# Patient Record
Sex: FEMALE | Race: WHITE | NOT HISPANIC OR LATINO | Employment: FULL TIME | URBAN - METROPOLITAN AREA
[De-identification: names, ages, dates, MRNs, and addresses within clinical notes are randomized per-mention and may not be internally consistent; named-entity substitution may affect disease eponyms.]

---

## 2017-07-21 ENCOUNTER — OFFICE VISIT (OUTPATIENT)
Dept: ORTHOPEDICS | Facility: CLINIC | Age: 67
End: 2017-07-21
Payer: OTHER GOVERNMENT

## 2017-07-21 ENCOUNTER — HOSPITAL ENCOUNTER (OUTPATIENT)
Dept: RADIOLOGY | Facility: HOSPITAL | Age: 67
Discharge: HOME | End: 2017-07-21
Attending: NURSE PRACTITIONER
Payer: OTHER GOVERNMENT

## 2017-07-21 VITALS — HEIGHT: 64 IN | BODY MASS INDEX: 32.8 KG/M2 | WEIGHT: 192.13 LBS

## 2017-07-21 DIAGNOSIS — G89.29 CHRONIC PAIN OF BOTH KNEES: Primary | ICD-10-CM

## 2017-07-21 DIAGNOSIS — M17.0 PRIMARY OSTEOARTHRITIS OF BOTH KNEES: ICD-10-CM

## 2017-07-21 DIAGNOSIS — M25.561 CHRONIC PAIN OF BOTH KNEES: ICD-10-CM

## 2017-07-21 DIAGNOSIS — M25.561 CHRONIC PAIN OF BOTH KNEES: Primary | ICD-10-CM

## 2017-07-21 DIAGNOSIS — M25.562 CHRONIC PAIN OF BOTH KNEES: Primary | ICD-10-CM

## 2017-07-21 DIAGNOSIS — M25.562 CHRONIC PAIN OF BOTH KNEES: ICD-10-CM

## 2017-07-21 DIAGNOSIS — G89.29 CHRONIC PAIN OF BOTH KNEES: ICD-10-CM

## 2017-07-21 PROCEDURE — 1159F MED LIST DOCD IN RCRD: CPT | Mod: S$GLB,,, | Performed by: NURSE PRACTITIONER

## 2017-07-21 PROCEDURE — 20610 DRAIN/INJ JOINT/BURSA W/O US: CPT | Mod: 50,S$GLB,, | Performed by: NURSE PRACTITIONER

## 2017-07-21 PROCEDURE — 73564 X-RAY EXAM KNEE 4 OR MORE: CPT | Mod: TC,50

## 2017-07-21 PROCEDURE — 99203 OFFICE O/P NEW LOW 30 MIN: CPT | Mod: 25,S$GLB,, | Performed by: NURSE PRACTITIONER

## 2017-07-21 PROCEDURE — 99999 PR PBB SHADOW E&M-NEW PATIENT-LVL III: CPT | Mod: PBBFAC,,, | Performed by: NURSE PRACTITIONER

## 2017-07-21 PROCEDURE — 1125F AMNT PAIN NOTED PAIN PRSNT: CPT | Mod: S$GLB,,, | Performed by: NURSE PRACTITIONER

## 2017-07-21 PROCEDURE — 73564 X-RAY EXAM KNEE 4 OR MORE: CPT | Mod: 26,50,, | Performed by: RADIOLOGY

## 2017-07-21 RX ORDER — IRBESARTAN 300 MG/1
300 TABLET ORAL NIGHTLY
COMMUNITY
Start: 2017-07-10 | End: 2021-03-10

## 2017-07-21 RX ORDER — SIMVASTATIN 10 MG/1
10 TABLET, FILM COATED ORAL NIGHTLY
COMMUNITY
End: 2022-01-11 | Stop reason: SDUPTHER

## 2017-07-21 RX ORDER — TRIAMCINOLONE ACETONIDE 40 MG/ML
80 INJECTION, SUSPENSION INTRA-ARTICULAR; INTRAMUSCULAR
Status: COMPLETED | OUTPATIENT
Start: 2017-07-21 | End: 2017-07-21

## 2017-07-21 RX ORDER — MELOXICAM 15 MG/1
15 TABLET ORAL DAILY
Qty: 30 TABLET | Refills: 1 | Status: SHIPPED | OUTPATIENT
Start: 2017-07-21 | End: 2017-09-20 | Stop reason: SDUPTHER

## 2017-07-21 RX ORDER — DILTIAZEM HYDROCHLORIDE 240 MG/1
240 CAPSULE, COATED, EXTENDED RELEASE ORAL EVERY MORNING
COMMUNITY
Start: 2017-06-30 | End: 2022-01-11

## 2017-07-21 RX ORDER — DOXAZOSIN 2 MG/1
2 TABLET ORAL EVERY MORNING
Refills: 1 | COMMUNITY
Start: 2017-06-30 | End: 2022-05-02 | Stop reason: SDUPTHER

## 2017-07-21 RX ADMIN — TRIAMCINOLONE ACETONIDE 80 MG: 40 INJECTION, SUSPENSION INTRA-ARTICULAR; INTRAMUSCULAR at 01:07

## 2017-07-21 NOTE — LETTER
July 21, 2017      Cabrera Ricketts MD  [address] [place] [id number]           [facility] - Orthopedics  [address], 5th [address]  [facility] [address]  Phone: [phone]          Patient: Maria Elena Jackson   MR Number: [id number]   YOB: 1950   Date of Visit: 7/21/2017       Dear Dr. Cabrera Ricketts:    Thank you for referring Maria Elena Jackson to me for evaluation. Attached you will find relevant portions of my assessment and plan of care.    If you have questions, please do not hesitate to call me. I look forward to following Maria Elena Jackson along with you.    Sincerely,    Lu Segura NP    Enclosure  CC:  No Recipients    If you would like to receive this communication electronically, please contact [email or web address] or [phone] to request more information on [employer] Link access.    For providers and/or their staff who would like to refer a patient to [facility], please contact us through our one-stop-shop provider referral line, [facility], at [phone].    If you feel you have received this communication in error or would no longer like to receive these types of communications, please e-mail [email or web address]

## 2017-07-21 NOTE — PROGRESS NOTES
CC: Pain of the Left Knee and Pain of the Right Knee      HPI: Pt with bilateral knee pain for the past several months. The pain started before she took a trip to [facility] in April. There was posterior knee swelling. She took advil with some relief and used voltaren gel which also helped her pain. She has had increased swelling in the left knee over the past week. She is still taking advil with some relief of the pain. She is ambulating without assistive device. There is not a limp.    ROS  General: denies fever and chills  Resp: no c/o sob  CVS: no c/o cp  MSK: c/o bilateral knee pain left>right with associated swelling    PE  General: AAOx3, pleasant and cooperative  Resp: respirations even and unlabored  MSK: bilateral knee exam  0 degrees extension  120 degrees flexion  No warmth or erythema   - effusion    Xray:  Ordered and reviewed by me: There is no evidence for acute fracture dislocation bilateral knees. Tricompartmental degenerative change greater on the right most prominent involving the medial femoral tibial compartment with mild/moderate joint space loss. There is no significant change in genu deformity. No focal bone erosion or evidence for significant joint effusion.    Assessment:  Bilateral knee djd    Plan:  Cortisone injections bilateral knees today  mobic 15mg po qd  RICE  F/u as needed    Knee Injection Procedure Note    Pre-operative Diagnosis: bilateral knee degenerative arthritis    Post-operative Diagnosis: same    Indications: bilateral knee pain    Anesthesia: none    Procedure Details     Verbal consent was obtained for the procedure. The injection site was identified and the skin was prepared with alcohol. The bilateral knee was injected from an anterolateral approach with 1 ml of Kenalog and 5 ml Lidocaine under sterile technique using a 22 gauge needle. The needle was removed and the area cleansed and dressed.    Complications:  None; patient tolerated the procedure well.    she was  advised to rest the knee today, using ice and elevation as needed for comfort and swelling. she did receive immediate relief of the knee pain. she was told this would be short lived and is secondary to the lidocaine. she may have an increase in discomfort tonight followed by steady improvement over the next several days. It may take 1-3 weeks following the injection to get the full benefit of the medication.

## 2017-09-20 DIAGNOSIS — M25.561 CHRONIC PAIN OF BOTH KNEES: ICD-10-CM

## 2017-09-20 DIAGNOSIS — G89.29 CHRONIC PAIN OF BOTH KNEES: ICD-10-CM

## 2017-09-20 DIAGNOSIS — M25.562 CHRONIC PAIN OF BOTH KNEES: ICD-10-CM

## 2017-09-20 RX ORDER — MELOXICAM 15 MG/1
15 TABLET ORAL DAILY
Qty: 30 TABLET | Refills: 1 | Status: SHIPPED | OUTPATIENT
Start: 2017-09-20 | End: 2017-11-08 | Stop reason: SDUPTHER

## 2017-11-08 ENCOUNTER — TELEPHONE (OUTPATIENT)
Dept: ORTHOPEDICS | Facility: CLINIC | Age: 67
End: 2017-11-08

## 2017-11-08 DIAGNOSIS — M25.561 CHRONIC PAIN OF BOTH KNEES: ICD-10-CM

## 2017-11-08 DIAGNOSIS — G89.29 CHRONIC PAIN OF BOTH KNEES: ICD-10-CM

## 2017-11-08 DIAGNOSIS — M25.562 CHRONIC PAIN OF BOTH KNEES: ICD-10-CM

## 2017-11-08 RX ORDER — MELOXICAM 15 MG/1
15 TABLET ORAL DAILY
Qty: 30 TABLET | Refills: 1 | Status: SHIPPED | OUTPATIENT
Start: 2017-11-08 | End: 2019-09-26 | Stop reason: CLARIF

## 2017-11-08 NOTE — TELEPHONE ENCOUNTER
----- Message from Fidelia Edmondson MA sent at 11/8/2017  1:20 PM CST -----  Contact: [facility] Pharmacist      ----- Message -----  From: Mary Barros  Sent: 11/8/2017  12:23 PM  To: [facility] Staff    Pt needs a refill on her Mobic medication.

## 2017-11-14 RX ORDER — MELOXICAM 15 MG/1
15 TABLET ORAL DAILY
Qty: 90 TABLET | Refills: 1 | Status: SHIPPED | OUTPATIENT
Start: 2017-11-14 | End: 2018-12-18

## 2018-03-02 ENCOUNTER — OFFICE VISIT (OUTPATIENT)
Dept: ORTHOPEDICS | Facility: CLINIC | Age: 68
End: 2018-03-02
Payer: OTHER GOVERNMENT

## 2018-03-02 VITALS — BODY MASS INDEX: 32.28 KG/M2 | WEIGHT: 189.06 LBS | HEIGHT: 64 IN

## 2018-03-02 DIAGNOSIS — M17.0 PRIMARY OSTEOARTHRITIS OF BOTH KNEES: Primary | ICD-10-CM

## 2018-03-02 PROCEDURE — 20610 DRAIN/INJ JOINT/BURSA W/O US: CPT | Mod: 50,S$GLB,, | Performed by: PHYSICIAN ASSISTANT

## 2018-03-02 PROCEDURE — 99213 OFFICE O/P EST LOW 20 MIN: CPT | Mod: 25,S$GLB,, | Performed by: PHYSICIAN ASSISTANT

## 2018-03-02 PROCEDURE — 99999 PR PBB SHADOW E&M-EST. PATIENT-LVL III: CPT | Mod: PBBFAC,,, | Performed by: PHYSICIAN ASSISTANT

## 2018-03-02 RX ORDER — TRIAMCINOLONE ACETONIDE 40 MG/ML
80 INJECTION, SUSPENSION INTRA-ARTICULAR; INTRAMUSCULAR
Status: COMPLETED | OUTPATIENT
Start: 2018-03-02 | End: 2018-03-02

## 2018-03-02 RX ADMIN — TRIAMCINOLONE ACETONIDE 80 MG: 40 INJECTION, SUSPENSION INTRA-ARTICULAR; INTRAMUSCULAR at 04:03

## 2018-03-02 NOTE — PROGRESS NOTES
Subjective:      Patient ID: Maria Elena Jackson is a 67 y.o. female.    Chief Complaint: No chief complaint on file.    HPI  67 year old female presents with chief complaint of bilateral knee pain L>R x couple of months. She denies trauma. She has a known h/o OA. She has slight pain with walking and pain with stairs. She took mobic in the past. She received cortisone injections last July that gave good relief. She does not use assistive devices.   Review of Systems   Constitution: Negative for chills, fever and night sweats.   Cardiovascular: Negative for chest pain.   Respiratory: Negative for cough and shortness of breath.    Hematologic/Lymphatic: Does not bruise/bleed easily.   Skin: Negative for color change.   Gastrointestinal: Negative for heartburn.   Genitourinary: Negative for dysuria.   Neurological: Negative for numbness and paresthesias.   Psychiatric/Behavioral: Negative for altered mental status.   Allergic/Immunologic: Negative for persistent infections.         Objective:            General    Vitals reviewed.  Constitutional: She is oriented to person, place, and time. She appears well-developed and well-nourished.   Cardiovascular: Normal rate.    Neurological: She is alert and oriented to person, place, and time.             Right Knee Exam:  ROM 0-125 degrees  No effusion  +crepitus  TTP medial joint line    Left Knee Exam:  ROM 0-125 degrees  Small effusion  No warmth or erythema  +crepitus  TTP medial and lateral joint line      X-ray: reviewed by myself. DJD both knees.      Assessment:       Encounter Diagnosis   Name Primary?    Primary osteoarthritis of both knees Yes          Plan:       Patient is going to [place] next week. She would like cortisone injections. Ice and elevate knee. Mobic as needed. HEP given. RTC prn.     PROCEDURE:  I have explained the risks, benefits, and alternatives of the procedure in detail.  The patient voices understanding and all questions have been  answered.  The patient agrees to proceed as planned. So after I performed a sterile prep of the skin in the normal fashion the bilateral knee is injected using a 22 gauge needle from the anterolateral approach with a combination of 4cc 1% plain lidocaine and 40 mg of kenalog.  The patient is cautioned and immediate relief of pain is secondary to the local anesthetic and will be temporary.  After the anesthetic wears off there may be a increase in pain that may last for a few hours or a few days and they should use ice to help alleviate this flair up of pain.

## 2018-09-20 ENCOUNTER — OFFICE VISIT (OUTPATIENT)
Dept: ORTHOPEDICS | Facility: CLINIC | Age: 68
End: 2018-09-20
Payer: OTHER GOVERNMENT

## 2018-09-20 VITALS
WEIGHT: 192 LBS | SYSTOLIC BLOOD PRESSURE: 160 MMHG | HEIGHT: 64 IN | BODY MASS INDEX: 32.78 KG/M2 | HEART RATE: 64 BPM | DIASTOLIC BLOOD PRESSURE: 73 MMHG

## 2018-09-20 DIAGNOSIS — M17.0 PRIMARY OSTEOARTHRITIS OF BOTH KNEES: Primary | ICD-10-CM

## 2018-09-20 PROCEDURE — 99213 OFFICE O/P EST LOW 20 MIN: CPT | Mod: 25,S$GLB,, | Performed by: PHYSICIAN ASSISTANT

## 2018-09-20 PROCEDURE — 99999 PR PBB SHADOW E&M-EST. PATIENT-LVL III: CPT | Mod: PBBFAC,,, | Performed by: PHYSICIAN ASSISTANT

## 2018-09-20 PROCEDURE — 20610 DRAIN/INJ JOINT/BURSA W/O US: CPT | Mod: 50,S$GLB,, | Performed by: PHYSICIAN ASSISTANT

## 2018-09-20 RX ORDER — METHYLPREDNISOLONE ACETATE 80 MG/ML
80 INJECTION, SUSPENSION INTRA-ARTICULAR; INTRALESIONAL; INTRAMUSCULAR; SOFT TISSUE
Status: COMPLETED | OUTPATIENT
Start: 2018-09-20 | End: 2018-09-20

## 2018-09-20 RX ADMIN — METHYLPREDNISOLONE ACETATE 80 MG: 80 INJECTION, SUSPENSION INTRA-ARTICULAR; INTRALESIONAL; INTRAMUSCULAR; SOFT TISSUE at 09:09

## 2018-09-20 NOTE — PROGRESS NOTES
Subjective:      Patient ID: Maria Elena Jackson is a 68 y.o. female.    Chief Complaint: No chief complaint on file.    HPI  68 year old female returns regarding her bilateral knee pain R>L. No trauma. She has h/o OA. Pain is worse with stairs. She has pain with walking and twisting. Aleve and advil give some relief. Mobic does not help anymore. She had cortisone injections in the past. Last injection was in March but it only lasted about 2 months.   Review of Systems   Constitution: Negative for chills, fever and night sweats.   Cardiovascular: Negative for chest pain.   Respiratory: Negative for cough and shortness of breath.    Hematologic/Lymphatic: Does not bruise/bleed easily.   Skin: Negative for color change.   Gastrointestinal: Negative for heartburn.   Genitourinary: Negative for dysuria.   Neurological: Negative for numbness and paresthesias.   Psychiatric/Behavioral: Negative for altered mental status.   Allergic/Immunologic: Negative for persistent infections.         Objective:            General    Vitals reviewed.  Constitutional: She is oriented to person, place, and time. She appears well-developed and well-nourished.   Cardiovascular: Normal rate.    Neurological: She is alert and oriented to person, place, and time.             Bilateral Knee Exam:  ROM 0-120 degrees  No effusion  No warmth or erythema  TTP medial joint line      X-ray: reviewed by myself. There is no evidence for acute fracture dislocation bilateral knees. Tricompartmental degenerative change greater on the right most prominent involving the medial femoral tibial compartment with mild/moderate joint space loss. There is no significant change in genu deformity. No focal bone erosion or evidence for significant joint effusion.      Assessment:       Encounter Diagnosis   Name Primary?    Primary osteoarthritis of both knees Yes          Plan:       Discussed treatment options with patient. She is going on a cruise in 2 weeks  and would like to repeat cortisone today. RTC prn.     PROCEDURE:  I have explained the risks, benefits, and alternatives of the procedure in detail.  The patient voices understanding and all questions have been answered.  The patient agrees to proceed as planned. So after I performed a sterile prep of the skin in the normal fashion the bilateral knee is injected using a 22 gauge needle from the anterolateral approach with a combination of 4cc 1% plain lidocaine and 80 mg of depo medrol.  The patient is cautioned and immediate relief of pain is secondary to the local anesthetic and will be temporary.  After the anesthetic wears off there may be a increase in pain that may last for a few hours or a few days and they should use ice to help alleviate this flair up of pain.

## 2018-11-20 DIAGNOSIS — M17.0 PRIMARY OSTEOARTHRITIS OF BOTH KNEES: Primary | ICD-10-CM

## 2018-11-20 NOTE — PROGRESS NOTES
Patient received very short relief with bilateral cortisone injections done in September. She has bilateral knee DJD. She wants to try viscosupplementation. Order placed for euflexxa. RTC in 2 weeks for first injections pending insurance approval.

## 2018-11-29 ENCOUNTER — TELEPHONE (OUTPATIENT)
Dept: ORTHOPEDICS | Facility: CLINIC | Age: 68
End: 2018-11-29

## 2018-11-30 ENCOUNTER — TELEPHONE (OUTPATIENT)
Dept: ORTHOPEDICS | Facility: CLINIC | Age: 68
End: 2018-11-30

## 2018-11-30 NOTE — TELEPHONE ENCOUNTER
Called patient. No answer. Left vm stating that her options are PT (aquatic or land) or consultation with a knee surgeon. Stated to call clinic back and let us know what she wants to do.

## 2018-11-30 NOTE — TELEPHONE ENCOUNTER
Called pt in regards to starting PT. I explain to pt Ms. Mills and her nurse is out of office for today and that I would send Ms Mills a message to let her know she is ready for PT. Pt verbally understood.

## 2018-12-03 ENCOUNTER — TELEPHONE (OUTPATIENT)
Dept: ORTHOPEDICS | Facility: CLINIC | Age: 68
End: 2018-12-03

## 2018-12-18 ENCOUNTER — OFFICE VISIT (OUTPATIENT)
Dept: ORTHOPEDICS | Facility: CLINIC | Age: 68
End: 2018-12-18
Payer: OTHER GOVERNMENT

## 2018-12-18 ENCOUNTER — HOSPITAL ENCOUNTER (OUTPATIENT)
Dept: RADIOLOGY | Facility: HOSPITAL | Age: 68
Discharge: HOME | End: 2018-12-18
Attending: ORTHOPAEDIC SURGERY
Payer: OTHER GOVERNMENT

## 2018-12-18 ENCOUNTER — TELEPHONE (OUTPATIENT)
Dept: ORTHOPEDICS | Facility: CLINIC | Age: 68
End: 2018-12-18

## 2018-12-18 VITALS
SYSTOLIC BLOOD PRESSURE: 153 MMHG | HEART RATE: 64 BPM | WEIGHT: 196.19 LBS | HEIGHT: 64 IN | DIASTOLIC BLOOD PRESSURE: 76 MMHG | BODY MASS INDEX: 33.49 KG/M2

## 2018-12-18 DIAGNOSIS — M25.561 CHRONIC PAIN OF BOTH KNEES: Primary | ICD-10-CM

## 2018-12-18 DIAGNOSIS — G89.29 CHRONIC PAIN OF BOTH KNEES: ICD-10-CM

## 2018-12-18 DIAGNOSIS — M17.0 PRIMARY OSTEOARTHRITIS OF BOTH KNEES: ICD-10-CM

## 2018-12-18 DIAGNOSIS — M25.562 CHRONIC PAIN OF BOTH KNEES: ICD-10-CM

## 2018-12-18 DIAGNOSIS — M25.561 CHRONIC PAIN OF BOTH KNEES: ICD-10-CM

## 2018-12-18 DIAGNOSIS — G89.29 CHRONIC PAIN OF BOTH KNEES: Primary | ICD-10-CM

## 2018-12-18 DIAGNOSIS — M25.562 CHRONIC PAIN OF BOTH KNEES: Primary | ICD-10-CM

## 2018-12-18 PROCEDURE — 73562 X-RAY EXAM OF KNEE 3: CPT | Mod: 26,50,, | Performed by: RADIOLOGY

## 2018-12-18 PROCEDURE — 73562 X-RAY EXAM OF KNEE 3: CPT | Mod: TC,50

## 2018-12-18 PROCEDURE — 99999 PR PBB SHADOW E&M-EST. PATIENT-LVL III: CPT | Mod: PBBFAC,,, | Performed by: ORTHOPAEDIC SURGERY

## 2018-12-18 PROCEDURE — 99214 OFFICE O/P EST MOD 30 MIN: CPT | Mod: S$GLB,,, | Performed by: ORTHOPAEDIC SURGERY

## 2018-12-18 NOTE — TELEPHONE ENCOUNTER
----- Message from Shandra Wakefield MA sent at 12/18/2018  3:33 PM CST -----  Contact: pt      ----- Message -----  From: Neetu Robles  Sent: 12/18/2018   3:08 PM  To: [facility]    Pt would like to be called back regarding scheduling surgery    Pt can be reached at [phone]              We spoke  She sould not get a steroid injection if she wants surgery in January   She will talk dates over with work & family & call me back

## 2018-12-18 NOTE — PROGRESS NOTES
HPI:    Maria Elena Jackson is a 68 y.o. female who is here today for   Chief Complaint   Patient presents with    Left Knee - Pain    Right Knee - Pain   .     Duration: 6 months  Intensity: severe  Character of pain: sharp  Location: She reports that the pain is predominately  medial  Patient's pain increases with activity.  Pain is increased with weightbearing and interferes with activities of daily living.    She has tried conservative management including NSAIDS, injections, and activity modification without relief.    She has discussed options with her family and wishes to schedule TKA.     PMSFSH reviewed per clinic record       Past Medical History:   Diagnosis Date    Hyperlipidemia     Hypertension           Current Outpatient Medications:     diltiaZEM (CARDIZEM CD) 240 MG 24 hr capsule, , Disp: , Rfl:     doxazosin (CARDURA) 2 MG tablet, Take 2 mg by mouth once daily., Disp: , Rfl: 1    HYDROCHLOROTHIAZIDE ORAL, Take 25 mg by mouth as needed. , Disp: , Rfl:     irbesartan (AVAPRO) 300 MG tablet, , Disp: , Rfl:     simvastatin (ZOCOR) 10 MG tablet, Take 10 mg by mouth every evening., Disp: , Rfl:     meloxicam (MOBIC) 15 MG tablet, Take 1 tablet (15 mg total) by mouth once daily., Disp: 30 tablet, Rfl: 1    Current Facility-Administered Medications:     sodium hyaluronate (EUFLEXXA) 10 mg/mL(mw 2.4 -3.6 million) Syrg 40 mg, 4 mL, Intra-articular, Weekly, Annelise Mills PA-C     Review of patient's allergies indicates:  No Known Allergies     ROS  Constitutional: Negative for fever, Negative for weight loss  HENT Negative for congestion  Cardiovascular: Negative chest pain  Respiratory: neg Shortness of breath  Heme: Negative excessive bleeding  Skin:NegativeItching, Negative breakdown  Musculoskeletal:Negative for back pain, Positive for joint pain, Negative muscle pain, Negative muscle weakness  Neurological: Negative for numbness and paresthesias   Psychiatric/Behavioral: Negative  "altered mental status, Negative for depression    Physical Exam:   BP (!) 153/76   Pulse 64   Ht 5' 4" (1.626 m)   Wt 89 kg (196 lb 3.4 oz)   BMI 33.68 kg/m²   General appearance: This is a well-developed, Well nourished female No obvious acute distress.  Psychiatric: normal mood and affect;  pleasant and conversant; judgment and thought content normal  Gait is coordinated. Patient has antalgic gait to the bilateral  Cardiovascular: There are no swelling or varicosities present.   Respiratory: normal respiratory effort   Lymphatic: no adenopathy   Neurologic: alert and oriented to person, place, and time   Deep Tendon Reflexes are normal;  Coordination and Balance: Normal   Musculoskeletal   Neck    ROM shows normal flexion and extension and lateral rotation    Palpation: Non-tender    Stability is normal    Strength is normal    Skin is normal without masses and lesions    Sensation is intact to light touch   Back    ROM showsnormal flexion, extension and     rotation    Palpation shows no masses    Stability is normal    Strength to flexion and extension well maintained    Core strength is diminished    Skin shows no rashes or cafe au lait spots;     Sensation is intact to light touch  Right hip   Range of motion normal    Left Hip  Range of motionnormal    Right Knee  Swelling Mild  TendernessMedial joint line  Range of Motion: 120  Motion is painfulYes  Crepitance presentYes    Right Leg  Neurologic Intact  Pulses Intact    Left Knee: Swelling Mild  TendernessMedial joint line  Range of Motion: 120   Motion is painful Yes    Left Leg   Neurologic Intact  Pulses Intact    Radiograph: Show severe degenerative arthritis with subchondral sclerosis, periarticular osteophytes and narrowing of joint space.  Angle: mild varus    Physical therapy is contraindicated due to potential bone loss on this severe arthritic joint.    Assessment:  Knee arthritis bilateral      She will need to be cleared in our PreOp center. "         .    She  has a past medical history of Hyperlipidemia and Hypertension. . We will have to take this into account. She  will be followed by the hospitalist service while in the hospital.       We have gone over the hospitalization and recovery with her as well.  This is typically around 2 weeks on a walker and transition to a cane after that.  She will have home health likely for 3-4 weeks and then transition to outpatient if necessary.  She is agreement with this plan of care and is ready to proceed.  I will see her back for clinical recheck at the 2-week postop sharon.  I will see her back for clinical recheck for any other questions or problems as needed and certainly for any other issues in the interim.    We have discussed risks of total knee replacement which include but are not limited to blood clots in the legs that can travel to the lungs (pulmonary embolism). Pulmonary embolism can cause shortness of breath, chest pain, and even shock. Other risks include urinary tract infection, nausea and vomiting (usually related to pain medication), chronic knee pain and stiffness, bleeding into the knee joint, nerve damage, blood vessel injury, and infection of the knee which can require re-operation. Furthermore, the risks of anesthesia include potential heart, lung, kidney, and liver damage.

## 2018-12-18 NOTE — LETTER
December 18, 2018      Annelise Mills PA-C  [address]  [facility] [address]           [facility] - Orthopedics  [address]  [facility] [address]  Phone: [phone]          Patient: Maria Elena Jackson   MR Number: [id number]   YOB: 1950   Date of Visit: 12/18/2018       Dear Annelise Mills:    Thank you for referring Maria Elena Jackson to me for evaluation. Attached you will find relevant portions of my assessment and plan of care.    If you have questions, please do not hesitate to call me. I look forward to following Maria Elena Jackson along with you.    Sincerely,    [facility] MD    Enclosure  CC:  No Recipients    If you would like to receive this communication electronically, please contact [email or web address] or [phone] to request more information on [employer] Link access.    For providers and/or their staff who would like to refer a patient to [facility], please contact us through our one-stop-shop provider referral line, [facility], at [phone].    If you feel you have received this communication in error or would no longer like to receive these types of communications, please e-mail [email or web address]

## 2018-12-20 ENCOUNTER — OFFICE VISIT (OUTPATIENT)
Dept: ORTHOPEDICS | Facility: CLINIC | Age: 68
End: 2018-12-20
Payer: OTHER GOVERNMENT

## 2018-12-20 ENCOUNTER — TELEPHONE (OUTPATIENT)
Dept: ORTHOPEDICS | Facility: CLINIC | Age: 68
End: 2018-12-20

## 2018-12-20 VITALS — WEIGHT: 189.81 LBS | HEIGHT: 64 IN | BODY MASS INDEX: 32.41 KG/M2

## 2018-12-20 DIAGNOSIS — M17.12 PRIMARY OSTEOARTHRITIS OF LEFT KNEE: Primary | ICD-10-CM

## 2018-12-20 PROCEDURE — 20610 DRAIN/INJ JOINT/BURSA W/O US: CPT | Mod: LT,S$GLB,, | Performed by: PHYSICIAN ASSISTANT

## 2018-12-20 PROCEDURE — 99213 OFFICE O/P EST LOW 20 MIN: CPT | Mod: 25,S$GLB,, | Performed by: PHYSICIAN ASSISTANT

## 2018-12-20 PROCEDURE — 99999 PR PBB SHADOW E&M-EST. PATIENT-LVL III: CPT | Mod: PBBFAC,,, | Performed by: PHYSICIAN ASSISTANT

## 2018-12-20 RX ORDER — METHYLPREDNISOLONE ACETATE 80 MG/ML
80 INJECTION, SUSPENSION INTRA-ARTICULAR; INTRALESIONAL; INTRAMUSCULAR; SOFT TISSUE
Status: COMPLETED | OUTPATIENT
Start: 2018-12-20 | End: 2018-12-20

## 2018-12-20 RX ADMIN — METHYLPREDNISOLONE ACETATE 80 MG: 80 INJECTION, SUSPENSION INTRA-ARTICULAR; INTRALESIONAL; INTRAMUSCULAR; SOFT TISSUE at 10:12

## 2018-12-20 NOTE — TELEPHONE ENCOUNTER
We spoke  She picked March 18 for her Right total knee replacement   She will come in March 12  for her clearance and the seminar [email or web address]   I will mail her the appointments

## 2018-12-20 NOTE — TELEPHONE ENCOUNTER
----- Message from Jessica Bobo sent at 12/20/2018  2:24 PM CST -----  Contact: [email or web address]  Type:  Patient Returning Call    Who Called:  Patient  Who Left Message for Patient:  Donna  Does the patient know what this is regarding?:    Best Call Back Number:    Additional Information:           No answer  Left a message to call me back

## 2018-12-20 NOTE — PROGRESS NOTES
Subjective:      Patient ID: Maria Elena Jackson is a 68 y.o. female.    Chief Complaint: No chief complaint on file.    HPI  68 year old female returns regarding her left knee pain. She has h/o OA. She is going to have right TKA done soon by Dr. [facility]. She is here today requesting a cortisone injection for her left knee.   Review of Systems   Constitution: Negative for chills, fever and night sweats.   Cardiovascular: Negative for chest pain.   Respiratory: Negative for cough and shortness of breath.    Hematologic/Lymphatic: Does not bruise/bleed easily.   Skin: Negative for color change.   Gastrointestinal: Negative for heartburn.   Genitourinary: Negative for dysuria.   Neurological: Negative for numbness and paresthesias.   Psychiatric/Behavioral: Negative for altered mental status.   Allergic/Immunologic: Negative for persistent infections.         Objective:            General    Vitals reviewed.  Constitutional: She is oriented to person, place, and time. She appears well-developed and well-nourished.   Cardiovascular: Normal rate.    Neurological: She is alert and oriented to person, place, and time.             Left Knee Exam:  ROM 0-120 degrees  No effusion  TTP medial joint line      X-ray: reviewed by myself. Advanced DJD.       Assessment:       Encounter Diagnosis   Name Primary?    Primary osteoarthritis of left knee Yes          Plan:       Patient requests left knee cortisone injection. She will contact Dr. [facility] office when she is ready to schedule right TKA.    PROCEDURE:  I have explained the risks, benefits, and alternatives of the procedure in detail.  The patient voices understanding and all questions have been answered.  The patient agrees to proceed as planned. So after I performed a sterile prep of the skin in the normal fashion the left knee is injected using a 22 gauge needle from the anterolateral approach with a combination of 4cc 1% plain lidocaine and 80 mg of depo medrol.   The patient is cautioned and immediate relief of pain is secondary to the local anesthetic and will be temporary.  After the anesthetic wears off there may be a increase in pain that may last for a few hours or a few days and they should use ice to help alleviate this flair up of pain.

## 2019-01-11 DIAGNOSIS — M17.9 OSTEOARTHRITIS OF KNEE, UNSPECIFIED (CODE): Primary | ICD-10-CM

## 2019-01-30 ENCOUNTER — TELEPHONE (OUTPATIENT)
Dept: ORTHOPEDICS | Facility: CLINIC | Age: 69
End: 2019-01-30

## 2019-01-30 NOTE — TELEPHONE ENCOUNTER
----- Message from Erna Boothe MA sent at 1/29/2019  6:12 PM CST -----  Contact: Self/ [phone]      ----- Message -----  From: Concepcion Chaidez  Sent: 1/29/2019   4:11 PM  To: [facility] Staff    Patient would like to speak with a nurse about her surgery.      We spoke  She must postpone her  R  TKA on 3/18 to sometime in August do to needing 2 root canals and periodontal work   She went to the seminar already   Will need a full clearance with template films also

## 2019-02-04 ENCOUNTER — OFFICE VISIT (OUTPATIENT)
Dept: OBSTETRICS AND GYNECOLOGY | Facility: CLINIC | Age: 69
End: 2019-02-04
Attending: OBSTETRICS & GYNECOLOGY
Payer: OTHER GOVERNMENT

## 2019-02-04 VITALS
BODY MASS INDEX: 31.48 KG/M2 | HEIGHT: 65 IN | WEIGHT: 188.94 LBS | DIASTOLIC BLOOD PRESSURE: 78 MMHG | SYSTOLIC BLOOD PRESSURE: 122 MMHG

## 2019-02-04 DIAGNOSIS — Z01.419 WELL WOMAN EXAM: Primary | ICD-10-CM

## 2019-02-04 PROCEDURE — 99999 PR PBB SHADOW E&M-EST. PATIENT-LVL III: CPT | Mod: PBBFAC,,, | Performed by: OBSTETRICS & GYNECOLOGY

## 2019-02-04 PROCEDURE — 99387 PR PREVENTIVE VISIT,NEW,65 & OVER: ICD-10-PCS | Mod: S$GLB,,, | Performed by: OBSTETRICS & GYNECOLOGY

## 2019-02-04 PROCEDURE — 88175 CYTOPATH C/V AUTO FLUID REDO: CPT

## 2019-02-04 PROCEDURE — 99999 PR PBB SHADOW E&M-EST. PATIENT-LVL III: ICD-10-PCS | Mod: PBBFAC,,, | Performed by: OBSTETRICS & GYNECOLOGY

## 2019-02-04 PROCEDURE — 99387 INIT PM E/M NEW PAT 65+ YRS: CPT | Mod: S$GLB,,, | Performed by: OBSTETRICS & GYNECOLOGY

## 2019-02-04 RX ORDER — SULFAMETHOXAZOLE AND TRIMETHOPRIM 800; 160 MG/1; MG/1
1 TABLET ORAL 2 TIMES DAILY
Refills: 0 | COMMUNITY
Start: 2019-01-28 | End: 2022-01-11

## 2019-02-04 NOTE — PROGRESS NOTES
"CC: Well woman exam    Maria Elena Jackson is a 68 y.o. female  presents for well woman exam.  LMP: No LMP recorded. Patient is postmenopausal..  No gyn issues, problems, or complaints.      Past Medical History:   Diagnosis Date    Hyperlipidemia     Hypertension      Past Surgical History:   Procedure Laterality Date    CHOLECYSTECTOMY      D&C Hysteroscopy      DILATION AND CURETTAGE OF UTERUS      HYSTEROSCOPY, WITH DILATION AND CURETTAGE OF UTERUS N/A 2013    Performed by Miguel Angel Benjamin [facility] at [facility]    laparoscopy      TONSILLECTOMY       Social History     Socioeconomic History    Marital status:      Spouse name: Not on file    Number of children: Not on file    Years of education: Not on file    Highest education level: Not on file   Social Needs    Financial resource strain: Not on file    Food insecurity - worry: Not on file    Food insecurity - inability: Not on file    Transportation needs - medical: Not on file    Transportation needs - non-medical: Not on file   Occupational History    Not on file   Tobacco Use    Smoking status: Former Smoker    Smokeless tobacco: Never Used    Tobacco comment: quit 25 yrs ago   Substance and Sexual Activity    Alcohol use: Yes     Comment: social    Drug use: No    Sexual activity: Not Currently     Birth control/protection: Post-menopausal   Other Topics Concern    Not on file   Social History Narrative    Not on file     Family History   Problem Relation Age of Onset    Colon cancer Mother 55    Ovarian cancer Mother 35    Breast cancer Neg Hx      OB History      Para Term  AB Living    2 2       2    SAB TAB Ectopic Multiple Live Births            2          /78   Ht 5' 5" (1.651 m)   Wt 85.7 kg (188 lb 15 oz)   BMI 31.44 kg/m²       ROS:  GENERAL: Denies weight gain or weight loss. Feeling well overall.   SKIN: Denies rash or lesions.   HEAD: Denies head injury or headache.   NODES: " Denies enlarged lymph nodes.   CHEST: Denies chest pain or shortness of breath.   CARDIOVASCULAR: Denies palpitations or left sided chest pain.   ABDOMEN: No abdominal pain, constipation, diarrhea, nausea, vomiting or rectal bleeding.   URINARY: No frequency, dysuria, hematuria, or burning on urination.  REPRODUCTIVE: See HPI.   BREASTS: The patient performs breast self-examination and denies pain, lumps, or nipple discharge.   HEMATOLOGIC: No easy bruisability or excessive bleeding.   MUSCULOSKELETAL: Denies joint pain or swelling.   NEUROLOGIC: Denies syncope or weakness.   PSYCHIATRIC: Denies depression, anxiety or mood swings.    PHYSICAL EXAM:  APPEARANCE: Well nourished, well developed, in no acute distress.  AFFECT: WNL, alert and oriented x 3  SKIN: No acne or hirsutism  NECK: Neck symmetric without masses or thyromegaly  NODES: No inguinal, cervical, axillary, or femoral lymph node enlargement  CHEST: Good respiratory effect  ABDOMEN: Soft.  No tenderness or masses.  No hepatosplenomegaly.  No hernias.  BREASTS: Symmetrical, no skin changes or visible lesions.  No palpable masses, nipple discharge bilaterally.  PELVIC: Normal external genitalia without lesions.  Normal hair distribution.  Adequate perineal body, normal urethral meatus.  Vagina moist and well rugated without lesions or discharge.  Cervix pink, without lesions, discharge or tenderness.  No significant cystocele or rectocele.  Bimanual exam shows uterus to be normal size, regular, mobile and nontender.  Adnexa without masses or tenderness.    EXTREMITIES: No edema.    ASSESSMENT    ICD-10-CM ICD-9-CM    1. Well woman exam Z01.419 V72.31 Mammo Digital Screening Bilat w/ Shahriar      Liquid-based pap smear, screening         PLAN:  Well woman exam  -     Mammo Digital Screening Bilat w/ Shahriar; Future; Expected date: 02/04/2019  -     Liquid-based pap smear, screening        Patient was counseled today on A.C.S. Pap guidelines and recommendations for  yearly pelvic exams, mammograms and monthly self breast exams; to see her PCP for other health maintenance.

## 2019-02-10 ENCOUNTER — HOSPITAL ENCOUNTER (OUTPATIENT)
Dept: RADIOLOGY | Facility: HOSPITAL | Age: 69
Discharge: HOME | End: 2019-02-10
Attending: OBSTETRICS & GYNECOLOGY
Payer: OTHER GOVERNMENT

## 2019-02-10 VITALS — BODY MASS INDEX: 31.16 KG/M2 | HEIGHT: 65 IN | WEIGHT: 187 LBS

## 2019-02-10 DIAGNOSIS — Z12.31 VISIT FOR SCREENING MAMMOGRAM: ICD-10-CM

## 2019-02-10 PROCEDURE — 77067 SCR MAMMO BI INCL CAD: CPT | Mod: TC

## 2019-02-10 PROCEDURE — 77067 SCR MAMMO BI INCL CAD: CPT | Mod: 26,,, | Performed by: RADIOLOGY

## 2019-02-10 PROCEDURE — 77067 MAMMO DIGITAL SCREENING BILAT WITH TOMOSYNTHESIS_CAD: ICD-10-PCS | Mod: 26,,, | Performed by: RADIOLOGY

## 2019-02-10 PROCEDURE — 77063 BREAST TOMOSYNTHESIS BI: CPT | Mod: 26,,, | Performed by: RADIOLOGY

## 2019-02-10 PROCEDURE — 77063 MAMMO DIGITAL SCREENING BILAT WITH TOMOSYNTHESIS_CAD: ICD-10-PCS | Mod: 26,,, | Performed by: RADIOLOGY

## 2019-02-11 ENCOUNTER — TELEPHONE (OUTPATIENT)
Dept: RADIOLOGY | Facility: HOSPITAL | Age: 69
End: 2019-02-11

## 2019-02-11 NOTE — TELEPHONE ENCOUNTER
Spoke with patient and explained mammogram findings.Patient expressed understanding of results. Patient is scheduled for a abnormal mammogram follow up appointment at [facility] on 2/12/2019.

## 2019-02-12 ENCOUNTER — HOSPITAL ENCOUNTER (OUTPATIENT)
Dept: RADIOLOGY | Facility: HOSPITAL | Age: 69
Discharge: HOME | End: 2019-02-12
Attending: OBSTETRICS & GYNECOLOGY
Payer: OTHER GOVERNMENT

## 2019-02-12 DIAGNOSIS — R92.8 ABNORMAL MAMMOGRAM: ICD-10-CM

## 2019-02-12 PROCEDURE — 76642 ULTRASOUND BREAST LIMITED: CPT | Mod: TC,PO,RT

## 2019-02-12 PROCEDURE — 76642 ULTRASOUND BREAST LIMITED: CPT | Mod: 26,RT,, | Performed by: RADIOLOGY

## 2019-02-12 PROCEDURE — 76642 US BREAST RIGHT LIMITED: ICD-10-PCS | Mod: 26,RT,, | Performed by: RADIOLOGY

## 2019-06-18 ENCOUNTER — OFFICE VISIT (OUTPATIENT)
Dept: ORTHOPEDICS | Facility: CLINIC | Age: 69
End: 2019-06-18
Payer: OTHER GOVERNMENT

## 2019-06-18 VITALS
WEIGHT: 180.88 LBS | BODY MASS INDEX: 30.14 KG/M2 | SYSTOLIC BLOOD PRESSURE: 160 MMHG | DIASTOLIC BLOOD PRESSURE: 70 MMHG | HEIGHT: 65 IN | HEART RATE: 58 BPM

## 2019-06-18 DIAGNOSIS — M17.0 PRIMARY OSTEOARTHRITIS OF BOTH KNEES: Primary | ICD-10-CM

## 2019-06-18 PROCEDURE — 99999 PR PBB SHADOW E&M-EST. PATIENT-LVL III: CPT | Mod: PBBFAC,,, | Performed by: PHYSICIAN ASSISTANT

## 2019-06-18 PROCEDURE — 20610 PR DRAIN/INJECT LARGE JOINT/BURSA: ICD-10-PCS | Mod: 50,S$GLB,, | Performed by: PHYSICIAN ASSISTANT

## 2019-06-18 PROCEDURE — 99213 OFFICE O/P EST LOW 20 MIN: CPT | Mod: 25,S$GLB,, | Performed by: PHYSICIAN ASSISTANT

## 2019-06-18 PROCEDURE — 20610 DRAIN/INJ JOINT/BURSA W/O US: CPT | Mod: 50,S$GLB,, | Performed by: PHYSICIAN ASSISTANT

## 2019-06-18 PROCEDURE — 99213 PR OFFICE/OUTPT VISIT, EST, LEVL III, 20-29 MIN: ICD-10-PCS | Mod: 25,S$GLB,, | Performed by: PHYSICIAN ASSISTANT

## 2019-06-18 PROCEDURE — 99999 PR PBB SHADOW E&M-EST. PATIENT-LVL III: ICD-10-PCS | Mod: PBBFAC,,, | Performed by: PHYSICIAN ASSISTANT

## 2019-06-18 RX ORDER — METHYLPREDNISOLONE ACETATE 80 MG/ML
80 INJECTION, SUSPENSION INTRA-ARTICULAR; INTRALESIONAL; INTRAMUSCULAR; SOFT TISSUE
Status: COMPLETED | OUTPATIENT
Start: 2019-06-18 | End: 2019-06-18

## 2019-06-18 RX ADMIN — METHYLPREDNISOLONE ACETATE 80 MG: 80 INJECTION, SUSPENSION INTRA-ARTICULAR; INTRALESIONAL; INTRAMUSCULAR; SOFT TISSUE at 11:06

## 2019-06-18 NOTE — PROGRESS NOTES
Subjective:      Patient ID: Maria Elena Jackson is a 68 y.o. female.    Chief Complaint: Pain of the Right Knee and Pain of the Left Knee    HPI  Patient returns regarding her bilateral knee pain R>L. She has h/o OA. She was supposed to undergo right TKA in March by Dr. [facility] but she had to get some dental work done. She is planning for surgery later this Fall. Pain is worse with increased activity. She takes advil prn with some relief. She received cortisone injections in the past that give her relief for about 3 months.   Review of Systems   Constitution: Negative for chills, fever and night sweats.   Cardiovascular: Negative for chest pain.   Respiratory: Negative for cough and shortness of breath.    Hematologic/Lymphatic: Does not bruise/bleed easily.   Skin: Negative for color change.   Gastrointestinal: Negative for heartburn.   Genitourinary: Negative for dysuria.   Neurological: Negative for numbness and paresthesias.   Psychiatric/Behavioral: Negative for altered mental status.   Allergic/Immunologic: Negative for persistent infections.         Objective:            General    Vitals reviewed.  Constitutional: She is oriented to person, place, and time. She appears well-developed and well-nourished.   Cardiovascular: Normal rate.    Neurological: She is alert and oriented to person, place, and time.             Bilateral Knee Exam:  ROM 0-120 degrees  No effusion  No warmth or erythema  TTP medial joint line      X-ray: reviewed by myself. Advanced DJD.             Assessment:       Encounter Diagnosis   Name Primary?    Primary osteoarthritis of both knees Yes          Plan:       Patient is requesting repeat cortisone injections today since she won't be having surgery til later this year. RTC prn.     PROCEDURE:  I have explained the risks, benefits, and alternatives of the procedure in detail.  The patient voices understanding and all questions have been answered.  The patient agrees to proceed as  planned. So after I performed a sterile prep of the skin in the normal fashion the bilateral knee is injected using a 22 gauge needle from the anteromedial approach with a combination of 4cc 1% plain lidocaine and 80 mg of depo medrol.  The patient is cautioned and immediate relief of pain is secondary to the local anesthetic and will be temporary.  After the anesthetic wears off there may be a increase in pain that may last for a few hours or a few days and they should use ice to help alleviate this flair up of pain.

## 2019-08-21 ENCOUNTER — TELEPHONE (OUTPATIENT)
Dept: ORTHOPEDICS | Facility: CLINIC | Age: 69
End: 2019-08-21

## 2019-08-21 NOTE — TELEPHONE ENCOUNTER
----- Message from Concepcion Chaidez sent at 8/21/2019  3:37 PM CDT -----  Contact: Self/ [phone]  [facility]    Who Left Message for Patient: Donna Cheek LPN .   Communication Preference: [phone].        We spoke  She's ready for her r TKA    Wants 10/7    Will come in 10/1 for preop clearance  &  did the seminar January 2019 (she was scheduled for 3/19-cx=teeth infected , all good now)

## 2019-08-21 NOTE — TELEPHONE ENCOUNTER
----- Message from Concepcion Chaidez sent at 8/21/2019  3:16 PM CDT -----  Contact: Self/ [phone]  Patient would like to speak with the doctor nurse to ask a few questions about her knee pain.           No answer  Left a message to call me back

## 2019-08-22 DIAGNOSIS — M17.9 OSTEOARTHRITIS OF KNEE, UNSPECIFIED (CODE): Primary | ICD-10-CM

## 2019-09-16 ENCOUNTER — TELEPHONE (OUTPATIENT)
Dept: ORTHOPEDICS | Facility: CLINIC | Age: 69
End: 2019-09-16

## 2019-09-16 NOTE — TELEPHONE ENCOUNTER
----- Message from Slava Hernandez sent at 9/16/2019  9:38 AM CDT -----  Contact: Self  Pt ask for a call in regards to may have a bladder infection and have to take antibiotics want to know will she be ok for her scheduled surgery       Contact info: [phone]    We spoke  Her surgery is not until Oct.7...  Yes  ,  Please do take the antibiotic now  & remind us at preop that you had the UTI so that we can repeat the test to make sure it's gone , she said OK

## 2019-09-18 DIAGNOSIS — M79.606 PAIN OF LOWER EXTREMITY, UNSPECIFIED LATERALITY: ICD-10-CM

## 2019-09-18 DIAGNOSIS — Z01.818 PRE-OP TESTING: Primary | ICD-10-CM

## 2019-09-18 DIAGNOSIS — M17.9 OSTEOARTHRITIS OF KNEE, UNSPECIFIED (CODE): Primary | ICD-10-CM

## 2019-09-19 ENCOUNTER — TELEPHONE (OUTPATIENT)
Dept: PREADMISSION TESTING | Facility: HOSPITAL | Age: 69
End: 2019-09-19

## 2019-09-19 NOTE — TELEPHONE ENCOUNTER
----- Message from Saloni Donohue LPN sent at 9/18/2019  1:53 PM CDT -----  Surgery-10/7/19    Patient needs CBC,BMP,PT/INR,POC AND OPOC appt.

## 2019-09-19 NOTE — TELEPHONE ENCOUNTER
Appt. 10/1 add on to this day appt. Called patient and left message about add on appt. With call back number.

## 2019-09-26 ENCOUNTER — ANESTHESIA EVENT (OUTPATIENT)
Dept: SURGERY | Facility: HOSPITAL | Age: 69
End: 2019-09-26
Payer: OTHER GOVERNMENT

## 2019-09-26 DIAGNOSIS — Z01.818 PRE-OP TESTING: Primary | ICD-10-CM

## 2019-09-26 NOTE — PRE ADMISSION SCREENING
Anesthesia Assessment: Preoperative EQUATION    Planned Procedure: Procedure(s) (LRB):  ARTHROPLASTY, KNEE, TOTAL (Right)  Requested Anesthesia Type:Femoral Block  Surgeon: [facility] Jr., MD  Service: Orthopedics  Known or anticipated Date of Surgery:10/7/2019    Surgeon notes: reviewed    Previous anesthesia records: HYSTEROSCOPY D&C 2013 LMA    Last PCP note: outside [facility]ERL  RECENT UTI WHICH PT STATES PROVIDED URINE SAMPLE TO PCP AND HAS TAKEN LAST DOSE OF BACTRIM TODAY, 9/26/19.    Other important co-morbidities: HTN, HLP     Tests already available:  No recent tests. (HAD U/A AT OS PCP 9/2019)               Plan:    Testing:  Hematology Profile, BMP, PT/INR and EKG   Pre-anesthesia  visit       Visit focus: possible regional anesthesia and/or nerve block      Consultation:IM Perioperative Hospitalist       Navigation: Tests Scheduled.              Consults scheduled.             Results will be tracked by Preop Clinic.

## 2019-09-26 NOTE — ANESTHESIA PREPROCEDURE EVALUATION
"              Anesthesia Assessment: Preoperative EQUATION     Planned Procedure: Procedure(s) (LRB):  ARTHROPLASTY, KNEE, TOTAL (Right)  Requested Anesthesia Type:Femoral Block  Surgeon: [facility] Jr., MD  Service: Orthopedics  Known or anticipated Date of Surgery:10/7/2019     Surgeon notes: reviewed     Previous anesthesia records: HYSTEROSCOPY D&C 2013 LMA     Last PCP note: outside [facility] DR [facility]ERL  RECENT UTI WHICH PT STATES PROVIDED URINE SAMPLE TO PCP AND HAS TAKEN LAST DOSE OF BACTRIM TODAY, 9/26/19.     Other important co-morbidities: HTN, HLP     Tests already available:  No recent tests. (HAD U/A AT OS PCP 9/2019)                                          Plan:    Testing:  Hematology Profile, BMP, PT/INR and EKG   Pre-anesthesia  visit                                        Visit focus: possible regional anesthesia and/or nerve block                            Consultation:IM Perioperative Hospitalist                             Navigation: Tests Scheduled.                         Consults scheduled.                        Results will be tracked by Preop Clinic.                                 Electronically signed by Donna Loyd RN at 9/26/2019  6:18 PM                                                                                                        09/26/2019  Maria Elena Jackson is a 69 y.o., female.    Anesthesia Evaluation      I have reviewed the Medications.   Steroids Taken In Past Year:     Review of Systems  Anesthesia Hx:  History of prior surgery of interest to airway management or planning: Previous anesthesia: MAC, General 12/2013: Hysteroscopy with general anesthesia.  5 yrs ago: Colonoscopy with MAC.  Family Hx of Anesthesia complications: Family Anesthesia Complications are Brother: not sure what happened ("gave him too much")- was in ICU (as a teenager)  Denies Personal Hx of Anesthesia complications.   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 " Tobacco Use:  for 25+ years, quit smoking >10 years ago Alcohol Use: Pt consumes 4-5 glasses of wine monthly,    Hematology/Oncology:         -- Cancer in past history: Oncology Comments: Hx Skin Cancer: removed in MD office     EENT/Dental:EENT/Dental Normal   Cardiovascular:   Hypertension hyperlipidemia  Functional Capacity good / => 4 METS, CrossFit 2x weekly: denies CP/SOB  Denies Coronary Artery Disease.  Denies Deep Venous Thrombosis (DVT)  Hypertension , Recent typical clinic B/P of 149/67 @ POC visit    Pulmonary:  Denies Asthma.  Denies Chronic Obstructive Pulmonary Disease (COPD).  Possible Obstructive Sleep Apnea , (STOP/BANG) Symptoms S - Snoring (loud), P - Pressure being treated for high BP and A - Age > 50    Renal/:  Renal/ Normal     Hepatic/GI:  Hepatic/GI Normal    Musculoskeletal:   Arthritis     Neurological:  Neurology Normal  Pain , location of bilateral knee , precipitating factors are walking    Endocrine:  Denies Diabetes  Denies Thyroid Disease        Physical Exam  General:  Obesity    Airway/Jaw/Neck:  Airway Findings: Mouth Opening: Normal Tongue: Normal  General Airway Assessment: Good  Mallampati: III  Improves to II with phonation.  TM Distance: Normal, at least 6 cm  Jaw/Neck Findings:  Neck ROM: Normal ROM      Dental:  Dental Findings: In tact        Mental Status:  Mental Status Findings:  Cooperative, Alert and Oriented       10/4/19-Per  Patient is optimized. EKG,Labs reviewed and noted. VANNESSA Burns          Anesthesia Plan  Type of Anesthesia, risks & benefits discussed:  Anesthesia Type:  general, CSE, epidural, regional, spinal  Patient's Preference:   Intra-op Monitoring Plan: standard ASA monitors  Intra-op Monitoring Plan Comments:   Post Op Pain Control Plan:   Post Op Pain Control Plan Comments:   Induction:   IV  Beta Blocker:  Patient is not currently on a Beta-Blocker (No further documentation required).       Informed Consent: Patient understands  risks and agrees with Anesthesia plan.  Questions answered. Anesthesia consent signed with patient.  ASA Score: 2     Day of Surgery Review of History & Physical:    H&P update referred to the surgeon.         Ready For Surgery From Anesthesia Perspective.       Discharge Plan: To home with  (Louie: [phone])    Jacklyn Cervantes RN

## 2019-09-26 NOTE — PRE-PROCEDURE INSTRUCTIONS
Chart review, triage plan initiated, appointments scheduled. Medications reconciled; pt instructed to stop vitamins and herbal supplements now and informed that she will be given all other pre-op medication instructions when she comes to the POC. Reminded pt of appointments schedule on 10/1/19. Pt verbalized understanding.

## 2019-10-01 ENCOUNTER — HOSPITAL ENCOUNTER (OUTPATIENT)
Dept: PREADMISSION TESTING | Facility: HOSPITAL | Age: 69
Discharge: HOME | End: 2019-10-01
Attending: ANESTHESIOLOGY
Payer: OTHER GOVERNMENT

## 2019-10-01 ENCOUNTER — OFFICE VISIT (OUTPATIENT)
Dept: ORTHOPEDICS | Facility: CLINIC | Age: 69
End: 2019-10-01
Payer: OTHER GOVERNMENT

## 2019-10-01 ENCOUNTER — INITIAL CONSULT (OUTPATIENT)
Dept: INTERNAL MEDICINE | Facility: CLINIC | Age: 69
End: 2019-10-01
Payer: OTHER GOVERNMENT

## 2019-10-01 ENCOUNTER — HOSPITAL ENCOUNTER (OUTPATIENT)
Dept: CARDIOLOGY | Facility: CLINIC | Age: 69
Discharge: HOME | End: 2019-10-01
Payer: OTHER GOVERNMENT

## 2019-10-01 ENCOUNTER — HOSPITAL ENCOUNTER (OUTPATIENT)
Dept: RADIOLOGY | Facility: HOSPITAL | Age: 69
Discharge: HOME | End: 2019-10-01
Attending: NURSE PRACTITIONER
Payer: OTHER GOVERNMENT

## 2019-10-01 VITALS
RESPIRATION RATE: 18 BRPM | OXYGEN SATURATION: 95 % | HEART RATE: 71 BPM | BODY MASS INDEX: 29.32 KG/M2 | SYSTOLIC BLOOD PRESSURE: 149 MMHG | DIASTOLIC BLOOD PRESSURE: 67 MMHG | TEMPERATURE: 99 F | HEIGHT: 65 IN | WEIGHT: 176 LBS

## 2019-10-01 VITALS — HEIGHT: 65 IN | BODY MASS INDEX: 30.16 KG/M2 | WEIGHT: 181 LBS

## 2019-10-01 DIAGNOSIS — D75.89 MACROCYTOSIS WITHOUT ANEMIA: ICD-10-CM

## 2019-10-01 DIAGNOSIS — Z96.651 S/P TOTAL KNEE REPLACEMENT USING CEMENT, RIGHT: ICD-10-CM

## 2019-10-01 DIAGNOSIS — M17.12 PRIMARY OSTEOARTHRITIS OF LEFT KNEE: Primary | ICD-10-CM

## 2019-10-01 DIAGNOSIS — I10 ESSENTIAL HYPERTENSION: ICD-10-CM

## 2019-10-01 DIAGNOSIS — E78.5 HYPERLIPIDEMIA, UNSPECIFIED HYPERLIPIDEMIA TYPE: ICD-10-CM

## 2019-10-01 DIAGNOSIS — M17.12 PRIMARY OSTEOARTHRITIS OF LEFT KNEE: ICD-10-CM

## 2019-10-01 DIAGNOSIS — Z01.818 PRE-OP TESTING: ICD-10-CM

## 2019-10-01 DIAGNOSIS — R60.9 EDEMA, UNSPECIFIED TYPE: ICD-10-CM

## 2019-10-01 DIAGNOSIS — M17.11 PRIMARY OSTEOARTHRITIS OF RIGHT KNEE: ICD-10-CM

## 2019-10-01 PROCEDURE — 99999 PR PBB SHADOW E&M-EST. PATIENT-LVL III: ICD-10-PCS | Mod: PBBFAC,,, | Performed by: NURSE PRACTITIONER

## 2019-10-01 PROCEDURE — 73560 XR KNEE 1 OR 2 VIEW RIGHT: ICD-10-PCS | Mod: 26,RT,, | Performed by: RADIOLOGY

## 2019-10-01 PROCEDURE — 99999 PR PBB SHADOW E&M-EST. PATIENT-LVL I: ICD-10-PCS | Mod: PBBFAC,,, | Performed by: HOSPITALIST

## 2019-10-01 PROCEDURE — 99204 OFFICE O/P NEW MOD 45 MIN: CPT | Mod: S$GLB,,, | Performed by: HOSPITALIST

## 2019-10-01 PROCEDURE — 93005 ELECTROCARDIOGRAM TRACING: CPT | Mod: S$GLB,,, | Performed by: ANESTHESIOLOGY

## 2019-10-01 PROCEDURE — 73560 X-RAY EXAM OF KNEE 1 OR 2: CPT | Mod: 26,RT,, | Performed by: RADIOLOGY

## 2019-10-01 PROCEDURE — 99999 PR PBB SHADOW E&M-EST. PATIENT-LVL I: CPT | Mod: PBBFAC,,, | Performed by: HOSPITALIST

## 2019-10-01 PROCEDURE — 99499 UNLISTED E&M SERVICE: CPT | Mod: S$GLB,,, | Performed by: NURSE PRACTITIONER

## 2019-10-01 PROCEDURE — 93010 EKG 12-LEAD: ICD-10-PCS | Mod: S$GLB,,, | Performed by: INTERNAL MEDICINE

## 2019-10-01 PROCEDURE — 93005 EKG 12-LEAD: ICD-10-PCS | Mod: S$GLB,,, | Performed by: ANESTHESIOLOGY

## 2019-10-01 PROCEDURE — 93010 ELECTROCARDIOGRAM REPORT: CPT | Mod: S$GLB,,, | Performed by: INTERNAL MEDICINE

## 2019-10-01 PROCEDURE — 73560 X-RAY EXAM OF KNEE 1 OR 2: CPT | Mod: TC,RT

## 2019-10-01 PROCEDURE — 99204 PR OFFICE/OUTPT VISIT, NEW, LEVL IV, 45-59 MIN: ICD-10-PCS | Mod: S$GLB,,, | Performed by: HOSPITALIST

## 2019-10-01 PROCEDURE — 99999 PR PBB SHADOW E&M-EST. PATIENT-LVL III: CPT | Mod: PBBFAC,,, | Performed by: NURSE PRACTITIONER

## 2019-10-01 PROCEDURE — 99499 NO LOS: ICD-10-PCS | Mod: S$GLB,,, | Performed by: NURSE PRACTITIONER

## 2019-10-01 RX ORDER — CHLORHEXIDINE GLUCONATE ORAL RINSE 1.2 MG/ML
SOLUTION DENTAL
Refills: 90 | COMMUNITY
Start: 2019-08-14 | End: 2019-10-01

## 2019-10-01 RX ORDER — HYDROCHLOROTHIAZIDE 25 MG/1
25 TABLET ORAL DAILY PRN
COMMUNITY
End: 2022-06-13

## 2019-10-01 RX ORDER — TRIAZOLAM 0.25 MG/1
TABLET ORAL
Refills: 0 | COMMUNITY
Start: 2019-08-02 | End: 2019-10-01

## 2019-10-01 RX ORDER — HYDROCODONE BITARTRATE AND ACETAMINOPHEN 5; 325 MG/1; MG/1
TABLET ORAL
Refills: 0 | COMMUNITY
Start: 2019-08-02 | End: 2019-10-01

## 2019-10-01 RX ORDER — VIT C/E/ZN/COPPR/LUTEIN/ZEAXAN 250MG-90MG
1000 CAPSULE ORAL DAILY
COMMUNITY

## 2019-10-01 NOTE — HPI
"History of present illness- I had the pleasure of meeting this pleasant 69 y.o. lady in the pre op clinic prior to her elective Orthopedic surgery. The patient is new to me .    Goes by "Martin "    I have obtained the history by speaking to the patient and by reviewing the electronic health records.    Events leading up to surgery / History of presenting illness -    She has been troubled with moderate - Severe Rt knee  pain for 1-2 years  . Pain increases with activity and decreases with resting.    Relevant health conditions of significance for the perioperative period/ History of presenting illness -    Subjectively describes health as good      Lives with  in a single level house   Help available posy op     Health conditions of significance for the perioperative period - HTN    Not known to have heart disease , Diabetes Mellitus, Lung disease     "

## 2019-10-01 NOTE — DISCHARGE INSTRUCTIONS
Your surgery has been scheduled for:__________________________________________    You should report to:  ____[facility], located on the [place] side of the first floor of the           [facility] ([phone])  ____[facility], located on the [facility] side of the            Second floor of the [facility] ([phone])  ____3rd Floor [facility] located on the [facility] side of the [facility] [phone]  ____[facility]: located at [address] [address]. Check in on the first floor of the hospital.  Please Note   - Tell your doctor if you take Aspirin, products containing Aspirin, herbal medications  or blood thinners, such as Coumadin, Ticlid, or Plavix.  (Consult your provider regarding holding or stopping before surgery).  - Arrange for someone to drive you home following surgery.  You will not be allowed to leave the surgical facility alone or drive yourself home following sedation and anesthesia.  Before Surgery  - Stop taking all herbal medications 14 days prior to surgery  - No Motrin/Advil (Ibuprofen)  1 day before surgery  - No Aleve (Naproxen) 7 days before surgery  - Stop Taking Asprin, products containing Asprin _____days before surgery  - Stop taking blood thinners_______days before surgery  - No Goody's/BC  Powder 7 days before surgery  - Refrain from drinking alcoholic beverages for 24hours before and after surgery  - Stop or limit smoking _________days before surgery  - You may take Tylenol for pain  Night before Surgery  - Do not eat or drink after midnight  - Take a shower or bath (shower is recommended).  Bathe with Hibiclens soap or an antibacterial soap from the neck down.  If not supplied by your surgeon, hibiclens soap will need to be purchased over the counter in pharmacy.  Rinse soap off thoroughly.  - Shampoo your hair with your regular  shampoo  The Day of Surgery  - Take another bath or shower with hibiclens or any antibacterial soap, to reduce the chance of infection.  - Take heart and blood pressure medications with a small sip of water, as advised by the perioperative team.  - Do not take fluid pills  - You may brush your teeth and rinse your mouth, but do not swall any additional water.   - Do not apply perfumes, powder, body lotions or deodorant on the day of surgery.  - Nail polish should be removed.  - Do not wear makeup or moisturizer  - Wear comfortable clothes, such as a button front shirt and loose fitting pants.  - Leave all jewelry, including body piercings, and valuables at home.    - Bring any devices you will neeed after surgery such as crutches or canes.  - If you have sleep apnea, please bring your CPAP machine  In the event that your physical condition changes including the onset of a cold or respiratory illness, or if you have to delay or cancel your surgery, please notify your surgeon.   Anesthesia: Regional Anesthesia    Youre scheduled for surgery. During surgery, youll receive medicine called anesthesia to keep you comfortable and pain-free. Your surgeon has decided that youll receive regional anesthesia. This sheet tells you what to expect with this type of anesthesia.  What is regional anesthesia?  Regional anesthesia numbs one region of your body. The anesthesia may be given around nerves or into veins in your arms, neck, or legs (nerve block or Alfredo block). Or it may be sent into the spinal fluid (spinal anesthesia) or into the space just outside the spinal fluid (epidural anesthesia). You may also be given sedatives to help you relax.  Nerve block or [place] block  A small area of the body, such as an arm or leg, can be numbed using a nerve block or Alfredo block.  · Nerve block. During a nerve block, your skin is numbed. A needle is then inserted near nerves that serve the area to be numbed. Anesthetic is sent through  the needle.  · IV regional or [place] block. For this type of block, an IV line is put into a vein. The blood flow to the area to be numbed is blocked for a short time. Anesthetic is sent through the IV.  Spinal anesthesia  Spinal anesthesia numbs your body from about the waist down.  · Anesthetic is injected into the spinal fluid. This is a substance that surrounds the spinal cord in your spinal column. The anesthetic blocks pain traveling from the body to the brain.  · To receive the anesthetic, your skin is numbed at the injection site on your back.  · A needle is then inserted into the spinal space. Anesthetic is sent into the spinal fluid through the needle.  Epidural anesthesia  Epidural anesthesia is most commonly used during childbirth and may also be used after surgical procedures of the chest, belly, and legs.  · Anesthetic is injected into the epidural space. This is just outside the dural sac which contains the spinal fluid.  · To receive the anesthetic, your skin is numbed at the injection site on your back.  · A needle is then inserted into the epidural space. Anesthetic is sent into the epidural space through the needle.  · A small flexible catheter may be attached to the needle and left in place. This allows for continuous injections or infusions of anesthetic.  Anesthesia tools and medicines that might be near you during your procedure  · Local anesthetic. This medicine is given through a needle numbs one region of your body.  · Electrocardiography leads (electrodes). These are used to record your heart rate and rhythm.  · Blood pressure cuff. A cuff is placed on your arm to keep track of your blood pressure.  · Pulse oximeter. This small clip is placed on the end of the finger. It measures your blood oxygen level.  · Sedatives. These medicines may be given through an IV. They help to relax you and keep you comfortable. You may stay awake or sleep lightly.  · Oxygen. You may be given oxygen through a  facemask.  Risks and possible complications  Regional anesthesia carries some risks. These include:  · Nausea and vomiting  · Headache  · Backache  · Decreased blood pressure  · Allergic reaction to the anesthetic  · Ongoing numbness (rare)  · Irregular heartbeat (rare)  · Cardiac arrest (rare)   Date Last Reviewed: 12/1/2016  © 3198-8994 "[employer]". [address] [place], [place] [address]. All rights reserved. This information is not intended as a substitute for professional medical care. Always follow your healthcare professional's instructions.

## 2019-10-01 NOTE — ASSESSMENT & PLAN NOTE
Lower extremities   Since doing  exercises , starting Jan 2019 , noticed lower extremity edema to be better     Likes salty food    Edema- I suggested avoidance of added salt,avoidance of NSAID's, unless advised or ordered  and suggested Limb elevation and chan hose use

## 2019-10-01 NOTE — ASSESSMENT & PLAN NOTE
On Cardura, Cardizem  HCTZ as needed for edema- last took about 1 month ago   Avapro Nightly -It is a larger pil and has difficulty swallowing usually  takes once a week - lately taking nightly , regularly , given the up coming surgery     Home BP readings -150-160/70's  Recent BP readings in the record-fluctuating   Hypertension-  Blood pressure is acceptable . I suggest continuation of   Cardura, Cardizem , Avapro Nightly  during the entire perioperative period. I suggest blood pressure, electrolyte and renal function monitoring .I suggest addressing pain control as uncontrolled pain can increased blood pressure

## 2019-10-01 NOTE — OUTPATIENT SUBJECTIVE & OBJECTIVE
Outpatient Subjective & Objective     Chief complaint-Preoperative evaluation, Perioperative Medical management, complication reduction plan     Active cardiac conditions- none    Revised cardiac risk index predictors- none    Functional capacity -Examples of physical activity , goes to [employer], two times a week, doing weights, push ups, sit ups,  house work and can take a flight of stairs holding on to the railing, gardening ----- She can undertake all the above activities without  chest pain,chest tightness, Shortness of breath ,dizziness,lightheadedness making her exercise tolerance more,   than 4 Mets.       Review of Systems   Constitutional: Negative for chills and fever.        Weight loss intentionally    HENT:        STOPBANG score  2-3/ 8    Some Snoring - some times loud   HTN  Age over 50        Eyes:        No new visual changes- none    Respiratory:        No cough , phlegm    No Hemoptysis   Cardiovascular:        As noted   Gastrointestinal:        No overt GI/ blood losses  Bowel movements- Regular ,  Constipated  Due for colonoscopy    Endocrine:        Prednisone use > 20 mg daily for 3 weeks- none   Genitourinary: Negative for dysuria.        No urinary hesitancy   Had Urinary pressure about 3 weeks ago , was treated with Bactrim DS for 10 days   As per her understanding Culture was negative   Symptom resolved   Musculoskeletal:        As above      Skin: Negative for rash.   Neurological: Negative for syncope.        No unilateral weakness   Hematological:        Current use of Anticoagulants  None      Psychiatric/Behavioral:        No Depression,Anxiety       No vascular stenting           No anesthesia, bleeding, cardiac problems , PONVwith previous surgeries/procedures.  Medications and Allergies reviewed in epic.   Gets cancer screening   Cancer in family   FH- No  bleeding / venous thrombosis , early onset heart disease in family     Physical Exam     BP - 149/67   Pulse 71  Temp  98.5   Sat 95 %      Physical Exam  Constitutional- General appearance-Conscious,Coherent  Eyes- No conjunctival icterus,pupils  round  and reactive to light   ENT-Oral cavity- moist  , Hearing grossly normal   Neck- No thyromegaly ,Trachea -central, No jugular venous distension,   No Carotid Bruit   Cardiovascular -Heart Sounds- Normal  and  no murmur   , No gallop rhythm   Respiratory - Normal Respiratory Effort, Normal breath sounds,  no wheeze  and  no forced expiratory wheeze    Peripheral pitting pedal edema-- mild, no calf pain   Gastrointestinal -Soft abdomen, No palpable masses, Non Tender,Liver,Spleen not palpable. No-- free fluid and shifting dullness  Musculoskeletal- No finger Clubbing. Strength grossly normal   Lymphatic-No Palpable cervical, axillary,Inguinal lymphadenopathy   Psychiatric - normal effect,Orientation  Rt Dorsalis pedis pulses-palpable    Lt Dorsalis pedis pulses- palpable   Rt Posterior tibial pulses -palpable   Left posterior tibial pulses -palpable   Miscellaneous -  Surgical scarabdomen   and  no renal bruit    Investigations  Lab and Imaging have been reviewed in epic.  Review of Medicine tests  Telemetry 12/18/2013 - Sinus     EKG- I had independently reviewed the EKG from--10/1/2019   No acute changes    Review of clinical lab tests:  Lab Results   Component Value Date    CREATININE 0.7 10/01/2019    HGB 13.8 10/01/2019     10/01/2019           Review of old records- Was done and information gathered regards to events leading to surgery and health conditions of significance in the perioperative period.    Outpatient Subjective & Objective

## 2019-10-01 NOTE — ASSESSMENT & PLAN NOTE
HLD-I  suggest continuation of statin during the entire perioperative period.  Encouraged compliance

## 2019-10-01 NOTE — PROGRESS NOTES
"[facility] - Pre Op Consult  Progress Note    Patient Name: Maria Elena Jackson  MRN: [id number]  Date of Evaluation- 10/01/2019  PCP- Cabrera Ricketts MD    Future cases for Martin Jackson [[id number]]     Case ID Status Date Time [place] Procedure Provider Location    [id number] [facility] 10/7/2019  7:00  ARTHROPLASTY, KNEE, TOTAL [facility] [[id number]] [facility] OR      Rt     HPI:  History of present illness- I had the pleasure of meeting this pleasant 69 y.o. lady in the pre op clinic prior to her elective Orthopedic surgery. The patient is new to me .    Goes by "Martin "    I have obtained the history by speaking to the patient and by reviewing the electronic health records.    Events leading up to surgery / History of presenting illness -    She has been troubled with moderate - Severe Rt knee  pain for 1-2 years  . Pain increases with activity and decreases with resting.    Relevant health conditions of significance for the perioperative period/ History of presenting illness -    Subjectively describes health as good      Lives with  in a single level house   Help available posy op     Health conditions of significance for the perioperative period - HTN    Not known to have heart disease , Diabetes Mellitus, Lung disease         Subjective/ Objective:       Chief complaint-Preoperative evaluation, Perioperative Medical management, complication reduction plan     Active cardiac conditions- none    Revised cardiac risk index predictors- none    Functional capacity -Examples of physical activity , goes to [employer], two times a week, doing weights, push ups, sit ups,  house work and can take a flight of stairs holding on to the railing, gardening ----- She can undertake all the above activities without  chest pain,chest tightness, Shortness of breath ,dizziness,lightheadedness making her exercise tolerance more,   than 4 Mets.       Review of Systems   Constitutional: Negative for chills and fever.        Weight " loss intentionally    HENT:        STOPBANG score  2-3/ 8    Some Snoring - some times loud   HTN  Age over 50        Eyes:        No new visual changes- none    Respiratory:        No cough , phlegm    No Hemoptysis   Cardiovascular:        As noted   Gastrointestinal:        No overt GI/ blood losses  Bowel movements- Regular ,  Constipated  Due for colonoscopy    Endocrine:        Prednisone use > 20 mg daily for 3 weeks- none   Genitourinary: Negative for dysuria.        No urinary hesitancy   Had Urinary pressure about 3 weeks ago , was treated with Bactrim DS for 10 days   As per her understanding Culture was negative   Symptom resolved   Musculoskeletal:        As above      Skin: Negative for rash.   Neurological: Negative for syncope.        No unilateral weakness   Hematological:        Current use of Anticoagulants  None      Psychiatric/Behavioral:        No Depression,Anxiety       No vascular stenting           No anesthesia, bleeding, cardiac problems , PONVwith previous surgeries/procedures.  Medications and Allergies reviewed in epic.   Gets cancer screening   Cancer in family   FH- No  bleeding / venous thrombosis , early onset heart disease in family     Physical Exam     BP - 149/67   Pulse 71  Temp 98.5   Sat 95 %      Physical Exam  Constitutional- General appearance-Conscious,Coherent  Eyes- No conjunctival icterus,pupils  round  and reactive to light   ENT-Oral cavity- moist  , Hearing grossly normal   Neck- No thyromegaly ,Trachea -central, No jugular venous distension,   No Carotid Bruit   Cardiovascular -Heart Sounds- Normal  and  no murmur   , No gallop rhythm   Respiratory - Normal Respiratory Effort, Normal breath sounds,  no wheeze  and  no forced expiratory wheeze    Peripheral pitting pedal edema-- mild, no calf pain   Gastrointestinal -Soft abdomen, No palpable masses, Non Tender,Liver,Spleen not palpable. No-- free fluid and shifting dullness  Musculoskeletal- No finger Clubbing.  Strength grossly normal   Lymphatic-No Palpable cervical, axillary,Inguinal lymphadenopathy   Psychiatric - normal effect,Orientation  Rt Dorsalis pedis pulses-palpable    Lt Dorsalis pedis pulses- palpable   Rt Posterior tibial pulses -palpable   Left posterior tibial pulses -palpable   Miscellaneous -  Surgical scarabdomen   and  no renal bruit    Investigations  Lab and Imaging have been reviewed in [facility].  Review of Medicine tests  Telemetry 12/18/2013 - Sinus     EKG- I had independently reviewed the EKG from--10/1/2019   No acute changes    Review of clinical lab tests:  Lab Results   Component Value Date    CREATININE 0.7 10/01/2019    HGB 13.8 10/01/2019     10/01/2019           Review of old records- Was done and information gathered regards to events leading to surgery and health conditions of significance in the perioperative period.        Preoperative cardiac risk assessment-  The patient does not have any active cardiac conditions . Revised cardiac risk index predictors- 0---.Functional capacity is more than 4 Mets. She will be undergoing a Orthopedic procedure that carries a intermediate risk     Risk of a major Cardiac event ( Defined as death, myocardial infarction, or cardiac arrest at 30 days after noncardiac surgery), based on RCRI score     3.9%       No further cardiac work up is indicated prior to proceeding with the surgery          American Society of Anesthesiologists Physical status classification ( ASA ) class:3      Postoperative pulmonary complication risk assessment:      ARISCAT ( Canet) risk index- risk class -  Low, if duration of surgery is under 2 hours, intermediate, if duration of surgery is over 2 hours          Assessment/Plan:     Essential hypertension  On Cardura, Cardizem  HCTZ as needed for edema- last took about 1 month ago   Avapro Nightly -It is a larger pil and has difficulty swallowing usually  takes once a week - lately taking nightly , regularly , given the  up coming surgery     Home BP readings -150-160/70's  Recent BP readings in the record-fluctuating   Hypertension-  Blood pressure is acceptable . I suggest continuation of   Cardura, Cardizem , Avapro Nightly  during the entire perioperative period. I suggest blood pressure, electrolyte and renal function monitoring .I suggest addressing pain control as uncontrolled pain can increased blood pressure           HLD (hyperlipidemia)  HLD-I  suggest continuation of statin during the entire perioperative period.  Encouraged compliance     Edema  Lower extremities   Since doing  exercises , starting Jan 2019 , noticed lower extremity edema to be better     Likes salty food    Edema- I suggested avoidance of added salt,avoidance of NSAID's, unless advised or ordered  and suggested Limb elevation and chan hose use      Macrocytosis without anemia  No suggestion of alcohol excess   Suggested follow up         Preventive perioperative care    Thromboembolic prophylaxis:  Her risk factors for thrombosis include surgical procedure and age.I suggest  thromboembolic prophylaxis ( mechanical/pharmacological, weighing the risk benefits of pharmacological agent use considering ronen procedural bleeding )  during the perioperative period.I suggested being active in the post operative period.    The patient is a candidate for extended DVT prophylaxis    Postoperative pulmonary complication prophylaxis-Risk factors for post operative pulmonary complications include age over 65 years and ASA class >2- I suggest incentive spirometry use, early ambulation and end tidal carbon dioxide monitoring  , oral care , head end of bed elevation      Renal complication prophylaxis-Risk factors for renal complications include hypertension . I suggest keeping her well hydrated in the perioperative period.    Surgical site Infection Prophylaxis-I  suggest appropriate antibiotic for Prophylaxis against Surgical site infections     In view of Regional  anesthesia  the patient  is at risk of postoperative urinary retention.  I suggest avoidance / minimizing the of  Benzodiazepines,Anticholinergic medication,antihistamines ( Benadryl) , if possible in the perioperative period. I suggest using the minimum possible use of opioids for the minimum period of time in the perioperative period. Benadryl avoidance suggested      This visit was focused on Preoperative evaluation, Perioperative Medical management, complication reduction plans. I suggest that the patient follows up with primary care or relevant sub specialists for ongoing health care.    I appreciate the opportunity to be involved in this patients care. Please feel free to contact me if there were any questions about this consultation.    Patient is optimized     Patient  was instructed to call and update me about any changes to health,  medication, office visits ,testing out side of the ronen operative care center , hospitalizations between now and surgery     Mirian Rich MD  Perioperative Medicine  [facility]   Pager [phone]    on regular consistency diet and on thin liquids  --  10/1- 17 36     EKG report (10/1)     Normal sinus rhythm  Normal ECG  When compared with ECG of 20-SEP-1991 09:01,  No significant change was found  --  10/4- 2009     Called to follow up , to address any concerns with the up coming surgery or any questions on Medication instructions   Unable to speak   Left a message to call, if needed

## 2019-10-03 RX ORDER — LIDOCAINE HYDROCHLORIDE 10 MG/ML
1 INJECTION, SOLUTION EPIDURAL; INFILTRATION; INTRACAUDAL; PERINEURAL
Status: CANCELLED | OUTPATIENT
Start: 2019-10-03

## 2019-10-03 RX ORDER — RAMELTEON 8 MG/1
8 TABLET ORAL NIGHTLY PRN
Status: CANCELLED | OUTPATIENT
Start: 2019-10-03

## 2019-10-03 RX ORDER — PREGABALIN 25 MG/1
75 CAPSULE ORAL NIGHTLY
Status: CANCELLED | OUTPATIENT
Start: 2019-10-03

## 2019-10-03 RX ORDER — MORPHINE SULFATE 10 MG/ML
2 INJECTION, SOLUTION INTRAMUSCULAR; INTRAVENOUS
Status: CANCELLED | OUTPATIENT
Start: 2019-10-03

## 2019-10-03 RX ORDER — FAMOTIDINE 20 MG/1
20 TABLET, FILM COATED ORAL 2 TIMES DAILY
Status: CANCELLED | OUTPATIENT
Start: 2019-10-03

## 2019-10-03 RX ORDER — MUPIROCIN 20 MG/G
1 OINTMENT TOPICAL
Status: CANCELLED | OUTPATIENT
Start: 2019-10-03

## 2019-10-03 RX ORDER — OXYCODONE HYDROCHLORIDE 5 MG/1
15 TABLET ORAL
Status: CANCELLED | OUTPATIENT
Start: 2019-10-03

## 2019-10-03 RX ORDER — NALOXONE HCL 0.4 MG/ML
0.02 VIAL (ML) INJECTION
Status: CANCELLED | OUTPATIENT
Start: 2019-10-03

## 2019-10-03 RX ORDER — OXYCODONE HYDROCHLORIDE 5 MG/1
5 TABLET ORAL
Status: CANCELLED | OUTPATIENT
Start: 2019-10-03

## 2019-10-03 RX ORDER — SODIUM CHLORIDE 9 MG/ML
INJECTION, SOLUTION INTRAVENOUS
Status: CANCELLED | OUTPATIENT
Start: 2019-10-03

## 2019-10-03 RX ORDER — ACETAMINOPHEN 10 MG/ML
1000 INJECTION, SOLUTION INTRAVENOUS ONCE
Status: CANCELLED | OUTPATIENT
Start: 2019-10-03 | End: 2019-10-03

## 2019-10-03 RX ORDER — FENTANYL CITRATE 50 UG/ML
25 INJECTION, SOLUTION INTRAMUSCULAR; INTRAVENOUS EVERY 5 MIN PRN
Status: CANCELLED | OUTPATIENT
Start: 2019-10-03

## 2019-10-03 RX ORDER — POLYETHYLENE GLYCOL 3350 17 G/17G
17 POWDER, FOR SOLUTION ORAL DAILY
Status: CANCELLED | OUTPATIENT
Start: 2019-10-04

## 2019-10-03 RX ORDER — CELECOXIB 100 MG/1
400 CAPSULE ORAL
Status: CANCELLED | OUTPATIENT
Start: 2019-10-03

## 2019-10-03 RX ORDER — ROPIVACAINE HYDROCHLORIDE 2 MG/ML
8 INJECTION, SOLUTION EPIDURAL; INFILTRATION; PERINEURAL CONTINUOUS
Status: CANCELLED | OUTPATIENT
Start: 2019-10-03

## 2019-10-03 RX ORDER — ASPIRIN 81 MG/1
81 TABLET ORAL 2 TIMES DAILY
Status: CANCELLED | OUTPATIENT
Start: 2019-10-03

## 2019-10-03 RX ORDER — SODIUM CHLORIDE 0.9 % (FLUSH) 0.9 %
10 SYRINGE (ML) INJECTION
Status: CANCELLED | OUTPATIENT
Start: 2019-10-03

## 2019-10-03 RX ORDER — PREGABALIN 25 MG/1
75 CAPSULE ORAL
Status: CANCELLED | OUTPATIENT
Start: 2019-10-03

## 2019-10-03 RX ORDER — MIDAZOLAM HYDROCHLORIDE 1 MG/ML
1 INJECTION INTRAMUSCULAR; INTRAVENOUS EVERY 5 MIN PRN
Status: CANCELLED | OUTPATIENT
Start: 2019-10-03

## 2019-10-03 RX ORDER — ACETAMINOPHEN 500 MG
1000 TABLET ORAL EVERY 6 HOURS
Status: CANCELLED | OUTPATIENT
Start: 2019-10-03 | End: 2019-10-05

## 2019-10-03 RX ORDER — CELECOXIB 100 MG/1
200 CAPSULE ORAL DAILY
Status: CANCELLED | OUTPATIENT
Start: 2019-10-04

## 2019-10-03 RX ORDER — OXYCODONE HYDROCHLORIDE 5 MG/1
10 TABLET ORAL
Status: CANCELLED | OUTPATIENT
Start: 2019-10-03

## 2019-10-03 RX ORDER — BISACODYL 10 MG
10 SUPPOSITORY, RECTAL RECTAL EVERY 12 HOURS PRN
Status: CANCELLED | OUTPATIENT
Start: 2019-10-03

## 2019-10-03 RX ORDER — MUPIROCIN 20 MG/G
1 OINTMENT TOPICAL 2 TIMES DAILY
Status: CANCELLED | OUTPATIENT
Start: 2019-10-03 | End: 2019-10-08

## 2019-10-03 RX ORDER — SODIUM CHLORIDE 9 MG/ML
INJECTION, SOLUTION INTRAVENOUS CONTINUOUS
Status: CANCELLED | OUTPATIENT
Start: 2019-10-03 | End: 2019-10-04

## 2019-10-03 RX ORDER — ONDANSETRON 2 MG/ML
4 INJECTION INTRAMUSCULAR; INTRAVENOUS EVERY 8 HOURS PRN
Status: CANCELLED | OUTPATIENT
Start: 2019-10-03

## 2019-10-03 RX ORDER — AMOXICILLIN 250 MG
1 CAPSULE ORAL 2 TIMES DAILY
Status: CANCELLED | OUTPATIENT
Start: 2019-10-03

## 2019-10-03 NOTE — PROGRESS NOTES
Maria Elena Jackson is a 69 y.o. year old here today for a pre-operative visit in preparation for a Right total knee arthroplasty to be performed by  Dr. [facility] on 10/7/9.  she was last seen and treated in the clinic on 6/18/2019. she will be medically optimized by the pre op center. There has been no significant change in medical status since last visit. No fever, chills, malaise, or unexplained weight change.      Allergies, Medications, past medical and surgical history reviewed.    Focused examination performed.    Dr. [facility] saw this patient today in clinic. All questions answered. Patient encouraged to call with questions. Contact information given.     Pre, ronen, and post operative procedures and expectations discussed. Questions were answered. Maria Elena Jackson has been educated and is ready to proceed with surgery. Approximately 30 minutes was spent discussing surgical outcomes, plans, procedures pre, ronen, and post operative expections and care.  Surgical consent signed.    Maria Elena Jackson will contact us if there are any questions, concerns, or changes in medical status prior to surgery.

## 2019-10-03 NOTE — H&P (VIEW-ONLY)
CC: Right knee pain    Maria Elena Jackson is a 69 y.o. female with a history of Right knee pain. Pain is worse with activity and weight bearing.  Patient has experienced interference of activities of daily living due to decreased range of motion and an increase in joint pain and swelling.  Patient has failed non-operative treatment including NSAIDs, corticosteroid injections, viscosupplement injections, and activity modification.  Maria Elena Jackson currently ambulates independently.     Relevant medical conditions of significance in perioperative period:  HTN- on meds managed by pcp    Past Medical History:   Diagnosis Date    Arthritis     Hyperlipidemia     Hypertension        Past Surgical History:   Procedure Laterality Date    CHOLECYSTECTOMY      D&C Hysteroscopy      DILATION AND CURETTAGE OF UTERUS      laparoscopy      TONSILLECTOMY         Family History   Problem Relation Age of Onset    Colon cancer Mother 55    Ovarian cancer Mother 35    Breast cancer Neg Hx        Review of patient's allergies indicates:  No Known Allergies      Current Outpatient Medications:     diltiaZEM (CARDIZEM CD) 240 MG 24 hr capsule, Take 240 mg by mouth every morning. , Disp: , Rfl:     doxazosin (CARDURA) 2 MG tablet, Take 2 mg by mouth every morning. , Disp: , Rfl: 1    FLUAD [id number], 65 YR UP,,PF, 45 mcg (15 mcg x 3)/0.5 mL Syrg, , Disp: , Rfl:     irbesartan (AVAPRO) 300 MG tablet, Take 300 mg by mouth every evening. , Disp: , Rfl:     simvastatin (ZOCOR) 10 MG tablet, Take 10 mg by mouth every evening., Disp: , Rfl:     sulfamethoxazole-trimethoprim 800-160mg (BACTRIM DS) 800-160 mg Tab, Take 1 tablet by mouth 2 (two) times daily., Disp: , Rfl: 0    beta-carotene,A,-vits C,E/mins (OCUVITE ORAL), Take 1 tablet by mouth Daily., Disp: , Rfl:     calcium carbonate/vitamin D3 (CALTRATE 600 + D ORAL), Take 1 tablet by mouth Daily., Disp: , Rfl:     cholecalciferol, vitamin D3, (VITAMIN D3) 1,000  "unit capsule, Take 1,000 Units by mouth once daily., Disp: , Rfl:     folic acid/multivit-min/lutein (CENTRUM SILVER ORAL), Take 1 tablet by mouth Daily., Disp: , Rfl:     hydroCHLOROthiazide (HYDRODIURIL) 25 MG tablet, Take 25 mg by mouth daily as needed., Disp: , Rfl:     Review of Systems:  Constitutional: no fever or chills  Eyes: no visual changes  ENT: no nasal congestion or sore throat  Respiratory: no cough or shortness of breath  Cardiovascular: no chest pain or palpitations  Gastrointestinal: no nausea or vomiting, tolerating diet  Genitourinary: no hematuria or dysuria  Integument/Breast: no rash or pruritis  Hematologic/Lymphatic: no easy bruising or lymphadenopathy  Musculoskeletal: positive for c/o right knee pain worse with increased activity  Neurological: no seizures or tremors  Behavioral/Psych: no auditory or visual hallucinations  Endocrine: no heat or cold intolerance    PE:  Ht 5' 5" (1.651 m)   Wt 82.1 kg (181 lb)   BMI 30.12 kg/m²   General: Pleasant, cooperative, NAD   Gait: antalgic  HEENT: NCAT, sclera nonicteric   Lungs: Respirations clear bilaterally; equal and unlabored.   CV: S1S2; 2+ bilateral upper and lower extremity pulses.   Skin: Intact throughout with no rashes, erythema, or lesions  Extremities: No LE edema,  no erythema or warmth of the skin in either lower extremity.    Right knee exam:  Knee Range of Motion:normal, pain with passive range of motion  Effusion:none  Condition of skin:intact  Location of tenderness:medial   Strength:normal  Stability: stable to testing    Hip Examination:normal    Radiographs: Radiographs reveal severe narrowing medial femoral tibial compartments with hypertrophic new bone.  There may be small effusions    Knee Alignment: normal    Diagnosis: osteoarthritis Right knee    Plan: Right total knee arthroplasty    Due to the serious nature of total joint infection and the high prevalence of community acquired MRSA, vancomycin will be used " perioperatively.

## 2019-10-03 NOTE — H&P
CC: Right knee pain    Maria Elena Jackson is a 69 y.o. female with a history of Right knee pain. Pain is worse with activity and weight bearing.  Patient has experienced interference of activities of daily living due to decreased range of motion and an increase in joint pain and swelling.  Patient has failed non-operative treatment including NSAIDs, corticosteroid injections, viscosupplement injections, and activity modification.  Maria Elena Jackson currently ambulates independently.     Relevant medical conditions of significance in perioperative period:  HTN- on meds managed by pcp    Past Medical History:   Diagnosis Date    Arthritis     Hyperlipidemia     Hypertension        Past Surgical History:   Procedure Laterality Date    CHOLECYSTECTOMY      D&C Hysteroscopy      DILATION AND CURETTAGE OF UTERUS      laparoscopy      TONSILLECTOMY         Family History   Problem Relation Age of Onset    Colon cancer Mother 55    Ovarian cancer Mother 35    Breast cancer Neg Hx        Review of patient's allergies indicates:  No Known Allergies      Current Outpatient Medications:     diltiaZEM (CARDIZEM CD) 240 MG 24 hr capsule, Take 240 mg by mouth every morning. , Disp: , Rfl:     doxazosin (CARDURA) 2 MG tablet, Take 2 mg by mouth every morning. , Disp: , Rfl: 1    FLUAD [id number], 65 YR UP,,PF, 45 mcg (15 mcg x 3)/0.5 mL Syrg, , Disp: , Rfl:     irbesartan (AVAPRO) 300 MG tablet, Take 300 mg by mouth every evening. , Disp: , Rfl:     simvastatin (ZOCOR) 10 MG tablet, Take 10 mg by mouth every evening., Disp: , Rfl:     sulfamethoxazole-trimethoprim 800-160mg (BACTRIM DS) 800-160 mg Tab, Take 1 tablet by mouth 2 (two) times daily., Disp: , Rfl: 0    beta-carotene,A,-vits C,E/mins (OCUVITE ORAL), Take 1 tablet by mouth Daily., Disp: , Rfl:     calcium carbonate/vitamin D3 (CALTRATE 600 + D ORAL), Take 1 tablet by mouth Daily., Disp: , Rfl:     cholecalciferol, vitamin D3, (VITAMIN D3) 1,000  "unit capsule, Take 1,000 Units by mouth once daily., Disp: , Rfl:     folic acid/multivit-min/lutein (CENTRUM SILVER ORAL), Take 1 tablet by mouth Daily., Disp: , Rfl:     hydroCHLOROthiazide (HYDRODIURIL) 25 MG tablet, Take 25 mg by mouth daily as needed., Disp: , Rfl:     Review of Systems:  Constitutional: no fever or chills  Eyes: no visual changes  ENT: no nasal congestion or sore throat  Respiratory: no cough or shortness of breath  Cardiovascular: no chest pain or palpitations  Gastrointestinal: no nausea or vomiting, tolerating diet  Genitourinary: no hematuria or dysuria  Integument/Breast: no rash or pruritis  Hematologic/Lymphatic: no easy bruising or lymphadenopathy  Musculoskeletal: positive for c/o right knee pain worse with increased activity  Neurological: no seizures or tremors  Behavioral/Psych: no auditory or visual hallucinations  Endocrine: no heat or cold intolerance    PE:  Ht 5' 5" (1.651 m)   Wt 82.1 kg (181 lb)   BMI 30.12 kg/m²   General: Pleasant, cooperative, NAD   Gait: antalgic  HEENT: NCAT, sclera nonicteric   Lungs: Respirations clear bilaterally; equal and unlabored.   CV: S1S2; 2+ bilateral upper and lower extremity pulses.   Skin: Intact throughout with no rashes, erythema, or lesions  Extremities: No LE edema,  no erythema or warmth of the skin in either lower extremity.    Right knee exam:  Knee Range of Motion:normal, pain with passive range of motion  Effusion:none  Condition of skin:intact  Location of tenderness:medial   Strength:normal  Stability: stable to testing    Hip Examination:normal    Radiographs: Radiographs reveal severe narrowing medial femoral tibial compartments with hypertrophic new bone.  There may be small effusions    Knee Alignment: normal    Diagnosis: osteoarthritis Right knee    Plan: Right total knee arthroplasty    Due to the serious nature of total joint infection and the high prevalence of community acquired MRSA, vancomycin will be used " perioperatively.

## 2019-10-04 ENCOUNTER — TELEPHONE (OUTPATIENT)
Dept: ORTHOPEDICS | Facility: CLINIC | Age: 69
End: 2019-10-04

## 2019-10-04 DIAGNOSIS — Z96.651 STATUS POST TOTAL RIGHT KNEE REPLACEMENT: Primary | ICD-10-CM

## 2019-10-04 RX ORDER — OXYCODONE HYDROCHLORIDE 5 MG/1
5 TABLET ORAL EVERY 6 HOURS PRN
Qty: 30 TABLET | Refills: 0 | Status: SHIPPED | OUTPATIENT
Start: 2019-10-04 | End: 2019-10-22 | Stop reason: SDUPTHER

## 2019-10-04 RX ORDER — DOCUSATE SODIUM 100 MG/1
100 CAPSULE, LIQUID FILLED ORAL 2 TIMES DAILY PRN
Qty: 60 CAPSULE | Refills: 0 | Status: SHIPPED | OUTPATIENT
Start: 2019-10-04 | End: 2022-02-21

## 2019-10-04 RX ORDER — ASPIRIN 81 MG/1
81 TABLET ORAL 2 TIMES DAILY
Qty: 60 TABLET | Refills: 0 | Status: SHIPPED | OUTPATIENT
Start: 2019-10-04 | End: 2022-01-11

## 2019-10-04 RX ORDER — ONDANSETRON 8 MG/1
8 TABLET, ORALLY DISINTEGRATING ORAL EVERY 12 HOURS PRN
Qty: 20 TABLET | Refills: 0 | Status: SHIPPED | OUTPATIENT
Start: 2019-10-04 | End: 2021-03-10

## 2019-10-04 RX ORDER — CELECOXIB 200 MG/1
200 CAPSULE ORAL DAILY
Qty: 30 CAPSULE | Refills: 0 | Status: SHIPPED | OUTPATIENT
Start: 2019-10-04 | End: 2019-11-03

## 2019-10-04 RX ORDER — DEXTROMETHORPHAN HYDROBROMIDE, GUAIFENESIN 5; 100 MG/5ML; MG/5ML
650 LIQUID ORAL EVERY 8 HOURS PRN
Qty: 100 TABLET | Refills: 0 | Status: SHIPPED | OUTPATIENT
Start: 2019-10-04 | End: 2022-04-27

## 2019-10-04 NOTE — TELEPHONE ENCOUNTER
we spoke : Reminded to eat light the night before surgery, NPO after midnight, take hibclens shower the night before surgery  & again in the am , sleep on clean sheets  in clean clothes, no laxatives or stool softeners , report to the 2nd floor surgery unit for 5 am Monday morning  She voiced understanding

## 2019-10-07 ENCOUNTER — ANESTHESIA (OUTPATIENT)
Dept: SURGERY | Facility: HOSPITAL | Age: 69
End: 2019-10-07
Payer: OTHER GOVERNMENT

## 2019-10-07 ENCOUNTER — HOSPITAL ENCOUNTER (OUTPATIENT)
Facility: HOSPITAL | Age: 69
LOS: 1 days | Discharge: HOME HEALTH | End: 2019-10-08
Attending: ORTHOPAEDIC SURGERY | Admitting: ORTHOPAEDIC SURGERY
Payer: OTHER GOVERNMENT

## 2019-10-07 DIAGNOSIS — M17.11 PRIMARY OSTEOARTHRITIS OF RIGHT KNEE: ICD-10-CM

## 2019-10-07 DIAGNOSIS — Z96.651 S/P TOTAL KNEE REPLACEMENT USING CEMENT, RIGHT: ICD-10-CM

## 2019-10-07 PROCEDURE — 94799 UNLISTED PULMONARY SVC/PX: CPT

## 2019-10-07 PROCEDURE — 76942 PR U/S GUIDANCE FOR NEEDLE GUIDANCE: ICD-10-PCS | Mod: 26,,, | Performed by: ANESTHESIOLOGY

## 2019-10-07 PROCEDURE — 64448 PR NERVE BLOCK INJ, ANES/STEROID, FEMORAL, CONT INFUSION, INCL IMAG GUIDANCE: ICD-10-PCS | Mod: 59,RT,, | Performed by: ANESTHESIOLOGY

## 2019-10-07 PROCEDURE — 25000003 PHARM REV CODE 250: Performed by: NURSE PRACTITIONER

## 2019-10-07 PROCEDURE — 63600175 PHARM REV CODE 636 W HCPCS: Performed by: NURSE PRACTITIONER

## 2019-10-07 PROCEDURE — 36000710: Performed by: ORTHOPAEDIC SURGERY

## 2019-10-07 PROCEDURE — 63600175 PHARM REV CODE 636 W HCPCS: Performed by: ANESTHESIOLOGY

## 2019-10-07 PROCEDURE — 25000003 PHARM REV CODE 250: Performed by: ANESTHESIOLOGY

## 2019-10-07 PROCEDURE — 64448 NJX AA&/STRD FEM NRV NFS IMG: CPT | Mod: 59,RT,, | Performed by: ANESTHESIOLOGY

## 2019-10-07 PROCEDURE — 71000033 HC RECOVERY, INTIAL HOUR: Performed by: ORTHOPAEDIC SURGERY

## 2019-10-07 PROCEDURE — 63600175 PHARM REV CODE 636 W HCPCS: Performed by: NURSE ANESTHETIST, CERTIFIED REGISTERED

## 2019-10-07 PROCEDURE — 37000008 HC ANESTHESIA 1ST 15 MINUTES: Performed by: ORTHOPAEDIC SURGERY

## 2019-10-07 PROCEDURE — D9220A PRA ANESTHESIA: ICD-10-PCS | Mod: ,,, | Performed by: ANESTHESIOLOGY

## 2019-10-07 PROCEDURE — 25000003 PHARM REV CODE 250: Performed by: NURSE ANESTHETIST, CERTIFIED REGISTERED

## 2019-10-07 PROCEDURE — 94664 DEMO&/EVAL PT USE INHALER: CPT

## 2019-10-07 PROCEDURE — 97165 OT EVAL LOW COMPLEX 30 MIN: CPT

## 2019-10-07 PROCEDURE — 63600175 PHARM REV CODE 636 W HCPCS: Performed by: ORTHOPAEDIC SURGERY

## 2019-10-07 PROCEDURE — 76942 ECHO GUIDE FOR BIOPSY: CPT | Performed by: ANESTHESIOLOGY

## 2019-10-07 PROCEDURE — 25000003 PHARM REV CODE 250: Performed by: ORTHOPAEDIC SURGERY

## 2019-10-07 PROCEDURE — 99900035 HC TECH TIME PER 15 MIN (STAT)

## 2019-10-07 PROCEDURE — 37000009 HC ANESTHESIA EA ADD 15 MINS: Performed by: ORTHOPAEDIC SURGERY

## 2019-10-07 PROCEDURE — 94770 HC EXHALED C02 TEST: CPT

## 2019-10-07 PROCEDURE — 97161 PT EVAL LOW COMPLEX 20 MIN: CPT

## 2019-10-07 PROCEDURE — C1776 JOINT DEVICE (IMPLANTABLE): HCPCS | Performed by: ORTHOPAEDIC SURGERY

## 2019-10-07 PROCEDURE — 94761 N-INVAS EAR/PLS OXIMETRY MLT: CPT

## 2019-10-07 PROCEDURE — 27447 TOTAL KNEE ARTHROPLASTY: CPT | Mod: RT,,, | Performed by: ORTHOPAEDIC SURGERY

## 2019-10-07 PROCEDURE — 27201423 OPTIME MED/SURG SUP & DEVICES STERILE SUPPLY: Performed by: ORTHOPAEDIC SURGERY

## 2019-10-07 PROCEDURE — 76942 ECHO GUIDE FOR BIOPSY: CPT | Mod: 26,,, | Performed by: ANESTHESIOLOGY

## 2019-10-07 PROCEDURE — 97116 GAIT TRAINING THERAPY: CPT

## 2019-10-07 PROCEDURE — 27447 PR TOTAL KNEE ARTHROPLASTY: ICD-10-PCS | Mod: RT,,, | Performed by: ORTHOPAEDIC SURGERY

## 2019-10-07 PROCEDURE — C1713 ANCHOR/SCREW BN/BN,TIS/BN: HCPCS | Performed by: ORTHOPAEDIC SURGERY

## 2019-10-07 PROCEDURE — 36000711: Performed by: ORTHOPAEDIC SURGERY

## 2019-10-07 PROCEDURE — 64448 NJX AA&/STRD FEM NRV NFS IMG: CPT | Performed by: ANESTHESIOLOGY

## 2019-10-07 PROCEDURE — D9220A PRA ANESTHESIA: Mod: ,,, | Performed by: ANESTHESIOLOGY

## 2019-10-07 PROCEDURE — 97535 SELF CARE MNGMENT TRAINING: CPT

## 2019-10-07 DEVICE — PATELLA PSN CEM POLY 8X29MM: Type: IMPLANTABLE DEVICE | Site: KNEE | Status: FUNCTIONAL

## 2019-10-07 DEVICE — CEMENT BONE WHOLE BATCH: Type: IMPLANTABLE DEVICE | Site: KNEE | Status: FUNCTIONAL

## 2019-10-07 DEVICE — IMPLANTABLE DEVICE: Type: IMPLANTABLE DEVICE | Site: KNEE | Status: FUNCTIONAL

## 2019-10-07 DEVICE — PLUG BONE #5: Type: IMPLANTABLE DEVICE | Site: KNEE | Status: FUNCTIONAL

## 2019-10-07 DEVICE — PSN TIB STM 5 DEG SZ D R: Type: IMPLANTABLE DEVICE | Site: KNEE | Status: FUNCTIONAL

## 2019-10-07 RX ORDER — CELECOXIB 100 MG/1
200 CAPSULE ORAL DAILY
Status: DISCONTINUED | OUTPATIENT
Start: 2019-10-08 | End: 2019-10-08 | Stop reason: HOSPADM

## 2019-10-07 RX ORDER — DOXAZOSIN 1 MG/1
1 TABLET ORAL DAILY
Status: DISCONTINUED | OUTPATIENT
Start: 2019-10-07 | End: 2019-10-07

## 2019-10-07 RX ORDER — PREGABALIN 75 MG/1
75 CAPSULE ORAL NIGHTLY
Status: DISCONTINUED | OUTPATIENT
Start: 2019-10-07 | End: 2019-10-08 | Stop reason: HOSPADM

## 2019-10-07 RX ORDER — DILTIAZEM HYDROCHLORIDE 120 MG/1
240 CAPSULE, COATED, EXTENDED RELEASE ORAL EVERY MORNING
Status: DISCONTINUED | OUTPATIENT
Start: 2019-10-07 | End: 2019-10-08 | Stop reason: HOSPADM

## 2019-10-07 RX ORDER — AMOXICILLIN 250 MG
1 CAPSULE ORAL 2 TIMES DAILY
Status: DISCONTINUED | OUTPATIENT
Start: 2019-10-07 | End: 2019-10-08 | Stop reason: HOSPADM

## 2019-10-07 RX ORDER — MORPHINE SULFATE 2 MG/ML
2 INJECTION, SOLUTION INTRAMUSCULAR; INTRAVENOUS
Status: DISCONTINUED | OUTPATIENT
Start: 2019-10-07 | End: 2019-10-07

## 2019-10-07 RX ORDER — EPHEDRINE SULFATE 50 MG/ML
INJECTION, SOLUTION INTRAVENOUS
Status: DISCONTINUED | OUTPATIENT
Start: 2019-10-07 | End: 2019-10-07

## 2019-10-07 RX ORDER — FENTANYL CITRATE 50 UG/ML
25 INJECTION, SOLUTION INTRAMUSCULAR; INTRAVENOUS EVERY 5 MIN PRN
Status: DISCONTINUED | OUTPATIENT
Start: 2019-10-07 | End: 2019-10-07 | Stop reason: HOSPADM

## 2019-10-07 RX ORDER — BUPIVACAINE HYDROCHLORIDE 5 MG/ML
INJECTION, SOLUTION EPIDURAL; INTRACAUDAL
Status: DISPENSED
Start: 2019-10-07 | End: 2019-10-07

## 2019-10-07 RX ORDER — OXYCODONE HYDROCHLORIDE 5 MG/1
10 TABLET ORAL
Status: DISCONTINUED | OUTPATIENT
Start: 2019-10-07 | End: 2019-10-07

## 2019-10-07 RX ORDER — CELECOXIB 200 MG/1
400 CAPSULE ORAL
Status: COMPLETED | OUTPATIENT
Start: 2019-10-07 | End: 2019-10-07

## 2019-10-07 RX ORDER — MUPIROCIN 20 MG/G
1 OINTMENT TOPICAL 2 TIMES DAILY
Status: DISCONTINUED | OUTPATIENT
Start: 2019-10-07 | End: 2019-10-08 | Stop reason: HOSPADM

## 2019-10-07 RX ORDER — CEFAZOLIN SODIUM 1 G/3ML
2 INJECTION, POWDER, FOR SOLUTION INTRAMUSCULAR; INTRAVENOUS
Status: COMPLETED | OUTPATIENT
Start: 2019-10-07 | End: 2019-10-08

## 2019-10-07 RX ORDER — PROPOFOL 10 MG/ML
VIAL (ML) INTRAVENOUS CONTINUOUS PRN
Status: DISCONTINUED | OUTPATIENT
Start: 2019-10-07 | End: 2019-10-07

## 2019-10-07 RX ORDER — NALOXONE HCL 0.4 MG/ML
0.02 VIAL (ML) INJECTION
Status: DISCONTINUED | OUTPATIENT
Start: 2019-10-07 | End: 2019-10-08 | Stop reason: HOSPADM

## 2019-10-07 RX ORDER — SODIUM CHLORIDE 9 MG/ML
INJECTION, SOLUTION INTRAVENOUS
Status: COMPLETED | OUTPATIENT
Start: 2019-10-07 | End: 2019-10-07

## 2019-10-07 RX ORDER — BISACODYL 10 MG
10 SUPPOSITORY, RECTAL RECTAL EVERY 12 HOURS PRN
Status: DISCONTINUED | OUTPATIENT
Start: 2019-10-07 | End: 2019-10-08 | Stop reason: HOSPADM

## 2019-10-07 RX ORDER — FAMOTIDINE 20 MG/1
20 TABLET, FILM COATED ORAL 2 TIMES DAILY
Status: DISCONTINUED | OUTPATIENT
Start: 2019-10-07 | End: 2019-10-08 | Stop reason: HOSPADM

## 2019-10-07 RX ORDER — CLONIDINE 100 UG/ML
INJECTION, SOLUTION EPIDURAL
Status: DISPENSED
Start: 2019-10-07 | End: 2019-10-07

## 2019-10-07 RX ORDER — GENTAMICIN SULFATE 40 MG/ML
INJECTION, SOLUTION INTRAMUSCULAR; INTRAVENOUS
Status: DISCONTINUED | OUTPATIENT
Start: 2019-10-07 | End: 2019-10-07 | Stop reason: HOSPADM

## 2019-10-07 RX ORDER — POLYETHYLENE GLYCOL 3350 17 G/17G
17 POWDER, FOR SOLUTION ORAL DAILY
Status: DISCONTINUED | OUTPATIENT
Start: 2019-10-07 | End: 2019-10-08 | Stop reason: HOSPADM

## 2019-10-07 RX ORDER — DOXAZOSIN 1 MG/1
2 TABLET ORAL EVERY MORNING
Status: DISCONTINUED | OUTPATIENT
Start: 2019-10-07 | End: 2019-10-08 | Stop reason: HOSPADM

## 2019-10-07 RX ORDER — SODIUM CHLORIDE 9 MG/ML
INJECTION, SOLUTION INTRAVENOUS CONTINUOUS PRN
Status: DISCONTINUED | OUTPATIENT
Start: 2019-10-07 | End: 2019-10-07

## 2019-10-07 RX ORDER — EPINEPHRINE 1 MG/ML
INJECTION, SOLUTION INTRACARDIAC; INTRAMUSCULAR; INTRAVENOUS; SUBCUTANEOUS
Status: DISPENSED
Start: 2019-10-07 | End: 2019-10-07

## 2019-10-07 RX ORDER — OXYCODONE HYDROCHLORIDE 5 MG/1
5 TABLET ORAL
Status: DISCONTINUED | OUTPATIENT
Start: 2019-10-07 | End: 2019-10-07

## 2019-10-07 RX ORDER — ROPIVACAINE HYDROCHLORIDE 2 MG/ML
8 INJECTION, SOLUTION EPIDURAL; INFILTRATION; PERINEURAL CONTINUOUS
Status: DISCONTINUED | OUTPATIENT
Start: 2019-10-07 | End: 2019-10-08 | Stop reason: HOSPADM

## 2019-10-07 RX ORDER — ACETAMINOPHEN 500 MG
1000 TABLET ORAL EVERY 6 HOURS
Status: DISCONTINUED | OUTPATIENT
Start: 2019-10-07 | End: 2019-10-08 | Stop reason: HOSPADM

## 2019-10-07 RX ORDER — OXYCODONE HYDROCHLORIDE 5 MG/1
5 TABLET ORAL
Status: DISCONTINUED | OUTPATIENT
Start: 2019-10-07 | End: 2019-10-07 | Stop reason: HOSPADM

## 2019-10-07 RX ORDER — VANCOMYCIN HCL IN 5 % DEXTROSE 1G/250ML
1000 PLASTIC BAG, INJECTION (ML) INTRAVENOUS
Status: COMPLETED | OUTPATIENT
Start: 2019-10-07 | End: 2019-10-07

## 2019-10-07 RX ORDER — RAMELTEON 8 MG/1
8 TABLET ORAL NIGHTLY PRN
Status: DISCONTINUED | OUTPATIENT
Start: 2019-10-07 | End: 2019-10-08 | Stop reason: HOSPADM

## 2019-10-07 RX ORDER — PREGABALIN 75 MG/1
75 CAPSULE ORAL
Status: COMPLETED | OUTPATIENT
Start: 2019-10-07 | End: 2019-10-07

## 2019-10-07 RX ORDER — BUPIVACAINE HYDROCHLORIDE 2.5 MG/ML
INJECTION, SOLUTION EPIDURAL; INFILTRATION; INTRACAUDAL
Status: DISPENSED
Start: 2019-10-07 | End: 2019-10-07

## 2019-10-07 RX ORDER — CEFAZOLIN SODIUM 1 G/3ML
2 INJECTION, POWDER, FOR SOLUTION INTRAMUSCULAR; INTRAVENOUS
Status: COMPLETED | OUTPATIENT
Start: 2019-10-07 | End: 2019-10-07

## 2019-10-07 RX ORDER — ROPIVACAINE HYDROCHLORIDE 5 MG/ML
INJECTION, SOLUTION EPIDURAL; INFILTRATION; PERINEURAL
Status: DISPENSED
Start: 2019-10-07 | End: 2019-10-07

## 2019-10-07 RX ORDER — MUPIROCIN 20 MG/G
1 OINTMENT TOPICAL
Status: COMPLETED | OUTPATIENT
Start: 2019-10-07 | End: 2019-10-07

## 2019-10-07 RX ORDER — ONDANSETRON 2 MG/ML
4 INJECTION INTRAMUSCULAR; INTRAVENOUS EVERY 8 HOURS PRN
Status: DISCONTINUED | OUTPATIENT
Start: 2019-10-07 | End: 2019-10-08 | Stop reason: HOSPADM

## 2019-10-07 RX ORDER — DEXAMETHASONE SODIUM PHOSPHATE 4 MG/ML
INJECTION, SOLUTION INTRA-ARTICULAR; INTRALESIONAL; INTRAMUSCULAR; INTRAVENOUS; SOFT TISSUE
Status: DISCONTINUED | OUTPATIENT
Start: 2019-10-07 | End: 2019-10-07

## 2019-10-07 RX ORDER — SIMVASTATIN 10 MG/1
10 TABLET, FILM COATED ORAL NIGHTLY
Status: DISCONTINUED | OUTPATIENT
Start: 2019-10-07 | End: 2019-10-08 | Stop reason: HOSPADM

## 2019-10-07 RX ORDER — SODIUM CHLORIDE 0.9 % (FLUSH) 0.9 %
10 SYRINGE (ML) INJECTION
Status: DISCONTINUED | OUTPATIENT
Start: 2019-10-07 | End: 2019-10-07 | Stop reason: HOSPADM

## 2019-10-07 RX ORDER — LIDOCAINE HYDROCHLORIDE 10 MG/ML
1 INJECTION, SOLUTION EPIDURAL; INFILTRATION; INTRACAUDAL; PERINEURAL
Status: DISCONTINUED | OUTPATIENT
Start: 2019-10-07 | End: 2019-10-07 | Stop reason: HOSPADM

## 2019-10-07 RX ORDER — PROPOFOL 10 MG/ML
VIAL (ML) INTRAVENOUS
Status: DISCONTINUED | OUTPATIENT
Start: 2019-10-07 | End: 2019-10-07

## 2019-10-07 RX ORDER — ASPIRIN 81 MG/1
81 TABLET ORAL 2 TIMES DAILY
Status: DISCONTINUED | OUTPATIENT
Start: 2019-10-07 | End: 2019-10-08 | Stop reason: HOSPADM

## 2019-10-07 RX ORDER — ACETAMINOPHEN 10 MG/ML
1000 INJECTION, SOLUTION INTRAVENOUS ONCE
Status: COMPLETED | OUTPATIENT
Start: 2019-10-07 | End: 2019-10-07

## 2019-10-07 RX ORDER — OXYCODONE HYDROCHLORIDE 5 MG/1
15 TABLET ORAL
Status: DISCONTINUED | OUTPATIENT
Start: 2019-10-07 | End: 2019-10-07

## 2019-10-07 RX ORDER — OXYCODONE HYDROCHLORIDE 10 MG/1
10 TABLET ORAL
Status: DISCONTINUED | OUTPATIENT
Start: 2019-10-07 | End: 2019-10-08 | Stop reason: HOSPADM

## 2019-10-07 RX ORDER — SODIUM CHLORIDE 9 MG/ML
INJECTION, SOLUTION INTRAVENOUS CONTINUOUS
Status: ACTIVE | OUTPATIENT
Start: 2019-10-07 | End: 2019-10-08

## 2019-10-07 RX ORDER — KETAMINE HYDROCHLORIDE 10 MG/ML
INJECTION, SOLUTION INTRAMUSCULAR; INTRAVENOUS
Status: DISCONTINUED | OUTPATIENT
Start: 2019-10-07 | End: 2019-10-07

## 2019-10-07 RX ORDER — VANCOMYCIN HYDROCHLORIDE 500 MG/10ML
INJECTION, POWDER, LYOPHILIZED, FOR SOLUTION INTRAVENOUS
Status: DISCONTINUED | OUTPATIENT
Start: 2019-10-07 | End: 2019-10-07 | Stop reason: HOSPADM

## 2019-10-07 RX ORDER — MORPHINE SULFATE 2 MG/ML
2 INJECTION, SOLUTION INTRAMUSCULAR; INTRAVENOUS
Status: DISCONTINUED | OUTPATIENT
Start: 2019-10-07 | End: 2019-10-08 | Stop reason: HOSPADM

## 2019-10-07 RX ORDER — IRBESARTAN 150 MG/1
300 TABLET ORAL NIGHTLY
Status: DISCONTINUED | OUTPATIENT
Start: 2019-10-07 | End: 2019-10-08 | Stop reason: HOSPADM

## 2019-10-07 RX ORDER — ROPIVACAINE HYDROCHLORIDE 5 MG/ML
INJECTION, SOLUTION EPIDURAL; INFILTRATION; PERINEURAL
Status: COMPLETED | OUTPATIENT
Start: 2019-10-07 | End: 2019-10-07

## 2019-10-07 RX ORDER — ONDANSETRON 2 MG/ML
INJECTION INTRAMUSCULAR; INTRAVENOUS
Status: DISCONTINUED | OUTPATIENT
Start: 2019-10-07 | End: 2019-10-07

## 2019-10-07 RX ORDER — MIDAZOLAM HYDROCHLORIDE 1 MG/ML
1 INJECTION INTRAMUSCULAR; INTRAVENOUS EVERY 5 MIN PRN
Status: DISCONTINUED | OUTPATIENT
Start: 2019-10-07 | End: 2019-10-07 | Stop reason: HOSPADM

## 2019-10-07 RX ORDER — MIDAZOLAM HYDROCHLORIDE 1 MG/ML
INJECTION, SOLUTION INTRAMUSCULAR; INTRAVENOUS
Status: DISCONTINUED | OUTPATIENT
Start: 2019-10-07 | End: 2019-10-07

## 2019-10-07 RX ORDER — OXYCODONE HYDROCHLORIDE 5 MG/1
TABLET ORAL
Status: DISPENSED
Start: 2019-10-07 | End: 2019-10-07

## 2019-10-07 RX ORDER — OXYCODONE HYDROCHLORIDE 5 MG/1
5 TABLET ORAL
Status: DISCONTINUED | OUTPATIENT
Start: 2019-10-07 | End: 2019-10-08 | Stop reason: HOSPADM

## 2019-10-07 RX ADMIN — VANCOMYCIN HYDROCHLORIDE 1000 MG: 1 INJECTION, POWDER, FOR SOLUTION INTRAVENOUS at 05:10

## 2019-10-07 RX ADMIN — SODIUM CHLORIDE: 0.9 INJECTION, SOLUTION INTRAVENOUS at 05:10

## 2019-10-07 RX ADMIN — ROPIVACAINE HYDROCHLORIDE 8 ML/HR: 2 INJECTION, SOLUTION EPIDURAL; INFILTRATION at 08:10

## 2019-10-07 RX ADMIN — IRBESARTAN 300 MG: 150 TABLET, FILM COATED ORAL at 08:10

## 2019-10-07 RX ADMIN — SENNOSIDES AND DOCUSATE SODIUM 1 TABLET: 8.6; 5 TABLET ORAL at 08:10

## 2019-10-07 RX ADMIN — ACETAMINOPHEN 1000 MG: 10 INJECTION, SOLUTION INTRAVENOUS at 09:10

## 2019-10-07 RX ADMIN — SODIUM CHLORIDE: 0.9 INJECTION, SOLUTION INTRAVENOUS at 11:10

## 2019-10-07 RX ADMIN — SODIUM CHLORIDE, SODIUM GLUCONATE, SODIUM ACETATE, POTASSIUM CHLORIDE, MAGNESIUM CHLORIDE, SODIUM PHOSPHATE, DIBASIC, AND POTASSIUM PHOSPHATE: .53; .5; .37; .037; .03; .012; .00082 INJECTION, SOLUTION INTRAVENOUS at 09:10

## 2019-10-07 RX ADMIN — MUPIROCIN 1 G: 20 OINTMENT TOPICAL at 05:10

## 2019-10-07 RX ADMIN — ACETAMINOPHEN 1000 MG: 500 TABLET ORAL at 08:10

## 2019-10-07 RX ADMIN — PROPOFOL 30 MG: 10 INJECTION, EMULSION INTRAVENOUS at 07:10

## 2019-10-07 RX ADMIN — MIDAZOLAM 1 MG: 1 INJECTION INTRAMUSCULAR; INTRAVENOUS at 07:10

## 2019-10-07 RX ADMIN — EPHEDRINE SULFATE 10 MG: 50 INJECTION INTRAVENOUS at 07:10

## 2019-10-07 RX ADMIN — MIDAZOLAM 0.5 MG: 1 INJECTION INTRAMUSCULAR; INTRAVENOUS at 08:10

## 2019-10-07 RX ADMIN — ROPIVACAINE HYDROCHLORIDE 10 ML: 5 INJECTION, SOLUTION EPIDURAL; INFILTRATION; PERINEURAL at 06:10

## 2019-10-07 RX ADMIN — MUPIROCIN 1 G: 20 OINTMENT TOPICAL at 08:10

## 2019-10-07 RX ADMIN — SIMVASTATIN 10 MG: 10 TABLET, FILM COATED ORAL at 08:10

## 2019-10-07 RX ADMIN — SODIUM CHLORIDE: 0.9 INJECTION, SOLUTION INTRAVENOUS at 09:10

## 2019-10-07 RX ADMIN — ACETAMINOPHEN 1000 MG: 500 TABLET ORAL at 04:10

## 2019-10-07 RX ADMIN — OXYCODONE HYDROCHLORIDE 10 MG: 10 TABLET ORAL at 01:10

## 2019-10-07 RX ADMIN — CEFAZOLIN 2 G: 1 INJECTION, POWDER, FOR SOLUTION INTRAMUSCULAR; INTRAVENOUS at 04:10

## 2019-10-07 RX ADMIN — CEFAZOLIN 2 G: 330 INJECTION, POWDER, FOR SOLUTION INTRAMUSCULAR; INTRAVENOUS at 07:10

## 2019-10-07 RX ADMIN — POLYETHYLENE GLYCOL (3350) 17 G: 17 POWDER, FOR SOLUTION ORAL at 01:10

## 2019-10-07 RX ADMIN — ROPIVACAINE HYDROCHLORIDE 8 ML/HR: 2 INJECTION, SOLUTION EPIDURAL; INFILTRATION at 09:10

## 2019-10-07 RX ADMIN — FAMOTIDINE 20 MG: 20 TABLET ORAL at 08:10

## 2019-10-07 RX ADMIN — SODIUM CHLORIDE, SODIUM GLUCONATE, SODIUM ACETATE, POTASSIUM CHLORIDE, MAGNESIUM CHLORIDE, SODIUM PHOSPHATE, DIBASIC, AND POTASSIUM PHOSPHATE: .53; .5; .37; .037; .03; .012; .00082 INJECTION, SOLUTION INTRAVENOUS at 08:10

## 2019-10-07 RX ADMIN — DEXAMETHASONE SODIUM PHOSPHATE 8 MG: 4 INJECTION, SOLUTION INTRAMUSCULAR; INTRAVENOUS at 07:10

## 2019-10-07 RX ADMIN — PREGABALIN 75 MG: 75 CAPSULE ORAL at 05:10

## 2019-10-07 RX ADMIN — KETAMINE HYDROCHLORIDE 20 MG: 10 INJECTION, SOLUTION INTRAMUSCULAR; INTRAVENOUS at 07:10

## 2019-10-07 RX ADMIN — PREGABALIN 75 MG: 75 CAPSULE ORAL at 08:10

## 2019-10-07 RX ADMIN — ONDANSETRON 4 MG: 2 INJECTION INTRAMUSCULAR; INTRAVENOUS at 09:10

## 2019-10-07 RX ADMIN — PROPOFOL 100 MCG/KG/MIN: 10 INJECTION, EMULSION INTRAVENOUS at 07:10

## 2019-10-07 RX ADMIN — SODIUM CHLORIDE: 0.9 INJECTION, SOLUTION INTRAVENOUS at 06:10

## 2019-10-07 RX ADMIN — ASPIRIN 81 MG: 81 TABLET, COATED ORAL at 08:10

## 2019-10-07 RX ADMIN — FENTANYL CITRATE 50 MCG: 50 INJECTION INTRAMUSCULAR; INTRAVENOUS at 06:10

## 2019-10-07 RX ADMIN — MIDAZOLAM HYDROCHLORIDE 2 MG: 1 INJECTION, SOLUTION INTRAMUSCULAR; INTRAVENOUS at 06:10

## 2019-10-07 RX ADMIN — CELECOXIB 400 MG: 200 CAPSULE ORAL at 05:10

## 2019-10-07 RX ADMIN — MIDAZOLAM 0.5 MG: 1 INJECTION INTRAMUSCULAR; INTRAVENOUS at 07:10

## 2019-10-07 RX ADMIN — OXYCODONE HYDROCHLORIDE 10 MG: 10 TABLET ORAL at 09:10

## 2019-10-07 NOTE — ANESTHESIA POSTPROCEDURE EVALUATION
Anesthesia Post Evaluation    Patient: Maria Elena Jackson    Procedure(s) Performed: Procedure(s) (LRB):  ARTHROPLASTY, KNEE, TOTAL (Right)    Final Anesthesia Type: spinal  Patient location during evaluation: PACU  Patient participation: Yes- Able to Participate  Level of consciousness: awake and alert and oriented  Post-procedure vital signs: reviewed and stable  Pain management: adequate  Airway patency: patent  PONV status at discharge: No PONV  Anesthetic complications: no      Cardiovascular status: stable  Respiratory status: unassisted  Hydration status: euvolemic  Follow-up not needed.          Vitals Value Taken Time   /55 10/7/2019 10:17 AM   Temp 36.4 °C (97.6 °F) 10/7/2019  9:30 AM   Pulse 57 10/7/2019 10:19 AM   Resp 21 10/7/2019 10:19 AM   SpO2 91 % 10/7/2019 10:19 AM   Vitals shown include unvalidated device data.      No case tracking events are documented in the log.      Pain/Stacy Score: Pain Rating Prior to Med Admin: 7 (10/7/2019  9:52 AM)  Stacy Score: 10 (10/7/2019 10:00 AM)

## 2019-10-07 NOTE — INTERVAL H&P NOTE
The patient has been examined and the H&P has been reviewed:    I concur with the findings and no changes have occurred since H&P was written.    Anesthesia/Surgery risks, benefits and alternative options discussed and understood by patient/family.          Active Hospital Problems    Diagnosis  POA    S/P total knee replacement using cement, right [Z96.651]  Not Applicable      Resolved Hospital Problems   No resolved problems to display.

## 2019-10-07 NOTE — PT/OT/SLP EVAL
Physical Therapy Evaluation and Treatment     Patient Name:  Maria Elena Jackson   MRN:  [id number]    Recommendations:     Discharge Recommendations:  home health PT   Discharge Equipment Recommendations: none - all DME delivered prior to surgery   Barriers to discharge: Inaccessible home    Assessment:     Maria Elena Jackson is a 69 y.o. female admitted with a medical diagnosis of R TKA.  She presents with the following impairments/functional limitations:  weakness, gait instability, impaired balance, impaired functional mobilty, impaired skin, orthopedic precautions, edema, impaired joint extensibility, decreased lower extremity function, decreased ROM. Patient tolerated PT session well. She ambulated 100ft with RW and SBA. She is okay to ambulate with RW and nursing staff assistance. All of her DME was delivered prior to surgery. She would benefit from HH PT at discharge.     Rehab Prognosis: Good; patient would benefit from acute skilled PT services to address these deficits and reach maximum level of function.    Recent Surgery: Procedure(s) (LRB):  ARTHROPLASTY, KNEE, TOTAL (Right) Day of Surgery    Plan:     During this hospitalization, patient to be seen daily to address the identified rehab impairments via gait training, therapeutic activities, therapeutic exercises and progress toward the following goals:    · Plan of Care Expires:  10/14/19    Subjective     Chief Complaint: R knee was stiff right after surgery.   Patient/Family Comments/goals: To get back to PLOF.   Pain/Comfort:  Pain Rating 1: 0/10    Living Environment:  Patient lives with her  in a H with 3 steps and no HR's to enter. Per , the steps are wide enough to accommodate the walker. Prior to admission, patients level of function was independent for functional mobility. Equipment at home: walker, rolling, commode, bath bench (all DME delivered prior to surgery). Upon discharge, patient will have assistance from  .    Objective:     Communicated with RN prior to session.  Patient found supine with cryotherapy, peripheral IV, perineural catheter, FCD  upon PT entry to room.    General Precautions: Standard, fall   Orthopedic Precautions:RLE weight bearing as tolerated   Braces: N/A     Exams:  · Cognitive Exam:  Patient is oriented to Person, Place, Time and Situation  · Sensation:    · -       Intact  · RLE ROM: WFL except limited at knee due to recent surgery   · RLE Strength: appears WFL but unable to formally assess due recent surgery   · LLE ROM: WFL  · LLE Strength: WFL    Functional Mobility:  · Bed Mobility:     · Supine to Sit: stand by assistance  · Transfers:     · Sit to Stand:  stand by assistance with rolling walker  · Gait:  Patient ambulated 100ft with stand by assistance and rolling walker. She required verbal cues for knee flexion during swing phase. She needed verbal cues for gait sequence and RW management.     Therapeutic Activities and Exercises:  Patient educated in:  -PT role and POC  -safety with transfers including hand placement  -gait sequencing and RW management  -OOB activity to maximize recovery including ambulating with nursing staff assistance and rolling walker   -polar ice management     AM-PAC 6 CLICK MOBILITY  Total Score:22     Patient left up in chair with all lines intact, call button in reach, RN notified and  present.    GOALS:   Multidisciplinary Problems     Physical Therapy Goals        Problem: Physical Therapy Goal    Goal Priority Disciplines Outcome Goal Variances Interventions   Physical Therapy Goal     PT, PT/OT Ongoing, Progressing     Description:  Goals to be met by: 10/14/19    Patient will increase functional independence with mobility by performin. Supine to sit with supervision  2. Sit to stand transfer with supervision  3. Gait x300 feet with supervision using rolling walker  4. Ascend/Descend three 6 inch curb steps with contact guard assistance  using rolling walker  5. Lower extremity exercise program x30 reps per handout, with supervision                      History:     Past Medical History:   Diagnosis Date    Arthritis     Hyperlipidemia     Hypertension        Past Surgical History:   Procedure Laterality Date    CHOLECYSTECTOMY      D&C Hysteroscopy      DILATION AND CURETTAGE OF UTERUS      laparoscopy      TONSILLECTOMY         Time Tracking:     PT Received On: 10/07/19  PT Start Time: 1335     PT Stop Time: 1358  PT Total Time (min): 23 min     Billable Minutes: Evaluation 13 and Gait Training 10    Kaleigh Herrera, PT  10/07/2019

## 2019-10-07 NOTE — NURSING
Pt arrived on unit from PACU, resp unlabored, VSS. Pt awake, alert, oriented pt and  to room and call bell instructed to call for assistance when needed. Rolling walker and bedside commode at bedside, polar ice on, PNC infusing at 8ml/hr to right knee

## 2019-10-07 NOTE — NURSING TRANSFER
Nursing Transfer Note      10/7/2019     Transfer :303    Transfer via stretcher    Transfer with PNC IV fluids    Transported by Lacie    Medicines sent:  Ropivcaine    Chart send with patient: Yes    Notified: spouse    Patient reassessed at: 10/07/19 1020     Upon arrival to floor: patient oriented to room

## 2019-10-07 NOTE — OP NOTE
DATE OF PROCEDURE:  10/07/2019    SURGEON:  John [facility]wood [facility], Jr., M.D.    ASSISTANT:  Loan Morales M.D.    OPERATION PERFORMED:  Right total knee arthroplasty.    PREOPERATIVE DIAGNOSIS:  Osteoarthritis, right knee.    POSTOPERATIVE DIAGNOSIS:  Osteoarthritis, right knee.    COMPONENTS USED:  Twin Persona knee system.  We used a size 4 femur, right   standard posterior stabilized; a size D tibia, right 5-degree stem; a 16 mm   articular insert, posterior stabilized, vitamin E, highly cross-linked poly and   a 29 mm patella.    ESTIMATED BLOOD LOSS:  100 mL    SPECIMEN:  No surgical specimens.    OPERATIVE TECHNIQUE:  The patient was placed supine on the operating table.    Spinal anesthesia had been introduced.  The right leg was prepped and draped in   sterile fashion.  The patient was given preoperative antibiotics and the OR team   wore the sterile exhaust suits in order to minimize risk for infection.  A foot   pump was placed on the left foot to help prevent DVT.  Right leg was   exsanguinated and the tourniquet was inflated to 300 pounds of pressure.  A   straight anterior incision was made to the knee.  Sharp dissection was carried   down to the extensor mechanism.  The extensor mechanism was opened in a straight   Insall approach.  Partial release of the medial collateral ligament was   performed to correct some slight varus deformity.  Intramedullary guide was   placed in the femur.  Distal cut was made at 5 degrees of valgus on a +2   setting.  The proximal tibial cut was performed removing about 5 from the medial   side and about 7 to 8 from the lateral side.  The posterior femur was cut for a   4 and the flexion gap was a little bigger than the extension gap, so we came   back and took another 2 off the extension gap that balanced the knee nicely.  I   went ahead and did the notch and chamfer-plasty on the femur, sized the tibia to   a D and drilled and prepared it with the same rotation as  the femur.  The   patella was nice and thick.  It was 24 mm and we cut it at 16.  I then   Pulsavac'd, dried the surfaces, cemented the tibial baseplate, then the femoral   component, removed the excess cement, put the 16 mm trial in, put the knee out   in extension and cemented the patella.  While the cement was hardening, I bathed   the knee in the Betadine solution.  When the cement was hard, I actually   trialed the 14, but it was just too loose in flexion, so I went ahead with the   16 insert.  With the 16 in, we then closed the extensor mechanism with 1 Vicryl   over tranexamic acid and vancomycin powder, closed the subcutaneous tissues with   0 and 3-0 Vicryl and closed the skin with a running subcuticular 3-0 Monocryl   and skin adhesive.  Sterile surgical dressing was applied.  There were no   complications to the procedure.      ZEKE  dd: 10/07/2019 09:24:37 (CDT)  td: 10/07/2019 10:00:18 (KAM)  Doc ID   #[id number]  Job ID #[id number]    CC:

## 2019-10-07 NOTE — TRANSFER OF CARE
"Anesthesia Transfer of Care Note    Patient: Maria Elena Jackson    Procedure(s) Performed: Procedure(s) (LRB):  ARTHROPLASTY, KNEE, TOTAL (Right)    Patient location: PACU    Anesthesia Type: spinal    Transport from OR: Transported from OR on room air with adequate spontaneous ventilation    Post pain: adequate analgesia    Post assessment: no apparent anesthetic complications    Post vital signs: stable    Level of consciousness: awake    Nausea/Vomiting: no nausea/vomiting    Complications: none    Transfer of care protocol was followed      Last vitals:   Visit Vitals  BP (!) 122/58 (BP Location: Right arm, Patient Position: Lying)   Pulse (!) 59   Temp 36.7 °C (98 °F) (Oral)   Resp 16   Ht 5' 5" (1.651 m)   Wt 79.8 kg (176 lb)   SpO2 97%   Breastfeeding? No   BMI 29.29 kg/m²     "

## 2019-10-07 NOTE — BRIEF OP NOTE
[facility]  Surgery Department  Operative Note    SUMMARY     Date of Procedure: 10/7/2019     Procedure: Procedure(s) (LRB):  ARTHROPLASTY, KNEE, TOTAL (Right)     Surgeon(s) and Role:     * John L. [facility] Jr., MD - Primary    Assisting Surgeon: None    Pre-Operative Diagnosis: Osteoarthritis of knee, unspecified (CODE) [M17.9]    Post-Operative Diagnosis: Post-Op Diagnosis Codes:     * Osteoarthritis of knee, unspecified (CODE) [M17.9]    Anesthesia: Femoral Block    Technical Procedures Used:  PS       Description of the Findings of the Procedure: Varus    Significant Surgical Tasks Conducted by the Assistant(s), if Applicable: closure    Complications: No    Estimated Blood Loss (EBL): 100cc             Implants:   Implant Name Type Inv. Item Serial No.  Lot No. LRB No. Used   CEMENT BONE WHOLE BATCH - [id number]  CEMENT BONE WHOLE BATCH  [employer]. [id number] Right 2   PLUG BONE #5 - [id number]  PLUG BONE #5  [employer]. [id number] Right 1   COMP FEM POST PSN SZ 4 RIGHT - [id number]  COMP FEM POST PSN SZ 4 RIGHT  [employer] [id number] Right 1   PATELLA PSN JOANNA POLY 8X29MM - [id number]  PATELLA PSN JOANNA POLY 8X29MM  [employer],INC [id number] Right 1   PSN TIB STM 5 DEG SZ D R - [id number]  PSN TIB STM 5 DEG SZ D R  [employer],INC [id number] Right 1   SCREW HEX HEAD - [id number]  SCREW HEX HEAD  [employer],INC  Right 1   BIT DRILL PIN TROCAR 3.2MM - [id number]  BIT DRILL PIN TROCAR 3.2MM  DASH,INC  Right 2   SCREW HEX HEAD 3.5X38MM - [id number]  SCREW HEX HEAD 3.5X38MM  DASH,INC  Right 2   PERSONA ARTICULAR SURFACE, RIGHT 16 MM HEIGHT     [id number] Right 1       Specimens:   Specimen (12h ago, onward)    None                  Condition: Good    Disposition: PACU - hemodynamically stable.    Attestation: I was present and scrubbed for the entire procedure.

## 2019-10-07 NOTE — ANESTHESIA PROCEDURE NOTES
[facility]    Patient location during procedure: pre-op   Block not for primary anesthetic.  Reason for block: at surgeon's request and post-op pain management   Post-op Pain Location: right knee  Start time: 10/7/2019 6:17 AM  Timeout: 10/7/2019 6:15 AM   End time: 10/7/2019 6:30 AM    Staffing  Authorizing Provider: Jericho Devi MD  Performing Provider: Jericho Devi MD    Preanesthetic Checklist  Completed: patient identified, site marked, surgical consent, pre-op evaluation, timeout performed, IV checked, risks and benefits discussed and monitors and equipment checked  Peripheral Block  Patient position: supine  Prep: ChloraPrep and site prepped and draped  Patient monitoring: heart rate, cardiac monitor, continuous pulse ox, continuous capnometry and frequent blood pressure checks  Block type: adductor canal  Laterality: right  Injection technique: continuous  Needle  Needle type: Tuohy   Needle gauge: 17 G  Needle length: 3.5 in  Needle localization: anatomical landmarks and ultrasound guidance  Catheter type: spring wound  Catheter size: 19 G  Test dose: lidocaine 1.5% with Epi 1-to-200,000 and negative   -ultrasound image captured on disc.  Assessment  Injection assessment: negative aspiration, negative parasthesia and local visualized surrounding nerve  Paresthesia pain: none  Heart rate change: no  Slow fractionated injection: yes  Additional Notes  VSS.  [facility] RN monitoring vitals throughout procedure.  Patient tolerated procedure well. 20cc 0.25% ropiv

## 2019-10-07 NOTE — PLAN OF CARE
1456: This CM Manager discussed home health with the patient.  The patient requested [facility] if possible.  Discussed with patient that the insurance chooses the home health agency but I will discuss her preference with them.  Patient choice form signed.  Copy placed into the chart and a copy given to the patient.      1500: This CM Manager spoke with Afua with [facility] with [facility] about home health request.  Contact number is [phone].  The patient's expected discharge date is 10/8 and will need to be seen on 10/9.  Information given to Afua.  Intake #[id number].  A representative will contact the patient once home health is set up.  Facesheet, PT/OT notes, and home health orders will be faxed to [phone].  Will discuss information with the patient and nursing staff.

## 2019-10-07 NOTE — PLAN OF CARE
Problem: Occupational Therapy Goal  Goal: Occupational Therapy Goal  Description  Goals to be met by: 10/14/19     Patient will increase functional independence with ADLs by performing:    UE Dressing with [place].  LE Dressing with Modified [place] and Assistive Devices as needed.  Grooming while standing at sink with Modified [place].  Toileting from toilet with Modified [place] for hygiene and clothing management.   Bathing from shower chair with Modified [place].  Toilet transfer to toilet with Modified [place].  Increased functional strength to WFL for B UE.  Upper extremity exercise program x15 reps per handout, with independence.     Outcome: Ongoing, Progressing

## 2019-10-07 NOTE — PT/OT/SLP EVAL
Occupational Therapy   Evaluation    Name: Maria Elena Jackson  MRN: [id number]  Admitting Diagnosis:  <principal problem not specified> Day of Surgery    Recommendations:     Discharge Recommendations: home with home health  Discharge Equipment Recommendations:  walker, rolling, commode, tub bench  Barriers to discharge:  None    Assessment:     Maria Elena Jackson is a 69 y.o. female with a medical diagnosis of <principal problem not specified>.  She was able to perform supine/sit, sit/stand, chair, and toilet T/F c SBA and RW.  B UE are WFL.  Able to perform UB dressing c min A 2* IV lines and toilet hygiene and grooming c mod I.  Was able to walk out into hallway and to bathroom c SBA and RW.  Pt is progressing well. Performance deficits affecting function: impaired self care skills, impaired functional mobilty, orthopedic precautions.      Rehab Prognosis: Good; patient would benefit from acute skilled OT services to address these deficits and reach maximum level of function.       Plan:     Patient to be seen daily to address the above listed problems via self-care/home management, therapeutic activities, therapeutic exercises  · Plan of Care Expires: 10/14/19  · Plan of Care Reviewed with: patient, spouse    Subjective     Chief Complaint: Pt is s/p R TKA and is WBAT R LE  Patient/Family Comments/goals: To get better.    Occupational Profile:  Living Environment: Pt lives in a one story house c 3 [address] and has a tub/shower.    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  Equipment Used at Home:  none  Assistance upon Discharge:     Pain/Comfort:  · Pain Rating 1: 0/10    Patients cultural, spiritual, [place] conflicts given the current situation: no    Objective:     Communicated with: RN prior to session.  Patient found supine with perineural catheter, FCD, peripheral IV upon OT entry to room.    General Precautions: Standard, fall   Orthopedic Precautions:RLE  weight bearing as tolerated   Braces: N/A     Occupational Performance:    Bed Mobility:    · Patient completed Supine to Sit with stand by assistance    Functional Mobility/Transfers:  · Patient completed Sit <> Stand Transfer with stand by assistance  with  rolling walker   · Patient completed Bed <> Chair Transfer using Stand Pivot technique with stand by assistance with rolling walker  · Patient completed Toilet Transfer Stand Pivot technique with stand by assistance with  rolling walker  · Functional Mobility: Pt was able to walk out into hallway c SBA and RW.    Activities of Daily Living:  · Grooming: modified independence to wash hands while standing at sink.  · Upper Body Dressing: minimum assistance to don robe.  · Toileting: modified independence for toilet hygiene.    Cognitive/Visual Perceptual:  Cognitive/Psychosocial Skills:     -       Oriented to: Person, Place, Time and Situation   -       Follows Commands/attention:Follows multistep  commands    Physical Exam:  Upper Extremity Range of Motion:     -       Right Upper Extremity: WFL  -       Left Upper Extremity: WFL  Upper Extremity Strength:    -       Right Upper Extremity: WFL  -       Left Upper Extremity: WFL    AMPAC 6 Click ADL:  AMPAC Total Score: 18      Education:    Patient left up in chair with all lines intact, call button in reach, RN notified and  present    GOALS:   Multidisciplinary Problems     Occupational Therapy Goals        Problem: Occupational Therapy Goal    Goal Priority Disciplines Outcome Interventions   Occupational Therapy Goal     OT, PT/OT Ongoing, Progressing    Description:  Goals to be met by: 10/14/19     Patient will increase functional independence with ADLs by performing:    UE Dressing with [place].  LE Dressing with Modified [place] and Assistive Devices as needed.  Grooming while standing at sink with Modified [place].  Toileting from toilet with Modified [place] for hygiene and  clothing management.   Bathing from shower chair with Modified [place].  Toilet transfer to toilet with Modified [place].  Increased functional strength to WFL for B UE.  Upper extremity exercise program x15 reps per handout, with independence.                      History:     Past Medical History:   Diagnosis Date    Arthritis     Hyperlipidemia     Hypertension        Past Surgical History:   Procedure Laterality Date    CHOLECYSTECTOMY      D&C Hysteroscopy      DILATION AND CURETTAGE OF UTERUS      laparoscopy      TONSILLECTOMY         Time Tracking:     OT Date of Treatment: 10/07/19  OT Start Time: 1330  OT Stop Time: 1358  OT Total Time (min): 28 min    Billable Minutes:Evaluation 14  Self Care/Home Management 14    ALYSON Bedolla  10/7/2019

## 2019-10-07 NOTE — PLAN OF CARE
[facility]    HOME HEALTH ORDERS  FACE TO FACE ENCOUNTER    Patient Name: Maria Elena Jackson  YOB: 1950    PCP: Cabrera Ricketts MD   PCP Address: [address] [address] / [place] [address]  PCP Phone Number: [phone]  PCP Fax: [phone]    Encounter Date: 10/07/2019    Admit to Home Health    Diagnoses:  Active Hospital Problems    Diagnosis  POA    S/P total knee replacement using cement, right [Z96.651]  Not Applicable      Resolved Hospital Problems   No resolved problems to display.       Future Appointments   Date Time Provider Department Center   10/22/2019  9:45 AM Lu Segura NP [facility]           I have seen and examined this patient face to face today. My clinical findings that support the need for the home health skilled services and home bound status are the following:  Weakness/numbness causing balance and gait disturbance due to Joint Replacement making it taxing to leave home.    Allergies:Review of patient's allergies indicates:  No Known Allergies    Diet: regular diet    Activities: activity as tolerated    Nursing:   SN to complete comprehensive assessment including routine vital signs. Instruct on disease process and s/s of complications to report to MD. Follow specific home health arthoplasty protocol. Review/verify medication list sent home with the patient at time of discharge  and instruct patient/caregiver as needed. If coumadin ordered, coumadin clinic to manage INR with INR draws 2x per week with a goal to maintain INR between 1.8 and 2.2. Frequency may be adjusted depending on start of care date.    Notify MD if SBP > 160 or < 90; DBP > 90 or < 50; HR > 120 or < 50; Temp > 101    Home Medical Equipment:  Walker, 3-1 bedside commode, transfer tub bench    CONSULTS:    Physical Therapy to evaluate and treat. Evaluate for home safety and equipment needs; Establish/upgrade home exercise program. Perform / instruct on therapeutic  exercises, gait training, transfer training, and Range of Motion.    OTHER:  Occupational Therapy to evaluate and treat. Evaluate home environment for safety and equipment needs. Perform/Instruct on transfers, ADL training, ROM, and therapeutic exercises.    MISCELLANEOUS CARE:  Routine Skin for Bedridden Patients: Instruct patient/caregiver to apply moisture barrier cream to all skin folds and wet areas in perineal area daily and after baths and all bowel movements.    WOUND CARE ORDERS  Do not remove surgical dressing for 2 weeks post-op. This will be done only by MD at initial post-op visit. If dressing is completely saturated, replace with identical dressing - silver-impregnated hydrocolloid dressing.     Do not get dressings wet. Do not shower.     If dressing continues to be saturated or there are signs of infection, please call [facility]o Clinic [phone] for further instructions and to make appt to be seen.         Medications: Review discharge medications with patient and family and provide education.      Current Discharge Medication List      CONTINUE these medications which have NOT CHANGED    Details   acetaminophen (TYLENOL) 650 MG TbSR Take 1 tablet (650 mg total) by mouth every 8 (eight) hours as needed.  Qty: 100 tablet, Refills: 0    Associated Diagnoses: Status post total right knee replacement      beta-carotene,A,-vits C,E/mins (OCUVITE ORAL) Take 1 tablet by mouth Daily.      calcium carbonate/vitamin D3 (CALTRATE 600 + D ORAL) Take 1 tablet by mouth Daily.      cholecalciferol, vitamin D3, (VITAMIN D3) 1,000 unit capsule Take 1,000 Units by mouth once daily.      diltiaZEM (CARDIZEM CD) 240 MG 24 hr capsule Take 240 mg by mouth every morning.       doxazosin (CARDURA) 2 MG tablet Take 2 mg by mouth every morning.   Refills: 1      irbesartan (AVAPRO) 300 MG tablet Take 300 mg by mouth every evening.       simvastatin (ZOCOR) 10 MG tablet Take 10 mg by mouth every evening.      aspirin (ECOTRIN)  81 MG EC tablet Take 1 tablet (81 mg total) by mouth 2 (two) times daily.  Qty: 60 tablet, Refills: 0    Associated Diagnoses: Status post total right knee replacement      celecoxib (CELEBREX) 200 MG capsule Take 1 capsule (200 mg total) by mouth once daily.  Qty: 30 capsule, Refills: 0    Associated Diagnoses: Status post total right knee replacement      docusate sodium (COLACE) 100 MG capsule Take 1 capsule (100 mg total) by mouth 2 (two) times daily as needed for Constipation.  Qty: 60 capsule, Refills: 0    Associated Diagnoses: Status post total right knee replacement      FLUAD 9349-4119, 65 YR UP,,PF, 45 mcg (15 mcg x 3)/0.5 mL Syrg       folic acid/multivit-min/lutein (CENTRUM SILVER ORAL) Take 1 tablet by mouth Daily.      hydroCHLOROthiazide (HYDRODIURIL) 25 MG tablet Take 25 mg by mouth daily as needed.      ondansetron (ZOFRAN-ODT) 8 MG TbDL Dissolve 1 tablet (8 mg total) by mouth every 12 (twelve) hours as needed (nausea).  Qty: 20 tablet, Refills: 0    Associated Diagnoses: Status post total right knee replacement      oxyCODONE (ROXICODONE) 5 MG immediate release tablet Take 1 tablet (5 mg total) by mouth every 6 (six) hours as needed for Pain.  Qty: 30 tablet, Refills: 0    Comments: Quantity prescribed more than 7 day supply? Yes, quantity medically necessary Deliver all meds to beside at [place] [facility] for surgery 10/7  Associated Diagnoses: Status post total right knee replacement      sulfamethoxazole-trimethoprim 800-160mg (BACTRIM DS) 800-160 mg Tab Take 1 tablet by mouth 2 (two) times daily.  Refills: 0             I certify that this patient is confined to her home and needs physical therapy and occupational therapy.

## 2019-10-07 NOTE — ANESTHESIA PROCEDURE NOTES
Spinal    Diagnosis: right knee  Patient location during procedure: OR  Start time: 10/7/2019 7:01 AM  Timeout: 10/7/2019 7:00 AM  End time: 10/7/2019 7:03 AM    Staffing  Authorizing Provider: Haydee Lugo CRNA  Performing Provider: Haydee Lugo CRNA    Staffing  Other anesthesia staff: Jericho Devi MD  Preanesthetic Checklist  Completed: patient identified, site marked, surgical consent, pre-op evaluation, timeout performed, IV checked, risks and benefits discussed and monitors and equipment checked  Spinal Block  Patient position: sitting  Prep: ChloraPrep  Patient monitoring: heart rate, cardiac monitor, continuous pulse ox and frequent blood pressure checks  Approach: midline  Location: L3-4  Injection technique: single shot  CSF Fluid: clear free-flowing CSF  Needle  Needle type: Barney   Needle gauge: 25 G  Needle length: 3.5 in  Additional Documentation: incremental injection, negative aspiration for heme and no paresthesia on injection  Needle localization: anatomical landmarks  Assessment  Sensory level: T6   Dermatomal levels determined by alcohol wipe  Ease of block: easy  Patient's tolerance of the procedure: comfortable throughout block and no complaints

## 2019-10-08 VITALS
WEIGHT: 176 LBS | HEART RATE: 73 BPM | SYSTOLIC BLOOD PRESSURE: 138 MMHG | HEIGHT: 65 IN | OXYGEN SATURATION: 94 % | DIASTOLIC BLOOD PRESSURE: 61 MMHG | RESPIRATION RATE: 18 BRPM | BODY MASS INDEX: 29.32 KG/M2 | TEMPERATURE: 98 F

## 2019-10-08 PROCEDURE — 97535 SELF CARE MNGMENT TRAINING: CPT

## 2019-10-08 PROCEDURE — 94760 N-INVAS EAR/PLS OXIMETRY 1: CPT

## 2019-10-08 PROCEDURE — 25000003 PHARM REV CODE 250: Performed by: ANESTHESIOLOGY

## 2019-10-08 PROCEDURE — 97116 GAIT TRAINING THERAPY: CPT

## 2019-10-08 PROCEDURE — 99900035 HC TECH TIME PER 15 MIN (STAT)

## 2019-10-08 PROCEDURE — 25000003 PHARM REV CODE 250: Performed by: NURSE PRACTITIONER

## 2019-10-08 PROCEDURE — 25000003 PHARM REV CODE 250: Performed by: ORTHOPAEDIC SURGERY

## 2019-10-08 PROCEDURE — 63600175 PHARM REV CODE 636 W HCPCS: Performed by: NURSE PRACTITIONER

## 2019-10-08 PROCEDURE — 97110 THERAPEUTIC EXERCISES: CPT

## 2019-10-08 PROCEDURE — 97530 THERAPEUTIC ACTIVITIES: CPT

## 2019-10-08 RX ADMIN — ASPIRIN 81 MG: 81 TABLET, COATED ORAL at 08:10

## 2019-10-08 RX ADMIN — OXYCODONE HYDROCHLORIDE 5 MG: 5 TABLET ORAL at 06:10

## 2019-10-08 RX ADMIN — OXYCODONE HYDROCHLORIDE 10 MG: 10 TABLET ORAL at 10:10

## 2019-10-08 RX ADMIN — SENNOSIDES AND DOCUSATE SODIUM 1 TABLET: 8.6; 5 TABLET ORAL at 08:10

## 2019-10-08 RX ADMIN — MUPIROCIN 1 G: 20 OINTMENT TOPICAL at 08:10

## 2019-10-08 RX ADMIN — CEFAZOLIN 2 G: 1 INJECTION, POWDER, FOR SOLUTION INTRAMUSCULAR; INTRAVENOUS at 12:10

## 2019-10-08 RX ADMIN — ROPIVACAINE HYDROCHLORIDE 8 ML/HR: 2 INJECTION, SOLUTION EPIDURAL; INFILTRATION at 08:10

## 2019-10-08 RX ADMIN — DOXAZOSIN 2 MG: 2 TABLET ORAL at 06:10

## 2019-10-08 RX ADMIN — DILTIAZEM HYDROCHLORIDE 240 MG: 120 CAPSULE, COATED, EXTENDED RELEASE ORAL at 06:10

## 2019-10-08 RX ADMIN — POLYETHYLENE GLYCOL (3350) 17 G: 17 POWDER, FOR SOLUTION ORAL at 08:10

## 2019-10-08 RX ADMIN — CELECOXIB 200 MG: 100 CAPSULE ORAL at 08:10

## 2019-10-08 RX ADMIN — ACETAMINOPHEN 1000 MG: 500 TABLET ORAL at 04:10

## 2019-10-08 RX ADMIN — ACETAMINOPHEN 1000 MG: 500 TABLET ORAL at 10:10

## 2019-10-08 RX ADMIN — FAMOTIDINE 20 MG: 20 TABLET ORAL at 08:10

## 2019-10-08 NOTE — PLAN OF CARE
Problem: Occupational Therapy Goal  Goal: Occupational Therapy Goal  Description  Goals to be met by: 10/14/19     Patient will increase functional independence with ADLs by performing:    UE Dressing with [place].  LE Dressing with Modified [place] and Assistive Devices as needed.  Grooming while standing at sink with Modified [place].  Toileting from toilet with Modified [place] for hygiene and clothing management.   Bathing from shower chair with Modified [place].  Toilet transfer to toilet with Modified [place].  Increased functional strength to WFL for B UE.  Upper extremity exercise program x15 reps per handout, with independence.      10/8/2019 1112 by ALYSON Waite  Reactivated     Problem: Occupational Therapy Goal  Goal: Occupational Therapy Goal  Description  Goals to be met by: 10/14/19     Patient will increase functional independence with ADLs by performing:    UE Dressing with [place].  LE Dressing with Modified [place] and Assistive Devices as needed.  Grooming while standing at sink with Modified [place].  Toileting from toilet with Modified [place] for hygiene and clothing management.   Bathing from shower chair with Modified [place].  Toilet transfer to toilet with Modified [place].  Increased functional strength to WFL for B UE.  Upper extremity exercise program x15 reps per handout, with independence.      10/8/2019 1112 by ALYSON Waite  Outcome: Met

## 2019-10-08 NOTE — NURSING
Discharge instructions given to pt and family. Verbalized understanding. Nad. resp unlabored, pt awake, alert sitting up in chair. ON Ball Pain teaching completed, pt spouse verbalized understanding.

## 2019-10-08 NOTE — NURSING
Pt has rolling walker and bedside commode at home, pt received bedside RX meds, pt will be called at home to inform to Therapy date

## 2019-10-08 NOTE — PLAN OF CARE
0845: This CM Manager spoke with Royce with [facility] to inquire about the home health referral from yesterday.  Contact number is [phone].  Per Royce, the case was escalated twice yesterday and she would escalate it again since the patient is discharging today and needs to be seen tomorrow.  Reference #[id number].  Discussed information with patient and family.

## 2019-10-08 NOTE — PROGRESS NOTES
Acute Pain Service and Perioperative Surgical Home Progress Note    HPI  Maria Elena Jackson is a 69 y.o., female,     Interval history      Surgery:  Procedure(s) (LRB):  ARTHROPLASTY, KNEE, TOTAL (Right)    Post Op Day #: 1    Catheter type: Perineural Adductor Canal    Infusion type: Ropivacaine 0.2%  8 ml/hr basal    Problem List:    Active Hospital Problems    Diagnosis  POA    S/P total knee replacement using cement, right [Z96.651]  Not Applicable      Resolved Hospital Problems   No resolved problems to display.       Subjective:       General appearance of alert, oriented, no complaints   Pain with rest: 1    Numbers   Pain with movement: 3    Numbers   Side Effects    1. Pruritis No    2. Nausea No    3. Motor Blockade Yes, 0=Ability to raise lower extremities off bed    4. Sedation No, 1=awake and alert    Schedule Medications:    acetaminophen  1,000 mg Oral Q6H    aspirin  81 mg Oral BID    celecoxib  200 mg Oral Daily    diltiaZEM  240 mg Oral QAM    doxazosin  2 mg Oral QAM    famotidine  20 mg Oral BID    irbesartan  300 mg Oral QHS    mupirocin  1 g Nasal BID    polyethylene glycol  17 g Oral Daily    pregabalin  75 mg Oral QHS    senna-docusate 8.6-50 mg  1 tablet Oral BID    simvastatin  10 mg Oral QHS        Continuous Infusions:   sodium chloride 0.9% 150 mL/hr at 10/07/19 2357    ropivacaine (PF) 2 mg/ml (0.2%) 8 mL/hr (10/07/19 2031)    ropivacaine          PRN Medications:  bisacodyl, morphine, naloxone, ondansetron, oxyCODONE, oxyCODONE, promethazine (PHENERGAN) IVPB, ramelteon, ropivacaine       Antibiotics:  Antibiotics (From admission, onward)    Start     Stop Route Frequency Ordered    10/07/19 1215  mupirocin 2 % ointment 1 g      10/12 0859 Nasl 2 times daily 10/07/19 1203             Objective:     Catheter site clean, dry, intact          Vital Signs (Most Recent):  Temp: 98.5 °F (36.9 °C) (10/08/19 0407)  Pulse: 65 (10/08/19 0407)  Resp: 17 (10/08/19 0407)  BP:  130/60 (10/08/19 0407)  SpO2: (!) 89 % (10/08/19 0407) Vital Signs Range (Last 24H):  Temp:  [97.1 °F (36.2 °C)-98.5 °F (36.9 °C)]   Pulse:  [52-72]   Resp:  [11-20]   BP: ()/(42-74)   SpO2:  [89 %-100 %]          I & O (Last 24H):    Intake/Output Summary (Last 24 hours) at 10/8/2019 0514  Last data filed at 10/8/2019 0030  Gross per 24 hour   Intake 3290 ml   Output 1850 ml   Net 1440 ml       Physical Exam:    GA: Alert, comfortable, no acute distress.   Pulmonary: Clear to auscultation A/P/L. No wheezing, crackles, or rhonchi.  Cardiac: RRR S1 & S2 w/o rubs/murmurs/gallops.   Abdominal:Bowel sounds present. No tenderness to palpation or distension. No appreciable hepatosplenomegaly.   Skin: No jaundice, rashes, or visible lesions.         Laboratory:  CBC: No results for input(s): WBC, RBC, HGB, HCT, PLT, MCV, MCH, MCHC in the last 72 hours.    BMP: No results for input(s): NA, K, CO2, CL, BUN, CREATININE, GLU, MG, PHOS, CALCIUM in the last 72 hours.    No results for input(s): PT, INR, PROTIME, APTT in the last 72 hours.      Anticoagulant dose ASA at 81mg.    Assessment:         Pain control adequate    Plan:     1) Pain:    Adductor canal perineural catheter in place and infusing. Good level. Multimodal pain regimen with acetaminophen, Celebrex, Lyrica, and prn oxycodone given  Will continue to monitor. Plan to D/C perineural catheter in AM or home with On-Q if patient discharged today.   2) HTN: 144/62. Continue home meds   3) FEN/GI: Tolerating liquids, advance diet as tolerated. Passing flatus.   4) Dispo: Pt working well with PT/OT. Case management and SW for home. Plan for D/C today if patient works well with PT and meets goals.          Evaluator Tobin Brambila PA-C

## 2019-10-08 NOTE — DISCHARGE SUMMARY
[facility]  Orthopedics  Discharge Summary      Patient Name: Maria Elena Jackson  MRN: [id number]  Admission Date: 10/7/2019  Hospital Length of Stay: 1 days  Discharge Date and Time:  10/08/2019 7:53 AM  Attending Physician: [facility]., MD   Discharging Provider: Loan Morales MD  Primary Care Provider: Cabrera Ricketts MD    HPI:   Maria Elena Jackson is a 69 y.o. female with right knee osteoarthritis.    Procedure(s) (LRB):  ARTHROPLASTY, KNEE, TOTAL (Right)      Hospital Course:  Patient presented for above procedure.  Tolerated it well and was discharged home POD1 after voiding, tolerating diet, ambulating, pain controlled.  Discharge instructions, follow-up appointment, and med rec are below.      Consults (From admission, onward)        Status Ordering Provider     Inpatient consult to Pain Management  Once     Provider:  (Not yet assigned)    Acknowledged LU SEGURA     Inpatient consult to Respiratory Care  Once     Provider:  (Not yet assigned)    Acknowledged LU SEGURA     Inpatient consult to Social Work  Once     Provider:  (Not yet assigned)    Acknowledged LU SEGURA          Significant Diagnostic Studies: No pertinent studies.    Pending Diagnostic Studies:     Procedure Component Value Units Date/Time    X-Ray Knee 1 or 2 View Right [[id number]] Resulted:  10/07/19 0921    Order Status:  Sent Lab Status:  In process Updated:  10/07/19 0941        Final Active Diagnoses:    Diagnosis Date Noted POA    PRINCIPAL PROBLEM:  S/P total knee replacement using cement, right [Z96.651] 10/07/2019 Not Applicable    HLD (hyperlipidemia) [E78.5] 10/01/2019 Yes    Essential hypertension [I10] 10/01/2019 Yes      Problems Resolved During this Admission:      Discharged Condition: good    Disposition: Home-Health Care [facility]    Follow Up:  Follow-up Information     Lu Segura NP On 10/22/2019.    Specialty:  Orthopedic Surgery  Why:  For wound  re-check  Contact information:  [address]  [facility] [address]  [phone]                 Patient Instructions:      Diet Adult Regular     Activity as tolerated     Call MD for:  difficulty breathing, headache or visual disturbances     Call MD for:  extreme fatigue     Call MD for:  hives     Call MD for:  persistent dizziness or light-headedness     Call MD for:  persistent nausea and vomiting     Call MD for:  redness, tenderness, or signs of infection (pain, swelling, redness, odor or green/yellow discharge around incision site)     Call MD for:  severe uncontrolled pain     Call MD for:  temperature >100.4     Keep surgical extremity elevated     Leave dressing on - Keep it clean, dry, and intact until clinic visit   Order Comments: Do not remove surgical dressing for 2 weeks post-op. This will be done only by MD at initial post-op visit. If dressing is completely saturated, replace with identical dressing - silver-impregnated hydrocolloid dressing.     Do not get dressings wet. Do not shower.     If dressing continues to be saturated or there are signs of infection, please call [facility] [phone] for further instructions and to make appt to be seen.     Lifting restrictions   Order Comments: No strenuous exercise or lifting of > 10 lbs     No driving, operating heavy equipment or signing legal documents while taking pain medication     Sponge bath only until clinic visit     Weight bearing as tolerated     Call MD for:  temperature >100.4     Call MD for:  persistent nausea and vomiting or diarrhea     Call MD for:  severe uncontrolled pain     Call MD for:  redness, tenderness, or signs of infection (pain, swelling, redness, odor or green/yellow discharge around incision site)     Call MD for:  difficulty breathing or increased cough     Call MD for:  severe persistent headache     Call MD for:  worsening rash     Call MD for:  persistent dizziness, light-headedness, or visual  disturbances     Call MD for:  increased confusion or weakness     Leave dressing on - Keep it clean, dry, and intact until clinic visit   Order Comments: Do not remove surgical dressing for 2 weeks post-op. This will be done only by MD at initial post-op visit. If dressing is completely saturated, replace with identical dressing - silver-impregnated hydrocolloid dressing.     Do not get dressings wet. Do not shower.     If dressing continues to be saturated or there are signs of infection, please call Ortho Clinic [phone] for further instructions and to make appt to be seen.     Keep surgical extremity elevated     Ice to affected area     Activity as tolerated     Medications:  Reconciled Home Medications:      Medication List      CONTINUE taking these medications    acetaminophen 650 MG Tbsr  Commonly known as:  TYLENOL  Take 1 tablet (650 mg total) by mouth every 8 (eight) hours as needed.     aspirin 81 MG EC tablet  Commonly known as:  ECOTRIN  Take 1 tablet (81 mg total) by mouth 2 (two) times daily.     CALTRATE 600 + D ORAL  Take 1 tablet by mouth Daily.     celecoxib 200 MG capsule  Commonly known as:  CeleBREX  Take 1 capsule (200 mg total) by mouth once daily.     CENTRUM SILVER ORAL  Take 1 tablet by mouth Daily.     diltiaZEM 240 MG 24 hr capsule  Commonly known as:  CARDIZEM CD  Take 240 mg by mouth every morning.     doxazosin 2 MG tablet  Commonly known as:  CARDURA  Take 2 mg by mouth every morning.     FLUAD 9842-7206 (65 YR UP)(PF) 45 mcg (15 mcg x 3)/0.5 mL Syrg  Generic drug:  flu vac 2019 [id number](PF)     hydroCHLOROthiazide 25 MG tablet  Commonly known as:  HYDRODIURIL  Take 25 mg by mouth daily as needed.     irbesartan 300 MG tablet  Commonly known as:  AVAPRO  Take 300 mg by mouth every evening.     OCUVITE ORAL  Take 1 tablet by mouth Daily.     ondansetron 8 MG Tbdl  Commonly known as:  ZOFRAN-ODT  Dissolve 1 tablet (8 mg total) by mouth every 12 (twelve) hours as needed  (nausea).     oxyCODONE 5 MG immediate release tablet  Commonly known as:  ROXICODONE  Take 1 tablet (5 mg total) by mouth every 6 (six) hours as needed for Pain.     simvastatin 10 MG tablet  Commonly known as:  ZOCOR  Take 10 mg by mouth every evening.     STOOL SOFTENER 100 MG capsule  Generic drug:  docusate sodium  Take 1 capsule (100 mg total) by mouth 2 (two) times daily as needed for Constipation.     sulfamethoxazole-trimethoprim 800-160mg 800-160 mg Tab  Commonly known as:  BACTRIM DS  Take 1 tablet by mouth 2 (two) times daily.     VITAMIN D3 1,000 unit capsule  Generic drug:  cholecalciferol (vitamin D3)  Take 1,000 Units by mouth once daily.            Loan Morales MD  Orthopedics  [facility]

## 2019-10-08 NOTE — PT/OT/SLP PROGRESS
Occupational Therapy   Treatment    Name: Maria Elena Jackson  MRN: [id number]  Admitting Diagnosis:  S/P total knee replacement using cement, right  1 Day Post-Op    Recommendations:     Discharge Recommendations: home health OT  Discharge Equipment Recommendations:  none  Barriers to discharge:  None    Assessment:     Maria Elena Jackson is a 69 y.o. female with a medical diagnosis of S/P total knee replacement using cement, right.  She was able to perform supine/sit, sit/stand, toilet, and bed/chair T/F c SBA and RW.  Able to perform UB/LB dressing, grooming, and toilet hygiene c mod I.  Pt was able to walk to bathroom c SBA and RW.  Pt is progressing well. Performance deficits affecting function are impaired self care skills, impaired functional mobilty, orthopedic precautions.     Rehab Prognosis:  Good; patient would benefit from acute skilled OT services to address these deficits and reach maximum level of function.       Plan:     Patient to be seen daily to address the above listed problems via self-care/home management, therapeutic activities, therapeutic exercises  · Plan of Care Expires: 10/14/19  · Plan of Care Reviewed with: patient, spouse    Subjective     Pain/Comfort:  · Pain Rating 1: 0/10    Objective:     Communicated with: RN prior to session.  Patient found supine with cryotherapy, perineural catheter upon OT entry to room.    General Precautions: Standard, fall   Orthopedic Precautions:RLE weight bearing as tolerated   Braces: N/A     Occupational Performance:     Bed Mobility:    · Patient completed Supine to Sit with stand by assistance     Functional Mobility/Transfers:  · Patient completed Sit <> Stand Transfer with stand by assistance  with  rolling walker   · Patient completed Bed <> Chair Transfer using Stand Pivot technique with stand by assistance with rolling walker  · Patient completed Toilet Transfer Stand Pivot technique with stand by assistance with  rolling  walker  · Functional Mobility: Pt was able to walk to bathroom c SBA and RW.    Activities of Daily Living:  · Grooming: modified independence to brush teeth while standing at sink.  · Upper Body Dressing: modified independence to don shirt.  · Lower Body Dressing: modified independence to don underwear, pants, socks, and shoes.  · Toileting: modified independence for toilet hygiene.      [facility] 6 Click ADL: 18    Treatment & Education:  Educated pt on bathing and car T/F's.    Patient left up in chair with all lines intact, call button in reach, RN notified and family presentEducation:      GOALS:   Multidisciplinary Problems     Occupational Therapy Goals     Not on file          Multidisciplinary Problems (Resolved)        Problem: Occupational Therapy Goal    Goal Priority Disciplines Outcome Interventions   Occupational Therapy Goal   (Resolved)     OT, PT/OT Met    Description:  Goals to be met by: 10/14/19     Patient will increase functional independence with ADLs by performing:    UE Dressing with [place].  LE Dressing with Modified [place] and Assistive Devices as needed.  Grooming while standing at sink with Modified [place].  Toileting from toilet with Modified [place] for hygiene and clothing management.   Bathing from shower chair with Modified [place].  Toilet transfer to toilet with Modified [place].  Increased functional strength to WFL for B UE.  Upper extremity exercise program x15 reps per handout, with independence.                       Time Tracking:     OT Date of Treatment: 10/08/19  OT Start Time: 0900  OT Stop Time: 0936  OT Total Time (min): 36 min    Billable Minutes:Self Care/Home Management 18  Therapeutic Activity 18    ALYSON Bedolla  10/8/2019

## 2019-10-08 NOTE — PT/OT/SLP PROGRESS
Physical Therapy Treatment and Discharge    Patient Name:  Maria Elena Jackson   MRN:  [id number]    Recommendations:     Discharge Recommendations:  home health PT   Discharge Equipment Recommendations: none   Barriers to discharge: None    Assessment:     Maria Elena Jackson is a 69 y.o. female admitted with a medical diagnosis of S/P total knee replacement using cement, right.  She presents with the following impairments/functional limitations:  weakness, gait instability, impaired balance, impaired functional mobilty, impaired skin, orthopedic precautions, edema, impaired joint extensibility, decreased lower extremity function, decreased ROM. Patient tolerated PT session well. She ambulated 120ft then 100ft with RW and SBA. She ascended/descended 1 curb step with RW and CGA. She ascended/descended 4 steps with folded RW and CGA.  present during stair training and verbalized understanding of how to assist patient at home. She performed R LE therex x10 reps. She is ready to discharge home from PT standpoint.      Rehab Prognosis: Good; patient would benefit from acute skilled PT services to address these deficits and reach maximum level of function.    Recent Surgery: Procedure(s) (LRB):  ARTHROPLASTY, KNEE, TOTAL (Right) 1 Day Post-Op    Plan:     During this hospitalization, patient to be seen daily to address the identified rehab impairments via gait training, therapeutic activities, therapeutic exercises and progress toward the following goals:    · Plan of Care Expires:  10/14/19    Subjective     Chief Complaint: Pain in right knee with ambulation.   Patient/Family Comments/goals: To walk without pain.   Pain/Comfort:  · Pain Rating 1: 0/10  · Location - Side 1: Right  · Location 1: knee  · Pain Addressed 1: Pre-medicate for activity, Reposition, Distraction, Cessation of Activity  · Pain Rating Post-Intervention 1: 4/10      Objective:     Communicated with RN prior to session.  Patient found up in  "chair with cryotherapy, perineural catheter upon PT entry to room.     General Precautions: Standard, fall   Orthopedic Precautions:RLE weight bearing as tolerated   Braces: N/A     Functional Mobility:  · Mat Mobility:     · Supine to Sit: supervision  · Sit to Supine: supervision  · Transfers:     · Sit to Stand:  supervision with rolling walker x1 from bedside chair and x1 from mat with verbal cues for hand placement  · Gait: She ambulated 120ft then 100ft with RW and SBA. No LOB or SOB noted.    · Stairs:  Pt ascended/descended 6" curb step with Rolling Walker with contact guard assistance.  Pt ascended 4 stairs with Rolling Walker folded up with contact guard assistance and descended 4 steps with B HR's and contact guard assistance.  present and verbalized understanding how to assist patient in stair training due to the RW not being able to fit on the steps.       AM-PAC 6 CLICK MOBILITY  Turning over in bed (including adjusting bedclothes, sheets and blankets)?: 4  Sitting down on and standing up from a chair with arms (e.g., wheelchair, bedside commode, etc.): 4  Moving from lying on back to sitting on the side of the bed?: 4  Moving to and from a bed to a chair (including a wheelchair)?: 4  Need to walk in hospital room?: 4  Climbing 3-5 steps with a railing?: 3  Basic Mobility Total Score: 23       Therapeutic Activities and Exercises:  Patient educated in and performed R LE exercises x10 reps for ankle pumps, quad set, glute set, SAQ over bolster, heel slides, hip abd/add, SLR, and LAQ.      Patient educated in:  -PT role and POC  -safety with transfers including hand placement  -gait sequencing and RW management  -OOB activity to maximize recovery   -car transfer  -stair training  -HEP for therex at home with handout provided      Patient left up in chair with all lines intact, call button in reach, RN notified and spouse present.    GOALS:   Multidisciplinary Problems     Physical Therapy Goals  "    Not on file          Multidisciplinary Problems (Resolved)        Problem: Physical Therapy Goal    Goal Priority Disciplines Outcome Goal Variances Interventions   Physical Therapy Goal   (Resolved)     PT, PT/OT Met     Description:  Goals to be met by: 10/14/19    Patient will increase functional independence with mobility by performin. Supine to sit with supervision  2. Sit to stand transfer with supervision  3. Gait x300 feet with supervision using rolling walker  4. Ascend/Descend three 6 inch curb steps with contact guard assistance using rolling walker  5. Lower extremity exercise program x30 reps per handout, with supervision                       Time Tracking:     PT Received On: 10/08/19  PT Start Time: 1006     PT Stop Time: 1030  PT Total Time (min): 24 min     Billable Minutes: Gait Training 12 and Therapeutic Exercise 12    Treatment Type: Treatment  PT/PTA: PT       Kaleigh Herrera, PT  10/08/2019

## 2019-10-08 NOTE — SUBJECTIVE & OBJECTIVE
"Principal Problem:S/P total knee replacement using cement, right    Principal Orthopedic Problem: same    Interval History: AFVSS. Pain controlled. Walked 100 ft with PT yesterday.    Review of patient's allergies indicates:  No Known Allergies    Current Facility-Administered Medications   Medication    0.9%  NaCl infusion    acetaminophen tablet 1,000 mg    aspirin EC tablet 81 mg    bisacodyl suppository 10 mg    celecoxib capsule 200 mg    diltiaZEM 24 hr capsule 240 mg    doxazosin tablet 2 mg    famotidine tablet 20 mg    irbesartan tablet 300 mg    morphine injection 2 mg    mupirocin 2 % ointment 1 g    naloxone 0.4 mg/mL injection 0.02 mg    ondansetron injection 4 mg    oxyCODONE immediate release tablet 10 mg    oxyCODONE immediate release tablet 5 mg    polyethylene glycol packet 17 g    pregabalin capsule 75 mg    promethazine (PHENERGAN) 6.25 mg in dextrose 5 % 50 mL IVPB    ramelteon tablet 8 mg    ropivacaine (PF) 2 mg/ml (0.2%) infusion    ropivacaine 0.2% ON-Q C-BLOC 400 ML (SELECT A FLOW)    senna-docusate 8.6-50 mg per tablet 1 tablet    simvastatin tablet 10 mg     Objective:     Vital Signs (Most Recent):  Temp: 98.5 °F (36.9 °C) (10/08/19 0407)  Pulse: 65 (10/08/19 0407)  Resp: 17 (10/08/19 0407)  BP: 130/60 (10/08/19 0407)  SpO2: (!) 89 % (10/08/19 0407) Vital Signs (24h Range):  Temp:  [97.1 °F (36.2 °C)-98.5 °F (36.9 °C)] 98.5 °F (36.9 °C)  Pulse:  [52-72] 65  Resp:  [11-19] 17  SpO2:  [89 %-99 %] 89 %  BP: ()/(42-74) 130/60     Weight: 79.8 kg (176 lb)  Height: 5' 5" (165.1 cm)  Body mass index is 29.29 kg/m².      Intake/Output Summary (Last 24 hours) at 10/8/2019 0632  Last data filed at 10/8/2019 0534  Gross per 24 hour   Intake 6083.8 ml   Output 2150 ml   Net 3933.8 ml       Ortho/SPM Exam   HEENT: normocephalic, atraumatic  Resp: no increased work of breathing  CV: regular rate and rhythm  MSK: moves b/l upper extremities well    right lower " extremity:  Aquacel c/d/i  No ecchymoses, erythema, or swelling  Sensation intact SP/DP/T/Sural/Saphenous nerves  Motor intact EHL/FHL/TA/gastroc  Palpable DP/PT pulses      Significant Labs: None    Significant Imaging: I have reviewed all pertinent imaging results/findings.

## 2019-10-08 NOTE — PLAN OF CARE
Plan of care reviewed with patient; verbalized understanding.   Medications reviewed and administered as ordered.  Rounding for safety and patient care per policy. No c/o pain throughout shift.  Safety precautions maintained. Knee precautions maintained.  Polar ice maintained to affected area.    Call light within reach, bed wheels locked, bed in lowest position, side rails ^x2, safety maintained. NADN, Will continue monitor.

## 2019-10-08 NOTE — ASSESSMENT & PLAN NOTE
Maria Elena Jackson is a 69 y.o. female s/p r TKA  - Antibiotics: post-op Ancef  - Weight bearing status: wbat  - Labs: reviewed  - DVT Prophylaxis: mechanical, ASA 81 mg BID  - Lines/Drains: PIV, PNC  - Pain control: multimodal per anesthesia    Dispo: home with

## 2019-10-08 NOTE — PLAN OF CARE
Patient tolerated PT session well. She ambulated 120ft then 100ft with RW and SBA. She ascended/descended 1 curb step with RW and CGA. She ascended/descended 4 steps with folded RW and CGA.  present during stair training and verbalized understanding of how to assist patient at home. She performed R LE therex x10 reps. She is ready to discharge home from PT standpoint.        Problem: Physical Therapy Goal  Goal: Physical Therapy Goal  Description  Goals to be met by: 10/14/19    Patient will increase functional independence with mobility by performin. Supine to sit with supervision  2. Sit to stand transfer with supervision  3. Gait x300 feet with supervision using rolling walker  4. Ascend/Descend three 6 inch curb steps with contact guard assistance using rolling walker  5. Lower extremity exercise program x30 reps per handout, with supervision      10/8/2019 1041 by Kaleigh Herrera, PT  Outcome: Met  10/8/2019 1041 by Kaleigh Herrera, PT  Reactivated

## 2019-10-08 NOTE — PROGRESS NOTES
"[facility]  Orthopedics  Progress Note    Patient Name: Maria Elena Jackson  MRN: [id number]  Admission Date: 10/7/2019  Hospital Length of Stay: 1 days  Attending Provider: [facility] L. [facility] Jr., MD  Primary Care Provider: Cabrera Ricketts MD  Follow-up For: Procedure(s) (LRB):  ARTHROPLASTY, KNEE, TOTAL (Right)    Post-Operative Day: 1 Day Post-Op  Subjective:     Principal Problem:S/P total knee replacement using cement, right    Principal Orthopedic Problem: same    Interval History: AFVSS. Pain controlled. Walked 100 ft with PT yesterday.    Review of patient's allergies indicates:  No Known Allergies    Current Facility-Administered Medications   Medication    0.9%  NaCl infusion    acetaminophen tablet 1,000 mg    aspirin EC tablet 81 mg    bisacodyl suppository 10 mg    celecoxib capsule 200 mg    diltiaZEM 24 hr capsule 240 mg    doxazosin tablet 2 mg    famotidine tablet 20 mg    irbesartan tablet 300 mg    morphine injection 2 mg    mupirocin 2 % ointment 1 g    naloxone 0.4 mg/mL injection 0.02 mg    ondansetron injection 4 mg    oxyCODONE immediate release tablet 10 mg    oxyCODONE immediate release tablet 5 mg    polyethylene glycol packet 17 g    pregabalin capsule 75 mg    promethazine (PHENERGAN) 6.25 mg in dextrose 5 % 50 mL IVPB    ramelteon tablet 8 mg    ropivacaine (PF) 2 mg/ml (0.2%) infusion    ropivacaine 0.2% ON-Q C-BLOC 400 ML (SELECT A FLOW)    senna-docusate 8.6-50 mg per tablet 1 tablet    simvastatin tablet 10 mg     Objective:     Vital Signs (Most Recent):  Temp: 98.5 °F (36.9 °C) (10/08/19 0407)  Pulse: 65 (10/08/19 0407)  Resp: 17 (10/08/19 0407)  BP: 130/60 (10/08/19 0407)  SpO2: (!) 89 % (10/08/19 0407) Vital Signs (24h Range):  Temp:  [97.1 °F (36.2 °C)-98.5 °F (36.9 °C)] 98.5 °F (36.9 °C)  Pulse:  [52-72] 65  Resp:  [11-19] 17  SpO2:  [89 %-99 %] 89 %  BP: ()/(42-74) 130/60     Weight: 79.8 kg (176 lb)  Height: 5' 5" (165.1 cm)  Body " mass index is 29.29 kg/m².      Intake/Output Summary (Last 24 hours) at 10/8/2019 0632  Last data filed at 10/8/2019 0534  Gross per 24 hour   Intake 6083.8 ml   Output 2150 ml   Net 3933.8 ml       Ortho/SPM Exam   HEENT: normocephalic, atraumatic  Resp: no increased work of breathing  CV: regular rate and rhythm  MSK: moves b/l upper extremities well    right lower extremity:  Aquacel c/d/i  No ecchymoses, erythema, or swelling  Sensation intact SP/DP/T/Sural/Saphenous nerves  Motor intact EHL/FHL/TA/gastroc  Palpable DP/PT pulses      Significant Labs: None    Significant Imaging: I have reviewed all pertinent imaging results/findings.    Assessment/Plan:     * S/P total knee replacement using cement, right  Maria Elena Jackson is a 69 y.o. female s/p r TKA  - Antibiotics: post-op Ancef  - Weight bearing status: wbat  - Labs: reviewed  - DVT Prophylaxis: mechanical, ASA 81 mg BID  - Lines/Drains: PIV, PNC  - Pain control: multimodal per anesthesia    Dispo: home with hh      HLD (hyperlipidemia)  Home meds    Essential hypertension  Home meds          Loan Morales MD  Orthopedics  [facility]

## 2019-10-08 NOTE — PLAN OF CARE
This CM Manager spoke with Tanya with [facility] about the home health referral.  Contact number is [phone]. Per Tanya, the authorization for home health is still pending.  The patient will be contacted directly once approved.  Reference #[id number].  Discussed information with Janet.

## 2019-10-09 PROCEDURE — G0180 MD CERTIFICATION HHA PATIENT: HCPCS | Mod: ,,, | Performed by: ORTHOPAEDIC SURGERY

## 2019-10-09 PROCEDURE — G0180 PR HOME HEALTH MD CERTIFICATION: ICD-10-PCS | Mod: ,,, | Performed by: ORTHOPAEDIC SURGERY

## 2019-10-09 NOTE — NURSING
10/9/  Patient called at home.  Pain controlled with OnQ.  Patient denies signs of local anesthetic toxicity.  Dressing clean , dry, and intact.     Discussed plan for discontinuation discontinuation of perineural catheter tomorrow.  Patient instructed to take something for pain before removing catheter. Notified that we will call back tomorrow.  All questions answered.

## 2019-10-09 NOTE — PLAN OF CARE
10/9/2019:    0805:This CM Manager received a voicemail from Kendra with [facility] about the home health referral.  The office opens at 0830    0806: This CM Manager called [facility] to check on the status of the home health referral.  Contact number is [phone].  Per La the patient was set up with [facility] and scheduled to be seen today. La also told this manager that [facility] reached out to the patient yesterday.    0815: This CM Manager spoke with the patient about home health.  Contact number is [phone].  The patient stated that she preferred [facility] and told the liaison yesterday that it was her preference.  I explained to the patient that the payor chooses the home health agency and I did tell them on the initial home health referral that she preferred [facility].  I also told the patient that I would check with her insurance to make sure they tried to establish home health with [facility].    0820: This CM Manager spoke with Naveed with [facility] about the patient's preference for [facility].  Per Naveed, [facility] is not one of their preferred providers and that is why [facility] was chosen.    0830: This CM Manager spoke with the patient and explained to her that [facility] was not a preferred provider for her insurance.  The patient stated that she is ok with [facility] for her home health.      0835: This CM Manager spoke with Kendra with [facility].  Contact information is [phone].  I explained to Kendra that [facility] is a preferred provider for the patient for home health and that the patient was ok with them seeing her today.  Per Kendra, the nurse will reach out to the patient and the patient will be seen today.  This information was also discussed with the patient.

## 2019-10-10 ENCOUNTER — TELEPHONE (OUTPATIENT)
Dept: ORTHOPEDICS | Facility: CLINIC | Age: 69
End: 2019-10-10

## 2019-10-10 NOTE — ADDENDUM NOTE
Addendum  created 10/10/19 1336 by Sommer Schmitt RN    Intraprocedure Event edited, LDA properties accepted

## 2019-10-10 NOTE — NURSING
10/10/19 1250 pm Patient called at home. Discussed discontinuation of perineural catheter with patient and caretaker.  Patient instructed to take something for pain before removing catheter.Site clean and dry per patient.  All questions answered.

## 2019-10-10 NOTE — TELEPHONE ENCOUNTER
----- Message from James Blas sent at 10/10/2019  2:14 PM CDT -----  Contact: patient  Please call pt at [phone]    Patient is concerned about her bandages coming apart    No other problems per pt    Thank you         We spoke  She said the top of the bandage is starting to rise up  The home health nurse re-inforced it with tape  , she stated no drainage noted  I will speak with Dr [facility] in the am    Pt stated she is ok with that she is  Just worried that she has a while to go before her 2 wk appt  On Oct., 22

## 2019-10-10 NOTE — NURSING
Patient called at home.   Discussed discontinuation of perineural catheter with caretaker, pt asleep at this time.  Patient instructed to take something for pain before removing catheter. Pt care taker states home health staff removed ON Q Ball . Site clean and dry per patient caretaker.  All questions answered.

## 2019-10-11 ENCOUNTER — TELEPHONE (OUTPATIENT)
Dept: ORTHOPEDICS | Facility: CLINIC | Age: 69
End: 2019-10-11

## 2019-10-11 NOTE — TELEPHONE ENCOUNTER
I left a message that I had spoken with  Dr [facility] about her bandage edges coming up slightly  & the Home Health nurse re-enforcing  it with tape   He said it happens time & again   She did the correct thing     all should be ok  I asked that she call me if she needs anything

## 2019-10-18 ENCOUNTER — EXTERNAL HOME HEALTH (OUTPATIENT)
Dept: HOME HEALTH SERVICES | Facility: HOSPITAL | Age: 69
End: 2019-10-18
Payer: OTHER GOVERNMENT

## 2019-10-22 ENCOUNTER — PATIENT MESSAGE (OUTPATIENT)
Dept: ORTHOPEDICS | Facility: CLINIC | Age: 69
End: 2019-10-22

## 2019-10-22 ENCOUNTER — OFFICE VISIT (OUTPATIENT)
Dept: ORTHOPEDICS | Facility: CLINIC | Age: 69
End: 2019-10-22
Payer: OTHER GOVERNMENT

## 2019-10-22 VITALS
SYSTOLIC BLOOD PRESSURE: 152 MMHG | BODY MASS INDEX: 29.31 KG/M2 | WEIGHT: 175.94 LBS | DIASTOLIC BLOOD PRESSURE: 74 MMHG | HEART RATE: 85 BPM | HEIGHT: 65 IN

## 2019-10-22 DIAGNOSIS — Z96.651 STATUS POST TOTAL RIGHT KNEE REPLACEMENT: ICD-10-CM

## 2019-10-22 PROCEDURE — 99024 POSTOP FOLLOW-UP VISIT: CPT | Mod: S$GLB,,, | Performed by: NURSE PRACTITIONER

## 2019-10-22 PROCEDURE — 99024 PR POST-OP FOLLOW-UP VISIT: ICD-10-PCS | Mod: S$GLB,,, | Performed by: NURSE PRACTITIONER

## 2019-10-22 PROCEDURE — 99999 PR PBB SHADOW E&M-EST. PATIENT-LVL III: ICD-10-PCS | Mod: PBBFAC,,, | Performed by: NURSE PRACTITIONER

## 2019-10-22 PROCEDURE — 99999 PR PBB SHADOW E&M-EST. PATIENT-LVL III: CPT | Mod: PBBFAC,,, | Performed by: NURSE PRACTITIONER

## 2019-10-22 RX ORDER — OXYCODONE HYDROCHLORIDE 5 MG/1
5 TABLET ORAL EVERY 6 HOURS PRN
Qty: 28 TABLET | Refills: 0 | Status: SHIPPED | OUTPATIENT
Start: 2019-10-22 | End: 2021-03-10

## 2019-10-22 NOTE — PROGRESS NOTES
"Maria Elena Jackson presents for initial post-operative visit following a right total knee arthroplasty performed by Dr. [facility] on 10/7/2019. Tolerating pain medication well.      Exam:   Blood pressure (!) 152/74, pulse 85, height 5' 5" (1.651 m), weight 79.8 kg (175 lb 14.8 oz).   Ambulating well with assistive device.  Incision is clean and dry without drainage or erythema. ROM:0-110    Initial post-operative radiographs reviewed today revealing a well fixed and aligned prosthesis.    A/P:  2 weeks s/p right total knee replacement  Dr. [facility] interviewed and examined patient today and agrees with plan.   - The patient was advised to keep the incision clean and dry for the next 24 hours after which she may wash the area with antibacterial soap in the shower. Will not submerge until the incision is completely healed.   - Outpatient PT: orders entered for [place] location at patient's request. She understands that she may have to go to an alternate location if there is not availability, but she really wants to see Altaf Velazquez.  - Continue aspirin for 1 month from surgery.  - Pain medication refilled  - Follow up in 4 weeks with Dr. [facility]. Pt will call clinic with problems/concerns.     "

## 2019-10-29 ENCOUNTER — PATIENT MESSAGE (OUTPATIENT)
Dept: ORTHOPEDICS | Facility: CLINIC | Age: 69
End: 2019-10-29

## 2019-10-30 ENCOUNTER — CLINICAL SUPPORT (OUTPATIENT)
Dept: REHABILITATION | Facility: HOSPITAL | Age: 69
End: 2019-10-30
Payer: OTHER GOVERNMENT

## 2019-10-30 DIAGNOSIS — M62.81 MUSCLE WEAKNESS OF LOWER EXTREMITY: ICD-10-CM

## 2019-10-30 PROCEDURE — 97161 PT EVAL LOW COMPLEX 20 MIN: CPT | Performed by: PHYSICAL THERAPIST

## 2019-10-30 PROCEDURE — 97110 THERAPEUTIC EXERCISES: CPT | Performed by: PHYSICAL THERAPIST

## 2019-10-30 NOTE — PROGRESS NOTES
Please see Treatment section for Initial Evaluation and Plan of Care  Altaf Velazquez, PT  10/30/2019

## 2019-10-30 NOTE — PLAN OF CARE
"[facility]  Physical Therapy Initial Evaluation    Name: Maria Elena Jackson  Clinic Number: [id number]    Therapy Diagnosis:   Encounter Diagnosis   Name Primary?    Muscle weakness of lower extremity      Physician: Lu Segura, NP   [facility]    OPERATION PERFORMED: Right total knee arthroplasty     DATE OF PROCEDURE: 10/07/2019     Physician Orders: PT Eval and Treat   Medical Diagnosis:  Z96.651 (ICD-10-CM) - Status post total right knee replacement  Evaluation Date: 10/30/2019  Authorization Period Expiration:  pend  Plan of Care Certification Period: 1/30/2020  Visit # / Visits authorized: 1/ 1    Time In: 11:30  Time Out: 12:30  Total Billable Time: 60 minutes    Precautions: Standard    Subjective   Date of onset: 2 yr hx   History of current condition - Martin reports     Past Medical History:   Diagnosis Date    Arthritis     Hyperlipidemia     Hypertension      Maria Elena Jackson  has a past surgical history that includes Cholecystectomy; laparoscopy; Tonsillectomy; Dilation and curettage of uterus; D&C Hysteroscopy; Total knee arthroplasty (Right, 10/7/2019); and Joint replacement.    Maria Elena has a current medication list which includes the following prescription(s): acetaminophen, aspirin, beta-carotene(a)-vits c,e/mins, calcium carbonate/vitamin d3, celecoxib, cholecalciferol (vitamin d3), diltiazem, docusate sodium, doxazosin, fluad [id number] (65 yr up)(pf), folic acid/multivit-min/lutein, hydrochlorothiazide, irbesartan, ondansetron, oxycodone, simvastatin, and sulfamethoxazole-trimethoprim 800-160mg.    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  Prior Level of " "Function: I  Current Level of Function: difficulty with prolonged walking, stairs     Pts goals: "get back to walk all day"     Objective     Observation:   Well healed incision, min effusion, no overt gait deviation mod quad atrophy     Range of Motion (extension/neutral/flexion):    Right Left   Knee PROM: 0 / 0 / 72 degrees 0 / 0 / 123 degrees     Strength:    Right Left Comment   Knee Extension: 4-/5 5/5    Knee Flexion: 4-/5 5/5    Hip Abduction: NT at this time  NT at this time       Special Tests: NT secondary to surgery    Palpation: (+) TTP generally around incision       CMS Impairment/Limitation/Restriction for FOTO  Survey    Therapist reviewed FOTO scores for Maria Elena Jackson on 10/30/2019.   FOTO documents entered into [employer] - see Media section.    Limitation Score: 52%  Category: Mobility    Current : CK = at least 40% but < 60% impaired, limited or restricted  Goal: CJ = at least 20% but < 40% impaired, limited or restricted  Discharge: CJ = at least 20% but < 40% impaired, limited or restricted       TREATMENT   Treatment Time In: 12:00  Treatment Time Out: 12:30  Total Treatment time separate from Evaluation time:15'     Martin received therapeutic exercises to develop strength, endurance, ROM and flexibility for 15  minutes including:    HS 5x;15, QS 1x15:05, SLR 2x10:05 0#, LLR 2x10:05 0#, self ROM k' flex 5x:15 prone     CP x10'     Education     Home Exercises and Patient Education Provided    Education provided re:   - progress towards goals   - role of therapy in multi - disciplinary team, goals for therapy  No spiritual or educational barriers to learning provided    Written Home Exercises Provided: yes.  Exercises were reviewed and Martin was able to demonstrate them prior to the end of the session.   Pt received a written copy of exercises to perform at home. Martin demonstrated good  understanding of the education provided.     Assessment   Maria Elena is a 69 y.o. female referred to " outpatient Physical Therapy with a medical diagnosis of s/p R knee TKA . Pt presents with       Pain  Stiffness  Swelling  Decreased ROM  Decreased strength  Gait difficulty   Decreased functional status       Pt prognosis is Good.   Pt will benefit from skilled outpatient Physical Therapy to address the deficits stated above and in the chart below, provide pt/family education, and to maximize pt's level of independence.     Plan of care discussed with patient: Yes  Pt's spiritual, cultural and educational needs considered and pt agreeable to plan of care and goals as stated below:     Anticipated Barriers for therapy:  None     Medical Necessity is demonstrated by the following  History  Co-morbidities and personal factors that may impact the plan of care Co-morbidities:   none     Personal Factors:   no deficits     low   Examination  Body Structures and Functions, activity limitations and participation restrictions that may impact the plan of care Body Regions:   lower extremities    Body Systems:    ROM  strength  balance  gait  motor control  edema  scar formation    Participation Restrictions:   ADLs  Walking     Activity limitations:   Mobility  walking    Self care  no deficits    Domestic Life  shopping  cooking  doing house work (cleaning house, washing dishes, laundry)    Community and Social Life  recreation and leisure         low   Clinical Presentation stable and uncomplicated low   Decision Making/ Complexity Score: low     Goals     Short-Term Goals: 6 weeks  - The patient will be independent with initial home exercise program.  - The patient will increase ROM by 15 degrees to perform ambulation and bathing and hygiene with pain < 0/10.  - The patient will increase strength by 1/2 muscle grade to perform ambulation and bathing and hygiene with pain < 0/10.    Long-Term Goals: 16 weeks  - The patient will be independent with home exercise program and symptom management.  - The patient will be  independent amb with no assistive device on all surfaces for community distances.  - The patient will increase ROM = to uninvolved knee to perform ambulation,  and recreation/leisure activities  with pain < 0/10.  - The patient will increase strength = to uninvolved knee  to perform ambulation,  and recreation/leisure activities   with pain < 0/10.      Plan   Certification Period: 10/30/2019 to  2/28/2020    Outpatient Physical Therapy 1 times weekly for 4 months to include the following interventions: patient education, Manual Therapy, Moist Heat/ Ice, Neuromuscular Re-ed, Patient Education, Therapeutic Activites and Therapeutic Exercise.     Altaf Velazquez, PT

## 2019-11-05 ENCOUNTER — PATIENT MESSAGE (OUTPATIENT)
Dept: ORTHOPEDICS | Facility: CLINIC | Age: 69
End: 2019-11-05

## 2019-11-06 ENCOUNTER — CLINICAL SUPPORT (OUTPATIENT)
Dept: REHABILITATION | Facility: HOSPITAL | Age: 69
End: 2019-11-06
Payer: OTHER GOVERNMENT

## 2019-11-06 DIAGNOSIS — M62.81 MUSCLE WEAKNESS OF LOWER EXTREMITY: ICD-10-CM

## 2019-11-06 PROCEDURE — 97110 THERAPEUTIC EXERCISES: CPT | Performed by: PHYSICAL THERAPIST

## 2019-11-06 NOTE — PROGRESS NOTES
Physical Therapy Daily Treatment Note     Visit Date: 11/6/2019    Name: Maria Elena Jackson  Clinic Number: [id number]    Therapy Diagnosis:   Encounter Diagnosis   Name Primary?    Muscle weakness of lower extremity      Physician: Lu Segura NP      OPERATION PERFORMED: Right total knee arthroplasty     DATE OF PROCEDURE: 10/07/2019     Physician Orders: PT Eval and Treat   Medical Diagnosis:  Z96.651 (ICD-10-CM) - Status post total right knee replacement  Evaluation Date: 10/30/2019  Authorization Period Expiration:  pend  Plan of Care Certification Period: 1/30/2020  Visit # / Visits authorized: 1/ 1    Time In: 11:30  Time Out: 12:30  Total Billable Time: 60 minutes    Precautions: Standard    Subjective      Pt reports increased activity at her home yesterday including vacuuming and dusting with   Resultant soreness in R knee     she was compliant with home exercise program given last session.   Response to previous treatment: good   Functional change: see subjective above     Pain: 1/10  Location: right knee      Objective     Measurements taken:      Martin received therapeutic exercises to develop strength, endurance, ROM and flexibility for 45 minutes one on one with PT including:    Bike x 10' 3.5 seat at 2  HSS 5x;15  QS 1x10:10  TKE 1/2 and full roll 1x10;05 0#  Supine TKE to SLR 1x10:05 0#  Butt winking 1x15:05  B bridge 2x10:05  LLR 2x10:05  Shuttle LP U 1.5b 3x15  Shuttle HR U 1.5b 3x15  Self ROM k' flex prone 5x:15    CP x 10'       Home Exercises Provided and Patient Education Provided     Education provided:   - role of bike     Written Home Exercises Provided: Patient instructed to cont prior HEP.  Exercises were reviewed and Martin was able to demonstrate them prior to the end of the session.  Martin demonstrated good  understanding of the education provided.     See EMR under hand out for exercises provided prior visit.      Assessment     justin Rx without increase in sxs, pt continues  with limited ROM and strength affecting ADL performance     Martin is progressing well towards her goals.   Pt prognosis is Good.     Pt will continue to benefit from skilled outpatient physical therapy to address the deficits listed in the problem list box on initial evaluation, provide pt/family education and to maximize pt's level of independence in the home and community environment.     Pt's spiritual, cultural and educational needs considered and pt agreeable to plan of care and goals.    Anticipated barriers to physical therapy: none    Goals:     Short-Term Goals: 6 weeks  - The patient will be independent with initial home exercise program.  - The patient will increase ROM by 15 degrees to perform ambulation and bathing and hygiene with pain < 0/10.  - The patient will increase strength by 1/2 muscle grade to perform ambulation and bathing and hygiene with pain < 0/10.    Long-Term Goals: 16 weeks  - The patient will be independent with home exercise program and symptom management.  - The patient will be independent amb with no assistive device on all surfaces for community distances.  - The patient will increase ROM = to uninvolved knee to perform ambulation,  and recreation/leisure activities  with pain < 0/10.  - The patient will increase strength = to uninvolved knee  to perform ambulation,  and recreation/leisure activities   with pain < 0/10.      Plan     Continue with established Plan of Care towards PT goals with focus on decreasing pain, increasing ROM, strength, neuromuscular control and functional status       Altaf Velazquez, PT

## 2019-11-08 ENCOUNTER — PATIENT MESSAGE (OUTPATIENT)
Dept: ORTHOPEDICS | Facility: CLINIC | Age: 69
End: 2019-11-08

## 2019-11-08 DIAGNOSIS — Z96.659 STATUS POST KNEE REPLACEMENT, UNSPECIFIED LATERALITY: Primary | ICD-10-CM

## 2019-11-08 RX ORDER — AMOXICILLIN 500 MG/1
2000 CAPSULE ORAL
Qty: 4 CAPSULE | Refills: 0 | Status: SHIPPED | OUTPATIENT
Start: 2019-11-08 | End: 2019-12-31 | Stop reason: SDUPTHER

## 2019-11-08 RX ORDER — CELECOXIB 200 MG/1
200 CAPSULE ORAL DAILY
Qty: 30 CAPSULE | Refills: 1 | Status: SHIPPED | OUTPATIENT
Start: 2019-11-08 | End: 2019-12-10 | Stop reason: SDUPTHER

## 2019-11-14 ENCOUNTER — CLINICAL SUPPORT (OUTPATIENT)
Dept: REHABILITATION | Facility: HOSPITAL | Age: 69
End: 2019-11-14
Payer: OTHER GOVERNMENT

## 2019-11-14 DIAGNOSIS — M62.81 MUSCLE WEAKNESS OF LOWER EXTREMITY: ICD-10-CM

## 2019-11-14 PROCEDURE — 97110 THERAPEUTIC EXERCISES: CPT | Performed by: PHYSICAL THERAPIST

## 2019-11-15 NOTE — PROGRESS NOTES
Physical Therapy Daily Treatment Note     Visit Date: 11/14/2019    Name: Maria Elena Jackson  Clinic Number: [id number]    Therapy Diagnosis:   Encounter Diagnosis   Name Primary?    Muscle weakness of lower extremity      Physician: Lu Segura, NP  [facility]     OPERATION PERFORMED: Right total knee arthroplasty     DATE OF PROCEDURE: 10/07/2019     Physician Orders: PT Eval and Treat   Medical Diagnosis:  Z96.651 (ICD-10-CM) - Status post total right knee replacement  Evaluation Date: 10/30/2019  Authorization Period Expiration:  pend  Plan of Care Certification Period: 1/30/2020  Visit # / Visits authorized: 3/ 1    Time In: 10:30  Time Out: 11:30  Total Billable Time: 30 minutes    Precautions: Standard    Subjective      Pt reports no new c/o this AM in R knee, willing to attempt Loy as tolerated     she was compliant with home exercise program given last session.   Response to previous treatment: good   Functional change: see subjective above     Pain: 1/10  Location: right knee      Objective     Measurements taken:  None this visit     Martin received therapeutic exercises to develop strength, endurance, ROM and flexibility for 30 minutes  including:    HSS 5x;15  QS 1x10:10  TKE 1/2 and full roll 1x10;05 2#  Supine TKE to SLR 1x10:05 0#  LP U 70# 3x15   HR U on LP 70# 3x15  SLS 5x:15  St ham curls 2x15:05 0#  HS k' ext 310 2.5# U   PROM k' flex prone 5x:15    CP x 10'       Home Exercises Provided and Patient Education Provided     Education provided:   - role of bike     Written Home Exercises Provided: Patient instructed to cont prior HEP.    Exercises were reviewed and Martin was able to demonstrate them prior to the end of the session.  Martin demonstrated good  understanding of the education provided.     See EMR under hand out for exercises provided prior visit.      Assessment     justin Rx without increase in sxs,  Tolerated increased TE volume and intensity     Martin is progressing well towards  her goals.   Pt prognosis is Good.     Pt will continue to benefit from skilled outpatient physical therapy to address the deficits listed in the problem list box on initial evaluation, provide pt/family education and to maximize pt's level of independence in the home and community environment.     Pt's spiritual, cultural and educational needs considered and pt agreeable to plan of care and goals.    Anticipated barriers to physical therapy: none    Goals:     Short-Term Goals: 6 weeks  - The patient will be independent with initial home exercise program.  - The patient will increase ROM by 15 degrees to perform ambulation and bathing and hygiene with pain < 0/10.  - The patient will increase strength by 1/2 muscle grade to perform ambulation and bathing and hygiene with pain < 0/10.    Long-Term Goals: 16 weeks  - The patient will be independent with home exercise program and symptom management.  - The patient will be independent amb with no assistive device on all surfaces for community distances.  - The patient will increase ROM = to uninvolved knee to perform ambulation,  and recreation/leisure activities  with pain < 0/10.  - The patient will increase strength = to uninvolved knee  to perform ambulation,  and recreation/leisure activities   with pain < 0/10.      Plan     Continue with established Plan of Care towards PT goals with focus on decreasing pain, increasing ROM, strength, neuromuscular control and functional status       Altaf Velazquez, PT

## 2019-11-19 ENCOUNTER — OFFICE VISIT (OUTPATIENT)
Dept: ORTHOPEDICS | Facility: CLINIC | Age: 69
End: 2019-11-19
Payer: OTHER GOVERNMENT

## 2019-11-19 VITALS
BODY MASS INDEX: 28.72 KG/M2 | HEIGHT: 65 IN | WEIGHT: 172.38 LBS | SYSTOLIC BLOOD PRESSURE: 164 MMHG | TEMPERATURE: 97 F | DIASTOLIC BLOOD PRESSURE: 69 MMHG | HEART RATE: 79 BPM

## 2019-11-19 DIAGNOSIS — Z96.651 STATUS POST TOTAL RIGHT KNEE REPLACEMENT: Primary | ICD-10-CM

## 2019-11-19 PROCEDURE — 99999 PR PBB SHADOW E&M-EST. PATIENT-LVL III: CPT | Mod: PBBFAC,,, | Performed by: ORTHOPAEDIC SURGERY

## 2019-11-19 PROCEDURE — 99999 PR PBB SHADOW E&M-EST. PATIENT-LVL III: ICD-10-PCS | Mod: PBBFAC,,, | Performed by: ORTHOPAEDIC SURGERY

## 2019-11-19 PROCEDURE — 99024 PR POST-OP FOLLOW-UP VISIT: ICD-10-PCS | Mod: S$GLB,,, | Performed by: ORTHOPAEDIC SURGERY

## 2019-11-19 PROCEDURE — 99024 POSTOP FOLLOW-UP VISIT: CPT | Mod: S$GLB,,, | Performed by: ORTHOPAEDIC SURGERY

## 2019-11-20 ENCOUNTER — CLINICAL SUPPORT (OUTPATIENT)
Dept: REHABILITATION | Facility: HOSPITAL | Age: 69
End: 2019-11-20
Payer: OTHER GOVERNMENT

## 2019-11-20 DIAGNOSIS — M62.81 MUSCLE WEAKNESS OF LOWER EXTREMITY: ICD-10-CM

## 2019-11-20 PROCEDURE — 97110 THERAPEUTIC EXERCISES: CPT | Performed by: PHYSICAL THERAPIST

## 2019-11-20 NOTE — PROGRESS NOTES
"  Physical Therapy Daily Treatment Note     Visit Date: 11/20/2019    Name: Maria Elena Jackson  Clinic Number: [id number]    Therapy Diagnosis:   Encounter Diagnosis   Name Primary?    Muscle weakness of lower extremity      Physician: Lu Segura, NP [facility]     OPERATION PERFORMED: Right total knee arthroplasty     DATE OF PROCEDURE: 10/07/2019     Physician Orders: PT Eval and Treat   Medical Diagnosis:  Z96.651 (ICD-10-CM) - Status post total right knee replacement  Evaluation Date: 10/30/2019  Authorization Period Expiration:  pend  Plan of Care Certification Period: 1/30/2020  Visit # / Visits authorized: 4/ 1    Time In:  9:30  Time Out: 10:30  Total Billable Time: 45 minutes    Precautions: Standard    Subjective      Pt reports "it's a lot better this week"     she was compliant with home exercise program given last session.   Response to previous treatment: good   Functional change: see subjective above     Pain:  0/10  Location: right knee      Objective     Measurements taken:   None this AM     Martin received therapeutic exercises to develop strength, endurance, ROM and flexibility for 45 minutes one on one with PT   including:    Stick upper / lower leg   ET x 5'   HSS 5x;15  Supine TKE to SLR 1x10:05 0#  Ball squat 3x10  B bridge 1x20:05   LP U 80# 3x10   HR B  on LP 80# 3x15  SLS 5x:15  HS k' ext 310 2.5# U   Hip abd R/L 3xburn red loop   LSU 3" 3x10   PROM k' flex prone 3xs     CP x 10'       Home Exercises Provided and Patient Education Provided     Education provided:   - role of bike     Written Home Exercises Provided: Patient instructed to cont prior HEP.    Exercises were reviewed and Martin was able to demonstrate them prior to the end of the session.  Martin demonstrated good  understanding of the education provided.     See EMR under hand out for exercises provided prior visit.      Assessment     justin Rx without increase in sxs,  Able to get a good training effect in quad and hip "     Martin is progressing well towards her goals.   Pt prognosis is Good.     Pt will continue to benefit from skilled outpatient physical therapy to address the deficits listed in the problem list box on initial evaluation, provide pt/family education and to maximize pt's level of independence in the home and community environment.     Pt's spiritual, cultural and educational needs considered and pt agreeable to plan of care and goals.    Anticipated barriers to physical therapy: none    Goals:     Short-Term Goals: 6 weeks  - The patient will be independent with initial home exercise program.  - The patient will increase ROM by 15 degrees to perform ambulation and bathing and hygiene with pain < 0/10.  - The patient will increase strength by 1/2 muscle grade to perform ambulation and bathing and hygiene with pain < 0/10.    Long-Term Goals: 16 weeks  - The patient will be independent with home exercise program and symptom management.  - The patient will be independent amb with no assistive device on all surfaces for community distances.  - The patient will increase ROM = to uninvolved knee to perform ambulation,  and recreation/leisure activities  with pain < 0/10.  - The patient will increase strength = to uninvolved knee  to perform ambulation,  and recreation/leisure activities   with pain < 0/10.      Plan     Continue with established Plan of Care towards PT goals with focus on decreasing pain, increasing ROM, strength, neuromuscular control and functional status       Altaf Velazquez, PT

## 2019-12-04 ENCOUNTER — CLINICAL SUPPORT (OUTPATIENT)
Dept: REHABILITATION | Facility: HOSPITAL | Age: 69
End: 2019-12-04
Payer: OTHER GOVERNMENT

## 2019-12-04 DIAGNOSIS — M62.81 MUSCLE WEAKNESS OF LOWER EXTREMITY: ICD-10-CM

## 2019-12-04 PROCEDURE — 97110 THERAPEUTIC EXERCISES: CPT | Performed by: PHYSICAL THERAPIST

## 2019-12-04 NOTE — PROGRESS NOTES
"  Physical Therapy Daily Treatment Note     Visit Date: 12/4/2019    Name: Maria Elena Jackson  Clinic Number: [id number]    Therapy Diagnosis:   Encounter Diagnosis   Name Primary?    Muscle weakness of lower extremity      Physician: Lu Segura, NP [facility]     OPERATION PERFORMED: Right total knee arthroplasty     DATE OF PROCEDURE: 10/07/2019     Physician Orders: PT Eval and Treat   Medical Diagnosis:  Z96.651 (ICD-10-CM) - Status post total right knee replacement  Evaluation Date: 10/30/2019  Authorization Period Expiration:  pend  Plan of Care Certification Period: 1/30/2020  Visit # / Visits authorized: 5/ 1    Time In:  11:30  Time Out: 12:30  Total Billable Time: 30 minutes    Precautions: Standard    Subjective      Pt reports soreness in R Knee particularly with ext ROM     she was compliant with home exercise program given last session.   Response to previous treatment: good   Functional change: see subjective above     Pain:  0/10  Location: right knee      Objective     Measurements taken:   Gait deviation noted by knee flexion at heel strike and persistent quad weakness     Martin received therapeutic exercises to develop strength, endurance, ROM and flexibility for 30 minutes  including:    Stick upper / lower leg   HSS 5x;15  TKE full roll 1x10:05 3#  TKE 1/2 roll 1x10:05 3#  Supine SLR 1x10:05 2.5#  LP U 80# 3x10  HS k' ext 310 2.5# U   St TKE pink cook band 2x10:05 B stance   LSU 4" 3x10   Self ROM k' flex heel slide 1x15  Self ROM k' flex prone + RF and prone 5x:15   Prone ext hang 3'x1 0#     CP x 10'       Home Exercises Provided and Patient Education Provided     Education provided:   - role of bike     Written Home Exercises Provided: Patient instructed to cont prior HEP.    Exercises were reviewed and Martin was able to demonstrate them prior to the end of the session.  Martin demonstrated good  understanding of the education provided.     See EMR under hand out for exercises provided " prior visit. But add prone ext hang       Assessment     justin Rx without increase in sxs,  Significant improvement in ambulation post Rx and decreased pain following achievement of full knee extension     Martin is progressing well towards her goals.   Pt prognosis is Good.     Pt will continue to benefit from skilled outpatient physical therapy to address the deficits listed in the problem list box on initial evaluation, provide pt/family education and to maximize pt's level of independence in the home and community environment.     Pt's spiritual, cultural and educational needs considered and pt agreeable to plan of care and goals.    Anticipated barriers to physical therapy: none    Goals:     Short-Term Goals: 6 weeks  - The patient will be independent with initial home exercise program.  - The patient will increase ROM by 15 degrees to perform ambulation and bathing and hygiene with pain < 0/10.  - The patient will increase strength by 1/2 muscle grade to perform ambulation and bathing and hygiene with pain < 0/10.    Long-Term Goals: 16 weeks  - The patient will be independent with home exercise program and symptom management.  - The patient will be independent amb with no assistive device on all surfaces for community distances.  - The patient will increase ROM = to uninvolved knee to perform ambulation,  and recreation/leisure activities  with pain < 0/10.  - The patient will increase strength = to uninvolved knee  to perform ambulation,  and recreation/leisure activities   with pain < 0/10.      Plan     Continue with established Plan of Care towards PT goals with focus on decreasing pain, increasing ROM, strength, neuromuscular control and functional status       Altaf Velazquez, PT

## 2019-12-10 ENCOUNTER — TELEPHONE (OUTPATIENT)
Dept: ORTHOPEDICS | Facility: CLINIC | Age: 69
End: 2019-12-10

## 2019-12-10 DIAGNOSIS — Z96.659 STATUS POST KNEE REPLACEMENT, UNSPECIFIED LATERALITY: ICD-10-CM

## 2019-12-10 RX ORDER — CELECOXIB 200 MG/1
200 CAPSULE ORAL DAILY
Qty: 30 CAPSULE | Refills: 1 | Status: SHIPPED | OUTPATIENT
Start: 2019-12-10 | End: 2022-01-11

## 2019-12-10 NOTE — TELEPHONE ENCOUNTER
----- Message from Fidelia Edmondson MA sent at 12/10/2019 11:26 AM CST -----  Contact: [facility]      ----- Message -----  From: James Blas  Sent: 12/10/2019  11:08 AM CST  To: [facility] Staff    Please call Nisreen at [phone]    Refill request for celecoxib (CELEBREX) 200 MG capsule    Use [facility]/pharmacy #[id number] - [place] - [address] [phone] (Phone)  [phone] (Fax)    Thank you

## 2019-12-16 ENCOUNTER — CLINICAL SUPPORT (OUTPATIENT)
Dept: REHABILITATION | Facility: HOSPITAL | Age: 69
End: 2019-12-16
Payer: OTHER GOVERNMENT

## 2019-12-16 DIAGNOSIS — M62.81 MUSCLE WEAKNESS OF LOWER EXTREMITY: ICD-10-CM

## 2019-12-16 PROCEDURE — 97110 THERAPEUTIC EXERCISES: CPT | Performed by: PHYSICAL THERAPIST

## 2019-12-16 NOTE — PROGRESS NOTES
"  Physical Therapy Daily Treatment Note     Visit Date: 12/16/2019    Name: Maria Elena Jackson  Clinic Number: [id number]    Therapy Diagnosis:   Encounter Diagnosis   Name Primary?    Muscle weakness of lower extremity      Physician: Lu Segura, NP  [facility]     OPERATION PERFORMED: Right total knee arthroplasty     DATE OF PROCEDURE: 10/07/2019     Physician Orders: PT Eval and Treat   Medical Diagnosis:  Z96.651 (ICD-10-CM) - Status post total right knee replacement  Evaluation Date: 10/30/2019  Authorization Period Expiration:  pend  Plan of Care Certification Period: 1/30/2020  Visit # / Visits authorized: 6/ 1    Time In:  10:30  Time Out: 11:30  Total Billable Time: 30 minutes    Precautions: Standard    Subjective      Pt reports cc at this time actually in L knee, "the right is only a little sore"     she was compliant with home exercise program given last session.   Response to previous treatment: good   Functional change: see subjective above     Pain:  1/10  Location: right knee      Objective     Measurements taken:  Able to go step over step without railing     Martin received therapeutic exercises to develop strength, endurance, ROM and flexibility for 30 minutes  including:    Stick upper / lower leg   Bike x 10'   HSS 5x;15  Supine SLR 1x10:05 4#  Shuttle LP U 2.5b 3x15  HS k' ext 310 2.5# U   LSU 4" 3x10   Hip abd band walks 3xburn R/L red loop  B bridge 2x10:10  U HR 3x10 R/L    CP x 10'       Home Exercises Provided and Patient Education Provided     Education provided:   - role of bike     Written Home Exercises Provided: Patient instructed to cont prior HEP.    Exercises were reviewed and Martin was able to demonstrate them prior to the end of the session.  Martin demonstrated good  understanding of the education provided.     See EMR under hand out for exercises provided prior visit. But add prone ext hang       Assessment     justin Rx without increase in sxs,  R knee continues to " progress as expected     Martin is progressing well towards her goals.   Pt prognosis is Good.     Pt will continue to benefit from skilled outpatient physical therapy to address the deficits listed in the problem list box on initial evaluation, provide pt/family education and to maximize pt's level of independence in the home and community environment.     Pt's spiritual, cultural and educational needs considered and pt agreeable to plan of care and goals.    Anticipated barriers to physical therapy: none    Goals:     Short-Term Goals: 6 weeks  - The patient will be independent with initial home exercise program.  - The patient will increase ROM by 15 degrees to perform ambulation and bathing and hygiene with pain < 0/10.  - The patient will increase strength by 1/2 muscle grade to perform ambulation and bathing and hygiene with pain < 0/10.    Long-Term Goals: 16 weeks  - The patient will be independent with home exercise program and symptom management.  - The patient will be independent amb with no assistive device on all surfaces for community distances.  - The patient will increase ROM = to uninvolved knee to perform ambulation,  and recreation/leisure activities  with pain < 0/10.  - The patient will increase strength = to uninvolved knee  to perform ambulation,  and recreation/leisure activities   with pain < 0/10.      Plan     Continue with established Plan of Care towards PT goals with focus on decreasing pain, increasing ROM, strength, neuromuscular control and functional status       Altaf Velazquez, PT

## 2019-12-20 ENCOUNTER — CLINICAL SUPPORT (OUTPATIENT)
Dept: REHABILITATION | Facility: HOSPITAL | Age: 69
End: 2019-12-20
Payer: OTHER GOVERNMENT

## 2019-12-20 DIAGNOSIS — M62.81 MUSCLE WEAKNESS OF LOWER EXTREMITY: ICD-10-CM

## 2019-12-20 PROCEDURE — 97110 THERAPEUTIC EXERCISES: CPT | Performed by: PHYSICAL THERAPIST

## 2019-12-20 NOTE — PROGRESS NOTES
"  Physical Therapy Daily Treatment Note     Visit Date: 12/20/2019    Name: Maria Elena Jackson  Clinic Number: [id number]    Therapy Diagnosis:   Encounter Diagnosis   Name Primary?    Muscle weakness of lower extremity      Physician: Lu Segura, NP [facility]     OPERATION PERFORMED: Right total knee arthroplasty     DATE OF PROCEDURE: 10/07/2019     Physician Orders: PT Eval and Treat   Medical Diagnosis:  Z96.651 (ICD-10-CM) - Status post total right knee replacement  Evaluation Date: 10/30/2019  Authorization Period Expiration:  pend  Plan of Care Certification Period: 1/30/2020  Visit # / Visits authorized: 7/ 1    Time In:  9:00  Time Out: 10:00  Total Billable Time: 30 minutes    Precautions: Standard    Subjective      Pt reports returning to work with resultant increase in swelling in R knee/lower leg/ankle     she was compliant with home exercise program given last session.   Response to previous treatment: good   Functional change: see subjective above     Pain:  0/10  Location: right knee      Objective     Measurements taken:  Mod effusion particularly in lower leg      Martin received therapeutic exercises to develop strength, endurance, ROM and flexibility for 30 minutes  including:    Bike x 10' 5.0   HSS 5x;15  TKE 2.5# 1x10:05   Supine SLR 1x10:05 2.5#  U bridge with opp LE resting on table 1x20:05 R/L  Ball squats 3x15  LP U  100#  3x10 ecc  HS k' ext 2x10 10# ecc and 2x10 5# conc U   LSU 4" 3x10   Prone self ROM k' flex + RF and prone 5x:15    CP x 10'       Home Exercises Provided and Patient Education Provided     Education provided:   - role of bike     Written Home Exercises Provided: Patient instructed to cont prior HEP.    Exercises were reviewed and Martin was able to demonstrate them prior to the end of the session.  Martin demonstrated good  understanding of the education provided.     See EMR under hand out for exercises provided prior visit. But add prone ext hang "       Assessment     justin Rx without increase in sxs,  Very good training effect in R LE musculature indicating strengthening occurring, tolerated ecc work which will mimic down stairs, declines     Martin is progressing well towards her goals.   Pt prognosis is Good.     Pt will continue to benefit from skilled outpatient physical therapy to address the deficits listed in the problem list box on initial evaluation, provide pt/family education and to maximize pt's level of independence in the home and community environment.     Pt's spiritual, cultural and educational needs considered and pt agreeable to plan of care and goals.    Anticipated barriers to physical therapy: none    Goals:     Short-Term Goals: 6 weeks  - The patient will be independent with initial home exercise program.  - The patient will increase ROM by 15 degrees to perform ambulation and bathing and hygiene with pain < 0/10.  - The patient will increase strength by 1/2 muscle grade to perform ambulation and bathing and hygiene with pain < 0/10.    Long-Term Goals: 16 weeks  - The patient will be independent with home exercise program and symptom management.  - The patient will be independent amb with no assistive device on all surfaces for community distances.  - The patient will increase ROM = to uninvolved knee to perform ambulation,  and recreation/leisure activities  with pain < 0/10.  - The patient will increase strength = to uninvolved knee  to perform ambulation,  and recreation/leisure activities   with pain < 0/10.      Plan     Continue with established Plan of Care towards PT goals with focus on decreasing pain, increasing ROM, strength, neuromuscular control and functional status       Altaf Velazquez, PT

## 2019-12-24 ENCOUNTER — PATIENT MESSAGE (OUTPATIENT)
Dept: ORTHOPEDICS | Facility: CLINIC | Age: 69
End: 2019-12-24

## 2019-12-31 ENCOUNTER — OFFICE VISIT (OUTPATIENT)
Dept: ORTHOPEDICS | Facility: CLINIC | Age: 69
End: 2019-12-31
Payer: OTHER GOVERNMENT

## 2019-12-31 VITALS — HEIGHT: 65 IN | BODY MASS INDEX: 29.12 KG/M2 | WEIGHT: 174.81 LBS

## 2019-12-31 DIAGNOSIS — Z96.659 STATUS POST KNEE REPLACEMENT, UNSPECIFIED LATERALITY: ICD-10-CM

## 2019-12-31 DIAGNOSIS — Z96.651 STATUS POST TOTAL RIGHT KNEE REPLACEMENT: Primary | ICD-10-CM

## 2019-12-31 PROCEDURE — 99999 PR PBB SHADOW E&M-EST. PATIENT-LVL III: CPT | Mod: PBBFAC,,, | Performed by: ORTHOPAEDIC SURGERY

## 2019-12-31 PROCEDURE — 99024 PR POST-OP FOLLOW-UP VISIT: ICD-10-PCS | Mod: S$GLB,,, | Performed by: ORTHOPAEDIC SURGERY

## 2019-12-31 PROCEDURE — 99999 PR PBB SHADOW E&M-EST. PATIENT-LVL III: ICD-10-PCS | Mod: PBBFAC,,, | Performed by: ORTHOPAEDIC SURGERY

## 2019-12-31 PROCEDURE — 99024 POSTOP FOLLOW-UP VISIT: CPT | Mod: S$GLB,,, | Performed by: ORTHOPAEDIC SURGERY

## 2019-12-31 RX ORDER — AMOXICILLIN 500 MG/1
2000 CAPSULE ORAL
Qty: 4 CAPSULE | Refills: 0 | Status: SHIPPED | OUTPATIENT
Start: 2019-12-31 | End: 2020-01-08

## 2019-12-31 NOTE — PROGRESS NOTES
Patient is here today for 12 week PO FU of her TKA on 10/7/19. She is progressing well.      We will allow her to return as needed for repeat radiographs and certainly for any other questions.    xrays are  reviewed today with good alignment.    We will check repeat radiographs in 1 year.

## 2020-01-07 ENCOUNTER — CLINICAL SUPPORT (OUTPATIENT)
Dept: REHABILITATION | Facility: HOSPITAL | Age: 70
End: 2020-01-07
Payer: OTHER GOVERNMENT

## 2020-01-07 DIAGNOSIS — Z96.659 STATUS POST KNEE REPLACEMENT, UNSPECIFIED LATERALITY: ICD-10-CM

## 2020-01-07 DIAGNOSIS — M62.81 MUSCLE WEAKNESS OF LOWER EXTREMITY: ICD-10-CM

## 2020-01-07 PROCEDURE — 97110 THERAPEUTIC EXERCISES: CPT | Performed by: PHYSICAL THERAPIST

## 2020-01-07 NOTE — PROGRESS NOTES
Physical Therapy Daily Treatment Note     Visit Date: 1/7/2020    Name: Maria Elena Jackson  Clinic Number: [id number]    Therapy Diagnosis:   Encounter Diagnosis   Name Primary?    Muscle weakness of lower extremity      Physician: Lu Segura, NP  [facility]     OPERATION PERFORMED: Right total knee arthroplasty     DATE OF PROCEDURE: 10/07/2019     Physician Orders: PT Eval and Treat   Medical Diagnosis:  Z96.651 (ICD-10-CM) - Status post total right knee replacement  Evaluation Date: 10/30/2019  Authorization Period Expiration:  pend  Plan of Care Certification Period: 1/30/2020  Visit # / Visits authorized: 8/ 1    Time In:  15:00  Time Out: 16:00  Total Billable Time: 30 minutes    Precautions: Standard    Subjective      Pt reports that she went to work yesterday with resultant swelling in R knee/lower leg     she was compliant with home exercise program given last session.   Response to previous treatment: good   Functional change: none since LV    Pain:  0/10  Location: right knee      Objective     PO 12 weeks     Measurements taken:   Mod effusion, PROM:  0/0/    Martin received therapeutic exercises to develop strength, endurance, ROM and flexibility for 30 minutes  including:    Bike x 10' 5.0   HSS 5x;15  LP U  100#  3x10 ecc  HS k' ext 2x10 10# ecc and 2x10 5# conc U   HS ham curl ecc 3x10 15#  Prone PROM k' flex 3xs     CP x 10'       Home Exercises Provided and Patient Education Provided     Education provided:   - role of bike     Written Home Exercises Provided: Patient instructed to cont prior HEP.    Exercises were reviewed and Martin was able to demonstrate them prior to the end of the session.  Martin demonstrated good  understanding of the education provided.     See EMR under hand out for exercises provided prior visit.       Assessment     justin Rx without increase in sxs,  Good training effect in quad/ham muscles indicating strengthening occurring     Martin is progressing well towards  her goals.   Pt prognosis is Good.     Pt will continue to benefit from skilled outpatient physical therapy to address the deficits listed in the problem list box on initial evaluation, provide pt/family education and to maximize pt's level of independence in the home and community environment.     Pt's spiritual, cultural and educational needs considered and pt agreeable to plan of care and goals.    Anticipated barriers to physical therapy: none    Goals:     Short-Term Goals: 6 weeks  - The patient will be independent with initial home exercise program.  - The patient will increase ROM by 15 degrees to perform ambulation and bathing and hygiene with pain < 0/10.  - The patient will increase strength by 1/2 muscle grade to perform ambulation and bathing and hygiene with pain < 0/10.    Long-Term Goals: 16 weeks  - The patient will be independent with home exercise program and symptom management.  - The patient will be independent amb with no assistive device on all surfaces for community distances.  - The patient will increase ROM = to uninvolved knee to perform ambulation,  and recreation/leisure activities  with pain < 0/10.  - The patient will increase strength = to uninvolved knee  to perform ambulation,  and recreation/leisure activities   with pain < 0/10.      Plan     Continue with established Plan of Care towards PT goals with focus on decreasing pain, increasing ROM, strength, neuromuscular control and functional status       Altaf Velazquez, PT

## 2020-01-08 RX ORDER — AMOXICILLIN 500 MG/1
CAPSULE ORAL
Qty: 4 CAPSULE | Refills: 0 | Status: SHIPPED | OUTPATIENT
Start: 2020-01-08 | End: 2020-02-13 | Stop reason: SDUPTHER

## 2020-01-15 ENCOUNTER — CLINICAL SUPPORT (OUTPATIENT)
Dept: REHABILITATION | Facility: HOSPITAL | Age: 70
End: 2020-01-15
Payer: OTHER GOVERNMENT

## 2020-01-15 DIAGNOSIS — M62.81 MUSCLE WEAKNESS OF LOWER EXTREMITY: ICD-10-CM

## 2020-01-15 PROCEDURE — 97110 THERAPEUTIC EXERCISES: CPT | Performed by: PHYSICAL THERAPIST

## 2020-01-15 NOTE — PROGRESS NOTES
"  Physical Therapy Daily Treatment Note     Visit Date: 1/15/2020    Name: Maria Elena Jackson  Clinic Number: [id number]    Therapy Diagnosis:   Encounter Diagnosis   Name Primary?    Muscle weakness of lower extremity      Physician: Lu Segura, NP Dr [facility]     OPERATION PERFORMED: Right total knee arthroplasty     DATE OF PROCEDURE: 10/07/2019     Physician Orders: PT Eval and Treat   Medical Diagnosis:  Z96.651 (ICD-10-CM) - Status post total right knee replacement  Evaluation Date: 10/30/2019  Authorization Period Expiration:  pend  Plan of Care Certification Period: 1/30/2020  Visit # / Visits authorized: 9/ 2 in 2020    Time In:  13:30  Time Out: 14:30  Total Billable Time: 30 minutes    Precautions: Standard    Subjective      Pt reports that she feels her R knee is 90% at this time, her cc at this time is turning in bed where the foot may get caught in the sheet, and she has not returned to walking for exercise     she was compliant with home exercise program given last session.   Response to previous treatment: good   Functional change: pt returned from cruise, did very well with activities on the ship     Pain:  0/10  Location: right knee      Objective     PO 13 weeks     Measurements taken:   Min effusion, PROM:  0/0/    Martin received therapeutic exercises to develop strength, endurance, ROM and flexibility for 30 minutes  including:    Bike x 10' 5.0   HSS 3x;15  Supine RF stretch 3x:15  SLR 1x10:05 2.5#  U bridge 1x15:05 R/L   Ball squat 3xburn   LSU 6" 3x10  Seated k' ext 3x15:01 5#  SLS 5x:15  Prone self ROM k' flex 5x;15     CP x 10'       Home Exercises Provided and Patient Education Provided     Education provided:   - walking progression     Written Home Exercises Provided:     Exercises were reviewed and Martin was able to demonstrate them prior to the end of the session.  Martin demonstrated good  understanding of the education provided.     See EMR under hand out for exercises " provided this visit 1/15/2020      Assessment     justin Rx without increase in sxs,  Tolerated increased TE volume and intensity     Martin is progressing well towards her goals.   Pt prognosis is Good.     Pt will continue to benefit from skilled outpatient physical therapy to address the deficits listed in the problem list box on initial evaluation, provide pt/family education and to maximize pt's level of independence in the home and community environment.     Pt's spiritual, cultural and educational needs considered and pt agreeable to plan of care and goals.    Anticipated barriers to physical therapy: none    Goals:     Short-Term Goals: 6 weeks (ALL MET)  - The patient will be independent with initial home exercise program.  - The patient will increase ROM by 15 degrees to perform ambulation and bathing and hygiene with pain < 0/10.  - The patient will increase strength by 1/2 muscle grade to perform ambulation and bathing and hygiene with pain < 0/10.    Long-Term Goals: 16 weeks (MET or moving toward achievement)  - The patient will be independent with home exercise program and symptom management.  - The patient will be independent amb with no assistive device on all surfaces for community distances.  - The patient will increase ROM = to uninvolved knee to perform ambulation,  and recreation/leisure activities  with pain < 0/10.  - The patient will increase strength = to uninvolved knee  to perform ambulation,  and recreation/leisure activities   with pain < 0/10.      Plan     Continue with established Plan of Care towards PT goals x 1 visit with focus on decreasing pain, increasing ROM, strength, neuromuscular control and functional status       Altaf Velazquez, PT

## 2020-01-30 ENCOUNTER — CLINICAL SUPPORT (OUTPATIENT)
Dept: REHABILITATION | Facility: HOSPITAL | Age: 70
End: 2020-01-30
Payer: OTHER GOVERNMENT

## 2020-01-30 DIAGNOSIS — M62.81 MUSCLE WEAKNESS OF LOWER EXTREMITY: ICD-10-CM

## 2020-01-30 PROCEDURE — 97110 THERAPEUTIC EXERCISES: CPT | Performed by: PHYSICAL THERAPIST

## 2020-01-30 NOTE — PROGRESS NOTES
"  Physical Therapy Daily Treatment Note     Visit Date: 1/30/2020    Name: Maria Elena Jackson  Clinic Number: [id number]    Therapy Diagnosis:   Encounter Diagnosis   Name Primary?    Muscle weakness of lower extremity      Physician: Lu Segura, NP Dr [facility]     OPERATION PERFORMED: Right total knee arthroplasty     DATE OF PROCEDURE: 10/07/2019     Physician Orders: PT Eval and Treat   Medical Diagnosis:  Z96.651 (ICD-10-CM) - Status post total right knee replacement  Evaluation Date: 10/30/2019  Authorization Period Expiration:  pend  Plan of Care Certification Period: 1/30/2020  Visit # / Visits authorized: 10/   3 in 2020    Time In:  12:00  Time Out: 13:00  Total Billable Time: 30 minutes    Precautions: Standard    Subjective      Pt reports no c/o this PM in R knee, has been able to walk x 1 mile in park without incident     she was compliant with home exercise program given last session.   Response to previous treatment: good   Functional change: pt returned from cruise, did very well with activities on the ship     Pain:  0/10  Location: right knee      Objective     PO 15 weeks     Measurements taken:   0  effusion, PROM:  0/0/129, MMT quad 4+/5, ham 4+/5     Martin received therapeutic exercises to develop strength, endurance, ROM and flexibility for 30 minutes  including:    Bike x 10' 5.0   HSS 3x;15  SLR 2x10:05 2.5#  LP 80# B 3x10  HS ham curl B 3x10 20#  LSU 6" 3x10  Seated k' ext 3x10:01 5#  R/L   SLS 5x:15  Prone self ROM k' flex 5x;15     CP x 10'       Home Exercises Provided and Patient Education Provided     Education provided:   - walking progression  125 to  1.5 to 1.75 to 2 miles which is her goal     Written Home Exercises Provided:     Exercises were reviewed and Martin was able to demonstrate them prior to the end of the session.  Martin demonstrated good  understanding of the education provided.     See EMR under hand out for exercises provided this visit 1/15/2020      Assessment "     justin Rx without increase in sxs,  All short and majority of long term goals achieved     Martin is progressing well towards her goals.   Pt prognosis is Good.     Pt will continue to benefit from skilled outpatient physical therapy to address the deficits listed in the problem list box on initial evaluation, provide pt/family education and to maximize pt's level of independence in the home and community environment.     Pt's spiritual, cultural and educational needs considered and pt agreeable to plan of care and goals.    Anticipated barriers to physical therapy: none    Goals:     Short-Term Goals: 6 weeks (ALL MET)  - The patient will be independent with initial home exercise program.  - The patient will increase ROM by 15 degrees to perform ambulation and bathing and hygiene with pain < 0/10.  - The patient will increase strength by 1/2 muscle grade to perform ambulation and bathing and hygiene with pain < 0/10.    Long-Term Goals: 16 weeks (MET or moving toward achievement)  - The patient will be independent with home exercise program and symptom management.  - The patient will be independent amb with no assistive device on all surfaces for community distances.  - The patient will increase ROM = to uninvolved knee to perform ambulation,  and recreation/leisure activities  with pain < 0/10.  - The patient will increase strength = to uninvolved knee  to perform ambulation,  and recreation/leisure activities   with pain < 0/10.      Plan     Pt and PT agree that pt can move to HEP at this time, pt to continue with HEP and walking progression, and will contact PT or MD should sxs reoccur   She is this DC'd from our care at this time secondary to progress     Altaf Velazquez, PT

## 2020-02-13 ENCOUNTER — PATIENT MESSAGE (OUTPATIENT)
Dept: ORTHOPEDICS | Facility: CLINIC | Age: 70
End: 2020-02-13

## 2020-02-13 DIAGNOSIS — Z96.659 STATUS POST KNEE REPLACEMENT, UNSPECIFIED LATERALITY: ICD-10-CM

## 2020-02-13 RX ORDER — AMOXICILLIN 500 MG/1
CAPSULE ORAL
Qty: 4 CAPSULE | Refills: 4 | Status: SHIPPED | OUTPATIENT
Start: 2020-02-13 | End: 2021-04-01

## 2020-03-23 ENCOUNTER — PATIENT MESSAGE (OUTPATIENT)
Dept: ORTHOPEDICS | Facility: CLINIC | Age: 70
End: 2020-03-23

## 2020-09-22 ENCOUNTER — PATIENT MESSAGE (OUTPATIENT)
Dept: DERMATOLOGY | Facility: CLINIC | Age: 70
End: 2020-09-22

## 2020-09-23 ENCOUNTER — HOSPITAL ENCOUNTER (OUTPATIENT)
Dept: RADIOLOGY | Facility: HOSPITAL | Age: 70
Discharge: HOME | End: 2020-09-23
Attending: ORTHOPAEDIC SURGERY
Payer: OTHER GOVERNMENT

## 2020-09-23 ENCOUNTER — OFFICE VISIT (OUTPATIENT)
Dept: ORTHOPEDICS | Facility: CLINIC | Age: 70
End: 2020-09-23
Payer: OTHER GOVERNMENT

## 2020-09-23 VITALS — BODY MASS INDEX: 30.12 KG/M2 | HEIGHT: 65 IN | WEIGHT: 180.75 LBS

## 2020-09-23 DIAGNOSIS — M17.12 PRIMARY OSTEOARTHRITIS OF LEFT KNEE: Primary | ICD-10-CM

## 2020-09-23 DIAGNOSIS — Z96.659 TOTAL KNEE REPLACEMENT STATUS, UNSPECIFIED LATERALITY: Primary | ICD-10-CM

## 2020-09-23 DIAGNOSIS — Z96.659 TOTAL KNEE REPLACEMENT STATUS, UNSPECIFIED LATERALITY: ICD-10-CM

## 2020-09-23 PROCEDURE — 73560 XR KNEE ORTHO RIGHT: ICD-10-PCS | Mod: 26,LT,, | Performed by: RADIOLOGY

## 2020-09-23 PROCEDURE — 99999 PR PBB SHADOW E&M-EST. PATIENT-LVL II: CPT | Mod: PBBFAC,,, | Performed by: ORTHOPAEDIC SURGERY

## 2020-09-23 PROCEDURE — 73562 X-RAY EXAM OF KNEE 3: CPT | Mod: 26,RT,, | Performed by: RADIOLOGY

## 2020-09-23 PROCEDURE — 99214 PR OFFICE/OUTPT VISIT, EST, LEVL IV, 30-39 MIN: ICD-10-PCS | Mod: S$GLB,,, | Performed by: ORTHOPAEDIC SURGERY

## 2020-09-23 PROCEDURE — 73560 X-RAY EXAM OF KNEE 1 OR 2: CPT | Mod: TC,LT

## 2020-09-23 PROCEDURE — 99214 OFFICE O/P EST MOD 30 MIN: CPT | Mod: S$GLB,,, | Performed by: ORTHOPAEDIC SURGERY

## 2020-09-23 PROCEDURE — 73562 XR KNEE ORTHO RIGHT: ICD-10-PCS | Mod: 26,RT,, | Performed by: RADIOLOGY

## 2020-09-23 PROCEDURE — 73560 X-RAY EXAM OF KNEE 1 OR 2: CPT | Mod: 26,LT,, | Performed by: RADIOLOGY

## 2020-09-23 PROCEDURE — 99999 PR PBB SHADOW E&M-EST. PATIENT-LVL II: ICD-10-PCS | Mod: PBBFAC,,, | Performed by: ORTHOPAEDIC SURGERY

## 2020-09-23 NOTE — PROGRESS NOTES
HPI:    Maria Elena Jackson is a 70 y.o. female who is here today for   Chief Complaint   Patient presents with    Post-op Evaluation     1 yr p/o r tk    .  10/07/2019    Duration: 12 months  Intensity: mild  Character of pain: achy  Location: She reports that the pain is predominately  intermediate    Past Medical History:   Diagnosis Date    Arthritis     Hyperlipidemia     Hypertension           Current Outpatient Medications:     acetaminophen (TYLENOL) 650 MG TbSR, Take 1 tablet (650 mg total) by mouth every 8 (eight) hours as needed., Disp: 100 tablet, Rfl: 0    amoxicillin (AMOXIL) 500 MG capsule, TAKE 4 CAPSULES (2,000 MG TOTAL) BY MOUTH ONE HOUR PRIOR TO  VISIT, Disp: 4 capsule, Rfl: 4    beta-carotene,A,-vits C,E/mins (OCUVITE ORAL), Take 1 tablet by mouth Daily., Disp: , Rfl:     calcium carbonate/vitamin D3 (CALTRATE 600 + D ORAL), Take 1 tablet by mouth Daily., Disp: , Rfl:     celecoxib (CELEBREX) 200 MG capsule, Take 1 capsule (200 mg total) by mouth once daily., Disp: 30 capsule, Rfl: 1    cholecalciferol, vitamin D3, (VITAMIN D3) 1,000 unit capsule, Take 1,000 Units by mouth once daily., Disp: , Rfl:     diltiaZEM (CARDIZEM CD) 240 MG 24 hr capsule, Take 240 mg by mouth every morning. , Disp: , Rfl:     docusate sodium (COLACE) 100 MG capsule, Take 1 capsule (100 mg total) by mouth 2 (two) times daily as needed for Constipation., Disp: 60 capsule, Rfl: 0    doxazosin (CARDURA) 2 MG tablet, Take 2 mg by mouth every morning. , Disp: , Rfl: 1    FLUAD [id number], 65 YR UP,,PF, 45 mcg (15 mcg x 3)/0.5 mL Syrg, , Disp: , Rfl:     folic acid/multivit-min/lutein (CENTRUM SILVER ORAL), Take 1 tablet by mouth Daily., Disp: , Rfl:     hydroCHLOROthiazide (HYDRODIURIL) 25 MG tablet, Take 25 mg by mouth daily as needed., Disp: , Rfl:     irbesartan (AVAPRO) 300 MG tablet, Take 300 mg by mouth every evening. , Disp: , Rfl:     ondansetron (ZOFRAN-ODT) 8 MG TbDL, Dissolve 1 tablet (8 mg total)  "by mouth every 12 (twelve) hours as needed (nausea)., Disp: 20 tablet, Rfl: 0    oxyCODONE (ROXICODONE) 5 MG immediate release tablet, Take 1 tablet (5 mg total) by mouth every 6 (six) hours as needed for Pain., Disp: 28 tablet, Rfl: 0    simvastatin (ZOCOR) 10 MG tablet, Take 10 mg by mouth every evening., Disp: , Rfl:     sulfamethoxazole-trimethoprim 800-160mg (BACTRIM DS) 800-160 mg Tab, Take 1 tablet by mouth 2 (two) times daily., Disp: , Rfl: 0    aspirin (ECOTRIN) 81 MG EC tablet, Take 1 tablet (81 mg total) by mouth 2 (two) times daily., Disp: 60 tablet, Rfl: 0     Review of patient's allergies indicates:  No Known Allergies     ROS  Constitutional: Negative for fever, Negative for weight loss  HENT Negative for congestion  Cardiovascular: Negative chest pain  Respiratory: Negative Shortness of breath  Heme: Negative excessive bleeding  Skin:NegativeItching, Negative breakdown  Musculoskeletal:Positive for back pain, Negative for joint pain, Negative muscle pain, Negative muscle weakness  Neurological: Negative for numbness and paresthesias   Psychiatric/Behavioral: Negative altered mental status, Negative for depression    Physical Exam:   Ht 5' 5" (1.651 m)   Wt 82 kg (180 lb 12.4 oz)   BMI 30.08 kg/m²   General appearance: This is a well-developed, Well nourished female No obvious acute distress.  Psychiatric: normal mood and affect;  pleasant and conversant; judgment and thought content normal  Gait is coordinated.   Cardiovascular: There are no swelling or varicosities present.   Respiratory: normal respiratory effort   Lymphatic: no adenopathy   Neurologic: alert and oriented to person, place, and time   Deep Tendon Reflexes are normal;  Coordination and Balance: Normal   Musculoskeletal   Neck    ROM shows normal flexion and extension and lateral rotation    Palpation: Non-tender    Stability is normal    Strength is normal    Skin is normal without masses and lesions    Sensation is intact to " light touch   Back    ROM showsabnormal flexion, extension    and  rotation    Palpation shows no masses    Stability is normal    Strength to flexion and extension well maintained    Core strength is diminished    Skin shows no rashes or cafe au lait spots;     Sensation is intact to light touch    Right Knee  Swelling None  TendernessNone  Range of Motion: 120    Left Knee: Swelling None  TendernessNone  Range of Motion: 130    Radiograph   Implants in excellent position no signs of loosening or failure.  Assessment:  Stable right total knee.    Plan:  Continue with activities as tolerated.  Patient will want her left knee done in the next year to.

## 2020-09-24 ENCOUNTER — TELEPHONE (OUTPATIENT)
Dept: OBSTETRICS AND GYNECOLOGY | Facility: CLINIC | Age: 70
End: 2020-09-24

## 2020-09-24 DIAGNOSIS — Z12.39 BREAST CANCER SCREENING: Primary | ICD-10-CM

## 2020-09-30 ENCOUNTER — PATIENT MESSAGE (OUTPATIENT)
Dept: ORTHOPEDICS | Facility: CLINIC | Age: 70
End: 2020-09-30

## 2020-09-30 ENCOUNTER — HOSPITAL ENCOUNTER (OUTPATIENT)
Dept: RADIOLOGY | Facility: OTHER | Age: 70
Discharge: HOME | End: 2020-09-30
Attending: OBSTETRICS & GYNECOLOGY
Payer: OTHER GOVERNMENT

## 2020-09-30 ENCOUNTER — OFFICE VISIT (OUTPATIENT)
Dept: OBSTETRICS AND GYNECOLOGY | Facility: CLINIC | Age: 70
End: 2020-09-30
Attending: OBSTETRICS & GYNECOLOGY
Payer: OTHER GOVERNMENT

## 2020-09-30 VITALS
WEIGHT: 187.63 LBS | BODY MASS INDEX: 31.22 KG/M2 | SYSTOLIC BLOOD PRESSURE: 120 MMHG | DIASTOLIC BLOOD PRESSURE: 76 MMHG

## 2020-09-30 DIAGNOSIS — Z01.419 WELL WOMAN EXAM: Primary | ICD-10-CM

## 2020-09-30 DIAGNOSIS — Z12.39 BREAST CANCER SCREENING: ICD-10-CM

## 2020-09-30 DIAGNOSIS — Z12.31 VISIT FOR SCREENING MAMMOGRAM: ICD-10-CM

## 2020-09-30 PROCEDURE — 77067 MAMMO DIGITAL SCREENING BILAT WITH TOMO: ICD-10-PCS | Mod: 26,,, | Performed by: RADIOLOGY

## 2020-09-30 PROCEDURE — 77063 MAMMO DIGITAL SCREENING BILAT WITH TOMO: ICD-10-PCS | Mod: 26,,, | Performed by: RADIOLOGY

## 2020-09-30 PROCEDURE — 99999 PR PBB SHADOW E&M-EST. PATIENT-LVL IV: CPT | Mod: PBBFAC,,, | Performed by: OBSTETRICS & GYNECOLOGY

## 2020-09-30 PROCEDURE — 99999 PR PBB SHADOW E&M-EST. PATIENT-LVL IV: ICD-10-PCS | Mod: PBBFAC,,, | Performed by: OBSTETRICS & GYNECOLOGY

## 2020-09-30 PROCEDURE — 99397 PR PREVENTIVE VISIT,EST,65 & OVER: ICD-10-PCS | Mod: S$GLB,,, | Performed by: OBSTETRICS & GYNECOLOGY

## 2020-09-30 PROCEDURE — 99397 PER PM REEVAL EST PAT 65+ YR: CPT | Mod: S$GLB,,, | Performed by: OBSTETRICS & GYNECOLOGY

## 2020-09-30 PROCEDURE — 77067 SCR MAMMO BI INCL CAD: CPT | Mod: TC

## 2020-09-30 PROCEDURE — 77063 BREAST TOMOSYNTHESIS BI: CPT | Mod: 26,,, | Performed by: RADIOLOGY

## 2020-09-30 PROCEDURE — 77067 SCR MAMMO BI INCL CAD: CPT | Mod: 26,,, | Performed by: RADIOLOGY

## 2020-09-30 RX ORDER — IRBESARTAN 150 MG/1
300 TABLET ORAL DAILY
COMMUNITY
Start: 2020-09-07 | End: 2022-05-30 | Stop reason: ALTCHOICE

## 2020-09-30 RX ORDER — TRETINOIN 0.5 MG/G
CREAM TOPICAL
COMMUNITY
Start: 2020-08-19 | End: 2022-04-27

## 2020-09-30 NOTE — PROGRESS NOTES
CC: Well woman exam    Maria Elena Jackson is a 70 y.o. female  presents for well woman exam.  LMP: No LMP recorded. Patient is postmenopausal..  No issues, problems, or complaints.      No history of abnormal pap.    Past Medical History:   Diagnosis Date    Arthritis     Hyperlipidemia     Hypertension      Past Surgical History:   Procedure Laterality Date    CHOLECYSTECTOMY      D&C Hysteroscopy      DILATION AND CURETTAGE OF UTERUS      JOINT REPLACEMENT      laparoscopy      TONSILLECTOMY      TOTAL KNEE ARTHROPLASTY Right 10/7/2019    Procedure: ARTHROPLASTY, KNEE, TOTAL;  Surgeon: John L. Ochsner Jr., MD;  Location: [facility];  Service: Orthopedics;  Laterality: Right;     Social History     Socioeconomic History    Marital status:      Spouse name: Not on file    Number of children: Not on file    Years of education: Not on file    Highest education level: Not on file   Occupational History    Not on file   Social Needs    Financial resource strain: Not on file    Food insecurity     Worry: Not on file     Inability: Not on file    Transportation needs     Medical: Not on file     Non-medical: Not on file   Tobacco Use    Smoking status: Former Smoker     Packs/day: 1.50     Years: 22.00     Pack years: 33.00     Quit date:      Years since quittin.    Smokeless tobacco: Never Used   Substance and Sexual Activity    Alcohol use: Yes     Comment: 4 glasses of wine in a months    Drug use: No    Sexual activity: Not Currently     Birth control/protection: Post-menopausal   Lifestyle    Physical activity     Days per week: Not on file     Minutes per session: Not on file    Stress: Not on file   Relationships    Social connections     Talks on phone: Not on file     Gets together: Not on file     Attends [employer] service: Not on file     Active member of club or organization: Not on file     Attends meetings of clubs or organizations: Not on file     Relationship  status: Not on file   Other Topics Concern    Not on file   Social History Narrative    Not on file     Family History   Problem Relation Age of Onset    Colon cancer Mother 55    Ovarian cancer Mother 35    Breast cancer Neg Hx      OB History        2    Para   2    Term                AB        Living   2       SAB        TAB        Ectopic        Multiple        Live Births   2                 /76   Wt 85.1 kg (187 lb 9.8 oz)   BMI 31.22 kg/m²       ROS:  GENERAL: Denies weight gain or weight loss. Feeling well overall.   SKIN: Denies rash or lesions.   HEAD: Denies head injury or headache.   NODES: Denies enlarged lymph nodes.   CHEST: Denies chest pain or shortness of breath.   CARDIOVASCULAR: Denies palpitations or left sided chest pain.   ABDOMEN: No abdominal pain, constipation, diarrhea, nausea, vomiting or rectal bleeding.   URINARY: No frequency, dysuria, hematuria, or burning on urination.  REPRODUCTIVE: See HPI.   BREASTS: The patient performs breast self-examination and denies pain, lumps, or nipple discharge.   HEMATOLOGIC: No easy bruisability or excessive bleeding.   MUSCULOSKELETAL: Denies joint pain or swelling.   NEUROLOGIC: Denies syncope or weakness.   PSYCHIATRIC: Denies depression, anxiety or mood swings.    PHYSICAL EXAM:  APPEARANCE: Well nourished, well developed, in no acute distress.  AFFECT: WNL, alert and oriented x 3  SKIN: No acne or hirsutism  NECK: Neck symmetric without masses or thyromegaly  NODES: No inguinal, cervical, axillary, or femoral lymph node enlargement  CHEST: Good respiratory effect  ABDOMEN: Soft.  No tenderness or masses.  No hepatosplenomegaly.  No hernias.  BREASTS: Symmetrical, no skin changes or visible lesions.  No palpable masses, nipple discharge bilaterally.  PELVIC: Normal external genitalia without lesions.  Normal hair distribution.  Adequate perineal body, normal urethral meatus.  Vagina moist and well rugated without lesions  or discharge.  Cervix pink, without lesions, discharge or tenderness.  No significant cystocele or rectocele.  Bimanual exam shows uterus to be normal size, regular, mobile and nontender.  Adnexa without masses or tenderness.    EXTREMITIES: No edema.    ASSESSMENT    ICD-10-CM ICD-9-CM    1. Well woman exam  Z01.419 V72.31 DXA Bone Density Spine And Hip         PLAN:  Well woman exam  -     DXA Bone Density Spine And Hip; Future; Expected date: 09/30/2020            Patient was counseled today on A.C.S. Pap guidelines and recommendations for yearly pelvic exams, mammograms and monthly self breast exams; to see her PCP for other health maintenance.

## 2020-10-06 ENCOUNTER — PATIENT OUTREACH (OUTPATIENT)
Dept: ORTHOPEDICS | Facility: CLINIC | Age: 70
End: 2020-10-06

## 2020-11-24 ENCOUNTER — PATIENT MESSAGE (OUTPATIENT)
Dept: ORTHOPEDICS | Facility: CLINIC | Age: 70
End: 2020-11-24

## 2020-11-30 ENCOUNTER — PATIENT MESSAGE (OUTPATIENT)
Dept: ORTHOPEDICS | Facility: CLINIC | Age: 70
End: 2020-11-30

## 2020-12-31 ENCOUNTER — CLINICAL SUPPORT (OUTPATIENT)
Dept: URGENT CARE | Facility: CLINIC | Age: 70
End: 2020-12-31
Payer: OTHER GOVERNMENT

## 2020-12-31 DIAGNOSIS — U07.1 COVID-19 VIRUS DETECTED: ICD-10-CM

## 2020-12-31 DIAGNOSIS — M79.10 MUSCLE ACHE: Primary | ICD-10-CM

## 2020-12-31 LAB
CTP QC/QA: YES
SARS-COV-2 RDRP RESP QL NAA+PROBE: POSITIVE

## 2020-12-31 PROCEDURE — U0002: ICD-10-PCS | Mod: QW,S$GLB,, | Performed by: NURSE PRACTITIONER

## 2020-12-31 PROCEDURE — U0002 COVID-19 LAB TEST NON-CDC: HCPCS | Mod: QW,S$GLB,, | Performed by: NURSE PRACTITIONER

## 2021-01-05 ENCOUNTER — NURSE TRIAGE (OUTPATIENT)
Dept: ADMINISTRATIVE | Facility: CLINIC | Age: 71
End: 2021-01-05

## 2021-01-06 ENCOUNTER — NURSE TRIAGE (OUTPATIENT)
Dept: ADMINISTRATIVE | Facility: CLINIC | Age: 71
End: 2021-01-06

## 2021-01-15 ENCOUNTER — IMMUNIZATION (OUTPATIENT)
Dept: INTERNAL MEDICINE | Facility: CLINIC | Age: 71
End: 2021-01-15
Payer: OTHER GOVERNMENT

## 2021-01-15 DIAGNOSIS — Z23 NEED FOR VACCINATION: Primary | ICD-10-CM

## 2021-01-15 PROCEDURE — 91300 COVID-19, MRNA, LNP-S, PF, 30 MCG/0.3 ML DOSE VACCINE: CPT | Mod: PBBFAC | Performed by: FAMILY MEDICINE

## 2021-02-05 ENCOUNTER — IMMUNIZATION (OUTPATIENT)
Dept: INTERNAL MEDICINE | Facility: CLINIC | Age: 71
End: 2021-02-05
Payer: OTHER GOVERNMENT

## 2021-02-05 DIAGNOSIS — Z23 NEED FOR VACCINATION: Primary | ICD-10-CM

## 2021-02-05 PROCEDURE — 91300 COVID-19, MRNA, LNP-S, PF, 30 MCG/0.3 ML DOSE VACCINE: CPT | Mod: PBBFAC | Performed by: FAMILY MEDICINE

## 2021-02-05 PROCEDURE — 0002A COVID-19, MRNA, LNP-S, PF, 30 MCG/0.3 ML DOSE VACCINE: CPT | Mod: PBBFAC | Performed by: FAMILY MEDICINE

## 2021-02-22 ENCOUNTER — NURSE TRIAGE (OUTPATIENT)
Dept: ADMINISTRATIVE | Facility: CLINIC | Age: 71
End: 2021-02-22

## 2021-02-22 ENCOUNTER — PATIENT MESSAGE (OUTPATIENT)
Dept: ADMINISTRATIVE | Facility: OTHER | Age: 71
End: 2021-02-22

## 2021-03-10 ENCOUNTER — OFFICE VISIT (OUTPATIENT)
Dept: ORTHOPEDICS | Facility: CLINIC | Age: 71
End: 2021-03-10
Payer: OTHER GOVERNMENT

## 2021-03-10 ENCOUNTER — TELEPHONE (OUTPATIENT)
Dept: ORTHOPEDICS | Facility: CLINIC | Age: 71
End: 2021-03-10

## 2021-03-10 VITALS
HEIGHT: 65 IN | HEART RATE: 63 BPM | DIASTOLIC BLOOD PRESSURE: 66 MMHG | WEIGHT: 185 LBS | BODY MASS INDEX: 30.82 KG/M2 | SYSTOLIC BLOOD PRESSURE: 165 MMHG

## 2021-03-10 DIAGNOSIS — M17.12 PRIMARY OSTEOARTHRITIS OF LEFT KNEE: Primary | ICD-10-CM

## 2021-03-10 PROCEDURE — 20610 DRAIN/INJ JOINT/BURSA W/O US: CPT | Mod: LT,S$GLB,, | Performed by: PHYSICIAN ASSISTANT

## 2021-03-10 PROCEDURE — 99213 OFFICE O/P EST LOW 20 MIN: CPT | Mod: 25,S$GLB,, | Performed by: PHYSICIAN ASSISTANT

## 2021-03-10 PROCEDURE — 99213 PR OFFICE/OUTPT VISIT, EST, LEVL III, 20-29 MIN: ICD-10-PCS | Mod: 25,S$GLB,, | Performed by: PHYSICIAN ASSISTANT

## 2021-03-10 PROCEDURE — 20610 PR DRAIN/INJECT LARGE JOINT/BURSA: ICD-10-PCS | Mod: LT,S$GLB,, | Performed by: PHYSICIAN ASSISTANT

## 2021-03-10 PROCEDURE — 99999 PR PBB SHADOW E&M-EST. PATIENT-LVL IV: CPT | Mod: PBBFAC,,, | Performed by: PHYSICIAN ASSISTANT

## 2021-03-10 PROCEDURE — 99999 PR PBB SHADOW E&M-EST. PATIENT-LVL IV: ICD-10-PCS | Mod: PBBFAC,,, | Performed by: PHYSICIAN ASSISTANT

## 2021-03-10 RX ORDER — BETAMETHASONE SODIUM PHOSPHATE AND BETAMETHASONE ACETATE 3; 3 MG/ML; MG/ML
6 INJECTION, SUSPENSION INTRA-ARTICULAR; INTRALESIONAL; INTRAMUSCULAR; SOFT TISSUE
Status: COMPLETED | OUTPATIENT
Start: 2021-03-10 | End: 2021-03-10

## 2021-03-10 RX ORDER — TOBRAMYCIN AND DEXAMETHASONE 3; 1 MG/ML; MG/ML
SUSPENSION/ DROPS OPHTHALMIC
COMMUNITY
Start: 2021-03-02 | End: 2022-04-27

## 2021-03-10 RX ADMIN — BETAMETHASONE SODIUM PHOSPHATE AND BETAMETHASONE ACETATE 6 MG: 3; 3 INJECTION, SUSPENSION INTRA-ARTICULAR; INTRALESIONAL; INTRAMUSCULAR; SOFT TISSUE at 07:03

## 2021-03-18 ENCOUNTER — PATIENT MESSAGE (OUTPATIENT)
Dept: RESEARCH | Facility: HOSPITAL | Age: 71
End: 2021-03-18

## 2021-03-26 ENCOUNTER — PATIENT MESSAGE (OUTPATIENT)
Dept: RESEARCH | Facility: HOSPITAL | Age: 71
End: 2021-03-26

## 2021-06-24 ENCOUNTER — PATIENT MESSAGE (OUTPATIENT)
Dept: ORTHOPEDICS | Facility: CLINIC | Age: 71
End: 2021-06-24

## 2021-07-09 ENCOUNTER — PATIENT MESSAGE (OUTPATIENT)
Dept: ORTHOPEDICS | Facility: CLINIC | Age: 71
End: 2021-07-09

## 2021-07-12 ENCOUNTER — TELEPHONE (OUTPATIENT)
Dept: ORTHOPEDICS | Facility: CLINIC | Age: 71
End: 2021-07-12

## 2021-07-12 DIAGNOSIS — M79.644 CHRONIC PAIN OF RIGHT THUMB: Primary | ICD-10-CM

## 2021-07-12 DIAGNOSIS — G89.29 CHRONIC PAIN OF RIGHT THUMB: Primary | ICD-10-CM

## 2021-07-13 ENCOUNTER — HOSPITAL ENCOUNTER (OUTPATIENT)
Dept: RADIOLOGY | Facility: HOSPITAL | Age: 71
Discharge: HOME | End: 2021-07-13
Attending: ORTHOPAEDIC SURGERY
Payer: OTHER GOVERNMENT

## 2021-07-13 ENCOUNTER — OFFICE VISIT (OUTPATIENT)
Dept: ORTHOPEDICS | Facility: CLINIC | Age: 71
End: 2021-07-13
Payer: OTHER GOVERNMENT

## 2021-07-13 VITALS — BODY MASS INDEX: 30.82 KG/M2 | HEIGHT: 65 IN | WEIGHT: 185 LBS

## 2021-07-13 DIAGNOSIS — M79.644 CHRONIC PAIN OF RIGHT THUMB: ICD-10-CM

## 2021-07-13 DIAGNOSIS — M65.311 TRIGGER THUMB, RIGHT THUMB: Primary | ICD-10-CM

## 2021-07-13 DIAGNOSIS — G89.29 CHRONIC PAIN OF RIGHT THUMB: ICD-10-CM

## 2021-07-13 PROCEDURE — 99203 PR OFFICE/OUTPT VISIT, NEW, LEVL III, 30-44 MIN: ICD-10-PCS | Mod: 25,S$GLB,, | Performed by: ORTHOPAEDIC SURGERY

## 2021-07-13 PROCEDURE — 73140 X-RAY EXAM OF FINGER(S): CPT | Mod: 26,RT,, | Performed by: RADIOLOGY

## 2021-07-13 PROCEDURE — 73140 X-RAY EXAM OF FINGER(S): CPT | Mod: TC,RT

## 2021-07-13 PROCEDURE — 99203 OFFICE O/P NEW LOW 30 MIN: CPT | Mod: 25,S$GLB,, | Performed by: ORTHOPAEDIC SURGERY

## 2021-07-13 PROCEDURE — 20550 NJX 1 TENDON SHEATH/LIGAMENT: CPT | Mod: F5,S$GLB,, | Performed by: ORTHOPAEDIC SURGERY

## 2021-07-13 PROCEDURE — 73140 XR FINGER 2 OR MORE VIEWS RIGHT: ICD-10-PCS | Mod: 26,RT,, | Performed by: RADIOLOGY

## 2021-07-13 PROCEDURE — 99999 PR PBB SHADOW E&M-EST. PATIENT-LVL III: ICD-10-PCS | Mod: PBBFAC,,, | Performed by: ORTHOPAEDIC SURGERY

## 2021-07-13 PROCEDURE — 20550 TENDON SHEATH: ICD-10-PCS | Mod: F5,S$GLB,, | Performed by: ORTHOPAEDIC SURGERY

## 2021-07-13 PROCEDURE — 99999 PR PBB SHADOW E&M-EST. PATIENT-LVL III: CPT | Mod: PBBFAC,,, | Performed by: ORTHOPAEDIC SURGERY

## 2021-07-13 RX ORDER — TRIAMCINOLONE ACETONIDE 40 MG/ML
40 INJECTION, SUSPENSION INTRA-ARTICULAR; INTRAMUSCULAR
Status: DISCONTINUED | OUTPATIENT
Start: 2021-07-13 | End: 2021-07-13 | Stop reason: HOSPADM

## 2021-07-13 RX ADMIN — TRIAMCINOLONE ACETONIDE 40 MG: 40 INJECTION, SUSPENSION INTRA-ARTICULAR; INTRAMUSCULAR at 10:07

## 2021-12-29 ENCOUNTER — PATIENT MESSAGE (OUTPATIENT)
Dept: ORTHOPEDICS | Facility: CLINIC | Age: 71
End: 2021-12-29
Payer: OTHER GOVERNMENT

## 2022-01-11 ENCOUNTER — HOSPITAL ENCOUNTER (OUTPATIENT)
Dept: RADIOLOGY | Facility: HOSPITAL | Age: 72
Discharge: HOME | End: 2022-01-11
Attending: STUDENT IN AN ORGANIZED HEALTH CARE EDUCATION/TRAINING PROGRAM
Payer: OTHER GOVERNMENT

## 2022-01-11 ENCOUNTER — OFFICE VISIT (OUTPATIENT)
Dept: INTERNAL MEDICINE | Facility: CLINIC | Age: 72
End: 2022-01-11
Payer: OTHER GOVERNMENT

## 2022-01-11 VITALS
WEIGHT: 192.69 LBS | BODY MASS INDEX: 32.1 KG/M2 | OXYGEN SATURATION: 98 % | DIASTOLIC BLOOD PRESSURE: 66 MMHG | SYSTOLIC BLOOD PRESSURE: 156 MMHG | HEIGHT: 65 IN | HEART RATE: 76 BPM

## 2022-01-11 DIAGNOSIS — Z00.00 ANNUAL PHYSICAL EXAM: ICD-10-CM

## 2022-01-11 DIAGNOSIS — E78.5 HYPERLIPIDEMIA, UNSPECIFIED HYPERLIPIDEMIA TYPE: ICD-10-CM

## 2022-01-11 DIAGNOSIS — I10 HYPERTENSION, UNSPECIFIED TYPE: Primary | ICD-10-CM

## 2022-01-11 PROCEDURE — 99203 OFFICE O/P NEW LOW 30 MIN: CPT | Mod: S$GLB,,, | Performed by: STUDENT IN AN ORGANIZED HEALTH CARE EDUCATION/TRAINING PROGRAM

## 2022-01-11 PROCEDURE — 99999 PR PBB SHADOW E&M-EST. PATIENT-LVL III: ICD-10-PCS | Mod: PBBFAC,,, | Performed by: STUDENT IN AN ORGANIZED HEALTH CARE EDUCATION/TRAINING PROGRAM

## 2022-01-11 PROCEDURE — 77067 SCR MAMMO BI INCL CAD: CPT | Mod: 26,,, | Performed by: RADIOLOGY

## 2022-01-11 PROCEDURE — 99999 PR PBB SHADOW E&M-EST. PATIENT-LVL III: CPT | Mod: PBBFAC,,, | Performed by: STUDENT IN AN ORGANIZED HEALTH CARE EDUCATION/TRAINING PROGRAM

## 2022-01-11 PROCEDURE — 77067 SCR MAMMO BI INCL CAD: CPT | Mod: TC

## 2022-01-11 PROCEDURE — 77063 BREAST TOMOSYNTHESIS BI: CPT | Mod: 26,,, | Performed by: RADIOLOGY

## 2022-01-11 PROCEDURE — 77063 MAMMO DIGITAL SCREENING BILAT WITH TOMO: ICD-10-PCS | Mod: 26,,, | Performed by: RADIOLOGY

## 2022-01-11 PROCEDURE — 77067 MAMMO DIGITAL SCREENING BILAT WITH TOMO: ICD-10-PCS | Mod: 26,,, | Performed by: RADIOLOGY

## 2022-01-11 PROCEDURE — 99203 PR OFFICE/OUTPT VISIT, NEW, LEVL III, 30-44 MIN: ICD-10-PCS | Mod: S$GLB,,, | Performed by: STUDENT IN AN ORGANIZED HEALTH CARE EDUCATION/TRAINING PROGRAM

## 2022-01-11 RX ORDER — NIFEDIPINE 60 MG/1
60 TABLET, EXTENDED RELEASE ORAL DAILY
Qty: 30 TABLET | Refills: 11 | Status: SHIPPED | OUTPATIENT
Start: 2022-01-11 | End: 2022-02-21

## 2022-01-11 RX ORDER — SIMVASTATIN 10 MG/1
10 TABLET, FILM COATED ORAL NIGHTLY
Qty: 90 TABLET | Refills: 3 | Status: SHIPPED | OUTPATIENT
Start: 2022-01-11 | End: 2022-02-21

## 2022-01-13 NOTE — PROGRESS NOTES
Clinic Note  1/11/2022      Subjective:       Patient ID:  Martin is a 71 y.o. female being seen for an established visit.    Chief Complaint: Establish Care    HPI  Matt is a pleasant 71 y.o female with h/o HTN, HLD who presents to the office to establish care. She reports her pcp is Dr. Ricketts however he has recently retired and she is looking for a new PCP.   HTN:  She has been taking  Cardizem 240 mg , irbesartan 300 mg,  Doxazosin 2 mg and Hydrodiuiril 25 mg Prn for the past several months. Recently she has been noncompliant with cardizam and occasionally irbesartan and therefore her BP has been elevated during recent office visits. Reports not taking the medication as she does not have enough.  Denies cp/chest discomfort, palpitations, lightheadedness, syncope, n/v.    HLD:  On Zocor 10 mg qhs which she has been noncompliant with    Pt is a former smoker however quit 30 yrs ago. Drinks alcohol occasionally and denies illicit drug use.         Review of Systems   Constitutional: Negative for chills, fever and weight loss.   HENT: Negative.    Eyes: Negative for blurred vision.   Respiratory: Negative for cough and shortness of breath.    Cardiovascular: Negative for chest pain and palpitations.   Gastrointestinal: Negative for abdominal pain, blood in stool, constipation, diarrhea, melena, nausea and vomiting.   Musculoskeletal: Negative for myalgias.   Skin: Negative.    Neurological: Negative for dizziness, loss of consciousness and weakness.       Past Medical History:   Diagnosis Date    Arthritis     Hyperlipidemia     Hypertension        Family History   Problem Relation Age of Onset    Colon cancer Mother 55    Ovarian cancer Mother 35    Breast cancer Neg Hx         reports that she quit smoking about 32 years ago. She has a 33.00 pack-year smoking history. She has never used smokeless tobacco. She reports current alcohol use. She reports that she does not use drugs.    Medication List with  Changes/Refills   New Medications    NIFEDIPINE (PROCARDIA-XL) 60 MG (OSM) 24 HR TABLET    Take 1 tablet (60 mg total) by mouth once daily.   Current Medications    ACETAMINOPHEN (TYLENOL) 650 MG TBSR    Take 1 tablet (650 mg total) by mouth every 8 (eight) hours as needed.    AMOXICILLIN (AMOXIL) 500 MG CAPSULE    TAKE 4 CAPSULES BY MOUTH 1 HOUR BEFORE VISIT    CALCIUM CARBONATE/VITAMIN D3 (CALTRATE 600 + D ORAL)    Take 1 tablet by mouth Daily.    CHOLECALCIFEROL, VITAMIN D3, (VITAMIN D3) 1,000 UNIT CAPSULE    Take 1,000 Units by mouth once daily.    DOCUSATE SODIUM (COLACE) 100 MG CAPSULE    Take 1 capsule (100 mg total) by mouth 2 (two) times daily as needed for Constipation.    DOXAZOSIN (CARDURA) 2 MG TABLET    Take 2 mg by mouth every morning.     FLUAD 4652-2935, 65 YR UP,,PF, 45 MCG (15 MCG X 3)/0.5 ML SYRG        FOLIC ACID/MULTIVIT-MIN/LUTEIN (CENTRUM SILVER ORAL)    Take 1 tablet by mouth Daily.    HYDROCHLOROTHIAZIDE (HYDRODIURIL) 25 MG TABLET    Take 25 mg by mouth daily as needed.    IRBESARTAN (AVAPRO) 150 MG TABLET    Take 300 mg by mouth once daily.    PICATO 0.015 % GEL        RETIN-A 0.05 % CREAM        TOBRAMYCIN-DEXAMETHASONE 0.3-0.1% (TOBRADEX) 0.3-0.1 % DRPS    INSTILL 1 DROP IN THE RIGHT EYE FOUR TIMES DAILY FOR 7 DAYS ONLY   Changed and/or Refilled Medications    Modified Medication Previous Medication    SIMVASTATIN (ZOCOR) 10 MG TABLET simvastatin (ZOCOR) 10 MG tablet       Take 1 tablet (10 mg total) by mouth every evening.    Take 10 mg by mouth every evening.   Discontinued Medications    ASPIRIN (ECOTRIN) 81 MG EC TABLET    Take 1 tablet (81 mg total) by mouth 2 (two) times daily.    BETA-CAROTENE,A,-VITS C,E/MINS (OCUVITE ORAL)    Take 1 tablet by mouth Daily.    CELECOXIB (CELEBREX) 200 MG CAPSULE    Take 1 capsule (200 mg total) by mouth once daily.    DILTIAZEM (CARDIZEM CD) 240 MG 24 HR CAPSULE    Take 240 mg by mouth every morning.     SULFAMETHOXAZOLE-TRIMETHOPRIM 800-160MG  "(BACTRIM DS) 800-160 MG TAB    Take 1 tablet by mouth 2 (two) times daily.     Review of patient's allergies indicates:  No Known Allergies    Patient Active Problem List   Diagnosis    Essential hypertension    HLD (hyperlipidemia)    Edema    Macrocytosis without anemia    Status post total right knee replacement 10/7/2019    S/P total knee replacement using cement, right    Muscle weakness of lower extremity           Objective:      BP (!) 156/66 (BP Location: Right arm, Patient Position: Sitting, BP Method: Large (Manual))   Pulse 76   Ht 5' 5" (1.651 m)   Wt 87.4 kg (192 lb 10.9 oz)   SpO2 98%   BMI 32.06 kg/m²   Estimated body mass index is 32.06 kg/m² as calculated from the following:    Height as of this encounter: 5' 5" (1.651 m).    Weight as of this encounter: 87.4 kg (192 lb 10.9 oz).  Physical Exam  Constitutional:       General: She is not in acute distress.     Appearance: She is not diaphoretic.   HENT:      Head: Normocephalic and atraumatic.      Mouth/Throat:      Pharynx: No oropharyngeal exudate.   Eyes:      General: No scleral icterus.     Extraocular Movements: EOM normal.      Conjunctiva/sclera: Conjunctivae normal.      Pupils: Pupils are equal, round, and reactive to light.   Neck:      Vascular: No JVD.      Trachea: No tracheal deviation.   Cardiovascular:      Rate and Rhythm: Normal rate and regular rhythm.      Pulses: Intact distal pulses.      Heart sounds: No murmur heard.  No friction rub. No gallop.    Pulmonary:      Effort: Pulmonary effort is normal. No respiratory distress.      Breath sounds: Normal breath sounds. No wheezing.   Chest:      Chest wall: No tenderness.   Abdominal:      General: Bowel sounds are normal. There is no distension.      Tenderness: There is no abdominal tenderness. There is no rebound.   Musculoskeletal:         General: No deformity or edema. Normal range of motion.      Cervical back: Normal range of motion and neck supple. "   Lymphadenopathy:      Cervical: No cervical adenopathy.   Skin:     General: Skin is warm and dry.   Neurological:      Mental Status: She is alert and oriented to person, place, and time.      Cranial Nerves: No cranial nerve deficit.   Psychiatric:         Mood and Affect: Affect normal.           Assessment and Plan:         Maria Elena was seen today for establish care.    Hypertension, unspecified type  Home medications include: Cardizem 240 mg , irbesartan 300 mg,  Doxazosin 2 mg and Hydrodiuiril 25 mg  Prn.   Will change cardizem to Procardia   Recommended keeping a Log of Bps for next 2 weeks  Will revisit in office in 4 weeks for follow up  Plan to change Doxazosin in future visit  -     NIFEdipine (PROCARDIA-XL) 60 MG (OSM) 24 hr tablet; Take 1 tablet (60 mg total) by mouth once daily.    Hyperlipidemia, unspecified hyperlipidemia type  -     simvastatin (ZOCOR) 10 MG tablet; Take 1 tablet (10 mg total) by mouth every evening.    Annual physical exam  -     CBC W/ AUTO DIFFERENTIAL; Future  -     COMPREHENSIVE METABOLIC PANEL; Future  -     Lipid Panel; Future  -     Case Request Endoscopy: COLONOSCOPY  -     Cancel: Mammo Digital Screening Bilat; Future  -     Hepatitis C Antibody; Future  -     Hemoglobin A1C; Future  -     CV AAA Screening; Future  -     Mammo Digital Screening Bilat w/ Shahriar; Future        Follow up in about 4 weeks (around 2/8/2022).    Other Orders Placed This Visit:  Orders Placed This Encounter   Procedures    Mammo Digital Screening Bilat w/ Shahriar    CBC W/ AUTO DIFFERENTIAL    COMPREHENSIVE METABOLIC PANEL    Lipid Panel    Hepatitis C Antibody    Hemoglobin A1C    CV AAA Screening         Julia Dinh I have discussed A/P with Dr Dumont and agree with plan of action.  Asael Arias.

## 2022-02-03 NOTE — PROGRESS NOTES
CHIEF COMPLAINT:  Annual physical examination    HISTORY OF PRESENT ILLNESS:  This is a 50 year old female who is here today for a general preventative exam.    She has the following concerns: None. She did have her hemoglobin A1c drawn this morning which is pending.    Past Medical History:   Diagnosis Date   • Allergic rhinitis    • Depressive disorder    • Diabetes mellitus (CMS/HCC)    • Essential (primary) hypertension    • Mixed hyperlipidemia          Past Surgical History:   Procedure Laterality Date   • Cholecystectomy     • Knee arthroplasty      1985 & 1987   • Total knee replacement  05/23/2018   • Tubal ligation          Family History   Problem Relation Age of Onset   • Asthma Mother    • Colon Polyps Mother    • Atrial Fibrilliation Mother    • High blood pressure Father    • High cholesterol Father    • High cholesterol Brother    • Atrial Fibrilliation Maternal Grandmother    • Heart disease Paternal Grandmother    • Hypertension Paternal Grandmother    • Hyperlipidemia Paternal Grandmother         Social History     Socioeconomic History   • Marital status: /Civil Union     Spouse name: Not on file   • Number of children: 4   • Years of education: Not on file   • Highest education level: Not on file   Social Needs   • Financial resource strain: Not on file   • Food insecurity - worry: Not on file   • Food insecurity - inability: Not on file   • Transportation needs - medical: Not on file   • Transportation needs - non-medical: Not on file   Occupational History   • Not on file   Tobacco Use   • Smoking status: Never Smoker   • Smokeless tobacco: Never Used   Substance and Sexual Activity   • Alcohol use: Yes     Alcohol/week: 0.6 - 1.2 oz     Types: 1 - 2 Glasses of wine per week     Comment: 1 glass weekly   • Drug use: No   • Sexual activity: Yes     Partners: Male   Other Topics Concern   • Not on file   Social History Narrative   • Not on file        Current Outpatient Medications  Preceptor note    Patient's history and physical discussed, please refer to resident physician's note for specific details.  I agree with resident's assessment and plan.       Medication Sig Dispense Refill   • FLUoxetine (PROZAC) 20 MG capsule Take 1 capsule by mouth daily. 90 capsule 3   • simvastatin (ZOCOR) 40 MG tablet Take 1 tablet by mouth nightly. 90 tablet 1   • metformin (GLUCOPHAGE) 1000 MG tablet Take 1 tablet by mouth 2 times daily. Indications: type 2 diabetes 180 tablet 1   • ferrous sulfate 325 (65 FE) MG tablet Take 1 tablet by mouth daily (with breakfast). 30 tablet 0   • aspirin 81 MG tablet Take 81 mg by mouth daily.     • Iron-Vitamins (GERITOL COMPLETE PO) Take 1 tablet by mouth daily. Iron- B complex- vitamin C     • cetirizine (ZYRTEC) 10 MG tablet Take 10 mg by mouth daily. Allergies     • Omega-3 Fatty Acids (FISH OIL) 1200 MG capsule Take 1,200 mg by mouth 2 times daily.     • calcium carbonate-vitamin D (CALTRATE+D) 600-400 MG-UNIT per tablet Take 1 tablet by mouth daily.     • Glucosamine-Chondroitin 500-400 MG Cap Take 1 capsule by mouth 3 times daily.     • ciclopirox (PENLAC) 8 % topical solution Apply topically nightly. 6.6 mL 1   • losartan (COZAAR) 50 MG tablet Take 1 tablet by mouth daily. 90 tablet 1   • furosemide (LASIX) 20 MG tablet 1/2 tablet as needed daily for wt gain 3-5 lbs 45 tablet 1     No current facility-administered medications for this visit.          REVIEW OF SYSTEMS:    CONSTITUTIONAL:  No fever, weight loss, change in appetite.  HEENT:  No visual changes or eye pain.  No hearing loss, ear pain, sore throat, nasal congestion, or hoarseness.   HEART:  Patient denies any exertional chest pain, diaphoresis, or  dyspnea, no palpitations, syncope, or edema.  PULMONARY:  Patient denies any cough, wheezing, dyspnea, or hemoptysis.  BREASTS:  No breast lumps, nipple discharge, lumps under arms.  Last mammogram was August 2018.  GASTROINTESTINAL:  No nausea, vomiting, abdominal pain, change in bowel habits, chronic constipation, diarrhea, blood in stools or black stools.  GENITAL: Patient is postmenopausal.   Last Pap was February 2018.   Denies any abnormal vaginal bleeding, discharge or pelvic pain.     URINARY:  The patient denies any dysuria, frequency, hematuria or incontinence.  SKIN:  Denies any rashes, unusual or changing skin lesions.  NEUROLOGICAL:  Denies any unusual headaches, numbness, tingling,  weakness in extremities or dizziness.  PSYCHIATRIC:  No depression, anxiety, insomnia or mood changes.   ENDOCRINE:  No polyuria, polydipsia, heat or cold intolerance.  HEMATOLOGICAL:  No easy bruising or unusual bleeding.    MUSCULOSKELETAL:  No joint pain, redness or swelling.      PHYSICAL EXAMINATION:    GENERAL:  This is an alert female in no acute distress.  Visit Vitals  /78 (BP Location: [facility] [place], Patient Position: Sitting, Cuff Size: Large Adult)   Pulse 82   Temp 97 °F (36.1 °C) (Tympanic)   Ht 5' 7\" (1.702 m)   Wt 105.7 kg   LMP 02/04/2019   SpO2 98%   BMI 36.49 kg/m²      HEENT: Normocephalic.  Normal conjunctivae and lids.  Pupils are equally round and reactive to light.  Extraocular eye movements are intact.  Tympanic membranes and canals are clear. Nares patent, no drainage.  Throat  clear, mucous membranes are moist.    NECK:  Supple, trachea midline, no thyromegaly, no lymphadenopathy, no supraclavicular adenopathy.    LUNGS:  Clear to auscultation, no wheezes, rales or rhonchi. Normal respiratory effort.   HEART: Regular rate and rhythm.  No murmurs.  BREASTS:  Soft, no palpable masses, no nipple discharge, no axillary adenopathy, no skin dimpling.  ABDOMEN:   Abdomen is soft, without tenderness, guarding, rebound, mass or hepatosplenomegaly.  Bowel sounds are normal.  No  groin lymphadenopathy is noted.   GENITOURINARY:  Vulva clear, no lesions.  Vagina: clear discharge.  Uterus is mobile, nontender.  No adnexal tenderness or masses noted.   RECTAL:  Deferred   MUSCULOSKELETAL:  Normal gait.  Moves all four extremities with normal strength. No joint redness or swelling. No edema.  SKIN:  Warm and dry.  Nailbeds pink and  capillary refill below 3 seconds.  No suspicious lesions or rashes.    NEUROLOGICAL:  Alert and oriented to time, place and person.  Answers all questions appropriately and follows all commands correctly.  Demonstrates good short and long term memory through answers to health history interview.  Normal affect.    ASSESSMENT:  Preventative physical   Patient Active Problem List   Diagnosis   • Type 2 diabetes mellitus without complication (CMS/HCC)   • Benign essential hypertension   • Hyperlipidemia, mixed   • Allergic rhinitis   • Premenstrual tension syndrome   • Anxiety         Gabby was seen today for physical.    Diagnoses and all orders for this visit:    Annual physical exam    Need for vaccination  -     INFLUENZA QUADRIVALENT SPLIT PRES FREE 0.5 ML VACC, IM (FLULAVAL,FLUARIX,FLUZONE 3 YRS +)  -     PNEUMOCOCCAL POLYSACCHARIDE ADULT VACC, IM/SQ (PNEUMOVAX 23)    Onychomycosis of toenail  -     ciclopirox (PENLAC) 8 % topical solution; Apply topically nightly.    Type 2 diabetes mellitus without complication, without long-term current use of insulin (CMS/HCC)    Hyperlipidemia, mixed  -     simvastatin (ZOCOR) 40 MG tablet; Take 1 tablet by mouth nightly.    Benign essential hypertension  -     losartan (COZAAR) 50 MG tablet; Take 1 tablet by mouth daily.    Anxiety  -     FLUoxetine (PROZAC) 20 MG capsule; Take 1 capsule by mouth daily.        Patient will be notified of all test results.  1. Type 2 diabetes: We will await the results of her hemoglobin A1 C before refilling her metformin. She will return back for follow-up in 6 months.    2. Hypertension: Patient will continue on Cozaar 50 mg daily. We will repeat yearly labs in August which will include hemoglobin A1 C, complete metabolic panel, lipids and a urine micral. She will continue on her dose of Lasix p.r.n.    3. Hyperlipidemia: Patient will continue on simvastatin 40 mg q.d.    4. Depression: Patient will continue on fluoxetine 20 mg daily.  All  questions answered and patient is agreeable to this plan.  Refer to After Visit Summary.    VICKY ConnellyC    Supervising Physician:  Dr. Nelson Tamayo

## 2022-02-10 ENCOUNTER — OFFICE VISIT (OUTPATIENT)
Dept: OBSTETRICS AND GYNECOLOGY | Facility: CLINIC | Age: 72
End: 2022-02-10
Payer: OTHER GOVERNMENT

## 2022-02-10 VITALS
BODY MASS INDEX: 32.03 KG/M2 | SYSTOLIC BLOOD PRESSURE: 124 MMHG | WEIGHT: 192.25 LBS | DIASTOLIC BLOOD PRESSURE: 80 MMHG | HEIGHT: 65 IN

## 2022-02-10 DIAGNOSIS — R39.9 UTI SYMPTOMS: Primary | ICD-10-CM

## 2022-02-10 LAB
BILIRUB SERPL-MCNC: ABNORMAL MG/DL
BLOOD URINE, POC: 50
CLARITY, POC UA: CLEAR
COLOR, POC UA: YELLOW
GLUCOSE UR QL STRIP: NORMAL
KETONES UR QL STRIP: ABNORMAL
LEUKOCYTE ESTERASE URINE, POC: ABNORMAL
NITRITE, POC UA: ABNORMAL
PH, POC UA: 7
PROTEIN, POC: ABNORMAL
SPECIFIC GRAVITY, POC UA: 1
UROBILINOGEN, POC UA: NORMAL

## 2022-02-10 PROCEDURE — 81002 POCT URINE DIPSTICK WITHOUT MICROSCOPE: ICD-10-PCS | Mod: S$GLB,,, | Performed by: PHYSICIAN ASSISTANT

## 2022-02-10 PROCEDURE — 99214 PR OFFICE/OUTPT VISIT, EST, LEVL IV, 30-39 MIN: ICD-10-PCS | Mod: S$GLB,,, | Performed by: PHYSICIAN ASSISTANT

## 2022-02-10 PROCEDURE — 99999 PR PBB SHADOW E&M-EST. PATIENT-LVL III: ICD-10-PCS | Mod: PBBFAC,,, | Performed by: PHYSICIAN ASSISTANT

## 2022-02-10 PROCEDURE — 81002 URINALYSIS NONAUTO W/O SCOPE: CPT | Mod: S$GLB,,, | Performed by: PHYSICIAN ASSISTANT

## 2022-02-10 PROCEDURE — 99999 PR PBB SHADOW E&M-EST. PATIENT-LVL III: CPT | Mod: PBBFAC,,, | Performed by: PHYSICIAN ASSISTANT

## 2022-02-10 PROCEDURE — 87086 URINE CULTURE/COLONY COUNT: CPT | Performed by: PHYSICIAN ASSISTANT

## 2022-02-10 PROCEDURE — 99214 OFFICE O/P EST MOD 30 MIN: CPT | Mod: S$GLB,,, | Performed by: PHYSICIAN ASSISTANT

## 2022-02-10 RX ORDER — CEPHALEXIN 500 MG/1
500 CAPSULE ORAL EVERY 8 HOURS
Qty: 21 CAPSULE | Refills: 0 | Status: SHIPPED | OUTPATIENT
Start: 2022-02-10 | End: 2022-02-17

## 2022-02-10 RX ORDER — TRIAMCINOLONE ACETONIDE 1 MG/G
OINTMENT TOPICAL
COMMUNITY
Start: 2022-02-08 | End: 2023-02-28

## 2022-02-10 NOTE — PROGRESS NOTES
Subjective:       Patient ID: Maria Elena Jackson is a 71 y.o. female.    Chief Complaint:  Vaginal Bleeding (With pressure- states she may have UTI)      History of Present Illness     71 y.o. woman presents with complaints of possible vaginal bleeding versus blood in her urine. She reports pink on the toilet paper when she would wipe on Friday. 2 days later, patient noticed pelvic pressure that was relieved with advil. She states pelvic pressure returned yesterday. She has not had any bleeding besides seeing pink on her toilet paper. Pt denies burning with urination, frequency, urgency, fever, or back pain. She is not sexually active. Age at menopause was 48. She reports having a UTI in the past with similar symptoms.        GYN & OB History  No LMP recorded. Patient is postmenopausal.   Date of Last Pap: No result found    OB History    Para Term  AB Living   2 2 0     2   SAB IAB Ectopic Multiple Live Births           2      # Outcome Date GA Lbr Edison/2nd Weight Sex Delivery Anes PTL Lv   2 Para      Vag-Spont   SABINA   1 Para      Vag-Spont   SABINA       Review of Systems  Review of Systems   Constitutional: Negative for fever.   Cardiovascular: Negative for chest pain.   Gastrointestinal: Negative for abdominal pain, diarrhea, nausea and vomiting.   Genitourinary: Positive for hematuria and pelvic pain (pressure). Negative for bladder incontinence, dysmenorrhea, dysuria, flank pain, frequency, genital sores, hot flashes, menorrhagia, urgency, vaginal bleeding, vaginal discharge, vaginal pain, urinary incontinence, vaginal dryness and vaginal odor.   Integumentary:  Negative for rash.   Neurological: Negative for headaches.           Objective:    Physical Exam:   Constitutional: She appears well-developed and well-nourished. She is cooperative. She does not appear ill. No distress.    HENT:   Head: Normocephalic and atraumatic.    Eyes: Conjunctivae and EOM are normal.    Neck: No  thyromegaly present.     Pulmonary/Chest: Effort normal. No respiratory distress.        Abdominal: Soft. She exhibits no distension. There is no abdominal tenderness. There is no guarding. Hernia confirmed negative in the right inguinal area and confirmed negative in the left inguinal area.     Genitourinary:    Inguinal canal, vagina, uterus, right adnexa and left adnexa normal.            Pelvic exam was performed with patient supine.   The external female genitalia was normal.   There is no rash or tenderness on the right labia. There is no rash or tenderness on the left labia. Cervix is normal. Right adnexum displays no tenderness. Left adnexum displays no tenderness. No erythema,  no vaginal discharge, tenderness or bleeding in the vagina.    No foreign body in the vagina.   Cervix exhibits no motion tenderness and no discharge. Uterus is not enlarged and not tender. Normal urethral meatus.              Neurological: She is alert.    Skin: Skin is warm and dry. No rash noted.    Psychiatric: Her speech is normal.          Assessment:        1. UTI symptoms       Office Visit on 02/10/2022   Component Date Value Ref Range Status    Color, UA 02/10/2022 Yellow   Final    pH, UA 02/10/2022 7   Final    WBC, UA 02/10/2022 NEG   Final    Nitrite, UA 02/10/2022 NEG   Final    Protein, POC 02/10/2022 NEG   Final    Glucose, UA 02/10/2022 NORMAL   Final    Ketones, UA 02/10/2022 NEG   Final    Urobilinogen, UA 02/10/2022 NORMAL   Final    Bilirubin, POC 02/10/2022 NEG   Final    Blood, UA 02/10/2022 50   Final    Clarity, UA 02/10/2022 Clear   Final    Spec Grav UA 02/10/2022 1.000   Final             Plan:      UTI symptoms  -     POCT urine dipstick without microscope  -     Urine culture  -     cephALEXin (KEFLEX) 500 MG capsule; Take 1 capsule (500 mg total) by mouth every 8 (eight) hours. for 7 days  Dispense: 21 capsule; Refill: 0      -Dipstick positive for blood. Will send for culture.   -Sending  urine for culture.   -No blood visualized on vaginal exam.    -Sending antibiotic. Awaiting culture results.    Veronique Stevens PA-C

## 2022-02-11 LAB — BACTERIA UR CULT: NO GROWTH

## 2022-02-16 ENCOUNTER — PATIENT MESSAGE (OUTPATIENT)
Dept: RESEARCH | Facility: HOSPITAL | Age: 72
End: 2022-02-16
Payer: OTHER GOVERNMENT

## 2022-02-18 ENCOUNTER — PATIENT MESSAGE (OUTPATIENT)
Dept: INTERNAL MEDICINE | Facility: CLINIC | Age: 72
End: 2022-02-18
Payer: OTHER GOVERNMENT

## 2022-02-18 NOTE — TELEPHONE ENCOUNTER
Hi, please offer her an appt to see back Julia Dinh MD next week. I emailed her back about her symptoms already and advised she see Dr Julia Dinh MD next week.  Thank you, Alfred Olguin

## 2022-02-21 ENCOUNTER — OFFICE VISIT (OUTPATIENT)
Dept: INTERNAL MEDICINE | Facility: CLINIC | Age: 72
End: 2022-02-21
Payer: OTHER GOVERNMENT

## 2022-02-21 VITALS
HEIGHT: 65 IN | WEIGHT: 191.13 LBS | OXYGEN SATURATION: 98 % | SYSTOLIC BLOOD PRESSURE: 138 MMHG | DIASTOLIC BLOOD PRESSURE: 76 MMHG | BODY MASS INDEX: 31.85 KG/M2 | HEART RATE: 60 BPM

## 2022-02-21 DIAGNOSIS — R60.9 EDEMA, UNSPECIFIED TYPE: ICD-10-CM

## 2022-02-21 DIAGNOSIS — E78.5 HYPERLIPIDEMIA, UNSPECIFIED HYPERLIPIDEMIA TYPE: ICD-10-CM

## 2022-02-21 DIAGNOSIS — I10 HYPERTENSION, UNSPECIFIED TYPE: Primary | ICD-10-CM

## 2022-02-21 PROCEDURE — 99213 PR OFFICE/OUTPT VISIT, EST, LEVL III, 20-29 MIN: ICD-10-PCS | Mod: S$GLB,,, | Performed by: STUDENT IN AN ORGANIZED HEALTH CARE EDUCATION/TRAINING PROGRAM

## 2022-02-21 PROCEDURE — 99999 PR PBB SHADOW E&M-EST. PATIENT-LVL IV: ICD-10-PCS | Mod: PBBFAC,,, | Performed by: STUDENT IN AN ORGANIZED HEALTH CARE EDUCATION/TRAINING PROGRAM

## 2022-02-21 PROCEDURE — 99213 OFFICE O/P EST LOW 20 MIN: CPT | Mod: S$GLB,,, | Performed by: STUDENT IN AN ORGANIZED HEALTH CARE EDUCATION/TRAINING PROGRAM

## 2022-02-21 PROCEDURE — 99999 PR PBB SHADOW E&M-EST. PATIENT-LVL IV: CPT | Mod: PBBFAC,,, | Performed by: STUDENT IN AN ORGANIZED HEALTH CARE EDUCATION/TRAINING PROGRAM

## 2022-02-21 RX ORDER — ATORVASTATIN CALCIUM 40 MG/1
40 TABLET, FILM COATED ORAL NIGHTLY
Qty: 90 TABLET | Refills: 3 | Status: SHIPPED | OUTPATIENT
Start: 2022-02-21 | End: 2022-08-15 | Stop reason: SDUPTHER

## 2022-02-21 RX ORDER — CARVEDILOL 12.5 MG/1
12.5 TABLET ORAL 2 TIMES DAILY WITH MEALS
Qty: 90 TABLET | Refills: 3 | Status: SHIPPED | OUTPATIENT
Start: 2022-02-21 | End: 2022-08-15 | Stop reason: SDUPTHER

## 2022-02-21 NOTE — PROGRESS NOTES
Clinic Note  2/22/2022      Subjective:       Patient ID:  Martin is a 71 y.o. female being seen for an established visit.    Chief Complaint: Follow-up    HPI  Pt is a pleasant 71 y.o female with h/o htn, hld, who presents to the office today for Lower extremity edema. Last office visit was on 1/11/22 where she presented to establish care. At the time her ccb was changed to procardia. Since then she developed lower extremity edema to the level of her ankles. Edema resolved once stopping the medication. Denies any sob/austin, cp/chest discomfort, palpitations, n/v, syncope, lightheadedness, dizziness.    Review of Systems   Constitutional: Negative for chills, fever and weight loss.   HENT: Negative.    Eyes: Negative for blurred vision.   Respiratory: Negative for cough and shortness of breath.    Cardiovascular: Negative for chest pain and palpitations.   Gastrointestinal: Negative for abdominal pain, blood in stool, constipation, diarrhea, melena, nausea and vomiting.   Musculoskeletal: Negative for myalgias.   Skin: Negative.    Neurological: Negative for dizziness, loss of consciousness and weakness.       Past Medical History:   Diagnosis Date    Arthritis     Hyperlipidemia     Hypertension        Family History   Problem Relation Age of Onset    Colon cancer Mother 55    Ovarian cancer Mother 35    Breast cancer Neg Hx         reports that she quit smoking about 32 years ago. She has a 33.00 pack-year smoking history. She has never used smokeless tobacco. She reports current alcohol use. She reports that she does not use drugs.    Medication List with Changes/Refills   New Medications    ATORVASTATIN (LIPITOR) 40 MG TABLET    Take 1 tablet (40 mg total) by mouth every evening.    CARVEDILOL (COREG) 12.5 MG TABLET    Take 1 tablet (12.5 mg total) by mouth 2 (two) times daily with meals.   Current Medications    ACETAMINOPHEN (TYLENOL) 650 MG TBSR    Take 1 tablet (650 mg total) by mouth every 8 (eight)  "hours as needed.    AMOXICILLIN (AMOXIL) 500 MG CAPSULE    TAKE 4 CAPSULES BY MOUTH 1 HOUR BEFORE VISIT    CALCIUM CARBONATE/VITAMIN D3 (CALTRATE 600 + D ORAL)    Take 1 tablet by mouth Daily.    CHOLECALCIFEROL, VITAMIN D3, (VITAMIN D3) 25 MCG (1,000 UNIT) CAPSULE    Take 1,000 Units by mouth once daily.    DOXAZOSIN (CARDURA) 2 MG TABLET    Take 2 mg by mouth every morning.     FLUAD [id number], 65 YR UP,,PF, 45 MCG (15 MCG X 3)/0.5 ML SYRG        HYDROCHLOROTHIAZIDE (HYDRODIURIL) 25 MG TABLET    Take 25 mg by mouth daily as needed.    IRBESARTAN (AVAPRO) 150 MG TABLET    Take 300 mg by mouth once daily.    PICATO 0.015 % GEL        RETIN-A 0.05 % CREAM        TOBRAMYCIN-DEXAMETHASONE 0.3-0.1% (TOBRADEX) 0.3-0.1 % DRPS    INSTILL 1 DROP IN THE RIGHT EYE FOUR TIMES DAILY FOR 7 DAYS ONLY    TRIAMCINOLONE ACETONIDE 0.1% (KENALOG) 0.1 % OINTMENT    Apply topically.   Discontinued Medications    DOCUSATE SODIUM (COLACE) 100 MG CAPSULE    Take 1 capsule (100 mg total) by mouth 2 (two) times daily as needed for Constipation.    FOLIC ACID/MULTIVIT-MIN/LUTEIN (CENTRUM SILVER ORAL)    Take 1 tablet by mouth Daily.    NIFEDIPINE (PROCARDIA-XL) 60 MG (OSM) 24 HR TABLET    Take 1 tablet (60 mg total) by mouth once daily.    SIMVASTATIN (ZOCOR) 10 MG TABLET    Take 1 tablet (10 mg total) by mouth every evening.     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    Weight as of this encounter: 86.7 kg (191 lb 2.2 oz).  Physical Exam  Constitutional:       General: She is not in acute distress.     Appearance: She is not diaphoretic.   HENT:      Head: Normocephalic and atraumatic.      Mouth/Throat:      Pharynx: No oropharyngeal exudate.   Eyes:      General: No scleral icterus.     Conjunctiva/sclera: Conjunctivae normal.      Pupils: Pupils are equal, round, and reactive to light.   Neck:      Vascular: No JVD.      Trachea: No tracheal deviation.   Cardiovascular:      Rate and Rhythm: Normal rate and regular rhythm.      Heart sounds: No murmur heard.    No friction rub. No gallop.   Pulmonary:      Effort: Pulmonary effort is normal. No respiratory distress.      Breath sounds: Normal breath sounds. No wheezing.   Chest:      Chest wall: No tenderness.   Abdominal:      General: Bowel sounds are normal. There is no distension.      Tenderness: There is no abdominal tenderness. There is no rebound.   Musculoskeletal:         General: No deformity. Normal range of motion.      Cervical back: Normal range of motion and neck supple.   Lymphadenopathy:      Cervical: No cervical adenopathy.   Skin:     General: Skin is warm and dry.   Neurological:      Mental Status: She is alert and oriented to person, place, and time.      Cranial Nerves: No cranial nerve deficit.   Psychiatric:         Mood and Affect: Affect normal.           Assessment and Plan:         Maria Elena was seen today lower extremity edema    Diagnoses and all orders for this visit:    Edema, unspecified type,  Resolved  Likely 2/2 change of medication to procardia  Have changed procardia to coreg 12.5 mg  Pt to cont taking Irbesartan, prn hctz, and Doxazosin (prior home medication) for the time being and obtain BP long  Next office visit will likely discontinue Doxazosin and replace with a combined medication (irbesartan-hctz)    Hyperlipidemia, " unspecified hyperlipidemia type  -     atorvastatin (LIPITOR) 40 MG tablet; Take 1 tablet (40 mg total) by mouth every evening.  -     COMPREHENSIVE METABOLIC PANEL; Future        No follow-ups on file.    Other Orders Placed This Visit:  Orders Placed This Encounter   Procedures    COMPREHENSIVE METABOLIC PANEL         Julia Dinh

## 2022-02-23 NOTE — PROGRESS NOTES
I have personally discussed  this patient and agree with the resident, and agree with  their note as stated above with the following thoughts:    Will get her off calcium channel blocker and start coreg.  Monitor BP and follow upin couple months

## 2022-03-22 ENCOUNTER — HOSPITAL ENCOUNTER (OUTPATIENT)
Dept: RADIOLOGY | Facility: HOSPITAL | Age: 72
Discharge: HOME | End: 2022-03-22
Attending: PHYSICIAN ASSISTANT
Payer: OTHER GOVERNMENT

## 2022-03-22 ENCOUNTER — OFFICE VISIT (OUTPATIENT)
Dept: ORTHOPEDICS | Facility: CLINIC | Age: 72
End: 2022-03-22
Payer: OTHER GOVERNMENT

## 2022-03-22 VITALS
BODY MASS INDEX: 31.44 KG/M2 | DIASTOLIC BLOOD PRESSURE: 78 MMHG | SYSTOLIC BLOOD PRESSURE: 190 MMHG | HEIGHT: 65 IN | HEART RATE: 60 BPM | WEIGHT: 188.69 LBS

## 2022-03-22 DIAGNOSIS — G89.29 CHRONIC PAIN OF RIGHT THUMB: ICD-10-CM

## 2022-03-22 DIAGNOSIS — M79.641 PAIN OF RIGHT HAND: ICD-10-CM

## 2022-03-22 DIAGNOSIS — M79.644 CHRONIC PAIN OF RIGHT THUMB: ICD-10-CM

## 2022-03-22 DIAGNOSIS — M18.11 PRIMARY OSTEOARTHRITIS OF FIRST CARPOMETACARPAL JOINT OF RIGHT HAND: Primary | ICD-10-CM

## 2022-03-22 PROCEDURE — 20600 DRAIN/INJ JOINT/BURSA W/O US: CPT | Mod: RT,S$GLB,, | Performed by: PHYSICIAN ASSISTANT

## 2022-03-22 PROCEDURE — 99999 PR PBB SHADOW E&M-EST. PATIENT-LVL IV: CPT | Mod: PBBFAC,,, | Performed by: PHYSICIAN ASSISTANT

## 2022-03-22 PROCEDURE — 73130 X-RAY EXAM OF HAND: CPT | Mod: TC,RT

## 2022-03-22 PROCEDURE — 99213 PR OFFICE/OUTPT VISIT, EST, LEVL III, 20-29 MIN: ICD-10-PCS | Mod: 25,S$GLB,, | Performed by: PHYSICIAN ASSISTANT

## 2022-03-22 PROCEDURE — 73130 XR HAND COMPLETE 3 VIEW RIGHT: ICD-10-PCS | Mod: 26,RT,, | Performed by: INTERNAL MEDICINE

## 2022-03-22 PROCEDURE — 99213 OFFICE O/P EST LOW 20 MIN: CPT | Mod: 25,S$GLB,, | Performed by: PHYSICIAN ASSISTANT

## 2022-03-22 PROCEDURE — 99999 PR PBB SHADOW E&M-EST. PATIENT-LVL IV: ICD-10-PCS | Mod: PBBFAC,,, | Performed by: PHYSICIAN ASSISTANT

## 2022-03-22 PROCEDURE — 20600 PR DRAIN/INJECT SMALL JOINT/BURSA: ICD-10-PCS | Mod: RT,S$GLB,, | Performed by: PHYSICIAN ASSISTANT

## 2022-03-22 PROCEDURE — 73130 X-RAY EXAM OF HAND: CPT | Mod: 26,RT,, | Performed by: INTERNAL MEDICINE

## 2022-03-22 RX ORDER — BETAMETHASONE SODIUM PHOSPHATE AND BETAMETHASONE ACETATE 3; 3 MG/ML; MG/ML
6 INJECTION, SUSPENSION INTRA-ARTICULAR; INTRALESIONAL; INTRAMUSCULAR; SOFT TISSUE
Status: COMPLETED | OUTPATIENT
Start: 2022-03-22 | End: 2022-03-22

## 2022-03-22 RX ADMIN — BETAMETHASONE SODIUM PHOSPHATE AND BETAMETHASONE ACETATE 6 MG: 3; 3 INJECTION, SUSPENSION INTRA-ARTICULAR; INTRALESIONAL; INTRAMUSCULAR; SOFT TISSUE at 02:03

## 2022-03-22 NOTE — PROGRESS NOTES
SUBJECTIVE:     Chief Complaint & History of Present Illness:  Maria Elena Jackson is a Established patient 71 y.o. female who is seen here today with a complaint of    Chief Complaint   Patient presents with    Right Hand - Pain    .  She has patient well-known to us was last seen treated the clinic for this condition 07/13/2021 that time she had undergone cortisone injection of the flexor tendon sheath of the right thumb with very good results.  She has begun to have return soreness and pain more in the joint space of the CMC.  On a scale of 1-10, with 10 being worst pain imaginable, he rates this pain as 3 on good days and 5 on bad days.  she describes the pain as tender and sore.    Review of patient's allergies indicates:  No Known Allergies      Current Outpatient Medications   Medication Sig Dispense Refill    acetaminophen (TYLENOL) 650 MG TbSR Take 1 tablet (650 mg total) by mouth every 8 (eight) hours as needed. 100 tablet 0    amoxicillin (AMOXIL) 500 MG capsule TAKE 4 CAPSULES BY MOUTH 1 HOUR BEFORE VISIT 4 capsule 4    atorvastatin (LIPITOR) 40 MG tablet Take 1 tablet (40 mg total) by mouth every evening. 90 tablet 3    calcium carbonate/vitamin D3 (CALTRATE 600 + D ORAL) Take 1 tablet by mouth Daily.      carvediloL (COREG) 12.5 MG tablet Take 1 tablet (12.5 mg total) by mouth 2 (two) times daily with meals. 90 tablet 3    cholecalciferol, vitamin D3, (VITAMIN D3) 25 mcg (1,000 unit) capsule Take 1,000 Units by mouth once daily.      doxazosin (CARDURA) 2 MG tablet Take 2 mg by mouth every morning.   1    FLUAD [id number], 65 YR UP,,PF, 45 mcg (15 mcg x 3)/0.5 mL Syrg       hydroCHLOROthiazide (HYDRODIURIL) 25 MG tablet Take 25 mg by mouth daily as needed.      irbesartan (AVAPRO) 150 MG tablet Take 300 mg by mouth once daily.      PICATO 0.015 % Gel       RETIN-A 0.05 % cream       tobramycin-dexamethasone 0.3-0.1% (TOBRADEX) 0.3-0.1 % DrpS INSTILL 1 DROP IN THE RIGHT EYE FOUR TIMES  "DAILY FOR 7 DAYS ONLY      triamcinolone acetonide 0.1% (KENALOG) 0.1 % ointment Apply topically.       No current facility-administered medications for this visit.       Past Medical History:   Diagnosis Date    Arthritis     Hyperlipidemia     Hypertension        Past Surgical History:   Procedure Laterality Date    CHOLECYSTECTOMY      D&C Hysteroscopy      DILATION AND CURETTAGE OF UTERUS      JOINT REPLACEMENT      laparoscopy      TONSILLECTOMY      TOTAL KNEE ARTHROPLASTY Right 10/7/2019    Procedure: ARTHROPLASTY, KNEE, TOTAL;  Surgeon: John L. Ochsner Jr., MD;  Location: [facility];  Service: Orthopedics;  Laterality: Right;       Vital Signs (Most Recent)  Vitals:    03/22/22 1100   BP: (!) 190/78   Pulse: 60           Review of Systems:  ROS:  Constitutional: no fever or chills  Eyes: no visual changes  ENT: no nasal congestion or sore throat  Respiratory: no cough or shortness of breath  Cardiovascular: no chest pain or palpitations  Gastrointestinal: no nausea or vomiting, tolerating diet  Genitourinary: no hematuria or dysuria  Integument/Breast: no rash or pruritis  Hematologic/Lymphatic: no easy bruising or lymphadenopathy, Macrocytosis without anemia  Musculoskeletal: no arthralgias or myalgias, Status post right TKA,  Neurological: no seizures or tremors  Behavioral/Psych: no auditory or visual hallucinations  Endocrine: no heat or cold intolerance                OBJECTIVE:     PHYSICAL EXAM:  Height: 5' 5" (165.1 cm) Weight: 85.6 kg (188 lb 11.4 oz), General Appearance: Well nourished, well developed, in no acute distress.  Neurological: Mood & affect are normal.  right thumb   History of injury: repetitive injury  Pain: Description: mild, moderate and intermittent  Night pain: yes, occasional, mild and moderate  Rest pain: yes, occasional, mild and moderate  Quality: aching, sharp and stabbing  Location: thumb and CMC joint  Exacerbating factors: activity, gripping, lifting and " writing  Alleviating factors: ice, OTC NSAIDS and rest  Neurological complaints: none  Mass/Swelling: none  Condition of skin:intact  Hand Exam: radial pulse normal, sensation normal, negative Phalen, negative Tinel and negative Cozen  ROM:fingers flex to palm and thumb flexes to palm    Wrist Exam: palmar flexion 90/90, dorsiflexion 90/90, pronation 90/90, supination 90/90, flexion contracture 0/0, extension contracture 0/0, radial deviation 25/25, ulnar deviation 25/25      RADIOGRAPHS:  X-rays taken today films reviewed by me demonstrate mild to moderate arthritic changes throughout multiple areas of the hand multiple DP joint as well as the CMC    ASSESSMENT/PLAN:       ICD-10-CM ICD-9-CM   1. Primary osteoarthritis of first carpometacarpal joint of right hand  M18.11 715.14   2. Pain of right hand  M79.641 729.5   3. Chronic pain of right thumb  M79.644 729.5    G89.29 338.29       Plan: We discussed with the patient at length all the different treatment options available for her thumb, including anti-inflammatories, acetaminophen, rest, ice, physical therapy to include strengthening, range of motion exercise ultrasound, splinting,  occasional cortisone injections for temporary relief,  or possible surgical interventions.  Proceed with therapeutic diagnostic cortisone injection CMC joint of the right thumb     The injection site was identified and the skin was prepared with an ETOH solution. The    right  Thumb was injected with 1 ml of Celestone and 1 ml Lidocaine under sterile technique. Maria Elena Jackson tolerated the procedure well, she was advised to rest the  Thumb  today, ice and support. she did receive immediate relief of the pain in and about her  Thumb  she was told this would be short lived and is secondary to the lidocaine. she may have an increase in her discomfort tonight followed by steady improvement over the next several days. It may take 1-3 weeks following the injection to get the full  benefit of the medication.  I will see her back in 4-6 months. Sooner if she has any problems or concerns.

## 2022-03-29 ENCOUNTER — TELEPHONE (OUTPATIENT)
Dept: ORTHOPEDICS | Facility: CLINIC | Age: 72
End: 2022-03-29
Payer: OTHER GOVERNMENT

## 2022-03-29 NOTE — TELEPHONE ENCOUNTER
Spoke with patient regarding rt. trigger thumb ,Jennifer Merrill injected patient thumb this March 2022 ,thumb still hurt , patient saw Dr. Peña in the past ,patient have an appointment with Dr. Peña at the end of April

## 2022-04-21 ENCOUNTER — PATIENT MESSAGE (OUTPATIENT)
Dept: OBSTETRICS AND GYNECOLOGY | Facility: CLINIC | Age: 72
End: 2022-04-21
Payer: OTHER GOVERNMENT

## 2022-04-23 NOTE — H&P (VIEW-ONLY)
HISTORY OF PRESENT ILLNESS:    Maria Elena Jackson is a 71 y.o. female, , No LMP recorded (lmp unknown). Patient is postmenopausal.,  presents for a problem visit, complaining of postmenopausal bleeding.     [facility]/D&C for PMPB.  Path benign polyp.  +small fibroid on ultrasound     pap normal  No history of abnormal pap     last visit (annual)     had pelvic pressure and had spotting.  No other uti symptoms.  u/a + blood.  Urine culture negative  Occasional spotting after that.  Last week bled more, changing a pantiliner 2-3 times per day.  Never heavy and no cramping.    Past Medical History:   Diagnosis Date    Arthritis     Hyperlipidemia     Hypertension        Past Surgical History:   Procedure Laterality Date    CHOLECYSTECTOMY      D&C Hysteroscopy      DILATION AND CURETTAGE OF UTERUS      JOINT REPLACEMENT      laparoscopy      TONSILLECTOMY      TOTAL KNEE ARTHROPLASTY Right 10/7/2019    Procedure: ARTHROPLASTY, KNEE, TOTAL;  Surgeon: John L. Ochsner Jr., MD;  Location: [facility];  Service: Orthopedics;  Laterality: Right;       MEDICATIONS AND ALLERGIES:      Current Outpatient Medications:     atorvastatin (LIPITOR) 40 MG tablet, Take 1 tablet (40 mg total) by mouth every evening., Disp: 90 tablet, Rfl: 3    calcium carbonate/vitamin D3 (CALTRATE 600 + D ORAL), Take 1 tablet by mouth Daily., Disp: , Rfl:     carvediloL (COREG) 12.5 MG tablet, Take 1 tablet (12.5 mg total) by mouth 2 (two) times daily with meals., Disp: 90 tablet, Rfl: 3    cholecalciferol, vitamin D3, (VITAMIN D3) 25 mcg (1,000 unit) capsule, Take 1,000 Units by mouth once daily., Disp: , Rfl:     doxazosin (CARDURA) 2 MG tablet, Take 2 mg by mouth every morning. , Disp: , Rfl: 1    FLUAD [id number], 65 YR UP,,PF, 45 mcg (15 mcg x 3)/0.5 mL Syrg, , Disp: , Rfl:     hydroCHLOROthiazide (HYDRODIURIL) 25 MG tablet, Take 25 mg by mouth daily as needed., Disp: , Rfl:     irbesartan (AVAPRO) 150 MG tablet,  "Take 300 mg by mouth once daily., Disp: , Rfl:     triamcinolone acetonide 0.1% (KENALOG) 0.1 % ointment, Apply topically., Disp: , Rfl:     Review of patient's allergies indicates:  No Known Allergies    Family History   Problem Relation Age of Onset    Colon cancer Mother 55    Ovarian cancer Mother 35    Breast cancer Neg Hx        Social History     Socioeconomic History    Marital status:    Tobacco Use    Smoking status: Former Smoker     Packs/day: 1.50     Years: 22.00     Pack years: 33.00     Quit date:      Years since quittin.3    Smokeless tobacco: Never Used   Substance and Sexual Activity    Alcohol use: Yes     Comment: 4 glasses of wine in a months    Drug use: No    Sexual activity: Not Currently     Birth control/protection: Post-menopausal       ROS:  GENERAL: No weight changes. No swelling. No fatigue. No fever.  CARDIOVASCULAR: No chest pain. No shortness of breath. No leg cramps.   NEUROLOGICAL: No headaches. No vision changes.  BREASTS: No pain. No lumps. No discharge.  ABDOMEN: No pain. No nausea. No vomiting. No diarrhea. No constipation.  REPRODUCTIVE: see above  VULVA: No pain. No lesions. No itching.  VAGINA: No relaxation. No itching. No odor. No discharge. No lesions.  URINARY: No incontinence. No nocturia. No frequency. No dysuria.    BP (!) 156/74   Ht 5' 5" (1.651 m)   Wt 84.6 kg (186 lb 8.2 oz)   LMP  (LMP Unknown)   BMI 31.04 kg/m²     PE:  APPEARANCE: Well nourished, well developed, in no acute distress.  ABDOMEN: Soft. No tenderness or masses. No hepatosplenomegaly. No hernias.  BREASTS, FUNDOSCOPIC, RECTAL DEFERRED  PELVIC: External female genitalia without lesions.  Female hair distribution. Adequate perineal body, Normal urethral meatus. Vagina atrophic without lesions or discharge.  No significant cystocele or rectocele present. Cervix pink and friable, without lesions, discharge or tenderness. Uterus is normal size, regular, mobile and " nontender. Adnexa without masses or tenderness.  EXTREMITIES: No edema      DIAGNOSIS & PLAN  1. Postmenopausal bleeding  Specimen to Pathology, Ob/Gyn    Liquid-Based Pap Smear, Screening    HPV High Risk Genotypes, PCR    US Pelvis Comp with Transvag NON-OB (xpd   2. Pap smear for cervical cancer screening  Liquid-Based Pap Smear, Screening   3. Screening for HPV (human papillomavirus)  HPV High Risk Genotypes, PCR   4. Hematuria, unspecified type             COUNSELING:  ENDOMETRIAL BIOPSY:    The patient was informed of the risk of bleeding, infection, uterine perforation, and pain.  She was counseled that the test will rule-out endometrial cancer with an accuracy greater than 96%.  She was counseled on the alternatives to endometrial biopsy and agrees to proceed.  A time out was performed.  Urine pregnancy test was not done.    The cervix was visualized with a speculum.  The cervix was swabbed with Betadinex3 times 3.  The anterior lip of the cervix was grasped with a single toothed tenaculum.  A uterine pipelle was inserted into the uterus, and the uterus sounded to  7cm. The tenaculum and speculum were removed, and the specimen was sent to Pathology for histologic evaluation.   The patient tolerated with above procedure well.    Post Endometrial Biopsy counseling:  The patient was instructed to manage post-biopsy cramping with NSAIDs or Tylenol.  She was counseled to expect spotting or light bleeding for a few days, and she was counseled to report heavy bleeding, fever greater then 101.0F, worsening pain, or foul-smelling vaginal discharge.  Counseling lasted about 15 minutes, and after counseling she had no further questions.       Virtual visit in 2-3 weeks to discuss results & plan      20 minutes addressing problems.  This includes face to face time and non-face to face time preparing to see the patient (eg, review of tests), Obtaining and/or reviewing separately obtained history, Documenting clinical  information in the electronic or other health record, Independently interpreting resultsand communicating results to the patient/family/caregiver, or Care coordination.

## 2022-04-23 NOTE — PROGRESS NOTES
HISTORY OF PRESENT ILLNESS:    Maria Elena Jackson is a 71 y.o. female, , No LMP recorded (lmp unknown). Patient is postmenopausal.,  presents for a problem visit, complaining of postmenopausal bleeding.     [facility]/D&C for PMPB.  Path benign polyp.  +small fibroid on ultrasound     pap normal  No history of abnormal pap     last visit (annual)     had pelvic pressure and had spotting.  No other uti symptoms.  u/a + blood.  Urine culture negative  Occasional spotting after that.  Last week bled more, changing a pantiliner 2-3 times per day.  Never heavy and no cramping.    Past Medical History:   Diagnosis Date    Arthritis     Hyperlipidemia     Hypertension        Past Surgical History:   Procedure Laterality Date    CHOLECYSTECTOMY      D&C Hysteroscopy      DILATION AND CURETTAGE OF UTERUS      JOINT REPLACEMENT      laparoscopy      TONSILLECTOMY      TOTAL KNEE ARTHROPLASTY Right 10/7/2019    Procedure: ARTHROPLASTY, KNEE, TOTAL;  Surgeon: John L. Ochsner Jr., MD;  Location: [facility];  Service: Orthopedics;  Laterality: Right;       MEDICATIONS AND ALLERGIES:      Current Outpatient Medications:     atorvastatin (LIPITOR) 40 MG tablet, Take 1 tablet (40 mg total) by mouth every evening., Disp: 90 tablet, Rfl: 3    calcium carbonate/vitamin D3 (CALTRATE 600 + D ORAL), Take 1 tablet by mouth Daily., Disp: , Rfl:     carvediloL (COREG) 12.5 MG tablet, Take 1 tablet (12.5 mg total) by mouth 2 (two) times daily with meals., Disp: 90 tablet, Rfl: 3    cholecalciferol, vitamin D3, (VITAMIN D3) 25 mcg (1,000 unit) capsule, Take 1,000 Units by mouth once daily., Disp: , Rfl:     doxazosin (CARDURA) 2 MG tablet, Take 2 mg by mouth every morning. , Disp: , Rfl: 1    FLUAD 8384-1540, 65 YR UP,,PF, 45 mcg (15 mcg x 3)/0.5 mL Syrg, , Disp: , Rfl:     hydroCHLOROthiazide (HYDRODIURIL) 25 MG tablet, Take 25 mg by mouth daily as needed., Disp: , Rfl:     irbesartan (AVAPRO) 150 MG tablet,  "Take 300 mg by mouth once daily., Disp: , Rfl:     triamcinolone acetonide 0.1% (KENALOG) 0.1 % ointment, Apply topically., Disp: , Rfl:     Review of patient's allergies indicates:  No Known Allergies    Family History   Problem Relation Age of Onset    Colon cancer Mother 55    Ovarian cancer Mother 35    Breast cancer Neg Hx        Social History     Socioeconomic History    Marital status:    Tobacco Use    Smoking status: Former Smoker     Packs/day: 1.50     Years: 22.00     Pack years: 33.00     Quit date:      Years since quittin.3    Smokeless tobacco: Never Used   Substance and Sexual Activity    Alcohol use: Yes     Comment: 4 glasses of wine in a months    Drug use: No    Sexual activity: Not Currently     Birth control/protection: Post-menopausal       ROS:  GENERAL: No weight changes. No swelling. No fatigue. No fever.  CARDIOVASCULAR: No chest pain. No shortness of breath. No leg cramps.   NEUROLOGICAL: No headaches. No vision changes.  BREASTS: No pain. No lumps. No discharge.  ABDOMEN: No pain. No nausea. No vomiting. No diarrhea. No constipation.  REPRODUCTIVE: see above  VULVA: No pain. No lesions. No itching.  VAGINA: No relaxation. No itching. No odor. No discharge. No lesions.  URINARY: No incontinence. No nocturia. No frequency. No dysuria.    BP (!) 156/74   Ht 5' 5" (1.651 m)   Wt 84.6 kg (186 lb 8.2 oz)   LMP  (LMP Unknown)   BMI 31.04 kg/m²     PE:  APPEARANCE: Well nourished, well developed, in no acute distress.  ABDOMEN: Soft. No tenderness or masses. No hepatosplenomegaly. No hernias.  BREASTS, FUNDOSCOPIC, RECTAL DEFERRED  PELVIC: External female genitalia without lesions.  Female hair distribution. Adequate perineal body, Normal urethral meatus. Vagina atrophic without lesions or discharge.  No significant cystocele or rectocele present. Cervix pink and friable, without lesions, discharge or tenderness. Uterus is normal size, regular, mobile and " nontender. Adnexa without masses or tenderness.  EXTREMITIES: No edema      DIAGNOSIS & PLAN  1. Postmenopausal bleeding  Specimen to Pathology, Ob/Gyn    Liquid-Based Pap Smear, Screening    HPV High Risk Genotypes, PCR    US Pelvis Comp with Transvag NON-OB (xpd   2. Pap smear for cervical cancer screening  Liquid-Based Pap Smear, Screening   3. Screening for HPV (human papillomavirus)  HPV High Risk Genotypes, PCR   4. Hematuria, unspecified type             COUNSELING:  ENDOMETRIAL BIOPSY:    The patient was informed of the risk of bleeding, infection, uterine perforation, and pain.  She was counseled that the test will rule-out endometrial cancer with an accuracy greater than 96%.  She was counseled on the alternatives to endometrial biopsy and agrees to proceed.  A time out was performed.  Urine pregnancy test was not done.    The cervix was visualized with a speculum.  The cervix was swabbed with Betadinex3 times 3.  The anterior lip of the cervix was grasped with a single toothed tenaculum.  A uterine pipelle was inserted into the uterus, and the uterus sounded to  7cm. The tenaculum and speculum were removed, and the specimen was sent to Pathology for histologic evaluation.   The patient tolerated with above procedure well.    Post Endometrial Biopsy counseling:  The patient was instructed to manage post-biopsy cramping with NSAIDs or Tylenol.  She was counseled to expect spotting or light bleeding for a few days, and she was counseled to report heavy bleeding, fever greater then 101.0F, worsening pain, or foul-smelling vaginal discharge.  Counseling lasted about 15 minutes, and after counseling she had no further questions.       Virtual visit in 2-3 weeks to discuss results & plan      20 minutes addressing problems.  This includes face to face time and non-face to face time preparing to see the patient (eg, review of tests), Obtaining and/or reviewing separately obtained history, Documenting clinical  information in the electronic or other health record, Independently interpreting resultsand communicating results to the patient/family/caregiver, or Care coordination.

## 2022-04-26 ENCOUNTER — OFFICE VISIT (OUTPATIENT)
Dept: ORTHOPEDICS | Facility: CLINIC | Age: 72
End: 2022-04-26
Payer: OTHER GOVERNMENT

## 2022-04-26 VITALS — WEIGHT: 188 LBS | BODY MASS INDEX: 31.32 KG/M2 | HEIGHT: 65 IN

## 2022-04-26 DIAGNOSIS — M65.311 TRIGGER THUMB, RIGHT THUMB: Primary | ICD-10-CM

## 2022-04-26 PROCEDURE — 99213 PR OFFICE/OUTPT VISIT, EST, LEVL III, 20-29 MIN: ICD-10-PCS | Mod: S$GLB,,, | Performed by: ORTHOPAEDIC SURGERY

## 2022-04-26 PROCEDURE — 99999 PR PBB SHADOW E&M-EST. PATIENT-LVL III: CPT | Mod: PBBFAC,,, | Performed by: ORTHOPAEDIC SURGERY

## 2022-04-26 PROCEDURE — 99999 PR PBB SHADOW E&M-EST. PATIENT-LVL III: ICD-10-PCS | Mod: PBBFAC,,, | Performed by: ORTHOPAEDIC SURGERY

## 2022-04-26 PROCEDURE — 99213 OFFICE O/P EST LOW 20 MIN: CPT | Mod: S$GLB,,, | Performed by: ORTHOPAEDIC SURGERY

## 2022-04-26 NOTE — PROGRESS NOTES
[facility]  History & Physical  Orthopedics    SUBJECTIVE:      COVID-19 attestation:  This patient was treated during the COVID-19 pandemic.  This was discussed with the patient, they are aware of our current policies and procedures, were given the option of delaying their visit and or switching to a virtual visit, delaying their surgery when applicable, and they elect to proceed.    Chief Complaint: right thumb pain    Referring Provider: No ref. provider found     History of Present Illness:  Patient is a 71 y.o. right hand dominant female who presents today with complaints of right thumb pain. This started a month ago. She has been having popping at her thumb IP with thumb extension as well as pain diffusely at the palmar aspect of the thumb. The pain is worse with palpation and thumb extension. She is having difficulty grasping objects due to the pain. She has not tried any OTC medications or gels or immobilization. She also does crossfit for the past year. Notes that when she was a child she injured her thumb but not sure if she broke anything.    Interval history April 26, 2022:  The patient returns today for re-evaluation of her right thumb.  She has been having some right thumb pain consistent with a right trigger thumb greater than right thumb CMC arthritis.  I injected this almost a year ago which helped great.  She notes that she was mistakenly scheduled with a physician's assistant in this office who performed another right trigger thumb injection about a month ago.  This did not provide any relief.  She returns for re-evaluation with no other complaints.    The patient is a/an assistant in an orthodontist's office.    Onset of symptoms/DOI was 1 month ago.    Symptoms are aggravated by activity and movement.    Symptoms are alleviated by rest.    Symptoms consist of pain.    The patient rates their pain as a 5/10.    Attempted treatment(s) and/or interventions include activity  modifications, rest     The patient denies any fevers, chills, N/V, D/C and presents for evaluation.       Past Medical History:   Diagnosis Date    Arthritis     Hyperlipidemia     Hypertension      Past Surgical History:   Procedure Laterality Date    CHOLECYSTECTOMY      D&C Hysteroscopy      DILATION AND CURETTAGE OF UTERUS      JOINT REPLACEMENT      laparoscopy      TONSILLECTOMY      TOTAL KNEE ARTHROPLASTY Right 10/7/2019    Procedure: ARTHROPLASTY, KNEE, TOTAL;  Surgeon: John L. Ochsner Jr., MD;  Location: [facility];  Service: Orthopedics;  Laterality: Right;     Review of patient's allergies indicates:  No Known Allergies  Social History     Social History Narrative    Not on file     Family History   Problem Relation Age of Onset    Colon cancer Mother 55    Ovarian cancer Mother 35    Breast cancer Neg Hx          Current Outpatient Medications:     acetaminophen (TYLENOL) 650 MG TbSR, Take 1 tablet (650 mg total) by mouth every 8 (eight) hours as needed., Disp: 100 tablet, Rfl: 0    amoxicillin (AMOXIL) 500 MG capsule, TAKE 4 CAPSULES BY MOUTH 1 HOUR BEFORE VISIT, Disp: 4 capsule, Rfl: 4    atorvastatin (LIPITOR) 40 MG tablet, Take 1 tablet (40 mg total) by mouth every evening., Disp: 90 tablet, Rfl: 3    calcium carbonate/vitamin D3 (CALTRATE 600 + D ORAL), Take 1 tablet by mouth Daily., Disp: , Rfl:     carvediloL (COREG) 12.5 MG tablet, Take 1 tablet (12.5 mg total) by mouth 2 (two) times daily with meals., Disp: 90 tablet, Rfl: 3    cholecalciferol, vitamin D3, (VITAMIN D3) 25 mcg (1,000 unit) capsule, Take 1,000 Units by mouth once daily., Disp: , Rfl:     doxazosin (CARDURA) 2 MG tablet, Take 2 mg by mouth every morning. , Disp: , Rfl: 1    FLUAD [id number], 65 YR UP,,PF, 45 mcg (15 mcg x 3)/0.5 mL Syrg, , Disp: , Rfl:     hydroCHLOROthiazide (HYDRODIURIL) 25 MG tablet, Take 25 mg by mouth daily as needed., Disp: , Rfl:     irbesartan (AVAPRO) 150 MG tablet, Take 300 mg by  "mouth once daily., Disp: , Rfl:     PICATO 0.015 % Gel, , Disp: , Rfl:     RETIN-A 0.05 % cream, , Disp: , Rfl:     tobramycin-dexamethasone 0.3-0.1% (TOBRADEX) 0.3-0.1 % DrpS, INSTILL 1 DROP IN THE RIGHT EYE FOUR TIMES DAILY FOR 7 DAYS ONLY, Disp: , Rfl:     triamcinolone acetonide 0.1% (KENALOG) 0.1 % ointment, Apply topically., Disp: , Rfl:       Review of Systems:  Constitutional: no fever or chills  Eyes: no visual changes  ENT: no nasal congestion or sore throat  Respiratory: no cough or shortness of breath  Cardiovascular: no chest pain  Gastrointestinal: no nausea or vomiting, tolerating diet  Musculoskeletal: pain and soreness    OBJECTIVE:      Vital Signs (Most Recent):  Vitals:    04/26/22 1142   Weight: 85.3 kg (188 lb)   Height: 5' 5" (1.651 m)     Body mass index is 31.28 kg/m².      Physical Exam:  Constitutional: The patient appears well-developed and well-nourished. No distress.   Skin: No lesions appreciated  Head: Normocephalic and atraumatic.   Nose: Nose normal.   Ears: No deformities seen  Eyes: Conjunctivae and EOM are normal.   Neck: No tracheal deviation present.   Cardiovascular: Normal rate and intact distal pulses.    Pulmonary/Chest: Effort normal. No respiratory distress.   Abdominal: There is no guarding.   Neurological: The patient is alert.   Psychiatric: The patient has a normal mood and affect.     Right Hand/Wrist Examination:    Observation/Inspection:  Swelling  none    Deformity  none  Discoloration  none     Scars   none    Atrophy  none    HAND/WRIST EXAMINATION:  Finkelstein's Test   Neg  WHAT Test    Neg  Snuff box tenderness   Neg  Gutierres's Test    Neg  Hook of Hamate Tenderness  Neg  CMC grind    positive  Circumduction test   positive  Thumb triggers with extension, TTP over thumb A1 puley    Neurovascular Exam:  Digits WWP, brisk CR < 3s throughout  NVI motor/LTS to M/R/U nerves, radial pulse 2+  Tinel's Test - Carpal Tunnel  Neg  Tinel's Test - Cubital " Tunnel  Neg  Phalen's Test    Neg  Median Nerve Compression Test Neg    ROM hand full, painless - except pain at palmar aspect of thumb with extension    ROM wrist full, painless    ROM elbow full, painless    Abdomen not guarded  Respirations nonlabored  Perfusion intact    Diagnostic Results:     Imaging - I independently viewed the patient's imaging as well as the radiology report.  Xrays of the patient's right thumb demonstrate no evidence of any acute fractures or dislocations, mild age related degenerative changes.      ASSESSMENT/PLAN:      71 y.o. yo female with right trigger thumb, right thumb CMC arthritis    Plan: The patient and I had a thorough discussion today.  We discussed the working diagnosis as well as several other potential alternative diagnoses.  Treatment options were discussed, both conservative and surgical.  Conservative treatment options would include things such as activity modifications, workplace modifications, a period of rest, oral vs topical OTC and prescription anti-inflammatory medications, occupational therapy, splinting/bracing, immobilization, corticosteroid injections, and others.  Surgical options were discussed as well.     At this time, it is too early to proceed with a repeat corticosteroid injection of the right trigger thumb given the fact that the patient received 4 of these a month ago.  Follow-up in 2 months to determine her progress and for further discussion of additional treatment options at that time.    Should the patient's symptoms worsen, persist, or fail to improve they should return for reevaluation and I would be happy to see them back anytime.        Michael Peañ M.D.     Please be aware that this note has been generated with the assistance of [employer] voice-to-text.  Please excuse any spelling or grammatical errors.    Thank you for choosing Dr. Michael Peña for your orthopedic hand and upper extremity care. It is our goal to provide you with exceptional  care that will help keep you healthy, active, and get you back in the game.     If you felt that you received exemplary care today, please consider leaving feedback for Dr. Peña on [employer] at [email or web address].    Please do not hesitate to reach out to us via email, phone, or MyChart with any questions, concerns, or feedback.

## 2022-04-27 ENCOUNTER — OFFICE VISIT (OUTPATIENT)
Dept: OBSTETRICS AND GYNECOLOGY | Facility: CLINIC | Age: 72
End: 2022-04-27
Attending: OBSTETRICS & GYNECOLOGY
Payer: OTHER GOVERNMENT

## 2022-04-27 VITALS
HEIGHT: 65 IN | BODY MASS INDEX: 31.07 KG/M2 | DIASTOLIC BLOOD PRESSURE: 74 MMHG | WEIGHT: 186.5 LBS | SYSTOLIC BLOOD PRESSURE: 156 MMHG

## 2022-04-27 DIAGNOSIS — R31.9 HEMATURIA, UNSPECIFIED TYPE: ICD-10-CM

## 2022-04-27 DIAGNOSIS — Z11.51 SCREENING FOR HPV (HUMAN PAPILLOMAVIRUS): ICD-10-CM

## 2022-04-27 DIAGNOSIS — N95.0 POSTMENOPAUSAL BLEEDING: Primary | ICD-10-CM

## 2022-04-27 DIAGNOSIS — Z12.4 PAP SMEAR FOR CERVICAL CANCER SCREENING: ICD-10-CM

## 2022-04-27 PROCEDURE — 99213 OFFICE O/P EST LOW 20 MIN: CPT | Mod: 25,S$GLB,, | Performed by: OBSTETRICS & GYNECOLOGY

## 2022-04-27 PROCEDURE — 88175 CYTOPATH C/V AUTO FLUID REDO: CPT | Performed by: OBSTETRICS & GYNECOLOGY

## 2022-04-27 PROCEDURE — 99213 PR OFFICE/OUTPT VISIT, EST, LEVL III, 20-29 MIN: ICD-10-PCS | Mod: 25,S$GLB,, | Performed by: OBSTETRICS & GYNECOLOGY

## 2022-04-27 PROCEDURE — 88305 TISSUE EXAM BY PATHOLOGIST: CPT | Performed by: PATHOLOGY

## 2022-04-27 PROCEDURE — 88305 TISSUE EXAM BY PATHOLOGIST: ICD-10-PCS | Mod: 26,,, | Performed by: PATHOLOGY

## 2022-04-27 PROCEDURE — 88305 TISSUE EXAM BY PATHOLOGIST: CPT | Mod: 26,,, | Performed by: PATHOLOGY

## 2022-04-27 PROCEDURE — 58100 PR BIOPSY OF UTERUS LINING: ICD-10-PCS | Mod: S$GLB,,, | Performed by: OBSTETRICS & GYNECOLOGY

## 2022-04-27 PROCEDURE — 87624 HPV HI-RISK TYP POOLED RSLT: CPT | Performed by: OBSTETRICS & GYNECOLOGY

## 2022-04-27 PROCEDURE — 99999 PR PBB SHADOW E&M-EST. PATIENT-LVL IV: ICD-10-PCS | Mod: PBBFAC,,, | Performed by: OBSTETRICS & GYNECOLOGY

## 2022-04-27 PROCEDURE — 58100 BIOPSY OF UTERUS LINING: CPT | Mod: S$GLB,,, | Performed by: OBSTETRICS & GYNECOLOGY

## 2022-04-27 PROCEDURE — 99999 PR PBB SHADOW E&M-EST. PATIENT-LVL IV: CPT | Mod: PBBFAC,,, | Performed by: OBSTETRICS & GYNECOLOGY

## 2022-04-29 ENCOUNTER — HOSPITAL ENCOUNTER (OUTPATIENT)
Dept: RADIOLOGY | Facility: OTHER | Age: 72
Discharge: HOME | End: 2022-04-29
Attending: OBSTETRICS & GYNECOLOGY
Payer: OTHER GOVERNMENT

## 2022-04-29 DIAGNOSIS — N95.0 POSTMENOPAUSAL BLEEDING: ICD-10-CM

## 2022-04-29 PROCEDURE — 76830 US PELVIS COMP WITH TRANSVAG NON-OB (XPD): ICD-10-PCS | Mod: 26,,, | Performed by: RADIOLOGY

## 2022-04-29 PROCEDURE — 76856 US EXAM PELVIC COMPLETE: CPT | Mod: 26,,, | Performed by: RADIOLOGY

## 2022-04-29 PROCEDURE — 76830 TRANSVAGINAL US NON-OB: CPT | Mod: 26,,, | Performed by: RADIOLOGY

## 2022-04-29 PROCEDURE — 76856 US PELVIS COMP WITH TRANSVAG NON-OB (XPD): ICD-10-PCS | Mod: 26,,, | Performed by: RADIOLOGY

## 2022-04-29 PROCEDURE — 76830 TRANSVAGINAL US NON-OB: CPT | Mod: TC

## 2022-05-01 ENCOUNTER — PATIENT MESSAGE (OUTPATIENT)
Dept: OBSTETRICS AND GYNECOLOGY | Facility: CLINIC | Age: 72
End: 2022-05-01
Payer: OTHER GOVERNMENT

## 2022-05-02 ENCOUNTER — TELEPHONE (OUTPATIENT)
Dept: OBSTETRICS AND GYNECOLOGY | Facility: CLINIC | Age: 72
End: 2022-05-02
Payer: OTHER GOVERNMENT

## 2022-05-02 ENCOUNTER — PATIENT MESSAGE (OUTPATIENT)
Dept: INTERNAL MEDICINE | Facility: CLINIC | Age: 72
End: 2022-05-02
Payer: OTHER GOVERNMENT

## 2022-05-02 NOTE — TELEPHONE ENCOUNTER
Called patient and discussed ultrasound report.  Path report pending.  Will contact patient once path reviewed.

## 2022-05-02 NOTE — TELEPHONE ENCOUNTER
Spoke with pt  Pt is very upset as her US results suggested endometrial cancer.  Staff advised pt that that Dr Ellsworth was still waiting for the rest of the testing that was done on 4/27/22 and would have more information once those results were finalized.  Staff spoke with Dr Ellsworth who will be calling pt today to discuss US results and answer questions.  Pt verbalized understating.

## 2022-05-03 LAB
HPV HR 12 DNA SPEC QL NAA+PROBE: NEGATIVE
HPV16 AG SPEC QL: NEGATIVE
HPV18 DNA SPEC QL NAA+PROBE: NEGATIVE

## 2022-05-04 ENCOUNTER — TELEPHONE (OUTPATIENT)
Dept: OBSTETRICS AND GYNECOLOGY | Facility: CLINIC | Age: 72
End: 2022-05-04
Payer: OTHER GOVERNMENT

## 2022-05-04 DIAGNOSIS — R93.89 THICKENED ENDOMETRIUM: ICD-10-CM

## 2022-05-04 DIAGNOSIS — N95.0 POSTMENOPAUSAL BLEEDING: Primary | ICD-10-CM

## 2022-05-04 LAB
FINAL PATHOLOGIC DIAGNOSIS: NORMAL
Lab: NORMAL

## 2022-05-04 NOTE — TELEPHONE ENCOUNTER
Spoke with pt  Per Dr Ellsworth pt advised that her surgery is scheudled for 5/10/22.  Pt scheduled for pre-admit and Post Op appts.  Pt will sign consents Morning of surgery per Dr Ellsworth.  Pt verbalized understanding.

## 2022-05-04 NOTE — TELEPHONE ENCOUNTER
Pl notify patient and schedule pre-op & post-op appointments.  Surgery 5/10.  She can sign consents on day of surgery but she needs to see anesthesia this week for pre-op.

## 2022-05-05 ENCOUNTER — ANESTHESIA EVENT (OUTPATIENT)
Dept: SURGERY | Facility: OTHER | Age: 72
End: 2022-05-05
Payer: OTHER GOVERNMENT

## 2022-05-05 ENCOUNTER — HOSPITAL ENCOUNTER (OUTPATIENT)
Dept: PREADMISSION TESTING | Facility: OTHER | Age: 72
Discharge: HOME | End: 2022-05-05
Attending: OBSTETRICS & GYNECOLOGY
Payer: OTHER GOVERNMENT

## 2022-05-05 VITALS
OXYGEN SATURATION: 95 % | HEART RATE: 77 BPM | TEMPERATURE: 98 F | HEIGHT: 65 IN | RESPIRATION RATE: 16 BRPM | DIASTOLIC BLOOD PRESSURE: 78 MMHG | SYSTOLIC BLOOD PRESSURE: 168 MMHG | WEIGHT: 185 LBS | BODY MASS INDEX: 30.82 KG/M2

## 2022-05-05 DIAGNOSIS — Z01.818 PREOP TESTING: Primary | ICD-10-CM

## 2022-05-05 LAB
BASOPHILS # BLD AUTO: 0.07 K/UL (ref 0–0.2)
BASOPHILS NFR BLD: 1 % (ref 0–1.9)
DIFFERENTIAL METHOD: ABNORMAL
EOSINOPHIL # BLD AUTO: 0.3 K/UL (ref 0–0.5)
EOSINOPHIL NFR BLD: 3.8 % (ref 0–8)
ERYTHROCYTE [DISTWIDTH] IN BLOOD BY AUTOMATED COUNT: 11.5 % (ref 11.5–14.5)
HCT VFR BLD AUTO: 44.8 % (ref 37–48.5)
HGB BLD-MCNC: 15 G/DL (ref 12–16)
IMM GRANULOCYTES # BLD AUTO: 0.01 K/UL (ref 0–0.04)
IMM GRANULOCYTES NFR BLD AUTO: 0.1 % (ref 0–0.5)
LYMPHOCYTES # BLD AUTO: 2.3 K/UL (ref 1–4.8)
LYMPHOCYTES NFR BLD: 33.9 % (ref 18–48)
MCH RBC QN AUTO: 31.3 PG (ref 27–31)
MCHC RBC AUTO-ENTMCNC: 33.5 G/DL (ref 32–36)
MCV RBC AUTO: 94 FL (ref 82–98)
MONOCYTES # BLD AUTO: 0.6 K/UL (ref 0.3–1)
MONOCYTES NFR BLD: 9 % (ref 4–15)
NEUTROPHILS # BLD AUTO: 3.6 K/UL (ref 1.8–7.7)
NEUTROPHILS NFR BLD: 52.2 % (ref 38–73)
NRBC BLD-RTO: 0 /100 WBC
PLATELET # BLD AUTO: 218 K/UL (ref 150–450)
PMV BLD AUTO: 11.5 FL (ref 9.2–12.9)
RBC # BLD AUTO: 4.79 M/UL (ref 4–5.4)
WBC # BLD AUTO: 6.9 K/UL (ref 3.9–12.7)

## 2022-05-05 PROCEDURE — 36415 COLL VENOUS BLD VENIPUNCTURE: CPT | Performed by: ANESTHESIOLOGY

## 2022-05-05 PROCEDURE — 85025 COMPLETE CBC W/AUTO DIFF WBC: CPT | Performed by: ANESTHESIOLOGY

## 2022-05-05 RX ORDER — LIDOCAINE HYDROCHLORIDE 10 MG/ML
0.5 INJECTION, SOLUTION EPIDURAL; INFILTRATION; INTRACAUDAL; PERINEURAL ONCE
Status: CANCELLED | OUTPATIENT
Start: 2022-05-05 | End: 2022-05-05

## 2022-05-05 RX ORDER — SODIUM CHLORIDE, SODIUM LACTATE, POTASSIUM CHLORIDE, CALCIUM CHLORIDE 600; 310; 30; 20 MG/100ML; MG/100ML; MG/100ML; MG/100ML
INJECTION, SOLUTION INTRAVENOUS CONTINUOUS
Status: CANCELLED | OUTPATIENT
Start: 2022-05-05

## 2022-05-05 NOTE — DISCHARGE INSTRUCTIONS
Information to Prepare you for your Surgery    PRE-ADMIT TESTING -  [phone]    [address] [facility]          Your surgery has been scheduled at [facility]. We are pleased to have the opportunity to serve you. For Further Information please call [phone].    On the day of surgery please report to the Information Desk on the 1st floor.    CONTACT YOUR PHYSICIAN'S OFFICE THE DAY PRIOR TO YOUR SURGERY TO OBTAIN YOUR ARRIVAL TIME.     The evening before surgery do not eat anything after 9 p.m. ( this includes hard candy, chewing gum and mints).  You may only have GATORADE, POWERADE AND WATER  from 9 p.m. until you leave your home.   DO NOT DRINK ANY LIQUIDS ON THE WAY TO THE HOSPITAL.      Why does your anesthesiologist allow you to drink Gatorade/Powerade before surgery?  Gatorade/Powerade helps to increase your comfort before surgery and to decrease your nausea after surgery. The carbohydrates in Gatorade/Powerade help reduce your body's stress response to surgery.  If you are a diabetic-drink only water prior to surgery.      Current Visitor policy(12/27/2021) - Patients may have 2 visitors pre and post procedure. Only 2 visitors will be allowed in the Surgical building with the patient.     SPECIAL MEDICATION INSTRUCTIONS: TAKE medications checked off by the Anesthesiologist on your Medication List.    Angiogram Patients: Take medications as instructed by your physician, including aspirin.     Surgery Patients:    If you take ASPIRIN - Your PHYSICIAN/SURGEON will need to inform you IF/OR when you need to stop taking aspirin prior to your surgery.     Do Not take any medications containing IBUPROFEN.    Do Not Wear any make-up (especially eye make-up) to surgery. Please remove any false eyelashes or eyelash extensions. If you arrive the day of surgery with makeup/eyelashes on you will be required to remove prior to surgery. (There is a risk of corneal  abrasions if eye makeup/eyelash extensions are not removed)      Leave all valuables at home.   Do Not wear any jewelry or watches, including any metal in body piercings. Jewelry must be removed prior to coming to the hospital.  There is a possibility that rings that are unable to be removed may be cut off if they are on the surgical extremity.    Please remove all hair extensions, wigs, clips and any other metal accessories/ ornaments from your hair.  These items may pose a flammable/fire risk in Surgery and must be removed.    Do not shave your surgical area at least 5 days prior to your surgery. The surgical prep will be performed at the hospital according to Infection Control regulations.    Contact Lens must be removed before surgery. Either do not wear the contact lens or bring a case and solution for storage.  Please bring a container for eyeglasses or dentures as required.  Bring any paperwork your physician has provided, such as consent forms,  history and physicals, doctor's orders, etc.   Bring comfortable clothes that are loose fitting to wear upon discharge. Take into consideration the type of surgery being performed.  Maintain your diet as advised per your physician the day prior to surgery.      Adequate rest the night before surgery is advised.   Park in the Parking lot behind the hospital or in the [facility] across the street from the parking lot. Parking is complimentary.  If you will be discharged the same day as your procedure, please arrange for a responsible adult to drive you home or to accompany you if traveling by taxi.   YOU WILL NOT BE PERMITTED TO DRIVE OR TO LEAVE THE HOSPITAL ALONE AFTER SURGERY.   If you are being discharged the same day, it is strongly recommended that you arrange for someone to remain with you for the first 24 hrs following your surgery.    The Surgeon will speak to your family/visitor after your surgery regarding the outcome of your surgery and post op  care.  The Surgeon may speak to you after your surgery, but there is a possibility you may not remember the details.  Please check with your family members regarding the conversation with the Surgeon.    We strongly recommend whoever is bringing you home be present for discharge instructions.  This will ensure a thorough understanding for your post op home care.    ALL CHILDREN MUST ALWAYS BE ACCOMPANIED BY AN ADULT.    Visitors-Refer to current Visitor policy handouts.    Thank you for your cooperation.  The Staff of [facility].            Bathing Instructions with Hibiclens    Shower the evening before and morning of your procedure with Hibiclens:  Wash your face with water and your regular face wash/soap  Apply Hibiclens directly on your skin or on a wet washcloth and wash gently. When showering: Move away from the shower stream when applying Hibiclens to avoid rinsing off too soon.  Rinse thoroughly with warm water  Do not dilute Hibiclens        Dry off as usual, do not use any deodorant, powder, body lotions, perfume, after shave or cologne.

## 2022-05-05 NOTE — ANESTHESIA PREPROCEDURE EVALUATION
05/05/2022  Maria Elena Jackson is a 71 y.o., female.      Pre-op Assessment    I have reviewed the Patient Summary Reports.     I have reviewed the Nursing Notes. I have reviewed the NPO Status.   I have reviewed the Medications.     Review of Systems  Anesthesia Hx:  Denies Family Hx of Anesthesia complications.   Denies Personal Hx of Anesthesia complications.   Social:  Non-Smoker    Hematology/Oncology:  Hematology Normal   Oncology Normal     EENT/Dental:EENT/Dental Normal   Cardiovascular:   Hypertension    Pulmonary:  Pulmonary Normal    Renal/:  Renal/ Normal     Hepatic/GI:  Hepatic/GI Normal    Musculoskeletal:  Musculoskeletal Normal    Neurological:  Neurology Normal    Endocrine:  Endocrine Normal    Dermatological:  Skin Normal    Psych:  Psychiatric Normal           Physical Exam    Airway:  Mallampati: III       Dental:  Lower front teeth bridge       Anesthesia Plan  Type of Anesthesia, risks & benefits discussed:    Anesthesia Type: Gen ETT  Intra-op Monitoring Plan: Standard ASA Monitors  Post Op Pain Control Plan: multimodal analgesia  Induction:  IV  Airway Plan: Video  Informed Consent: Informed consent signed with the Patient and all parties understand the risks and agree with anesthesia plan.  All questions answered.   ASA Score: 2  Anesthesia Plan Notes: Cbc today    Ready For Surgery From Anesthesia Perspective.     .

## 2022-05-06 LAB
FINAL PATHOLOGIC DIAGNOSIS: NORMAL
Lab: NORMAL

## 2022-05-06 RX ORDER — DOXAZOSIN 2 MG/1
2 TABLET ORAL EVERY MORNING
Qty: 60 TABLET | Refills: 1 | Status: SHIPPED | OUTPATIENT
Start: 2022-05-06 | End: 2022-05-30 | Stop reason: ALTCHOICE

## 2022-05-09 ENCOUNTER — PATIENT MESSAGE (OUTPATIENT)
Dept: ORTHOPEDICS | Facility: CLINIC | Age: 72
End: 2022-05-09
Payer: OTHER GOVERNMENT

## 2022-05-09 ENCOUNTER — TELEPHONE (OUTPATIENT)
Dept: OBSTETRICS AND GYNECOLOGY | Facility: CLINIC | Age: 72
End: 2022-05-09
Payer: OTHER GOVERNMENT

## 2022-05-09 ENCOUNTER — PATIENT MESSAGE (OUTPATIENT)
Dept: SURGERY | Facility: OTHER | Age: 72
End: 2022-05-09
Payer: OTHER GOVERNMENT

## 2022-05-09 NOTE — TELEPHONE ENCOUNTER
Spoke with pt  Advised pt that she will be meeting with Dr Ellsworth same day of surgery to sign consents. Advised pt per Dr Ellsworth to not tell her family that she has cancer as we do not know what her diagnoses is. Pt is to call her Ortho  provider to see if she needs to take antibiotics prior to D&  C.  Pt verbalized understanding.

## 2022-05-10 ENCOUNTER — HOSPITAL ENCOUNTER (OUTPATIENT)
Facility: OTHER | Age: 72
Discharge: HOME | End: 2022-05-10
Attending: OBSTETRICS & GYNECOLOGY | Admitting: OBSTETRICS & GYNECOLOGY
Payer: OTHER GOVERNMENT

## 2022-05-10 ENCOUNTER — ANESTHESIA (OUTPATIENT)
Dept: SURGERY | Facility: OTHER | Age: 72
End: 2022-05-10
Payer: OTHER GOVERNMENT

## 2022-05-10 VITALS
SYSTOLIC BLOOD PRESSURE: 137 MMHG | BODY MASS INDEX: 30.82 KG/M2 | DIASTOLIC BLOOD PRESSURE: 73 MMHG | WEIGHT: 185 LBS | TEMPERATURE: 97 F | OXYGEN SATURATION: 95 % | HEIGHT: 65 IN | HEART RATE: 73 BPM | RESPIRATION RATE: 16 BRPM

## 2022-05-10 DIAGNOSIS — N95.0 POSTMENOPAUSAL BLEEDING: ICD-10-CM

## 2022-05-10 DIAGNOSIS — Z98.890 STATUS POST HYSTEROSCOPY: Primary | ICD-10-CM

## 2022-05-10 PROBLEM — R93.89 THICKENED ENDOMETRIUM: Status: ACTIVE | Noted: 2022-05-10

## 2022-05-10 PROCEDURE — 58558 HYSTEROSCOPY BIOPSY: CPT | Mod: ,,, | Performed by: OBSTETRICS & GYNECOLOGY

## 2022-05-10 PROCEDURE — 36000707: Performed by: OBSTETRICS & GYNECOLOGY

## 2022-05-10 PROCEDURE — 37000008 HC ANESTHESIA 1ST 15 MINUTES: Performed by: OBSTETRICS & GYNECOLOGY

## 2022-05-10 PROCEDURE — 27201423 OPTIME MED/SURG SUP & DEVICES STERILE SUPPLY: Performed by: OBSTETRICS & GYNECOLOGY

## 2022-05-10 PROCEDURE — 36000706: Performed by: OBSTETRICS & GYNECOLOGY

## 2022-05-10 PROCEDURE — 71000016 HC POSTOP RECOV ADDL HR: Performed by: OBSTETRICS & GYNECOLOGY

## 2022-05-10 PROCEDURE — 25000003 PHARM REV CODE 250

## 2022-05-10 PROCEDURE — 58558 PR HYSTEROSCOPY,W/ENDO BX: ICD-10-PCS | Mod: ,,, | Performed by: OBSTETRICS & GYNECOLOGY

## 2022-05-10 PROCEDURE — 63600175 PHARM REV CODE 636 W HCPCS

## 2022-05-10 PROCEDURE — 88305 TISSUE EXAM BY PATHOLOGIST: CPT | Mod: 26,,, | Performed by: PATHOLOGY

## 2022-05-10 PROCEDURE — 37000009 HC ANESTHESIA EA ADD 15 MINS: Performed by: OBSTETRICS & GYNECOLOGY

## 2022-05-10 PROCEDURE — 25000003 PHARM REV CODE 250: Performed by: OBSTETRICS & GYNECOLOGY

## 2022-05-10 PROCEDURE — 71000033 HC RECOVERY, INTIAL HOUR: Performed by: OBSTETRICS & GYNECOLOGY

## 2022-05-10 PROCEDURE — 88305 TISSUE EXAM BY PATHOLOGIST: ICD-10-PCS | Mod: 26,,, | Performed by: PATHOLOGY

## 2022-05-10 PROCEDURE — 63600175 PHARM REV CODE 636 W HCPCS: Performed by: ANESTHESIOLOGY

## 2022-05-10 PROCEDURE — 88305 TISSUE EXAM BY PATHOLOGIST: CPT | Mod: 59 | Performed by: PATHOLOGY

## 2022-05-10 PROCEDURE — 71000015 HC POSTOP RECOV 1ST HR: Performed by: OBSTETRICS & GYNECOLOGY

## 2022-05-10 RX ORDER — VASOPRESSIN 20 [USP'U]/ML
INJECTION, SOLUTION INTRAMUSCULAR; SUBCUTANEOUS
Status: DISCONTINUED | OUTPATIENT
Start: 2022-05-10 | End: 2022-05-10 | Stop reason: HOSPADM

## 2022-05-10 RX ORDER — PROPOFOL 10 MG/ML
VIAL (ML) INTRAVENOUS
Status: DISCONTINUED | OUTPATIENT
Start: 2022-05-10 | End: 2022-05-10

## 2022-05-10 RX ORDER — IBUPROFEN 600 MG/1
600 TABLET ORAL EVERY 6 HOURS PRN
Qty: 30 TABLET | Refills: 0 | Status: SHIPPED | OUTPATIENT
Start: 2022-05-10 | End: 2022-05-26

## 2022-05-10 RX ORDER — MEPERIDINE HYDROCHLORIDE 25 MG/ML
12.5 INJECTION INTRAMUSCULAR; INTRAVENOUS; SUBCUTANEOUS ONCE AS NEEDED
Status: DISCONTINUED | OUTPATIENT
Start: 2022-05-10 | End: 2022-05-10 | Stop reason: HOSPADM

## 2022-05-10 RX ORDER — VASOPRESSIN 20 [USP'U]/ML
INJECTION, SOLUTION INTRAMUSCULAR; SUBCUTANEOUS
Status: DISCONTINUED | OUTPATIENT
Start: 2022-05-10 | End: 2022-05-10

## 2022-05-10 RX ORDER — ONDANSETRON 2 MG/ML
INJECTION INTRAMUSCULAR; INTRAVENOUS
Status: DISCONTINUED | OUTPATIENT
Start: 2022-05-10 | End: 2022-05-10

## 2022-05-10 RX ORDER — SODIUM CHLORIDE 0.9 % (FLUSH) 0.9 %
3 SYRINGE (ML) INJECTION
Status: DISCONTINUED | OUTPATIENT
Start: 2022-05-10 | End: 2022-05-10 | Stop reason: HOSPADM

## 2022-05-10 RX ORDER — PROCHLORPERAZINE EDISYLATE 5 MG/ML
5 INJECTION INTRAMUSCULAR; INTRAVENOUS EVERY 30 MIN PRN
Status: DISCONTINUED | OUTPATIENT
Start: 2022-05-10 | End: 2022-05-10 | Stop reason: HOSPADM

## 2022-05-10 RX ORDER — MUPIROCIN 20 MG/G
OINTMENT TOPICAL
Status: DISCONTINUED | OUTPATIENT
Start: 2022-05-10 | End: 2022-05-10 | Stop reason: HOSPADM

## 2022-05-10 RX ORDER — FENTANYL CITRATE 50 UG/ML
INJECTION, SOLUTION INTRAMUSCULAR; INTRAVENOUS
Status: DISCONTINUED | OUTPATIENT
Start: 2022-05-10 | End: 2022-05-10

## 2022-05-10 RX ORDER — EPHEDRINE SULFATE 50 MG/ML
INJECTION, SOLUTION INTRAVENOUS
Status: DISCONTINUED | OUTPATIENT
Start: 2022-05-10 | End: 2022-05-10

## 2022-05-10 RX ORDER — LIDOCAINE HYDROCHLORIDE 10 MG/ML
0.5 INJECTION, SOLUTION EPIDURAL; INFILTRATION; INTRACAUDAL; PERINEURAL ONCE
Status: DISCONTINUED | OUTPATIENT
Start: 2022-05-10 | End: 2022-05-10 | Stop reason: HOSPADM

## 2022-05-10 RX ORDER — LIDOCAINE HYDROCHLORIDE 20 MG/ML
INJECTION INTRAVENOUS
Status: DISCONTINUED | OUTPATIENT
Start: 2022-05-10 | End: 2022-05-10

## 2022-05-10 RX ORDER — DEXAMETHASONE SODIUM PHOSPHATE 4 MG/ML
INJECTION, SOLUTION INTRA-ARTICULAR; INTRALESIONAL; INTRAMUSCULAR; INTRAVENOUS; SOFT TISSUE
Status: DISCONTINUED | OUTPATIENT
Start: 2022-05-10 | End: 2022-05-10

## 2022-05-10 RX ORDER — HYDROMORPHONE HYDROCHLORIDE 2 MG/ML
0.4 INJECTION, SOLUTION INTRAMUSCULAR; INTRAVENOUS; SUBCUTANEOUS EVERY 5 MIN PRN
Status: DISCONTINUED | OUTPATIENT
Start: 2022-05-10 | End: 2022-05-10 | Stop reason: HOSPADM

## 2022-05-10 RX ORDER — OXYCODONE HYDROCHLORIDE 5 MG/1
5 TABLET ORAL
Status: DISCONTINUED | OUTPATIENT
Start: 2022-05-10 | End: 2022-05-10 | Stop reason: HOSPADM

## 2022-05-10 RX ORDER — SODIUM CHLORIDE 9 MG/ML
INJECTION, SOLUTION INTRAVENOUS CONTINUOUS
Status: DISCONTINUED | OUTPATIENT
Start: 2022-05-10 | End: 2022-05-10 | Stop reason: HOSPADM

## 2022-05-10 RX ORDER — HYDROCODONE BITARTRATE AND ACETAMINOPHEN 5; 325 MG/1; MG/1
1 TABLET ORAL EVERY 6 HOURS PRN
Qty: 7 TABLET | Refills: 0 | Status: SHIPPED | OUTPATIENT
Start: 2022-05-10 | End: 2022-05-26

## 2022-05-10 RX ORDER — PHENYLEPHRINE HYDROCHLORIDE 10 MG/ML
INJECTION INTRAVENOUS
Status: DISCONTINUED | OUTPATIENT
Start: 2022-05-10 | End: 2022-05-10

## 2022-05-10 RX ORDER — SODIUM CHLORIDE, SODIUM LACTATE, POTASSIUM CHLORIDE, CALCIUM CHLORIDE 600; 310; 30; 20 MG/100ML; MG/100ML; MG/100ML; MG/100ML
INJECTION, SOLUTION INTRAVENOUS CONTINUOUS
Status: DISCONTINUED | OUTPATIENT
Start: 2022-05-10 | End: 2022-05-10 | Stop reason: HOSPADM

## 2022-05-10 RX ADMIN — PHENYLEPHRINE HYDROCHLORIDE 100 MCG: 10 INJECTION INTRAVENOUS at 10:05

## 2022-05-10 RX ADMIN — CALCIUM CHLORIDE 250 MG: 100 INJECTION, SOLUTION INTRAVENOUS at 10:05

## 2022-05-10 RX ADMIN — EPHEDRINE SULFATE 15 MG: 50 INJECTION INTRAVENOUS at 10:05

## 2022-05-10 RX ADMIN — FENTANYL CITRATE 50 MCG: 50 INJECTION, SOLUTION INTRAMUSCULAR; INTRAVENOUS at 10:05

## 2022-05-10 RX ADMIN — GLYCOPYRROLATE 0.2 MG: 0.2 INJECTION, SOLUTION INTRAMUSCULAR; INTRAVITREAL at 10:05

## 2022-05-10 RX ADMIN — DEXAMETHASONE SODIUM PHOSPHATE 8 MG: 4 INJECTION, SOLUTION INTRAMUSCULAR; INTRAVENOUS at 10:05

## 2022-05-10 RX ADMIN — MUPIROCIN: 20 OINTMENT TOPICAL at 08:05

## 2022-05-10 RX ADMIN — EPHEDRINE SULFATE 10 MG: 50 INJECTION INTRAVENOUS at 10:05

## 2022-05-10 RX ADMIN — PROPOFOL 150 MG: 10 INJECTION, EMULSION INTRAVENOUS at 09:05

## 2022-05-10 RX ADMIN — VASOPRESSIN 2 UNITS: 20 INJECTION, SOLUTION INTRAMUSCULAR; SUBCUTANEOUS at 11:05

## 2022-05-10 RX ADMIN — ONDANSETRON HYDROCHLORIDE 4 MG: 2 INJECTION INTRAMUSCULAR; INTRAVENOUS at 10:05

## 2022-05-10 RX ADMIN — LIDOCAINE HYDROCHLORIDE 100 MG: 20 INJECTION, SOLUTION INTRAVENOUS at 09:05

## 2022-05-10 RX ADMIN — VASOPRESSIN 1 UNITS: 20 INJECTION, SOLUTION INTRAMUSCULAR; SUBCUTANEOUS at 11:05

## 2022-05-10 RX ADMIN — SODIUM CHLORIDE, SODIUM LACTATE, POTASSIUM CHLORIDE, AND CALCIUM CHLORIDE: 600; 310; 30; 20 INJECTION, SOLUTION INTRAVENOUS at 09:05

## 2022-05-10 RX ADMIN — SODIUM CHLORIDE, SODIUM LACTATE, POTASSIUM CHLORIDE, AND CALCIUM CHLORIDE: 600; 310; 30; 20 INJECTION, SOLUTION INTRAVENOUS at 10:05

## 2022-05-10 NOTE — TRANSFER OF CARE
"Anesthesia Transfer of Care Note    Patient: Maria Elena Jackson    Procedure(s) Performed: Procedure(s) (LRB):  HYSTEROSCOPY, WITH DILATION AND CURETTAGE OF UTERUS (N/A)    Patient location: PACU    Anesthesia Type: general    Transport from OR: Transported from OR on 2-3 L/min O2 by NC with adequate spontaneous ventilation    Post pain: adequate analgesia    Post assessment: no apparent anesthetic complications    Post vital signs: stable    Level of consciousness: awake and alert    Nausea/Vomiting: no nausea/vomiting    Complications: none    Transfer of care protocol was followed      Last vitals:   Visit Vitals  BP (!) 170/74 (BP Location: Right arm, Patient Position: Lying)   Pulse 63   Temp 36.3 °C (97.3 °F) (Temporal)   Resp 16   Ht 5' 5" (1.651 m)   Wt 83.9 kg (185 lb)   LMP  (LMP Unknown)   SpO2 (!) 94%   Breastfeeding No   BMI 30.79 kg/m²     "

## 2022-05-10 NOTE — DISCHARGE INSTRUCTIONS
Home Care Instruction D&C Hysteroscopy             ACTIVITY LEVEL:  If you received sedation and/or an anesthetic, you may feel sleepy for several hours. Rest until you feel more  awake. Gradually resume your normal activities.    DIET:  At home, continue with liquids. If there is no nausea, you may eat a soft diet and gradually resume a regular diet.    BATHING:  You may shower  as desired in one day.  You should avoid tub baths, hot tubs and swimming pools until seen by your physician for a post-op follow up.    CARE:  You can expect watery or bloody vaginal discharge for several days. Do not use tampons, please only use pads. Sexual activity is restricted as advised by your doctor.    MEDICATIONS:  You will receive instructions for any pain and/or antibiotic prescriptions. Pain medication should be taken only if needed and as directed. Antibiotics, if ordered, should be taken as directed until the entire prescription is completed.    ADDITIONAL INFORMATION:  __________________________________________________________________________________________  WHEN TO CALL THE DOCTOR:   For any heavy vaginal bleeding   Fever over 101°F (38.4°C)   Any lower abdominal pain not relieved by the pain mediation  RETURN APPOINTMENT:  __________________________________________________________________________________________  FOR EMERGENCIES:  If any unusual problems or difficulties occur, contact  ________Seng__________ or the resident at [phone] (page ) or the Clinic office, [phone].

## 2022-05-10 NOTE — ANESTHESIA POSTPROCEDURE EVALUATION
Anesthesia Post Evaluation    Patient: Maria Elena Jackson    Procedure(s) Performed: Procedure(s) (LRB):  HYSTEROSCOPY, WITH DILATION AND CURETTAGE OF UTERUS (N/A)    Final Anesthesia Type: general      Patient location during evaluation: PACU  Patient participation: Yes- Able to Participate  Level of consciousness: awake and alert  Post-procedure vital signs: reviewed and stable  Pain management: adequate  Airway patency: patent    PONV status at discharge: No PONV  Anesthetic complications: no      Cardiovascular status: blood pressure returned to baseline  Respiratory status: unassisted, spontaneous ventilation and room air  Hydration status: euvolemic  Follow-up not needed.          Vitals Value Taken Time   /79 05/10/22 1203   Temp 36.3 °C (97.3 °F) 05/10/22 1131   Pulse 54 05/10/22 1208   Resp 16 05/10/22 1200   SpO2 89 % 05/10/22 1208   Vitals shown include unvalidated device data.      Event Time   Out of Recovery 12:09:00         Pain/Stacy Score: Stacy Score: 10 (5/10/2022 12:00 PM)

## 2022-05-10 NOTE — PLAN OF CARE
Maria Elena Jackson has met all discharge criteria from Phase II. Vital Signs are stable, ambulating  without difficulty. Discharge instructions given, patient verbalized understanding. Discharged from facility via wheelchair in stable condition.

## 2022-05-10 NOTE — OP NOTE
[facility]  Surgery Department  Operative Note      Date of Procedure: 5/11/22    Procedure: HYSTEROSCOPY, WITH DILATION AND CURETTAGE OF UTERUS.  MYOSURE REMOVAL OF UTERINE POLYP AND UTERINE FIBROID    Surgeon:  Marce Ellsworth MD - Primary    Assisting Surgeon: Justina Brunner MD (Resident)    Pre-Operative Diagnosis: Thickened endometrium    Post-Operative Diagnosis: same    Anesthesia: LMA    Findings: Uterus sounds to 8 cm.  Large polyp in endometrial cavity, attached at right mid fundus.  Small 1-2 cm fibroid at fundus in midline.  Otherwise atrophic endometrium.  Bilateral ostia visualized    Procedure in detail:    Patient was taking to the operating room where general anesthesia was administered and found to be adequate.  She was prepped and draped in the dorsal lithotomy position.  The bladder was drained.  A speculum was placed in the vagina.  The anterior lip of the cervix was grasped with a single tooth tenaculum.  The uterus was sounded to approximately 8 cm.  The hysteroscope was advanced through the cervical os and the cavity distended with saline.  The findings as above were noted..  Myosure reach device inserted through the hysteroscopy and the polyp removed in its entirety.  Myosure also used to remove the small fibroid.  Hysteroscope removed and gentle curettage performed.  Hysteroscope replaced and cavity inspected.  Small amount of bleeding noted from site of uterine polyp.  Total 7 cc vasopressin  (20 mg diluted in 100 cc saline) injected with excellent hemostasis noted.  Hysteroscope and tenaculum removed, and no cervical or uterine bleeding noted.  Speculum removed.      Sponge, lap, needle counts were correct x 2.    Complications: No    Estimated Blood Loss (EBL): <50 cc           Specimens: polyp, fibroid, endometrial curettings    Condition: Good    Disposition: PACU - hemodynamically stable.    Attestation: I was present and scrubbed for the entire procedure.

## 2022-05-10 NOTE — OR NURSING
VSS on RA. Pt denies pain. Peripad and PIV C/D/I. Meets Phase I discharge criteria. Ready for transfer to Phase II. Family notified.

## 2022-05-10 NOTE — ANESTHESIA PROCEDURE NOTES
Intubation    Date/Time: 5/10/2022 9:58 AM  Performed by: Calin El CRNA  Authorized by: Tonio Nava MD     Intubation:     Induction:  Intravenous    Intubated:  Postinduction    Mask Ventilation:  N/a    Attempts:  1    Attempted By:  CRNA    Method of Intubation:  Direct    Difficult Airway Encountered?: No      Complications:  None    Airway Device:  Supraglottic airway/LMA (igel)    Airway Device Size:  4.0    Style/Cuff Inflation:  Cuffed (inflated to minimal occlusive pressure)    Secured at:  The lips    Placement Verified By:  Capnometry    Complicating Factors:  None    Findings Post-Intubation:  BS equal bilateral and atraumatic/condition of teeth unchanged

## 2022-05-10 NOTE — INTERVAL H&P NOTE
The patient has been examined and the H&P has been reviewed:    I concur with the findings and no changes have occurred since H&P was written.    Surgery risks, benefits and alternative options discussed and understood by patient/family. Consents signed with patient.    To OR for hysteroscopy D&C    Justina Brunner MD  PGY-1 OB/GYN            Active Hospital Problems    Diagnosis  POA    *Postmenopausal bleeding [N95.0]  Yes    Thickened endometrium [R93.89]  Yes      Resolved Hospital Problems   No resolved problems to display.

## 2022-05-10 NOTE — DISCHARGE SUMMARY
[facility]  Discharge Note  Short Stay    Admit Date: 5/10/2022    Discharge Date: 05/10/2022      Surgery Date: 5/10/2022     Surgeon(s) and Role:     * Marce Ellsworth MD - Primary     * Justina Brunner MD - Resident - Assisting      Pre-op Diagnosis:  Postmenopausal bleeding [N95.0]  Thickened endometrium [R93.89]    Post-op Diagnosis:  Post-Op Diagnosis Codes:     * Postmenopausal bleeding [N95.0]     * Thickened endometrium [R93.89]    Procedure(s) (LRB):  HYSTEROSCOPY, WITH DILATION AND CURETTAGE OF UTERUS (N/A)  REMOVAL OF UTERINE POLYP AND UTERINE FIBROID USING MYOSURE    Anesthesia: Choice    Estimated Blood Loss: 20 mL         Specimens:   Specimen (24h ago, onward)                 Start     Ordered    05/10/22 1103  Specimen to Pathology, Surgery Gynecology and Obstetrics  Once        Comments: 1. Endometrial curettings  2. Endometrial polyp and fibroid   References:    Click here for ordering Quick Tip   Question Answer Comment   Procedure Type: Gynecology and Obstetrics    Specimen Class: Routine/Screening        05/10/22 1117                    Discharge Provider: Justina Brunner    Diagnoses:  Active Hospital Problems    Diagnosis  POA    *Status post hysteroscopy with polypectomy [Z98.890]  Not Applicable    Postmenopausal bleeding [N95.0]  Yes    Thickened endometrium [R93.89]  Yes      Resolved Hospital Problems   No resolved problems to display.       Discharged Condition: good    Hospital Course:   Patient was admitted for outpatient procedure as above, and tolerated the procedure well with no complications. Please see operative report for further details. Following the procedure, the patient was awakened from anesthesia and transferred to the recovery area in stable condition. She was discharged to home once ambulating, voiding, tolerating PO intake, and pain was well-controlled. Patient was given routine post-op instructions and prescriptions for pain medication to take as needed.  Patient instructed to follow up with Dr. Ellsworth in 4-6 weeks.    Final Diagnoses: Same as principal problem.    Disposition: Home or Self Care    Follow up/Patient Instructions:    Medications:  Reconciled Home Medications:      Medication List        START taking these medications      HYDROcodone-acetaminophen 5-325 mg per tablet  Commonly known as: NORCO  Take 1 tablet by mouth every 6 (six) hours as needed for Pain.     ibuprofen 600 MG tablet  Commonly known as: ADVIL,MOTRIN  Take 1 tablet (600 mg total) by mouth every 6 (six) hours as needed for Pain.            CONTINUE taking these medications      atorvastatin 40 MG tablet  Commonly known as: LIPITOR  Take 1 tablet (40 mg total) by mouth every evening.     carvediloL 12.5 MG tablet  Commonly known as: COREG  Take 1 tablet (12.5 mg total) by mouth 2 (two) times daily with meals.     cholecalciferol (vitamin D3) 25 mcg (1,000 unit) capsule  Commonly known as: VITAMIN D3  Take 1,000 Units by mouth once daily.     doxazosin 2 MG tablet  Commonly known as: CARDURA  Take 1 tablet (2 mg total) by mouth every morning.     FLUAD 3602-8952 (65 YR UP)(PF) 45 mcg (15 mcg x 3)/0.5 mL Syrg  Generic drug: flu vac 2019 [id number](PF)     hydroCHLOROthiazide 25 MG tablet  Commonly known as: HYDRODIURIL  Take 25 mg by mouth daily as needed.     irbesartan 150 MG tablet  Commonly known as: AVAPRO  Take 300 mg by mouth once daily.     triamcinolone acetonide 0.1% 0.1 % ointment  Commonly known as: KENALOG  Apply topically.            Discharge Procedure Orders   Pelvic Rest   Order Comments: Until cleared by MD     Notify your health care provider if you experience any of the following:  temperature >100.4     Notify your health care provider if you experience any of the following:  persistent nausea and vomiting or diarrhea     Notify your health care provider if you experience any of the following:  severe uncontrolled pain     Notify your health care provider if you  experience any of the following:  redness, tenderness, or signs of infection (pain, swelling, redness, odor or green/yellow discharge around incision site)     Notify your health care provider if you experience any of the following:  difficulty breathing or increased cough     Notify your health care provider if you experience any of the following:  severe persistent headache     Notify your health care provider if you experience any of the following:  worsening rash     Notify your health care provider if you experience any of the following:  persistent dizziness, light-headedness, or visual disturbances     Notify your health care provider if you experience any of the following:  increased confusion or weakness     Notify your health care provider if you experience any of the following:   Order Comments: Vaginal bleeding that saturates 1 pad in 1 hour for > 1 hour     Activity as tolerated      Follow-up Information       Marce Ellsworth MD Follow up in 4 week(s).    Specialty: Obstetrics and Gynecology  Why: post-op visit  Contact information:  [address] [facility] [address]  [phone]                           Justina Brunner MD  PGY-1 OB/GYN

## 2022-05-11 ENCOUNTER — TELEPHONE (OUTPATIENT)
Dept: OBSTETRICS AND GYNECOLOGY | Facility: CLINIC | Age: 72
End: 2022-05-11
Payer: OTHER GOVERNMENT

## 2022-05-11 NOTE — TELEPHONE ENCOUNTER
Spoke with Pt  Per Dr Ellsworth regarding bumps that have showed on pts arm:  Pt denies any swelling, tingling, numbness, or fever.  If theses symptoms occur, pt has been advised to go to ED for assessment.  Pt verbalized understading understanding.

## 2022-05-11 NOTE — TELEPHONE ENCOUNTER
Spoke with pt    Pt reports recovering well after procedure.  Pt is passing gas, had BM, and does not report any constipation.  Pt is urinating fine.  Denies any fever, vomiting, bleeding, discharge, or sign of infection.    Pt scheduled for Post op in 2 weeks        Pt verbalized understanding.

## 2022-05-20 ENCOUNTER — PATIENT MESSAGE (OUTPATIENT)
Dept: OBSTETRICS AND GYNECOLOGY | Facility: CLINIC | Age: 72
End: 2022-05-20
Payer: OTHER GOVERNMENT

## 2022-05-20 NOTE — TELEPHONE ENCOUNTER
Spoke with pt  Pt advised that her pathology from Surgery is still in process. Pt advised that Dr Ellsworth will message her once her results are in from the lab.  Pt verblized understanding.

## 2022-05-23 ENCOUNTER — TELEPHONE (OUTPATIENT)
Dept: OBSTETRICS AND GYNECOLOGY | Facility: CLINIC | Age: 72
End: 2022-05-23
Payer: OTHER GOVERNMENT

## 2022-05-23 DIAGNOSIS — N85.02 COMPLEX ENDOMETRIAL HYPERPLASIA WITH ATYPIA: Primary | ICD-10-CM

## 2022-05-23 LAB
FINAL PATHOLOGIC DIAGNOSIS: NORMAL
GROSS: NORMAL
Lab: NORMAL

## 2022-05-23 NOTE — TELEPHONE ENCOUNTER
----- Message from Parish Hull MA sent at 5/23/2022  2:00 PM CDT -----  Regarding: FW: Returning Call  Contact: Martin  Please advise...  Thank you  parish  ----- Message -----  From: Corinne E Coniglio, RN  Sent: 5/23/2022   1:52 PM CDT  To: [facility] Staff  Subject: FW: Returning Call                               She may be returning your call, We do not have a referral for oncology for patient.  Corinne  ----- Message -----  From: Rand Bay RN  Sent: 5/23/2022   1:36 PM CDT  To: Corinne E Coniglio, RN  Subject: RE: Returning Call                               Hi,  I don't see any encounters to this pt from our dept. It looks like she is a pt of ARIEL Ellsworth. I also don't see a referral for any oncology service for her.    Rand  ----- Message -----  From: Corinne E Coniglio, RN  Sent: 5/23/2022  12:23 PM CDT  To: Rand Bay RN  Subject: FW: Returning Call                                 ----- Message -----  From: Gabe Murdock  Sent: 5/23/2022  12:04 PM CDT  To: [facility]  Subject: Returning Call                                   Consult/Advisory:           Name Of Caller: Martin  Contact Preference?: [phone]      What is the nature of the call?:    Pt says she received two missed calls for oncology scheduling and is returning the call        As always, thank you for all that you do for our patients!

## 2022-05-23 NOTE — TELEPHONE ENCOUNTER
Spoke with pt  Explained to pt that the results that were uploaded to her chart are the PICU telemetry results and Dr Ellsworth would not be get that sent to her to manage.  Staff explained that pts pathlolgy results can take up to 14 business days to results.  Dr Ellsworth office is looking for results and will let pt know as soon as they are in.  Pt verbal;inzed understanding.

## 2022-05-23 NOTE — TELEPHONE ENCOUNTER
Called patient and discussed path report, and referral to GYN ONC.    Patient with only minimal spotting since surgery and is feeling well.

## 2022-05-23 NOTE — TELEPHONE ENCOUNTER
Spoke with our patient about her insurance schedule appointment in gyn oncology she agreed she voiced understanding of the date, time and location. All questions answered . MA/AIMEE /Preceptor Darrick JALLOH

## 2022-05-26 ENCOUNTER — OFFICE VISIT (OUTPATIENT)
Dept: GYNECOLOGIC ONCOLOGY | Facility: CLINIC | Age: 72
End: 2022-05-26
Payer: OTHER GOVERNMENT

## 2022-05-26 VITALS
BODY MASS INDEX: 31.25 KG/M2 | SYSTOLIC BLOOD PRESSURE: 190 MMHG | WEIGHT: 187.81 LBS | DIASTOLIC BLOOD PRESSURE: 81 MMHG | HEART RATE: 59 BPM

## 2022-05-26 DIAGNOSIS — N85.02 COMPLEX ATYPICAL ENDOMETRIAL HYPERPLASIA: Primary | ICD-10-CM

## 2022-05-26 DIAGNOSIS — N85.02 COMPLEX ENDOMETRIAL HYPERPLASIA WITH ATYPIA: ICD-10-CM

## 2022-05-26 PROCEDURE — 99215 PR OFFICE/OUTPT VISIT, EST, LEVL V, 40-54 MIN: ICD-10-PCS | Mod: S$GLB,,, | Performed by: OBSTETRICS & GYNECOLOGY

## 2022-05-26 PROCEDURE — 99999 PR PBB SHADOW E&M-EST. PATIENT-LVL IV: CPT | Mod: PBBFAC,,, | Performed by: OBSTETRICS & GYNECOLOGY

## 2022-05-26 PROCEDURE — 99999 PR PBB SHADOW E&M-EST. PATIENT-LVL IV: ICD-10-PCS | Mod: PBBFAC,,, | Performed by: OBSTETRICS & GYNECOLOGY

## 2022-05-26 PROCEDURE — 99215 OFFICE O/P EST HI 40 MIN: CPT | Mod: S$GLB,,, | Performed by: OBSTETRICS & GYNECOLOGY

## 2022-05-26 NOTE — PROGRESS NOTES
REFERRING PROVIDER  Marce Ellsworth MD     HISTORY OF PRESENT CONDITION  CC: Complex Atypical Hyperplasia  Maria Elena Jackson is a 71 y.o. referred by Dr. Ellsworth for complex atypical hyperplasia.  In February she developed pelvic pressure and spotting.  No other symptoms.  Dr. Ellsworth work-up and treatment is noted below.     DATA REVIEWED  4/27/2022 EmBx: No endometrial tissue present, uterus sounded to 7 cm; Pap:  NILM    4/29/2022 US Pelvis:  Uterus 8 cm x 4 cm x 4.9 cm; Intramural calcified fibroids are identified in the anterior body near the fundus.  They measure 1.4 cm and 1.8 cm maximum diameter.  The endometrium is irregularly thickened at 2.7 cm in this postmenopausal patient.  It is heterogeneously echogenic with hypoechoic foci throughout.  Ovaries not visualized    5/10/2022 D&C:    FINDINGS:  Uterus sounds to 8 cm.  Large polyp in endometrial cavity, attached at right mid fundus.  Small 1-2 cm fibroid at fundus in midline.  Otherwise atrophic endometrium.  Bilateral ostia visualized.  PATHOLOGY:  Complex hyperplasia with atypia arising in association with a polyp    Past Medical History:   Diagnosis Date    Arthritis     Hyperlipidemia     Hypertension       Current Outpatient Medications   Medication Sig    atorvastatin (LIPITOR) 40 MG tablet Take 1 tablet (40 mg total) by mouth every evening.    carvediloL (COREG) 12.5 MG tablet Take 1 tablet (12.5 mg total) by mouth 2 (two) times daily with meals.    cholecalciferol, vitamin D3, (VITAMIN D3) 25 mcg (1,000 unit) capsule Take 1,000 Units by mouth once daily.    doxazosin (CARDURA) 2 MG tablet Take 1 tablet (2 mg total) by mouth every morning.    hydroCHLOROthiazide (HYDRODIURIL) 25 MG tablet Take 25 mg by mouth daily as needed.    irbesartan (AVAPRO) 150 MG tablet Take 300 mg by mouth once daily.    triamcinolone acetonide 0.1% (KENALOG) 0.1 % ointment Apply topically.    FLUAD 8549-7794, 65 YR UP,,PF, 45 mcg (15 mcg x 3)/0.5 mL Syrg      HYDROcodone-acetaminophen (NORCO) 5-325 mg per tablet Take 1 tablet by mouth every 6 (six) hours as needed for Pain. (Patient not taking: Reported on 5/26/2022)    ibuprofen (ADVIL,MOTRIN) 600 MG tablet Take 1 tablet (600 mg total) by mouth every 6 (six) hours as needed for Pain. (Patient not taking: Reported on 5/26/2022)     No current facility-administered medications for this visit.     Review of patient's allergies indicates:  No Known Allergies    Past Surgical History:   Procedure Laterality Date    CHOLECYSTECTOMY      D&C Hysteroscopy      DILATION AND CURETTAGE OF UTERUS      HYSTEROSCOPY WITH DILATION AND CURETTAGE OF UTERUS N/A 5/10/2022    Procedure: HYSTEROSCOPY, WITH DILATION AND CURETTAGE OF UTERUS;  Surgeon: Marce Ellsworth MD;  [facility]: [facility] OR;  Service: OB/GYN;  Laterality: N/A;    JOINT REPLACEMENT Right     laparoscopy      TONSILLECTOMY      TOTAL KNEE ARTHROPLASTY Right 10/7/2019    Procedure: ARTHROPLASTY, KNEE, TOTAL;  Surgeon: John L. [facility] Jr., MD;  Location: [facility] OR;  Service: Orthopedics;  Laterality: Right;        FAMILY HISTORY  Family History   Problem Relation Age of Onset    Colon cancer Mother 55    Ovarian cancer Mother 35    Breast cancer Neg Hx        SOCIAL HISTORY    reports that she quit smoking about 32 years ago. She has a 33.00 pack-year smoking history. She has never used smokeless tobacco. She reports current alcohol use. She reports that she does not use drugs.    REVIEW OF SYSTEMS  Review of Systems   Constitutional: Negative for activity change, appetite change, chills, fatigue, fever and unexpected weight change.   Respiratory: Negative for cough, chest tightness and shortness of breath.    Cardiovascular: Negative for chest pain, palpitations and leg swelling.   Gastrointestinal: Negative for abdominal distention, abdominal pain, blood in stool, constipation, diarrhea, nausea and vomiting.   Genitourinary: Positive for vaginal bleeding.  Negative for dyspareunia, dysuria, frequency, hematuria, pelvic pain and urgency.   Musculoskeletal: Negative for arthralgias and myalgias.   Skin: Negative for color change and rash.   Neurological: Negative for dizziness, weakness and light-headedness.   Hematological: Negative for adenopathy.   Psychiatric/Behavioral: Negative for decreased concentration, dysphoric mood and sleep disturbance. The patient is nervous/anxious.      OBJECTIVE   Vitals:    05/26/22 0909   BP: (!) 190/81   Pulse: (!) 59      Body mass index is 31.25 kg/m².     Physical Exam:   Constitutional: She is oriented to person, place, and time. She appears well-developed and well-nourished.    HENT:   Head: Normocephalic and atraumatic.    Eyes: Conjunctivae and EOM are normal. No scleral icterus.     Cardiovascular: Normal rate and regular rhythm.  Exam reveals no cyanosis and no edema.     Pulmonary/Chest: Effort normal and breath sounds normal. No respiratory distress.                  Musculoskeletal: Moves all extremeties.       Neurological: She is alert and oriented to person, place, and time.    Skin: No rash noted. No cyanosis or erythema.    Psychiatric: She has a normal mood and affect.     ECOG status: 0    LABORATORY DATA  Lab data reviewed.    RADIOLOGICAL DATA  Radiology data reviewed.    PATHOLOGY DATA  Pathology data reviewed.    ASSESSMENT    1. Complex atypical endometrial hyperplasia    2. Complex endometrial hyperplasia with atypia    I had an extensive conversation with the patient today giving a broad overview of endometrial hyperplasia and cancer to include epidemiology, risk factors, clinical features, diagnosis, as well as surgical treatment.  We discussed that patients with complex atypical hyperplasia have an approximately 40% risk of having concurrent endometrial cancer.  I have recommended robotic-assisted hysterectomy, bilateral salpingo-oophorectomy and sentinel lymph node mapping and biopsy.  Based on the data  from surgery we will determine whether adjuvant therapy would be recommended.  We will also have a better-informed discussion about prognosis, though most patients presenting with endometrial hyperplasia and grade 1 endometrial cancer have an excellent prognosis and will be cured by surgery alone.    I discussed extensively the risks, benefits, alternatives, and indications of the planned procedure to include the risk of damage to bowel, bladder, ureter, or any other abdominal or pelvic organ.  Additionally, she understands the standard surgical risks of infection, allergic reaction, bleeding possibly severe enough to require blood transfusion.  She expresses understanding, was given an opportunity to ask any questions, and now she strongly desires to proceed with planned procedure.  Informed consent was signed and a copy was given to the patient as well as the ACOG FAQs on endometrial hyperplasia.        PLAN  1.  Schedule robotic assisted total hysterectomy, bso, sln biopsy  2.  Anesthesia preop   3.  F/u with PCP for urgent BP management        Kevin Mcgraw MD

## 2022-05-30 ENCOUNTER — LAB VISIT (OUTPATIENT)
Dept: LAB | Facility: OTHER | Age: 72
End: 2022-05-30
Attending: PHYSICIAN ASSISTANT
Payer: OTHER GOVERNMENT

## 2022-05-30 ENCOUNTER — OFFICE VISIT (OUTPATIENT)
Dept: INTERNAL MEDICINE | Facility: CLINIC | Age: 72
End: 2022-05-30
Payer: OTHER GOVERNMENT

## 2022-05-30 VITALS
HEART RATE: 76 BPM | OXYGEN SATURATION: 96 % | HEIGHT: 65 IN | WEIGHT: 185.19 LBS | DIASTOLIC BLOOD PRESSURE: 72 MMHG | BODY MASS INDEX: 30.85 KG/M2 | SYSTOLIC BLOOD PRESSURE: 168 MMHG

## 2022-05-30 DIAGNOSIS — I10 HYPERTENSION, UNSPECIFIED TYPE: Primary | ICD-10-CM

## 2022-05-30 DIAGNOSIS — I10 HYPERTENSION, UNSPECIFIED TYPE: ICD-10-CM

## 2022-05-30 LAB
ALBUMIN SERPL BCP-MCNC: 3.7 G/DL (ref 3.5–5.2)
ALP SERPL-CCNC: 91 U/L (ref 55–135)
ALT SERPL W/O P-5'-P-CCNC: 19 U/L (ref 10–44)
ANION GAP SERPL CALC-SCNC: 7 MMOL/L (ref 8–16)
AST SERPL-CCNC: 17 U/L (ref 10–40)
BILIRUB SERPL-MCNC: 0.5 MG/DL (ref 0.1–1)
BUN SERPL-MCNC: 14 MG/DL (ref 8–23)
CALCIUM SERPL-MCNC: 9.8 MG/DL (ref 8.7–10.5)
CHLORIDE SERPL-SCNC: 104 MMOL/L (ref 95–110)
CO2 SERPL-SCNC: 31 MMOL/L (ref 23–29)
CREAT SERPL-MCNC: 0.8 MG/DL (ref 0.5–1.4)
EST. GFR  (AFRICAN AMERICAN): >60 ML/MIN/1.73 M^2
EST. GFR  (NON AFRICAN AMERICAN): >60 ML/MIN/1.73 M^2
GLUCOSE SERPL-MCNC: 110 MG/DL (ref 70–110)
POTASSIUM SERPL-SCNC: 3.8 MMOL/L (ref 3.5–5.1)
PROT SERPL-MCNC: 7 G/DL (ref 6–8.4)
SODIUM SERPL-SCNC: 142 MMOL/L (ref 136–145)

## 2022-05-30 PROCEDURE — 99999 PR PBB SHADOW E&M-EST. PATIENT-LVL IV: ICD-10-PCS | Mod: PBBFAC,,, | Performed by: PHYSICIAN ASSISTANT

## 2022-05-30 PROCEDURE — 99999 PR PBB SHADOW E&M-EST. PATIENT-LVL IV: CPT | Mod: PBBFAC,,, | Performed by: PHYSICIAN ASSISTANT

## 2022-05-30 PROCEDURE — 99214 OFFICE O/P EST MOD 30 MIN: CPT | Mod: S$GLB,,, | Performed by: PHYSICIAN ASSISTANT

## 2022-05-30 PROCEDURE — 99214 PR OFFICE/OUTPT VISIT, EST, LEVL IV, 30-39 MIN: ICD-10-PCS | Mod: S$GLB,,, | Performed by: PHYSICIAN ASSISTANT

## 2022-05-30 PROCEDURE — 36415 COLL VENOUS BLD VENIPUNCTURE: CPT | Performed by: PHYSICIAN ASSISTANT

## 2022-05-30 PROCEDURE — 80053 COMPREHEN METABOLIC PANEL: CPT | Performed by: PHYSICIAN ASSISTANT

## 2022-05-30 RX ORDER — IRBESARTAN AND HYDROCHLOROTHIAZIDE 300; 12.5 MG/1; MG/1
1 TABLET, FILM COATED ORAL DAILY
Qty: 30 TABLET | Refills: 11 | Status: SHIPPED | OUTPATIENT
Start: 2022-05-30 | End: 2022-08-15 | Stop reason: SDUPTHER

## 2022-05-30 NOTE — PATIENT INSTRUCTIONS
Stop taking Doxazosin (Cardura)   Start taking Irbesartan 300-HCTZ 12.5mg (Avalide)   Please limit your dietary sodium intake   Please return to clinic on 2 weeks for a follow-up appointment to check your blood work and your blood pressure.   Please let me know if you develop any symptoms or have any questions or concerns.     Thank you for allowing me to participate in your healthcare.     Luann Davies PA-C

## 2022-05-30 NOTE — PROGRESS NOTES
"INTERNAL MEDICINE URGENT VISIT NOTE    CHIEF COMPLAINT     Chief Complaint   Patient presents with    OTHER     "My BP was high on Thursday when I went to see the Oncologist"Need to Establish care with a PCP       HPI     Maria Elena Jackson is a 71 y.o. female who presents for an urgent visit today.    PCP is Jluia Dinh MD, patient is new to me.     Patient presnets with complaints of elevated blood pressure.   She is currently taking Coreg 12.5 bid  Avapro 300mg daily   HCTZ 25 mg PRN (about 3 times a year)  Cardura 2mg daily     BP med history:   Was taking cardizem; BP wasn't controlled on this so was changed to Nifedipine  Nifedipine caused swelling; so she was changed to Coreg. Swelling resolved when she stopped nifedipine.   Has never taken ACEi that she knows of     Last CMP was Jan 2022  No history of MI or CHF or CAD.     She has no symptoms of CP, SOB, HA, nausea, vomiting, dizziness.       Past Medical History:  Past Medical History:   Diagnosis Date    Arthritis     Hyperlipidemia     Hypertension        Home Medications:  Prior to Admission medications    Medication Sig Start Date End Date Taking? Authorizing Provider   atorvastatin (LIPITOR) 40 MG tablet Take 1 tablet (40 mg total) by mouth every evening. 2/21/22 2/21/23 Yes Julia Dinh MD   carvediloL (COREG) 12.5 MG tablet Take 1 tablet (12.5 mg total) by mouth 2 (two) times daily with meals. 2/21/22 2/21/23 Yes Julia Dinh MD   cholecalciferol, vitamin D3, (VITAMIN D3) 25 mcg (1,000 unit) capsule Take 1,000 Units by mouth once daily.   Yes Historical Provider   doxazosin (CARDURA) 2 MG tablet Take 1 tablet (2 mg total) by mouth every morning. 5/6/22  Yes Julia Dinh MD   hydroCHLOROthiazide (HYDRODIURIL) 25 MG tablet Take 25 mg by mouth daily as needed.   Yes Historical Provider   irbesartan (AVAPRO) 150 MG tablet Take 300 mg by mouth once daily. 9/7/20  Yes Historical Provider   triamcinolone acetonide 0.1% " "(KENALOG) 0.1 % ointment Apply topically. 2/8/22  Yes Historical Provider       Review of Systems:  Review of Systems   Constitutional: Negative for chills and fever.   HENT: Negative for sore throat and trouble swallowing.    Eyes: Negative for visual disturbance.   Respiratory: Negative for cough and shortness of breath.    Cardiovascular: Negative for chest pain.        Elevated blood pressure    Gastrointestinal: Negative for abdominal pain, constipation, diarrhea, nausea and vomiting.   Genitourinary: Negative for dysuria and flank pain.   Musculoskeletal: Negative for back pain, neck pain and neck stiffness.   Skin: Negative for rash.   Neurological: Negative for dizziness, syncope, weakness and headaches.   Psychiatric/Behavioral: Negative for confusion.       Health Maintainence:   Immunizations:  Health Maintenance       Date Due Completion Date    TETANUS VACCINE Never done ---    DEXA Scan Never done ---    Colorectal Cancer Screening Never done ---    Shingles Vaccine (2 of 2) 03/13/2013 1/16/2013    Pneumococcal Vaccines (Age 65+) (1 - PCV) Never done ---    COVID-19 Vaccine (4 - Booster for Pfizer series) 02/19/2022 10/19/2021    Mammogram 01/11/2023 1/11/2022    Lipid Panel 01/11/2027 1/11/2022           PHYSICAL EXAM     BP (!) 168/72 (BP Location: Right arm, Patient Position: Sitting, BP Method: Large (Manual))   Pulse 76   Ht 5' 5" (1.651 m)   Wt 84 kg (185 lb 3 oz)   LMP  (LMP Unknown)   SpO2 96%   BMI 30.82 kg/m²     Physical Exam  Vitals and nursing note reviewed.   Constitutional:       Appearance: Normal appearance.      Comments: Healthy appearing female in NAD or apparent pain. She makes good eye contact, speaks in clear full sentences and ambulates with ease.      HENT:      Head: Normocephalic and atraumatic.      Nose: Nose normal.      Mouth/Throat:      Pharynx: Oropharynx is clear.   Eyes:      Conjunctiva/sclera: Conjunctivae normal.   Cardiovascular:      Rate and Rhythm: " Normal rate and regular rhythm.      Pulses: Normal pulses.   Pulmonary:      Effort: No respiratory distress.   Abdominal:      Tenderness: There is no abdominal tenderness.   Musculoskeletal:         General: Normal range of motion.      Cervical back: No rigidity.   Skin:     General: Skin is warm and dry.      Capillary Refill: Capillary refill takes less than 2 seconds.      Findings: No rash.   Neurological:      General: No focal deficit present.      Mental Status: She is alert.      Gait: Gait normal.   Psychiatric:         Mood and Affect: Mood normal.         LABS     Lab Results   Component Value Date    HGBA1C 5.7 (H) 01/11/2022     CMP  Sodium   Date Value Ref Range Status   01/11/2022 139 136 - 145 mmol/L Final     Potassium   Date Value Ref Range Status   01/11/2022 3.7 3.5 - 5.1 mmol/L Final     Chloride   Date Value Ref Range Status   01/11/2022 103 95 - 110 mmol/L Final     CO2   Date Value Ref Range Status   01/11/2022 26 23 - 29 mmol/L Final     Glucose   Date Value Ref Range Status   01/11/2022 88 70 - 110 mg/dL Final     BUN   Date Value Ref Range Status   01/11/2022 14 8 - 23 mg/dL Final     Creatinine   Date Value Ref Range Status   01/11/2022 0.7 0.5 - 1.4 mg/dL Final     Calcium   Date Value Ref Range Status   01/11/2022 9.8 8.7 - 10.5 mg/dL Final     Total Protein   Date Value Ref Range Status   01/11/2022 7.3 6.0 - 8.4 g/dL Final     Albumin   Date Value Ref Range Status   01/11/2022 3.8 3.5 - 5.2 g/dL Final     Total Bilirubin   Date Value Ref Range Status   01/11/2022 0.3 0.1 - 1.0 mg/dL Final     Comment:     For infants and newborns, interpretation of results should be based  on gestational age, weight and in agreement with clinical  observations.    Premature Infant recommended reference ranges:  Up to 24 hours.............<8.0 mg/dL  Up to 48 hours............<12.0 mg/dL  3-5 days..................<15.0 mg/dL  6-29 days.................<15.0 mg/dL       Alkaline Phosphatase   Date  Value Ref Range Status   01/11/2022 92 55 - 135 U/L Final     AST   Date Value Ref Range Status   01/11/2022 20 10 - 40 U/L Final     ALT   Date Value Ref Range Status   01/11/2022 24 10 - 44 U/L Final     Anion Gap   Date Value Ref Range Status   01/11/2022 10 8 - 16 mmol/L Final     eGFR if    Date Value Ref Range Status   01/11/2022 >60.0 >60 mL/min/1.73 m^2 Final     eGFR if non    Date Value Ref Range Status   01/11/2022 >60.0 >60 mL/min/1.73 m^2 Final     Comment:     Calculation used to obtain the estimated glomerular filtration  rate (eGFR) is the CKD-EPI equation.        Lab Results   Component Value Date    WBC 6.90 05/05/2022    HGB 15.0 05/05/2022    HCT 44.8 05/05/2022    MCV 94 05/05/2022     05/05/2022     Lab Results   Component Value Date    CHOL 181 01/11/2022     Lab Results   Component Value Date    HDL 47 01/11/2022     Lab Results   Component Value Date    LDLCALC 113.2 01/11/2022     Lab Results   Component Value Date    TRIG 104 01/11/2022     Lab Results   Component Value Date    CHOLHDL 26.0 01/11/2022     No results found for: TSH, M9IYFPB, H7WROSJ, THYROIDAB    ASSESSMENT/PLAN     Maria Elena Jackson is a 71 y.o. female      Maria Elena was seen today for HTN. She has uncontrolled BP on her current medication regimen. Will stop Cardura and start on combo of irbesartan and HCTZ. Will check labs today and again in 2 weeks to ensure K and renal function are stable. She is educated on importance of decreasing dietary Na. She is aware of ED prompts. Will plan to establish care with new PCP.     Diagnoses and all orders for this visit:    Hypertension, unspecified type  -     Comprehensive Metabolic Panel; Future    Other orders  -     irbesartan-hydrochlorothiazide (AVALIDE) 300-12.5 mg per tablet; Take 1 tablet by mouth once daily.       Patient was counseled on when and how to seek emergent care.       Luann Davies PA-C   Department of Internal  Medicine - [facility]   7:41 AM

## 2022-06-01 ENCOUNTER — PATIENT MESSAGE (OUTPATIENT)
Dept: GYNECOLOGIC ONCOLOGY | Facility: CLINIC | Age: 72
End: 2022-06-01
Payer: OTHER GOVERNMENT

## 2022-06-02 ENCOUNTER — TELEPHONE (OUTPATIENT)
Dept: GYNECOLOGIC ONCOLOGY | Facility: CLINIC | Age: 72
End: 2022-06-02
Payer: OTHER GOVERNMENT

## 2022-06-02 DIAGNOSIS — N85.02 COMPLEX ENDOMETRIAL HYPERPLASIA WITH ATYPIA: Primary | ICD-10-CM

## 2022-06-03 NOTE — TELEPHONE ENCOUNTER
6/29/22 Surgery date confirmed with patient. Pre surgery education provided at 5/26/22 clinic visit. Pt has no additional questions/concerns regarding teaching. Pre admit appt schedule. All questions answered.

## 2022-06-06 ENCOUNTER — PATIENT MESSAGE (OUTPATIENT)
Dept: SURGERY | Facility: OTHER | Age: 72
End: 2022-06-06
Payer: OTHER GOVERNMENT

## 2022-06-12 ENCOUNTER — PATIENT MESSAGE (OUTPATIENT)
Dept: INTERNAL MEDICINE | Facility: CLINIC | Age: 72
End: 2022-06-12
Payer: OTHER GOVERNMENT

## 2022-06-13 ENCOUNTER — LAB VISIT (OUTPATIENT)
Dept: LAB | Facility: OTHER | Age: 72
End: 2022-06-13
Attending: PHYSICIAN ASSISTANT
Payer: OTHER GOVERNMENT

## 2022-06-13 ENCOUNTER — OFFICE VISIT (OUTPATIENT)
Dept: INTERNAL MEDICINE | Facility: CLINIC | Age: 72
End: 2022-06-13
Payer: OTHER GOVERNMENT

## 2022-06-13 VITALS
OXYGEN SATURATION: 96 % | DIASTOLIC BLOOD PRESSURE: 62 MMHG | WEIGHT: 184.31 LBS | BODY MASS INDEX: 30.67 KG/M2 | HEART RATE: 65 BPM | SYSTOLIC BLOOD PRESSURE: 130 MMHG

## 2022-06-13 DIAGNOSIS — I10 HYPERTENSION, UNSPECIFIED TYPE: ICD-10-CM

## 2022-06-13 DIAGNOSIS — E78.5 HYPERLIPIDEMIA, UNSPECIFIED HYPERLIPIDEMIA TYPE: ICD-10-CM

## 2022-06-13 DIAGNOSIS — I10 HYPERTENSION, UNSPECIFIED TYPE: Primary | ICD-10-CM

## 2022-06-13 LAB
ALBUMIN SERPL BCP-MCNC: 3.9 G/DL (ref 3.5–5.2)
ALP SERPL-CCNC: 95 U/L (ref 55–135)
ALT SERPL W/O P-5'-P-CCNC: 24 U/L (ref 10–44)
ANION GAP SERPL CALC-SCNC: 8 MMOL/L (ref 8–16)
ANION GAP SERPL CALC-SCNC: 8 MMOL/L (ref 8–16)
AST SERPL-CCNC: 20 U/L (ref 10–40)
BILIRUB SERPL-MCNC: 0.4 MG/DL (ref 0.1–1)
BUN SERPL-MCNC: 22 MG/DL (ref 8–23)
BUN SERPL-MCNC: 22 MG/DL (ref 8–23)
CALCIUM SERPL-MCNC: 10.2 MG/DL (ref 8.7–10.5)
CALCIUM SERPL-MCNC: 10.2 MG/DL (ref 8.7–10.5)
CHLORIDE SERPL-SCNC: 103 MMOL/L (ref 95–110)
CHLORIDE SERPL-SCNC: 103 MMOL/L (ref 95–110)
CO2 SERPL-SCNC: 31 MMOL/L (ref 23–29)
CO2 SERPL-SCNC: 31 MMOL/L (ref 23–29)
CREAT SERPL-MCNC: 0.9 MG/DL (ref 0.5–1.4)
CREAT SERPL-MCNC: 0.9 MG/DL (ref 0.5–1.4)
EST. GFR  (AFRICAN AMERICAN): >60 ML/MIN/1.73 M^2
EST. GFR  (AFRICAN AMERICAN): >60 ML/MIN/1.73 M^2
EST. GFR  (NON AFRICAN AMERICAN): >60 ML/MIN/1.73 M^2
EST. GFR  (NON AFRICAN AMERICAN): >60 ML/MIN/1.73 M^2
GLUCOSE SERPL-MCNC: 89 MG/DL (ref 70–110)
GLUCOSE SERPL-MCNC: 89 MG/DL (ref 70–110)
POTASSIUM SERPL-SCNC: 4 MMOL/L (ref 3.5–5.1)
POTASSIUM SERPL-SCNC: 4 MMOL/L (ref 3.5–5.1)
PROT SERPL-MCNC: 7.4 G/DL (ref 6–8.4)
SODIUM SERPL-SCNC: 142 MMOL/L (ref 136–145)
SODIUM SERPL-SCNC: 142 MMOL/L (ref 136–145)

## 2022-06-13 PROCEDURE — 99999 PR PBB SHADOW E&M-EST. PATIENT-LVL III: ICD-10-PCS | Mod: PBBFAC,,, | Performed by: PHYSICIAN ASSISTANT

## 2022-06-13 PROCEDURE — 80053 COMPREHEN METABOLIC PANEL: CPT | Performed by: STUDENT IN AN ORGANIZED HEALTH CARE EDUCATION/TRAINING PROGRAM

## 2022-06-13 PROCEDURE — 36415 COLL VENOUS BLD VENIPUNCTURE: CPT | Performed by: STUDENT IN AN ORGANIZED HEALTH CARE EDUCATION/TRAINING PROGRAM

## 2022-06-13 PROCEDURE — 99214 PR OFFICE/OUTPT VISIT, EST, LEVL IV, 30-39 MIN: ICD-10-PCS | Mod: S$GLB,,, | Performed by: PHYSICIAN ASSISTANT

## 2022-06-13 PROCEDURE — 99214 OFFICE O/P EST MOD 30 MIN: CPT | Mod: S$GLB,,, | Performed by: PHYSICIAN ASSISTANT

## 2022-06-13 PROCEDURE — 99999 PR PBB SHADOW E&M-EST. PATIENT-LVL III: CPT | Mod: PBBFAC,,, | Performed by: PHYSICIAN ASSISTANT

## 2022-06-13 NOTE — PROGRESS NOTES
INTERNAL MEDICINE PROGRESS NOTE    CHIEF COMPLAINT     Chief Complaint   Patient presents with    Hypertension       HPI     Maria Elena Jackson is a 71 y.o. female who presents for a follow-up visit today.    PCP is Julia Dinh MD, patient is known to me.     Patient presents for BP follow-up. She was last seen by me for BP mgmt on 5/30/2022. Her BP meds were adjusted at that time. She was started on combination irbesartan and HCTZ and her Cardura was stopped.     Today she presents feeling great. Has been compliant with BP medication. BP is in good range today at 130/62.  Will re-check labs to ensure Renal function and K are stable.     Past Medical History:  Past Medical History:   Diagnosis Date    Arthritis     Hyperlipidemia     Hypertension        Home Medications:  Prior to Admission medications    Medication Sig Start Date End Date Taking? Authorizing Provider   atorvastatin (LIPITOR) 40 MG tablet Take 1 tablet (40 mg total) by mouth every evening. 2/21/22 2/21/23  Julia Dinh MD   carvediloL (COREG) 12.5 MG tablet Take 1 tablet (12.5 mg total) by mouth 2 (two) times daily with meals. 2/21/22 2/21/23  Julia Dinh MD   cholecalciferol, vitamin D3, (VITAMIN D3) 25 mcg (1,000 unit) capsule Take 1,000 Units by mouth once daily.    Historical Provider   hydroCHLOROthiazide (HYDRODIURIL) 25 MG tablet Take 25 mg by mouth daily as needed.    Historical Provider   irbesartan-hydrochlorothiazide (AVALIDE) 300-12.5 mg per tablet Take 1 tablet by mouth once daily. 5/30/22 5/30/23  Luann Davies PA-C   triamcinolone acetonide 0.1% (KENALOG) 0.1 % ointment Apply topically. 2/8/22   Historical Provider       Review of Systems:  Review of Systems   Constitutional: Negative for chills and fever.   HENT: Negative for sore throat and trouble swallowing.    Eyes: Negative for visual disturbance.   Respiratory: Negative for cough and shortness of breath.    Cardiovascular: Negative for chest pain.    Gastrointestinal: Negative for abdominal pain, constipation, diarrhea, nausea and vomiting.   Genitourinary: Negative for dysuria and flank pain.   Musculoskeletal: Negative for back pain, neck pain and neck stiffness.   Skin: Negative for rash.   Neurological: Negative for dizziness, syncope, weakness and headaches.   Psychiatric/Behavioral: Negative for confusion.       Health Maintainence:   Immunizations:  Health Maintenance       Date Due Completion Date    TETANUS VACCINE Never done ---    DEXA Scan Never done ---    Colorectal Cancer Screening Never done ---    Shingles Vaccine (2 of 2) 03/13/2013 1/16/2013    Pneumococcal Vaccines (Age 65+) (1 - PCV) Never done ---    COVID-19 Vaccine (4 - Booster for Pfizer series) 02/19/2022 10/19/2021    Mammogram 01/11/2023 1/11/2022    Lipid Panel 01/11/2027 1/11/2022           PHYSICAL EXAM     /62 (BP Location: Right arm, Patient Position: Sitting)   Pulse 65   Wt 83.6 kg (184 lb 4.9 oz)   LMP  (LMP Unknown)   SpO2 96%   BMI 30.67 kg/m²     Physical Exam  Vitals and nursing note reviewed.   Constitutional:       Appearance: Normal appearance.      Comments: Healthy appearing female in NAD or apparent pain. She makes good eye contact, speaks in clear full sentences and ambulates with ease.      HENT:      Head: Normocephalic and atraumatic.      Nose: Nose normal.      Mouth/Throat:      Pharynx: Oropharynx is clear.   Eyes:      Conjunctiva/sclera: Conjunctivae normal.   Cardiovascular:      Rate and Rhythm: Normal rate and regular rhythm.      Pulses: Normal pulses.   Pulmonary:      Effort: No respiratory distress.   Abdominal:      Tenderness: There is no abdominal tenderness.   Musculoskeletal:         General: Normal range of motion.      Cervical back: No rigidity.   Skin:     General: Skin is warm and dry.      Capillary Refill: Capillary refill takes less than 2 seconds.      Findings: No rash.   Neurological:      General: No focal deficit  present.      Mental Status: She is alert.      Gait: Gait normal.   Psychiatric:         Mood and Affect: Mood normal.         LABS     Lab Results   Component Value Date    HGBA1C 5.7 (H) 01/11/2022     CMP  Sodium   Date Value Ref Range Status   05/30/2022 142 136 - 145 mmol/L Final     Potassium   Date Value Ref Range Status   05/30/2022 3.8 3.5 - 5.1 mmol/L Final     Chloride   Date Value Ref Range Status   05/30/2022 104 95 - 110 mmol/L Final     CO2   Date Value Ref Range Status   05/30/2022 31 (H) 23 - 29 mmol/L Final     Glucose   Date Value Ref Range Status   05/30/2022 110 70 - 110 mg/dL Final     BUN   Date Value Ref Range Status   05/30/2022 14 8 - 23 mg/dL Final     Creatinine   Date Value Ref Range Status   05/30/2022 0.8 0.5 - 1.4 mg/dL Final     Calcium   Date Value Ref Range Status   05/30/2022 9.8 8.7 - 10.5 mg/dL Final     Total Protein   Date Value Ref Range Status   05/30/2022 7.0 6.0 - 8.4 g/dL Final     Albumin   Date Value Ref Range Status   05/30/2022 3.7 3.5 - 5.2 g/dL Final     Total Bilirubin   Date Value Ref Range Status   05/30/2022 0.5 0.1 - 1.0 mg/dL Final     Comment:     For infants and newborns, interpretation of results should be based  on gestational age, weight and in agreement with clinical  observations.    Premature Infant recommended reference ranges:  Up to 24 hours.............<8.0 mg/dL  Up to 48 hours............<12.0 mg/dL  3-5 days..................<15.0 mg/dL  6-29 days.................<15.0 mg/dL       Alkaline Phosphatase   Date Value Ref Range Status   05/30/2022 91 55 - 135 U/L Final     AST   Date Value Ref Range Status   05/30/2022 17 10 - 40 U/L Final     ALT   Date Value Ref Range Status   05/30/2022 19 10 - 44 U/L Final     Anion Gap   Date Value Ref Range Status   05/30/2022 7 (L) 8 - 16 mmol/L Final     eGFR if    Date Value Ref Range Status   05/30/2022 >60 >60 mL/min/1.73 m^2 Final     eGFR if non    Date Value Ref Range  Status   05/30/2022 >60 >60 mL/min/1.73 m^2 Final     Comment:     Calculation used to obtain the estimated glomerular filtration  rate (eGFR) is the CKD-EPI equation.        Lab Results   Component Value Date    WBC 6.90 05/05/2022    HGB 15.0 05/05/2022    HCT 44.8 05/05/2022    MCV 94 05/05/2022     05/05/2022     Lab Results   Component Value Date    CHOL 181 01/11/2022     Lab Results   Component Value Date    HDL 47 01/11/2022     Lab Results   Component Value Date    LDLCALC 113.2 01/11/2022     Lab Results   Component Value Date    TRIG 104 01/11/2022     Lab Results   Component Value Date    CHOLHDL 26.0 01/11/2022     No results found for: TSH, W3GCWBL, M5DYGZJ, THYROIDAB    ASSESSMENT/PLAN     Maria Elena Jackson is a 71 y.o. female     Maria Elena was seen today for hypertension. BP is well controlled on current regimen, will check labs to ensure renal function and K are stable. Will set up apt with PCP to establish care for later in the year.     Diagnoses and all orders for this visit:    Hypertension, unspecified type  -     Basic Metabolic Panel; Future   -Continue current BP  Med regimen    -irbesartan-hctz 300-12.5mg daily           Luann Davies PA-C   Department of Internal Medicine - [facility]   9:47 AM

## 2022-06-22 ENCOUNTER — HOSPITAL ENCOUNTER (OUTPATIENT)
Dept: PREADMISSION TESTING | Facility: OTHER | Age: 72
Discharge: HOME | End: 2022-06-22
Attending: OBSTETRICS & GYNECOLOGY
Payer: OTHER GOVERNMENT

## 2022-06-22 ENCOUNTER — ANESTHESIA EVENT (OUTPATIENT)
Dept: SURGERY | Facility: OTHER | Age: 72
End: 2022-06-22
Payer: OTHER GOVERNMENT

## 2022-06-22 VITALS
TEMPERATURE: 99 F | DIASTOLIC BLOOD PRESSURE: 63 MMHG | OXYGEN SATURATION: 96 % | HEART RATE: 66 BPM | SYSTOLIC BLOOD PRESSURE: 136 MMHG | WEIGHT: 185 LBS | BODY MASS INDEX: 30.79 KG/M2

## 2022-06-22 DIAGNOSIS — Z01.818 PREOP TESTING: Primary | ICD-10-CM

## 2022-06-22 LAB
ABO + RH BLD: NORMAL
BLD GP AB SCN CELLS X3 SERPL QL: NORMAL

## 2022-06-22 PROCEDURE — 93010 ELECTROCARDIOGRAM REPORT: CPT | Mod: ,,, | Performed by: INTERNAL MEDICINE

## 2022-06-22 PROCEDURE — 86901 BLOOD TYPING SEROLOGIC RH(D): CPT | Performed by: ANESTHESIOLOGY

## 2022-06-22 PROCEDURE — 93005 ELECTROCARDIOGRAM TRACING: CPT

## 2022-06-22 PROCEDURE — 36415 COLL VENOUS BLD VENIPUNCTURE: CPT | Performed by: ANESTHESIOLOGY

## 2022-06-22 PROCEDURE — 93010 EKG 12-LEAD: ICD-10-PCS | Mod: ,,, | Performed by: INTERNAL MEDICINE

## 2022-06-22 RX ORDER — SODIUM CHLORIDE, SODIUM LACTATE, POTASSIUM CHLORIDE, CALCIUM CHLORIDE 600; 310; 30; 20 MG/100ML; MG/100ML; MG/100ML; MG/100ML
INJECTION, SOLUTION INTRAVENOUS CONTINUOUS
Status: CANCELLED | OUTPATIENT
Start: 2022-06-22

## 2022-06-22 RX ORDER — PREGABALIN 75 MG/1
75 CAPSULE ORAL ONCE
Status: CANCELLED | OUTPATIENT
Start: 2022-06-22 | End: 2022-06-22

## 2022-06-22 RX ORDER — ACETAMINOPHEN 500 MG
1000 TABLET ORAL
Status: CANCELLED | OUTPATIENT
Start: 2022-06-22 | End: 2022-06-22

## 2022-06-22 RX ORDER — LIDOCAINE HYDROCHLORIDE 10 MG/ML
0.5 INJECTION, SOLUTION EPIDURAL; INFILTRATION; INTRACAUDAL; PERINEURAL ONCE
Status: CANCELLED | OUTPATIENT
Start: 2022-06-22 | End: 2022-06-22

## 2022-06-22 NOTE — DISCHARGE INSTRUCTIONS
Information to Prepare you for your Surgery    PRE-ADMIT TESTING -  [phone]    [address] [facility]          Your surgery has been scheduled at [facility]. We are pleased to have the opportunity to serve you. For Further Information please call [phone].    On the day of surgery please report to the Information Desk on the 1st floor.    CONTACT YOUR PHYSICIAN'S OFFICE THE DAY PRIOR TO YOUR SURGERY TO OBTAIN YOUR ARRIVAL TIME.     The evening before surgery do not eat anything after 9 p.m. ( this includes hard candy, chewing gum and mints).  You may only have GATORADE, POWERADE AND WATER  from 9 p.m. until you leave your home.   DO NOT DRINK ANY LIQUIDS ON THE WAY TO THE HOSPITAL.      Why does your anesthesiologist allow you to drink Gatorade/Powerade before surgery?  Gatorade/Powerade helps to increase your comfort before surgery and to decrease your nausea after surgery. The carbohydrates in Gatorade/Powerade help reduce your body's stress response to surgery.  If you are a diabetic-drink only water prior to surgery.      Current Visitor policy(12/27/2021) - Patients may have 2 visitors pre and post procedure. Only 2 visitors will be allowed in the Surgical building with the patient.     SPECIAL MEDICATION INSTRUCTIONS: TAKE medications checked off by the Anesthesiologist on your Medication List.    Angiogram Patients: Take medications as instructed by your physician, including aspirin.     Surgery Patients:    If you take ASPIRIN - Your PHYSICIAN/SURGEON will need to inform you IF/OR when you need to stop taking aspirin prior to your surgery.     Do Not take any medications containing IBUPROFEN.    Do Not Wear any make-up (especially eye make-up) to surgery. Please remove any false eyelashes or eyelash extensions. If you arrive the day of surgery with makeup/eyelashes on you will be required to remove prior to surgery. (There is a risk of corneal  abrasions if eye makeup/eyelash extensions are not removed)      Leave all valuables at home.   Do Not wear any jewelry or watches, including any metal in body piercings. Jewelry must be removed prior to coming to the hospital.  There is a possibility that rings that are unable to be removed may be cut off if they are on the surgical extremity.    Please remove all hair extensions, wigs, clips and any other metal accessories/ ornaments from your hair.  These items may pose a flammable/fire risk in Surgery and must be removed.    Do not shave your surgical area at least 5 days prior to your surgery. The surgical prep will be performed at the hospital according to Infection Control regulations.    Contact Lens must be removed before surgery. Either do not wear the contact lens or bring a case and solution for storage.  Please bring a container for eyeglasses or dentures as required.  Bring any paperwork your physician has provided, such as consent forms,  history and physicals, doctor's orders, etc.   Bring comfortable clothes that are loose fitting to wear upon discharge. Take into consideration the type of surgery being performed.  Maintain your diet as advised per your physician the day prior to surgery.      Adequate rest the night before surgery is advised.   Park in the Parking lot behind the hospital or in the [facility] across the street from the parking lot. Parking is complimentary.  If you will be discharged the same day as your procedure, please arrange for a responsible adult to drive you home or to accompany you if traveling by taxi.   YOU WILL NOT BE PERMITTED TO DRIVE OR TO LEAVE THE HOSPITAL ALONE AFTER SURGERY.   If you are being discharged the same day, it is strongly recommended that you arrange for someone to remain with you for the first 24 hrs following your surgery.    The Surgeon will speak to your family/visitor after your surgery regarding the outcome of your surgery and post op  care.  The Surgeon may speak to you after your surgery, but there is a possibility you may not remember the details.  Please check with your family members regarding the conversation with the Surgeon.    We strongly recommend whoever is bringing you home be present for discharge instructions.  This will ensure a thorough understanding for your post op home care.    ALL CHILDREN MUST ALWAYS BE ACCOMPANIED BY AN ADULT.    Visitors-Refer to current Visitor policy handouts.    Thank you for your cooperation.  The Staff of [facility].            Bathing Instructions with Hibiclens    Shower the evening before and morning of your procedure with Hibiclens:  Wash your face with water and your regular face wash/soap  Apply Hibiclens directly on your skin or on a wet washcloth and wash gently. When showering: Move away from the shower stream when applying Hibiclens to avoid rinsing off too soon.  Rinse thoroughly with warm water  Do not dilute Hibiclens        Dry off as usual, do not use any deodorant, powder, body lotions, perfume, after shave or cologne.

## 2022-06-22 NOTE — ANESTHESIA PREPROCEDURE EVALUATION
06/22/2022  Maria Elena Jackson is a 71 y.o., female.      Pre-op Assessment    I have reviewed the Patient Summary Reports.     I have reviewed the Nursing Notes. I have reviewed the NPO Status.   I have reviewed the Medications.     Review of Systems  Anesthesia Hx:  Denies Family Hx of Anesthesia complications.   Denies Personal Hx of Anesthesia complications.   Social:  Non-Smoker    Hematology/Oncology:  Hematology Normal   Oncology Normal     EENT/Dental:EENT/Dental Normal   Cardiovascular:   Hypertension    Pulmonary:  Pulmonary Normal    Renal/:  Renal/ Normal     Hepatic/GI:  Hepatic/GI Normal    Musculoskeletal:  Musculoskeletal Normal    Neurological:  Neurology Normal    Endocrine:  Endocrine Normal    Dermatological:  Skin Normal    Psych:  Psychiatric Normal           Physical Exam    Airway:  Mallampati: III       Dental:  Lower front teeth bridge       Anesthesia Plan  Type of Anesthesia, risks & benefits discussed:    Anesthesia Type: Gen ETT  Intra-op Monitoring Plan: Standard ASA Monitors  Post Op Pain Control Plan: multimodal analgesia  Induction:  IV  Airway Plan: Video  Informed Consent: Informed consent signed with the Patient and all parties understand the risks and agree with anesthesia plan.  All questions answered.   ASA Score: 2  Anesthesia Plan Notes: Labs in [facility]  Had hysteroscopy here in May  EKG ordered    EKG: SB, no ST/T changes    Ready For Surgery From Anesthesia Perspective.     .

## 2022-06-28 ENCOUNTER — TELEPHONE (OUTPATIENT)
Dept: GYNECOLOGIC ONCOLOGY | Facility: CLINIC | Age: 72
End: 2022-06-28
Payer: OTHER GOVERNMENT

## 2022-06-29 ENCOUNTER — ANESTHESIA (OUTPATIENT)
Dept: SURGERY | Facility: OTHER | Age: 72
End: 2022-06-29
Payer: OTHER GOVERNMENT

## 2022-06-29 ENCOUNTER — HOSPITAL ENCOUNTER (OUTPATIENT)
Facility: OTHER | Age: 72
LOS: 1 days | Discharge: HOME | End: 2022-06-29
Attending: OBSTETRICS & GYNECOLOGY | Admitting: OBSTETRICS & GYNECOLOGY
Payer: OTHER GOVERNMENT

## 2022-06-29 DIAGNOSIS — Z90.710 S/P TOTAL HYSTERECTOMY AND BILATERAL SALPINGO-OOPHORECTOMY: Primary | ICD-10-CM

## 2022-06-29 DIAGNOSIS — N95.0 POSTMENOPAUSAL BLEEDING: ICD-10-CM

## 2022-06-29 DIAGNOSIS — Z90.79 S/P TOTAL HYSTERECTOMY AND BILATERAL SALPINGO-OOPHORECTOMY: Primary | ICD-10-CM

## 2022-06-29 DIAGNOSIS — Z90.722 S/P TOTAL HYSTERECTOMY AND BILATERAL SALPINGO-OOPHORECTOMY: Primary | ICD-10-CM

## 2022-06-29 DIAGNOSIS — N85.02 COMPLEX ATYPICAL ENDOMETRIAL HYPERPLASIA: ICD-10-CM

## 2022-06-29 PROCEDURE — 63600175 PHARM REV CODE 636 W HCPCS: Performed by: ANESTHESIOLOGY

## 2022-06-29 PROCEDURE — 88342 CHG IMMUNOCYTOCHEMISTRY: ICD-10-PCS | Mod: 26,,, | Performed by: PATHOLOGY

## 2022-06-29 PROCEDURE — 88341 PR IHC OR ICC EACH ADD'L SINGLE ANTIBODY  STAINPR: ICD-10-PCS | Mod: 26,,, | Performed by: PATHOLOGY

## 2022-06-29 PROCEDURE — P9045 ALBUMIN (HUMAN), 5%, 250 ML: HCPCS | Mod: JG | Performed by: NURSE ANESTHETIST, CERTIFIED REGISTERED

## 2022-06-29 PROCEDURE — 38900 PR INTRAOPERATIVE SENTINEL LYMPH NODE ID W DYE INJECTION: ICD-10-PCS | Mod: 50,,, | Performed by: OBSTETRICS & GYNECOLOGY

## 2022-06-29 PROCEDURE — 88342 IMHCHEM/IMCYTCHM 1ST ANTB: CPT | Mod: 26,,, | Performed by: PATHOLOGY

## 2022-06-29 PROCEDURE — 88341 IMHCHEM/IMCYTCHM EA ADD ANTB: CPT | Performed by: PATHOLOGY

## 2022-06-29 PROCEDURE — 88341 IMHCHEM/IMCYTCHM EA ADD ANTB: CPT | Mod: 26,,, | Performed by: PATHOLOGY

## 2022-06-29 PROCEDURE — 25000003 PHARM REV CODE 250: Performed by: ANESTHESIOLOGY

## 2022-06-29 PROCEDURE — 63600175 PHARM REV CODE 636 W HCPCS: Performed by: NURSE ANESTHETIST, CERTIFIED REGISTERED

## 2022-06-29 PROCEDURE — 88342 IMHCHEM/IMCYTCHM 1ST ANTB: CPT | Performed by: PATHOLOGY

## 2022-06-29 PROCEDURE — 25000003 PHARM REV CODE 250: Performed by: NURSE ANESTHETIST, CERTIFIED REGISTERED

## 2022-06-29 PROCEDURE — 88309 TISSUE EXAM BY PATHOLOGIST: CPT | Mod: 26,,, | Performed by: PATHOLOGY

## 2022-06-29 PROCEDURE — 38570 PR LAP,LYMPH NODE BX: ICD-10-PCS | Mod: AS,51,, | Performed by: PHYSICIAN ASSISTANT

## 2022-06-29 PROCEDURE — 38570 LAPAROSCOPY LYMPH NODE BIOP: CPT | Mod: AS,51,, | Performed by: PHYSICIAN ASSISTANT

## 2022-06-29 PROCEDURE — 37000008 HC ANESTHESIA 1ST 15 MINUTES: Performed by: OBSTETRICS & GYNECOLOGY

## 2022-06-29 PROCEDURE — 71000039 HC RECOVERY, EACH ADD'L HOUR: Performed by: OBSTETRICS & GYNECOLOGY

## 2022-06-29 PROCEDURE — 58571 PR LAPAROSCOPY W TOT HYSTERECTUTERUS <=250 GRAM  W TUBE/OVARY: ICD-10-PCS | Mod: ,,, | Performed by: OBSTETRICS & GYNECOLOGY

## 2022-06-29 PROCEDURE — 71000015 HC POSTOP RECOV 1ST HR: Performed by: OBSTETRICS & GYNECOLOGY

## 2022-06-29 PROCEDURE — 88307 PR  SURG PATH,LEVEL V: ICD-10-PCS | Mod: 26,,, | Performed by: PATHOLOGY

## 2022-06-29 PROCEDURE — 38570 LAPAROSCOPY LYMPH NODE BIOP: CPT | Mod: 51,,, | Performed by: OBSTETRICS & GYNECOLOGY

## 2022-06-29 PROCEDURE — 38570 PR LAP,LYMPH NODE BX: ICD-10-PCS | Mod: 51,,, | Performed by: OBSTETRICS & GYNECOLOGY

## 2022-06-29 PROCEDURE — 71000033 HC RECOVERY, INTIAL HOUR: Performed by: OBSTETRICS & GYNECOLOGY

## 2022-06-29 PROCEDURE — 88307 TISSUE EXAM BY PATHOLOGIST: CPT | Performed by: PATHOLOGY

## 2022-06-29 PROCEDURE — 88307 TISSUE EXAM BY PATHOLOGIST: CPT | Mod: 26,,, | Performed by: PATHOLOGY

## 2022-06-29 PROCEDURE — 58571 PR LAPAROSCOPY W TOT HYSTERECTUTERUS <=250 GRAM  W TUBE/OVARY: ICD-10-PCS | Mod: AS,,, | Performed by: PHYSICIAN ASSISTANT

## 2022-06-29 PROCEDURE — 58571 TLH W/T/O 250 G OR LESS: CPT | Mod: ,,, | Performed by: OBSTETRICS & GYNECOLOGY

## 2022-06-29 PROCEDURE — 37000009 HC ANESTHESIA EA ADD 15 MINS: Performed by: OBSTETRICS & GYNECOLOGY

## 2022-06-29 PROCEDURE — C2628 CATHETER, OCCLUSION: HCPCS | Performed by: OBSTETRICS & GYNECOLOGY

## 2022-06-29 PROCEDURE — 71000016 HC POSTOP RECOV ADDL HR: Performed by: OBSTETRICS & GYNECOLOGY

## 2022-06-29 PROCEDURE — 58571 TLH W/T/O 250 G OR LESS: CPT | Mod: AS,,, | Performed by: PHYSICIAN ASSISTANT

## 2022-06-29 PROCEDURE — 88309 PR  SURG PATH,LEVEL VI: ICD-10-PCS | Mod: 26,,, | Performed by: PATHOLOGY

## 2022-06-29 PROCEDURE — 63600175 PHARM REV CODE 636 W HCPCS: Performed by: OBSTETRICS & GYNECOLOGY

## 2022-06-29 PROCEDURE — 27201423 OPTIME MED/SURG SUP & DEVICES STERILE SUPPLY: Performed by: OBSTETRICS & GYNECOLOGY

## 2022-06-29 PROCEDURE — 36000713 HC OR TIME LEV V EA ADD 15 MIN: Performed by: OBSTETRICS & GYNECOLOGY

## 2022-06-29 PROCEDURE — 88309 TISSUE EXAM BY PATHOLOGIST: CPT | Performed by: PATHOLOGY

## 2022-06-29 PROCEDURE — 25000003 PHARM REV CODE 250: Performed by: OBSTETRICS & GYNECOLOGY

## 2022-06-29 PROCEDURE — 38900 IO MAP OF SENT LYMPH NODE: CPT | Mod: 50,,, | Performed by: OBSTETRICS & GYNECOLOGY

## 2022-06-29 PROCEDURE — 36000712 HC OR TIME LEV V 1ST 15 MIN: Performed by: OBSTETRICS & GYNECOLOGY

## 2022-06-29 RX ORDER — OXYCODONE HYDROCHLORIDE 5 MG/1
5 TABLET ORAL EVERY 4 HOURS PRN
Qty: 15 TABLET | Refills: 0 | Status: SHIPPED | OUTPATIENT
Start: 2022-06-29 | End: 2022-08-08

## 2022-06-29 RX ORDER — FENTANYL CITRATE 50 UG/ML
INJECTION, SOLUTION INTRAMUSCULAR; INTRAVENOUS
Status: DISCONTINUED | OUTPATIENT
Start: 2022-06-29 | End: 2022-06-29

## 2022-06-29 RX ORDER — ROCURONIUM BROMIDE 10 MG/ML
INJECTION, SOLUTION INTRAVENOUS
Status: DISCONTINUED | OUTPATIENT
Start: 2022-06-29 | End: 2022-06-29

## 2022-06-29 RX ORDER — CEFAZOLIN SODIUM 1 G/3ML
2 INJECTION, POWDER, FOR SOLUTION INTRAMUSCULAR; INTRAVENOUS
Status: COMPLETED | OUTPATIENT
Start: 2022-06-29 | End: 2022-06-29

## 2022-06-29 RX ORDER — ONDANSETRON 2 MG/ML
4 INJECTION INTRAMUSCULAR; INTRAVENOUS DAILY PRN
Status: DISCONTINUED | OUTPATIENT
Start: 2022-06-29 | End: 2022-06-29 | Stop reason: HOSPADM

## 2022-06-29 RX ORDER — LIDOCAINE HYDROCHLORIDE 10 MG/ML
INJECTION, SOLUTION EPIDURAL; INFILTRATION; INTRACAUDAL; PERINEURAL
Status: DISCONTINUED | OUTPATIENT
Start: 2022-06-29 | End: 2022-06-29 | Stop reason: HOSPADM

## 2022-06-29 RX ORDER — LIDOCAINE HYDROCHLORIDE 10 MG/ML
1 INJECTION, SOLUTION EPIDURAL; INFILTRATION; INTRACAUDAL; PERINEURAL ONCE
Status: DISCONTINUED | OUTPATIENT
Start: 2022-06-29 | End: 2022-06-29 | Stop reason: HOSPADM

## 2022-06-29 RX ORDER — INDOCYANINE GREEN AND WATER 25 MG
KIT INJECTION
Status: DISCONTINUED | OUTPATIENT
Start: 2022-06-29 | End: 2022-06-29 | Stop reason: HOSPADM

## 2022-06-29 RX ORDER — OXYCODONE HYDROCHLORIDE 5 MG/1
5 TABLET ORAL
Status: DISCONTINUED | OUTPATIENT
Start: 2022-06-29 | End: 2022-06-29 | Stop reason: HOSPADM

## 2022-06-29 RX ORDER — ONDANSETRON 2 MG/ML
INJECTION INTRAMUSCULAR; INTRAVENOUS
Status: DISCONTINUED | OUTPATIENT
Start: 2022-06-29 | End: 2022-06-29

## 2022-06-29 RX ORDER — ACETAMINOPHEN 500 MG
1000 TABLET ORAL
Status: COMPLETED | OUTPATIENT
Start: 2022-06-29 | End: 2022-06-29

## 2022-06-29 RX ORDER — SODIUM CHLORIDE, SODIUM LACTATE, POTASSIUM CHLORIDE, CALCIUM CHLORIDE 600; 310; 30; 20 MG/100ML; MG/100ML; MG/100ML; MG/100ML
INJECTION, SOLUTION INTRAVENOUS CONTINUOUS
Status: DISCONTINUED | OUTPATIENT
Start: 2022-06-29 | End: 2022-06-29 | Stop reason: HOSPADM

## 2022-06-29 RX ORDER — MEPERIDINE HYDROCHLORIDE 25 MG/ML
12.5 INJECTION INTRAMUSCULAR; INTRAVENOUS; SUBCUTANEOUS ONCE AS NEEDED
Status: DISCONTINUED | OUTPATIENT
Start: 2022-06-29 | End: 2022-06-29 | Stop reason: HOSPADM

## 2022-06-29 RX ORDER — PROPOFOL 10 MG/ML
VIAL (ML) INTRAVENOUS
Status: DISCONTINUED | OUTPATIENT
Start: 2022-06-29 | End: 2022-06-29

## 2022-06-29 RX ORDER — ATROPINE SULFATE 0.4 MG/ML
INJECTION, SOLUTION ENDOTRACHEAL; INTRAMEDULLARY; INTRAMUSCULAR; INTRAVENOUS; SUBCUTANEOUS
Status: DISCONTINUED | OUTPATIENT
Start: 2022-06-29 | End: 2022-06-29

## 2022-06-29 RX ORDER — LIDOCAINE HYDROCHLORIDE 20 MG/ML
INJECTION INTRAVENOUS
Status: DISCONTINUED | OUTPATIENT
Start: 2022-06-29 | End: 2022-06-29

## 2022-06-29 RX ORDER — PREGABALIN 75 MG/1
75 CAPSULE ORAL ONCE
Status: COMPLETED | OUTPATIENT
Start: 2022-06-29 | End: 2022-06-29

## 2022-06-29 RX ORDER — HYDROMORPHONE HYDROCHLORIDE 2 MG/ML
0.4 INJECTION, SOLUTION INTRAMUSCULAR; INTRAVENOUS; SUBCUTANEOUS EVERY 5 MIN PRN
Status: DISCONTINUED | OUTPATIENT
Start: 2022-06-29 | End: 2022-06-29 | Stop reason: HOSPADM

## 2022-06-29 RX ORDER — MUPIROCIN 20 MG/G
OINTMENT TOPICAL
Status: DISCONTINUED | OUTPATIENT
Start: 2022-06-29 | End: 2022-06-29 | Stop reason: HOSPADM

## 2022-06-29 RX ORDER — DEXTROMETHORPHAN HYDROBROMIDE, GUAIFENESIN 5; 100 MG/5ML; MG/5ML
650 LIQUID ORAL
Qty: 60 TABLET | Refills: 1 | Status: SHIPPED | OUTPATIENT
Start: 2022-06-29 | End: 2023-02-28

## 2022-06-29 RX ORDER — SODIUM CHLORIDE 0.9 % (FLUSH) 0.9 %
3 SYRINGE (ML) INJECTION
Status: DISCONTINUED | OUTPATIENT
Start: 2022-06-29 | End: 2022-06-29 | Stop reason: HOSPADM

## 2022-06-29 RX ORDER — LIDOCAINE HYDROCHLORIDE 10 MG/ML
0.5 INJECTION, SOLUTION EPIDURAL; INFILTRATION; INTRACAUDAL; PERINEURAL ONCE
Status: DISCONTINUED | OUTPATIENT
Start: 2022-06-29 | End: 2022-06-29 | Stop reason: HOSPADM

## 2022-06-29 RX ORDER — HEPARIN SODIUM 5000 [USP'U]/ML
INJECTION, SOLUTION INTRAVENOUS; SUBCUTANEOUS
Status: DISCONTINUED | OUTPATIENT
Start: 2022-06-29 | End: 2022-06-29 | Stop reason: HOSPADM

## 2022-06-29 RX ORDER — IBUPROFEN 600 MG/1
600 TABLET ORAL EVERY 6 HOURS PRN
Qty: 60 TABLET | Refills: 1 | Status: SHIPPED | OUTPATIENT
Start: 2022-06-29 | End: 2022-08-08

## 2022-06-29 RX ORDER — EPHEDRINE SULFATE 50 MG/ML
INJECTION, SOLUTION INTRAVENOUS
Status: DISCONTINUED | OUTPATIENT
Start: 2022-06-29 | End: 2022-06-29

## 2022-06-29 RX ORDER — ALBUMIN HUMAN 50 G/1000ML
SOLUTION INTRAVENOUS CONTINUOUS PRN
Status: DISCONTINUED | OUTPATIENT
Start: 2022-06-29 | End: 2022-06-29

## 2022-06-29 RX ADMIN — FENTANYL CITRATE 100 MCG: 50 INJECTION, SOLUTION INTRAMUSCULAR; INTRAVENOUS at 07:06

## 2022-06-29 RX ADMIN — ROCURONIUM BROMIDE 50 MG: 10 SOLUTION INTRAVENOUS at 07:06

## 2022-06-29 RX ADMIN — PREGABALIN 75 MG: 75 CAPSULE ORAL at 05:06

## 2022-06-29 RX ADMIN — MUPIROCIN: 20 OINTMENT TOPICAL at 05:06

## 2022-06-29 RX ADMIN — CARBOXYMETHYLCELLULOSE SODIUM 2 DROP: 2.5 SOLUTION/ DROPS OPHTHALMIC at 07:06

## 2022-06-29 RX ADMIN — FENTANYL CITRATE 50 MCG: 50 INJECTION, SOLUTION INTRAMUSCULAR; INTRAVENOUS at 07:06

## 2022-06-29 RX ADMIN — ATROPINE SULFATE 0.5 MG: 0.4 INJECTION, SOLUTION INTRAMUSCULAR; INTRAVENOUS; SUBCUTANEOUS at 07:06

## 2022-06-29 RX ADMIN — ACETAMINOPHEN 1000 MG: 500 TABLET, FILM COATED ORAL at 05:06

## 2022-06-29 RX ADMIN — PROPOFOL 150 MG: 10 INJECTION, EMULSION INTRAVENOUS at 07:06

## 2022-06-29 RX ADMIN — CEFAZOLIN 2 G: 330 INJECTION, POWDER, FOR SOLUTION INTRAMUSCULAR; INTRAVENOUS at 07:06

## 2022-06-29 RX ADMIN — SODIUM CHLORIDE, SODIUM LACTATE, POTASSIUM CHLORIDE, AND CALCIUM CHLORIDE: .6; .31; .03; .02 INJECTION, SOLUTION INTRAVENOUS at 06:06

## 2022-06-29 RX ADMIN — ONDANSETRON HYDROCHLORIDE 4 MG: 2 INJECTION INTRAMUSCULAR; INTRAVENOUS at 09:06

## 2022-06-29 RX ADMIN — LIDOCAINE HYDROCHLORIDE 100 MG: 20 INJECTION, SOLUTION INTRAVENOUS at 07:06

## 2022-06-29 RX ADMIN — ALBUMIN (HUMAN): 12.5 SOLUTION INTRAVENOUS at 08:06

## 2022-06-29 RX ADMIN — LIDOCAINE HYDROCHLORIDE 50 MG: 20 INJECTION, SOLUTION INTRAVENOUS at 09:06

## 2022-06-29 RX ADMIN — ROCURONIUM BROMIDE 20 MG: 10 SOLUTION INTRAVENOUS at 07:06

## 2022-06-29 RX ADMIN — SUGAMMADEX 200 MG: 100 INJECTION, SOLUTION INTRAVENOUS at 09:06

## 2022-06-29 RX ADMIN — EPHEDRINE SULFATE 10 MG: 50 INJECTION INTRAVENOUS at 07:06

## 2022-06-29 NOTE — ANESTHESIA POSTPROCEDURE EVALUATION
Anesthesia Post Evaluation    Patient: Maria Elena Jackson    Procedure(s) Performed: Procedure(s) (LRB):  XI ROBOTIC HYSTERECTOMY,WITH SALPINGO-OOPHORECTOMY (Bilateral)  MAPPING, LYMPH NODE, SENTINEL (N/A)  BIOPSY, LYMPH NODE (N/A)    Final Anesthesia Type: general      Patient location during evaluation: PACU  Patient participation: Yes- Able to Participate  Level of consciousness: awake and alert  Post-procedure vital signs: reviewed and stable  Pain management: adequate  Airway patency: patent  IVANIA mitigation strategies: Extubation while patient is awake  PONV status at discharge: No PONV  Anesthetic complications: no      Cardiovascular status: hemodynamically stable  Respiratory status: unassisted  Hydration status: euvolemic  Follow-up not needed.          Vitals Value Taken Time   /64 06/29/22 1054   Temp 36.6 °C (97.9 °F) 06/29/22 1005   Pulse 65 06/29/22 1056   Resp 16 06/29/22 1050   SpO2 97 % 06/29/22 1056   Vitals shown include unvalidated device data.      Event Time   Out of Recovery 10:57:00         Pain/Stacy Score: Pain Rating Prior to Med Admin: 0 (6/29/2022  5:51 AM)  Stacy Score: 10 (6/29/2022 10:35 AM)

## 2022-06-29 NOTE — INTERVAL H&P NOTE
The patient has been examined and the H&P has been reviewed:    I concur with the findings and no changes have occurred since H&P was written.    Surgery risks, benefits and alternative options discussed and understood by patient/family.    Please proceed to OR 12 for RALH BSO with SLND.      There are no hospital problems to display for this patient.

## 2022-06-29 NOTE — H&P
REFERRING PROVIDER  Marce Ellsworth MD      HISTORY OF PRESENT CONDITION  CC: Complex Atypical Hyperplasia  Maria Elena Jackson is a 71 y.o. referred by Dr. Ellsworth for complex atypical hyperplasia.  In February she developed pelvic pressure and spotting.  No other symptoms.  Dr. Ellsworth work-up and treatment is noted below.      DATA REVIEWED  4/27/2022 EmBx: No endometrial tissue present, uterus sounded to 7 cm; Pap:  NILM     4/29/2022 US Pelvis:  Uterus 8 cm x 4 cm x 4.9 cm; Intramural calcified fibroids are identified in the anterior body near the fundus.  They measure 1.4 cm and 1.8 cm maximum diameter.  The endometrium is irregularly thickened at 2.7 cm in this postmenopausal patient.  It is heterogeneously echogenic with hypoechoic foci throughout.  Ovaries not visualized     5/10/2022 D&C:    FINDINGS:  Uterus sounds to 8 cm.  Large polyp in endometrial cavity, attached at right mid fundus.  Small 1-2 cm fibroid at fundus in midline.  Otherwise atrophic endometrium.  Bilateral ostia visualized.  PATHOLOGY:  Complex hyperplasia with atypia arising in association with a polyp          Past Medical History:   Diagnosis Date    Arthritis      Hyperlipidemia      Hypertension             Current Outpatient Medications   Medication Sig    atorvastatin (LIPITOR) 40 MG tablet Take 1 tablet (40 mg total) by mouth every evening.    carvediloL (COREG) 12.5 MG tablet Take 1 tablet (12.5 mg total) by mouth 2 (two) times daily with meals.    cholecalciferol, vitamin D3, (VITAMIN D3) 25 mcg (1,000 unit) capsule Take 1,000 Units by mouth once daily.    doxazosin (CARDURA) 2 MG tablet Take 1 tablet (2 mg total) by mouth every morning.    hydroCHLOROthiazide (HYDRODIURIL) 25 MG tablet Take 25 mg by mouth daily as needed.    irbesartan (AVAPRO) 150 MG tablet Take 300 mg by mouth once daily.    triamcinolone acetonide 0.1% (KENALOG) 0.1 % ointment Apply topically.    FLUAD [id number], 65 YR UP,,PF, 45 mcg (15 mcg x  3)/0.5 mL Syrg      HYDROcodone-acetaminophen (NORCO) 5-325 mg per tablet Take 1 tablet by mouth every 6 (six) hours as needed for Pain. (Patient not taking: Reported on 5/26/2022)    ibuprofen (ADVIL,MOTRIN) 600 MG tablet Take 1 tablet (600 mg total) by mouth every 6 (six) hours as needed for Pain. (Patient not taking: Reported on 5/26/2022)      No current facility-administered medications for this visit.      Review of patient's allergies indicates:  No Known Allergies           Past Surgical History:   Procedure Laterality Date    CHOLECYSTECTOMY        D&C Hysteroscopy        DILATION AND CURETTAGE OF UTERUS        HYSTEROSCOPY WITH DILATION AND CURETTAGE OF UTERUS N/A 5/10/2022     Procedure: HYSTEROSCOPY, WITH DILATION AND CURETTAGE OF UTERUS;  Surgeon: Marce Ellsworth MD;  Location: [facility];  Service: OB/GYN;  Laterality: N/A;    JOINT REPLACEMENT Right      laparoscopy        TONSILLECTOMY        TOTAL KNEE ARTHROPLASTY Right 10/7/2019     Procedure: ARTHROPLASTY, KNEE, TOTAL;  Surgeon: John L. Ochsner Jr., MD;  Location: [facility] OR;  Service: Orthopedics;  Laterality: Right;         FAMILY HISTORY        Family History   Problem Relation Age of Onset    Colon cancer Mother 55    Ovarian cancer Mother 35    Breast cancer Neg Hx           SOCIAL HISTORY    reports that she quit smoking about 32 years ago. She has a 33.00 pack-year smoking history. She has never used smokeless tobacco. She reports current alcohol use. She reports that she does not use drugs.     REVIEW OF SYSTEMS  Review of Systems   Constitutional: Negative for activity change, appetite change, chills, fatigue, fever and unexpected weight change.   Respiratory: Negative for cough, chest tightness and shortness of breath.    Cardiovascular: Negative for chest pain, palpitations and leg swelling.   Gastrointestinal: Negative for abdominal distention, abdominal pain, blood in stool, constipation, diarrhea, nausea and vomiting.    Genitourinary: Positive for vaginal bleeding. Negative for dyspareunia, dysuria, frequency, hematuria, pelvic pain and urgency.   Musculoskeletal: Negative for arthralgias and myalgias.   Skin: Negative for color change and rash.   Neurological: Negative for dizziness, weakness and light-headedness.   Hematological: Negative for adenopathy.   Psychiatric/Behavioral: Negative for decreased concentration, dysphoric mood and sleep disturbance. The patient is nervous/anxious.       OBJECTIVE       Vitals:     05/26/22 0909   BP: (!) 190/81   Pulse: (!) 59      Body mass index is 31.25 kg/m².      Physical Exam:   Constitutional: She is oriented to person, place, and time. She appears well-developed and well-nourished.    HENT:   Head: Normocephalic and atraumatic.    Eyes: Conjunctivae and EOM are normal. No scleral icterus.     Cardiovascular: Normal rate and regular rhythm.  Exam reveals no cyanosis and no edema.     Pulmonary/Chest: Effort normal and breath sounds normal. No respiratory distress.                  Musculoskeletal: Moves all extremeties.       Neurological: She is alert and oriented to person, place, and time.    Skin: No rash noted. No cyanosis or erythema.    Psychiatric: She has a normal mood and affect.      ECOG status: 0     LABORATORY DATA  Lab data reviewed.     RADIOLOGICAL DATA  Radiology data reviewed.     PATHOLOGY DATA  Pathology data reviewed.     ASSESSMENT    1. Complex atypical endometrial hyperplasia    2. Complex endometrial hyperplasia with atypia    I had an extensive conversation with the patient today giving a broad overview of endometrial hyperplasia and cancer to include epidemiology, risk factors, clinical features, diagnosis, as well as surgical treatment.  We discussed that patients with complex atypical hyperplasia have an approximately 40% risk of having concurrent endometrial cancer.  I have recommended robotic-assisted hysterectomy, bilateral salpingo-oophorectomy and  sentinel lymph node mapping and biopsy.  Based on the data from surgery we will determine whether adjuvant therapy would be recommended.  We will also have a better-informed discussion about prognosis, though most patients presenting with endometrial hyperplasia and grade 1 endometrial cancer have an excellent prognosis and will be cured by surgery alone.     I discussed extensively the risks, benefits, alternatives, and indications of the planned procedure to include the risk of damage to bowel, bladder, ureter, or any other abdominal or pelvic organ.  Additionally, she understands the standard surgical risks of infection, allergic reaction, bleeding possibly severe enough to require blood transfusion.  She expresses understanding, was given an opportunity to ask any questions, and now she strongly desires to proceed with planned procedure.  Informed consent was signed and a copy was given to the patient as well as the ACOG FAQs on endometrial hyperplasia.        PLAN  1.  Schedule robotic assisted total hysterectomy, bso, sln biopsy  2.  Anesthesia preop   3.  F/u with PCP for urgent BP management         Kevin Mcgraw MD

## 2022-06-29 NOTE — ANESTHESIA PROCEDURE NOTES
Intubation    Date/Time: 6/29/2022 7:12 AM  Performed by: Sosa Rubio CRNA  Authorized by: Dean Ritchie MD     Intubation:     Induction:  Intravenous    Intubated:  Postinduction    Mask Ventilation:  Easy with oral airway    Attempts:  1    Attempted By:  CRNA    Method of Intubation:  Video laryngoscopy    Blade:  Dumont 3    Laryngeal View Grade: Grade I - full view of cords      Difficult Airway Encountered?: No      Complications:  None    Airway Device:  Oral endotracheal tube    Airway Device Size:  7.5    Style/Cuff Inflation:  Cuffed (inflated to minimal occlusive pressure)    Inflation Amount (mL):  7    Tube secured:  21    Secured at:  The lips    Placement Verified By:  Capnometry    Complicating Factors:  Obesity, retrognathia, small mouth and overbite    Findings Post-Intubation:  BS equal bilateral and atraumatic/condition of teeth unchanged

## 2022-06-29 NOTE — TRANSFER OF CARE
"Anesthesia Transfer of Care Note    Patient: Maria Elena Jackson    Procedure(s) Performed: Procedure(s) (LRB):  XI ROBOTIC HYSTERECTOMY,WITH SALPINGO-OOPHORECTOMY (Bilateral)  MAPPING, LYMPH NODE, SENTINEL (N/A)  BIOPSY, LYMPH NODE (N/A)    Patient location: PACU    Anesthesia Type: general    Transport from OR: Transported from OR on 2-3 L/min O2 by NC with adequate spontaneous ventilation. Continuous SpO2 monitoring in transport    Post pain: adequate analgesia    Post assessment: no apparent anesthetic complications    Post vital signs: stable    Level of consciousness: awake    Nausea/Vomiting: no nausea/vomiting    Complications: none    Transfer of care protocol was followed      Last vitals:   Visit Vitals  BP (!) 171/72   Pulse (!) 56   Temp 36.6 °C (97.9 °F)   Resp 16   Ht 5' 5" (1.651 m)   Wt 83.9 kg (185 lb)   LMP  (LMP Unknown)   SpO2 96%   Breastfeeding No   BMI 30.79 kg/m²     "

## 2022-06-29 NOTE — OR NURSING
VSS on RA. Pt denies pain. Dressings, peripad and PIV C/D/I. Meets Phase I discharge criteria. Ready for transfer to Phase II. Family notified.

## 2022-06-29 NOTE — OP NOTE
[facility]  Gynecologic Oncology  Operative Note    SUMMARY     Date of Procedure: 6/29/2022     Procedure: Procedure(s) (LRB):  XI ROBOTIC HYSTERECTOMY,WITH SALPINGO-OOPHORECTOMY (Bilateral)  MAPPING, LYMPH NODE, SENTINEL (N/A)  BIOPSY, LYMPH NODE (N/A)       Surgeon(s) and Role:     * Kevin Mcgraw MD - Primary     * XIN Montenegro - First Assist     * Cabrera Felix MD - Resident - Assisting    Pre-Operative Diagnosis: Complex endometrial hyperplasia with atypia [N85.02]    Post-Operative Diagnosis: Post-Op Diagnosis Codes:     * Complex endometrial hyperplasia with atypia [N85.02]    Anesthesia: General    Technical Procedures Used: 1.  Robotic Assisted Total Hysterectomy  2.  Bilateral Salpingo-oophorectomy  3.  [place] Lymph Node Mapping  4.  [place] Lymph Node Excision    Description of the Findings of the Procedure: Normal external female genitalia.  No vaginal lesions.  Cervix normal appearing.  Uterus sounded to 8 cm.  The uterus, tubes, and ovaries were grossly normal except for small subserosal fibroids.  The upper abdomen was free of adhesions and normal to include peritoneal and diaphragmatic surfaces, liver edge, and greater curvature of the stomach.  The right sentinel lymph node mapped to the obturator space.  The left sentinel lymph node mapped to the medial surface of the external iliac artery.    Procedure in Detail: After noting appropriate consents, the patient was taken to the operating room and placed in the dorsal lithotomy position. SCDs were started prior to anesthesia administration. She was then prepped and draped in the usual sterile manner.  A storm was inserted in a sterile manner. A sterile speculum was inserted in the vagina and a retraction suture was placed on the anterior lip of the cervix.  The cervix and uterus were sounded and dilated.  The WILVER uterine manipulator was inserted in the usual manner for uterine manipulation.  Then, attention was turned  to the abdomen. A LUQ 5mm incision was made and an Optiview trocar was inserted into the abdomen under direct visualization and pneumoperitoneum was obtained with CO2 gas insufflation.  The insertion site was checked and found to be free of injury.  Now under direct visualization an 8 mm trocar and sleeve were inserted in the midline in a supra-umbilical position. Additional right sided 8mm ports x 2 and a left sided 8mm port were placed.  The left upper quadrant trocar was traded out for a 12 mm AirSeal port.  Trendelenberg was obtained and bowel was moved out of the pelvis. The robot was docked in the usual manner.      First, attention was turned the pelvic sidewalls.  The round ligaments were grasped with the grasper and incised with monopolar keagan and the peritoneal incisions were carried superiorly and laterally to open the pararectal spaces.  The bilateral paravesical and pararectal spaces were fully developed noting the location of key structures including bilateral ureters, bilateral superior vesical arteries, bilateral external iliac vessels and bilateral obturator nerves.  The cardinal ligaments and parametria were free of gross disease. Upon switching to near-infrared light, bilateral lymphatic channels were mapped from the uterus and the sentinel nodes were identified as noted in the findings and resected bilaterally maintaining the integrity of all the key structures listed previously.  The nodes were removed and sent off for permanent pathology.      To proceed with the hysterectomy and bso, we returned to the right pelvic sidewall.  The ureter was clearly identified crossing the pelvic brim and a window was made in the peritoneum between the ureter and the infundibulopelvic ligament.  The bipolar graspers were then used to seal the IP ligament and this was divided with the monopolar keagan.  The peritoneal window was then extended distally to the lateral aspect of the uterus and its intersection  with the uterosacral ligament where the rectovaginal space was developed.  Attention was then turned anteriorly and the bladder reflection was clearly identified and a bladder flap created in the vesicouterine peritoneum.  With the posterior and anterior peritoneum taken down and with full and continued visualization of the ureters at all times, the uterine artery and vein were clearly identified and sealed with the bipolar grasper.  The uterine artery was then transected with the monopolar keagan and released below the level of the cervicovaginal junction.     In a similar manner attention was then turned to the left side of the pelvis.  The ureter was clearly identified and a window was made between the ureter and the infundibulopelvic ligament.  This was sealed with bipolar grasper and divided with the monopolar keagan.  The peritoneum was then taken down to the lateral aspect of the uterus and posteriorly to the uterosacral ligament where the rectovaginal space was developed.  Anteriorly the peritoneal reflection was carried around to intersect with the bladder reflection made from the right-sided dissection.  The bladder was dissected down off of the Koh cup.  The left uterine artery and vein were clearly visualized coursing along the lateral aspect of the uterus.  Again, with continued visualization of the ureter, the uterine vessels were sealed with the bipolar grasper and transected and released distally with the monopolar keagan.  The colpotomy was then created anteriorly and carried circumferentially around the Koh cup.  The cervix uterus and bilateral tubes and ovaries were then removed vaginally.  The vagina was stitched with 0 V-Loc suture in a running manner. Good closure and hemostasis were achieved.      During retraction of the redundant sigmoid, there was a small rent in the mesentery.  This was noted to be hemostatic but FloSeal was added for an additional degree of safety.  A final survey was  done of the abdomen and pelvis.  There was no active bleeding and the integrity of the bowel, bladder, and other visible organs and tissues was again confirmed.    All instruments were removed, the robot was undocked, and the fascia was closed at the 12 mm port site using the tita-Conrado and 0-vicryl sutures.     The skin was closed at all port sites with 4-0 Monocryl in a subcuticular manner. 0.25% Marcaine without epi was administered at all port sites. There were no complications and all counts were correct. I was present and scrubbed through the entire procedure. The patient was successfully extubated and brought to PACU for recovery.    Significant Surgical Tasks Conducted by the Assistant(s), if Applicable: XIN Rueda performed expert bedside robotic assist.  I certify that the services for which payment is claimed were medically necessary, and that no qualified resident was available to perform the services.    Complications: No    Estimated Blood Loss (EBL): minimal           Condition: Good    Disposition: PACU - hemodynamically stable.    Attestation: I was present and scrubbed for the entire procedure.

## 2022-06-29 NOTE — DISCHARGE SUMMARY
[facility] ([place])  Brief Operative Note    Surgery Date: 6/29/2022     Surgeon(s) and Role:     * Kevin Mcrae MD - Primary     * Cabrera Felix MD - Resident - Assisting        Pre-op Diagnosis:  Complex endometrial hyperplasia with atypia [N85.02]    Post-op Diagnosis:  Post-Op Diagnosis Codes:     * Complex endometrial hyperplasia with atypia [N85.02]    Procedure(s) (LRB):  XI ROBOTIC HYSTERECTOMY,WITH SALPINGO-OOPHORECTOMY (Bilateral)  MAPPING, LYMPH NODE, SENTINEL (N/A)  BIOPSY, LYMPH NODE (N/A)    Anesthesia: General    Description of the findings of the procedure(s): Normal external female genitalia.  No vaginal lesions.  Cervix normal appearing.  Uterus sounded to 8 cm.  The uterus, tubes, and ovaries were grossly normal except for small subserosal fibroids.  The upper abdomen was free of adhesions and normal to include peritoneal and diaphragmatic surfaces, liver edge, and greater curvature of the stomach.  The right sentinel lymph node mapped to the obturator space.  The left sentinel lymph node mapped to the medial surface of the external iliac artery.    Estimated Blood Loss: * No values recorded between 6/29/2022  7:26 AM and 6/29/2022 10:01 AM *         Specimens:   Specimens (From admission, onward)     Start     Ordered    06/29/22 0859  Specimen to Pathology, Surgery Gynecology and Obstetrics  Once        Comments: Pre-op Diagnosis: Complex endometrial hyperplasia with atypia [N85.02]Procedure(s):XI ROBOTIC HYSTERECTOMY,WITH SALPINGO-OOPHORECTOMYMAPPING, LYMPH NODE, SENTINELBIOPSY, LYMPH NODE Number of specimens: 3Name of specimens: 1. Right sentinel node   2. Left sentinel node  3.  Cervix, uuterus, bilateral tubes and ovaries     References:    Click here for ordering Quick Tip   Question Answer Comment   Procedure Type: Gynecology and Obstetrics    Which provider would you like to cc? KEVIN MCRAE    Release to patient Immediate        06/29/22 1866                    Discharge Note    OUTCOME: Patient tolerated treatment/procedure well without complication and is now ready for discharge.    DISPOSITION: Home or Self Care    FINAL DIAGNOSIS:  S/P total hysterectomy and bilateral salpingo-oophorectomy    FOLLOWUP: In clinic    DISCHARGE INSTRUCTIONS:    Discharge Procedure Orders   Diet Adult Regular     Lifting restrictions   Order Comments: No lifting heavier than 20 lbs     No driving until:   Order Comments: No driving while taking narcotics     Pelvic Rest   Order Comments: NOTHING in vagina until seen in clinic     Notify your health care provider if you experience any of the following:  temperature >100.4     Notify your health care provider if you experience any of the following:  persistent nausea and vomiting or diarrhea     Notify your health care provider if you experience any of the following:  severe uncontrolled pain     Notify your health care provider if you experience any of the following:  redness, tenderness, or signs of infection (pain, swelling, redness, odor or green/yellow discharge around incision site)     Notify your health care provider if you experience any of the following:   Order Comments: Vaginal bleeding heavier than a period for 2 hours or more     Activity as tolerated            Medication List      START taking these medications    acetaminophen 650 MG Tbsr  Commonly known as: TYLENOL  Take 1 tablet (650 mg total) by mouth every 6 to 8 hours as needed (Pain). Alternate every 3 hours with ibuprofen.     ibuprofen 600 MG tablet  Commonly known as: ADVIL,MOTRIN  Take 1 tablet (600 mg total) by mouth every 6 (six) hours as needed for Pain. Alternate every 3 hours with tylenol     oxyCODONE 5 MG immediate release tablet  Commonly known as: ROXICODONE  Take 1 tablet (5 mg total) by mouth every 4 (four) hours as needed for Pain.        CHANGE how you take these medications    irbesartan-hydrochlorothiazide 300-12.5 mg per tablet  Commonly known  as: AVALIDE  Take 1 tablet by mouth once daily.  What changed: when to take this        CONTINUE taking these medications    atorvastatin 40 MG tablet  Commonly known as: LIPITOR  Take 1 tablet (40 mg total) by mouth every evening.     carvediloL 12.5 MG tablet  Commonly known as: COREG  Take 1 tablet (12.5 mg total) by mouth 2 (two) times daily with meals.     cholecalciferol (vitamin D3) 25 mcg (1,000 unit) capsule  Commonly known as: VITAMIN D3     triamcinolone acetonide 0.1% 0.1 % ointment  Commonly known as: KENALOG           Where to Get Your Medications      These medications were sent to [facility]  [address] [address]    Hours: Mon-Fri, 8a-5:30p Phone: [phone]   · acetaminophen 650 MG Tbsr  · ibuprofen 600 MG tablet  · oxyCODONE 5 MG immediate release tablet          ROBSON Felix MD  OBGYN PGY-3

## 2022-06-29 NOTE — OPERATIVE NOTE ADDENDUM
Certification of Assistant at Surgery       Surgery Date: 6/29/2022     Participating Surgeons:  Surgeon(s) and Role:     * Kevin Mcgraw MD - Primary     * Cabrera Felix MD - Resident - Assisting    Procedures:  Procedure(s) (LRB):  XI ROBOTIC HYSTERECTOMY,WITH SALPINGO-OOPHORECTOMY (Bilateral)  MAPPING, LYMPH NODE, SENTINEL (N/A)  BIOPSY, LYMPH NODE (N/A)    Assistant Surgeon's Certification of Necessity:  I understand that section 1842 (b) (6) (d) of the Social Security Act generally prohibits Medicare Part B reasonable charge payment for the services of assistants at surgery in teaching hospitals when qualified residents are available to furnish such services. I certify that the services for which payment is claimed were medically necessary, and that no qualified resident was available to perform the services. I further understand that these services are subject to post-payment review by the Medicare carrier.      XIN Mcmahon    06/29/2022  10:03 AM

## 2022-06-29 NOTE — PLAN OF CARE
Maria Elena Jackson has met all discharge criteria from Phase II. Vital Signs are stable, ambulating  without difficulty.Pain is now under control and tolerable for the pt. Pain score 3 at this time.  Discharge instructions given, patient verbalized understanding. Discharged from facility via wheelchair in stable condition.

## 2022-06-30 VITALS
WEIGHT: 185 LBS | DIASTOLIC BLOOD PRESSURE: 74 MMHG | HEIGHT: 65 IN | BODY MASS INDEX: 30.82 KG/M2 | SYSTOLIC BLOOD PRESSURE: 179 MMHG | TEMPERATURE: 98 F | HEART RATE: 62 BPM | OXYGEN SATURATION: 97 % | RESPIRATION RATE: 18 BRPM

## 2022-07-06 ENCOUNTER — PATIENT MESSAGE (OUTPATIENT)
Dept: GYNECOLOGIC ONCOLOGY | Facility: CLINIC | Age: 72
End: 2022-07-06
Payer: OTHER GOVERNMENT

## 2022-07-06 LAB
FINAL PATHOLOGIC DIAGNOSIS: NORMAL
GROSS: NORMAL
Lab: NORMAL
SUPPLEMENTAL DIAGNOSIS: NORMAL

## 2022-07-06 NOTE — PROGRESS NOTES
I called Ms. Jackson to check on her postop recovery and review her pathology results.  She is doing well and seems to be recovering very appropriately at this point after surgery.  We discussed her pathology results which showed only complex atypical hyperplasia without cancer.  She was thrilled and greatly relieved.  I look forward to seeing her for a postop visit early next month.

## 2022-07-11 ENCOUNTER — TELEPHONE (OUTPATIENT)
Dept: GYNECOLOGIC ONCOLOGY | Facility: CLINIC | Age: 72
End: 2022-07-11
Payer: OTHER GOVERNMENT

## 2022-07-11 NOTE — TELEPHONE ENCOUNTER
----- Message from Kevin Mcgraw MD sent at 7/7/2022  4:45 PM CDT -----  Please book her for a postop with me 2nd week of August.  Thanks!

## 2022-07-11 NOTE — TELEPHONE ENCOUNTER
Spoke with our patient about her insurance, schedule appointment she voiced understanding of the date, time and location. All questions answered appointment mail. Provider Scheduling Coord.  Gynecologic Oncology MA/PAR /Preceptor Darrick Hdez

## 2022-07-21 ENCOUNTER — PATIENT MESSAGE (OUTPATIENT)
Dept: ORTHOPEDICS | Facility: CLINIC | Age: 72
End: 2022-07-21
Payer: OTHER GOVERNMENT

## 2022-08-02 ENCOUNTER — TELEPHONE (OUTPATIENT)
Dept: GYNECOLOGIC ONCOLOGY | Facility: CLINIC | Age: 72
End: 2022-08-02
Payer: OTHER GOVERNMENT

## 2022-08-02 NOTE — TELEPHONE ENCOUNTER
Spoke with our patient about her insurance, reschedule appointment she voiced understanding of the date, time and location. All questions answered appointment mail. Provider Scheduling Coord.  Gynecologic Oncology MA/PAR /Preceptor Darrick Hdez

## 2022-08-05 ENCOUNTER — PATIENT MESSAGE (OUTPATIENT)
Dept: INTERNAL MEDICINE | Facility: CLINIC | Age: 72
End: 2022-08-05
Payer: OTHER GOVERNMENT

## 2022-08-08 ENCOUNTER — PATIENT MESSAGE (OUTPATIENT)
Dept: INTERNAL MEDICINE | Facility: CLINIC | Age: 72
End: 2022-08-08
Payer: OTHER GOVERNMENT

## 2022-08-08 ENCOUNTER — OFFICE VISIT (OUTPATIENT)
Dept: GYNECOLOGIC ONCOLOGY | Facility: CLINIC | Age: 72
End: 2022-08-08
Payer: OTHER GOVERNMENT

## 2022-08-08 VITALS
DIASTOLIC BLOOD PRESSURE: 71 MMHG | WEIGHT: 183 LBS | SYSTOLIC BLOOD PRESSURE: 158 MMHG | HEART RATE: 64 BPM | BODY MASS INDEX: 30.45 KG/M2

## 2022-08-08 DIAGNOSIS — N85.02 COMPLEX ENDOMETRIAL HYPERPLASIA WITH ATYPIA: Primary | ICD-10-CM

## 2022-08-08 PROCEDURE — 99999 PR PBB SHADOW E&M-EST. PATIENT-LVL III: CPT | Mod: PBBFAC,,, | Performed by: OBSTETRICS & GYNECOLOGY

## 2022-08-08 PROCEDURE — 99999 PR PBB SHADOW E&M-EST. PATIENT-LVL III: ICD-10-PCS | Mod: PBBFAC,,, | Performed by: OBSTETRICS & GYNECOLOGY

## 2022-08-08 PROCEDURE — 99024 POSTOP FOLLOW-UP VISIT: CPT | Mod: S$GLB,,, | Performed by: OBSTETRICS & GYNECOLOGY

## 2022-08-08 PROCEDURE — 99024 PR POST-OP FOLLOW-UP VISIT: ICD-10-PCS | Mod: S$GLB,,, | Performed by: OBSTETRICS & GYNECOLOGY

## 2022-08-08 NOTE — PROGRESS NOTES
REFERRING PROVIDER  Marce Ellsworth MD     INTERVAL HISTORY  CC: Complex Atypical Hyperplasia  Maria Elena Jackson is a 71 y.o. s/p robotic assisted total hysterectomy with sln mapping and biopsy on 6/29/2022.  She has no vaginal bleeding or discharge.  She is having no nausea or vomiting.  She has no bowel or bladder complaints.  She has no pain issues.  She is resuming her normal activities.    HPI or ONCOLOGIC HISTORY  Referred by Dr. Ellsworth for complex atypical hyperplasia.  In February she developed pelvic pressure and spotting.  No other symptoms.  Dr. Ellsworth work-up and treatment is noted below.      DATA REVIEWED  4/27/2022 EmBx: No endometrial tissue present, uterus sounded to 7 cm; Pap:  NILM     4/29/2022 US Pelvis:  Uterus 8 cm x 4 cm x 4.9 cm; Intramural calcified fibroids are identified in the anterior body near the fundus.  They measure 1.4 cm and 1.8 cm maximum diameter.  The endometrium is irregularly thickened at 2.7 cm in this postmenopausal patient.  It is heterogeneously echogenic with hypoechoic foci throughout.  Ovaries not visualized     5/10/2022 D&C:    FINDINGS:  Uterus sounds to 8 cm.  Large polyp in endometrial cavity, attached at right mid fundus.  Small 1-2 cm fibroid at fundus in midline.  Otherwise atrophic endometrium.  Bilateral ostia visualized.  PATHOLOGY:  Complex hyperplasia with atypia arising in association with a polyp    6/29/2022 Jose Hyst BSO SLN  FINDINGS:  Normal external female genitalia.  No vaginal lesions.  Cervix normal appearing.  Uterus sounded to 8 cm.  The uterus, tubes, and ovaries were grossly normal except for small subserosal fibroids.  The upper abdomen was free of adhesions and normal to include peritoneal and diaphragmatic surfaces, liver edge, and greater curvature of the stomach.  The right sentinel lymph node mapped to the obturator space.  The left sentinel lymph node mapped to the medial surface of the external iliac artery.  PATHOLOGY:  89 gram  uterus with complex atypical hyperplasia in association with benign polyp and adenomyosis.  No malignancy.     REVIEW OF SYSTEMS  Review of Systems   Constitutional: Negative for appetite change, chills, fatigue, fever and unexpected weight change.   HENT:   Negative for lump/mass and mouth sores.    Respiratory: Negative for chest tightness, cough and shortness of breath.    Cardiovascular: Negative for chest pain, leg swelling and palpitations.   Gastrointestinal: Negative for abdominal distention, abdominal pain, blood in stool, constipation, diarrhea, nausea and vomiting.   Genitourinary: Negative for dysuria, frequency, vaginal bleeding and vaginal discharge.    Musculoskeletal: Negative for arthralgias and myalgias.   Skin: Negative for rash.   Neurological: Negative for dizziness, light-headedness and numbness.   Hematological: Negative for adenopathy.   Psychiatric/Behavioral: Negative for decreased concentration, depression and sleep disturbance. The patient is not nervous/anxious.      OBJECTIVE   Vitals:    08/08/22 1413   BP: (!) 158/71   Pulse: 64      Body mass index is 30.45 kg/m².     Physical Exam:   Constitutional: She is oriented to person, place, and time. She appears well-developed and well-nourished. No distress.    HENT:   Head: Normocephalic and atraumatic.    Eyes: Conjunctivae and EOM are normal.      Pulmonary/Chest: Effort normal. No respiratory distress.        Abdominal: Soft. She exhibits no distension, no mass and no abdominal incision (healing well). There is no abdominal tenderness. There is no rebound and no guarding. No hernia.     Genitourinary:    Genitourinary Comments: NEFG  Vagina - no lesions, no bleeding, cuff well healed  Cervix, Uterus, Adnexa - Surgically absent                 Neurological: She is alert and oriented to person, place, and time.    Skin: No rash noted.    Psychiatric: She has a normal mood and affect. Her behavior is normal.     ECOG status:  0    LABORATORY DATA  Lab data reviewed.    RADIOLOGICAL DATA  Radiology data reviewed.    PATHOLOGY DATA  Pathology data reviewed.    ASSESSMENT    1. Complex endometrial hyperplasia with atypia     The patient is doing very well postoperatively.  Based on her benign pathology, she can follow-up with her regular healthcare team.  She will return to see me if there are concerns regarding her surgery.     PLAN  No orders of the defined types were placed in this encounter.  1.  Patient to follow-up with regular health care team for routine health maintenance  2.  Follow-up with me if any issues related to her recent surgery  3.  Continue pelvic rest for full 8 weeks after day of surgery        Kevin Mcgraw MD

## 2022-08-14 NOTE — PROGRESS NOTES
[facility]    Subjective:       Patient ID: Maria Elena Jackson is a 71 y.o. female.    Chief Complaint: [facility]      History was obtained from the patient and supplemented through chart review.  This patient has been seen by an [facility] internal medicine physician in the past 3 years but is new to me.    HPI:    Patient is a 71 y.o. female who presents to [facility].    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   HENT: Negative for trouble swallowing.    Respiratory: Negative for shortness of breath.    Cardiovascular: Negative for chest pain.   Gastrointestinal: Negative for constipation, diarrhea, nausea and vomiting.   Musculoskeletal: Positive for arthralgias. Negative for gait problem.   Neurological: Negative for dizziness and seizures.   Psychiatric/Behavioral: Negative for hallucinations.         Surgical hx, family hx, social hx   Have been reviewed      Current Outpatient Medications:     cholecalciferol, vitamin D3, (VITAMIN D3) 25 mcg (1,000 unit) capsule, Take 1,000 Units by mouth once daily., Disp: , Rfl:     triamcinolone acetonide 0.1% (KENALOG) 0.1 % ointment, Apply topically., Disp: , Rfl:     acetaminophen (TYLENOL) 650 MG TbSR, Take 1 tablet (650 mg total) by mouth every 6 to 8 hours as needed (Pain). Alternate every 3 hours with ibuprofen. (Patient not taking: Reported on 8/15/2022), Disp: 60 tablet, Rfl: 1    atorvastatin (LIPITOR) 40 MG tablet,  "Take 1 tablet (40 mg total) by mouth every evening., Disp: 90 tablet, Rfl: 3    carvediloL (COREG) 12.5 MG tablet, Take 1 tablet (12.5 mg total) by mouth 2 (two) times daily with meals., Disp: 90 tablet, Rfl: 3    doxazosin (CARDURA) 4 MG tablet, Take 1 tablet (4 mg total) by mouth every evening., Disp: 90 tablet, Rfl: 3    irbesartan-hydrochlorothiazide (AVALIDE) 300-12.5 mg per tablet, Take 1 tablet by mouth nightly., Disp: 90 tablet, Rfl: 1    Objective:        Body mass index is 31.18 kg/m².  Vitals:    08/15/22 0758   BP: (!) 158/78   Pulse: 60   SpO2: 97%   Weight: 85 kg (187 lb 6.3 oz)   Height: 5' 5" (1.651 m)   PainSc: 0-No pain     Physical Exam  Vitals and nursing note reviewed.   Constitutional:       General: She is not in acute distress.     Appearance: Normal appearance. She is not ill-appearing.   HENT:      Head: Normocephalic and atraumatic.      Nose:      Comments: Wearing a mask  Eyes:      General: No scleral icterus.  Pulmonary:      Effort: Pulmonary effort is normal.   Abdominal:      General: There is no distension.   Musculoskeletal:         General: No deformity.      Cervical back: Normal range of motion.   Skin:     General: Skin is warm and dry.   Neurological:      Mental Status: She is alert and oriented to person, place, and time.   Psychiatric:         Behavior: Behavior normal.           Lab Results   Component Value Date    WBC 6.90 05/05/2022    HGB 15.0 05/05/2022    HCT 44.8 05/05/2022     05/05/2022    CHOL 181 01/11/2022    TRIG 104 01/11/2022    HDL 47 01/11/2022    ALT 24 06/13/2022    AST 20 06/13/2022     06/13/2022     06/13/2022    K 4.0 06/13/2022    K 4.0 06/13/2022     06/13/2022     06/13/2022    CREATININE 0.9 06/13/2022    CREATININE 0.9 06/13/2022    BUN 22 06/13/2022    BUN 22 06/13/2022    CO2 31 (H) 06/13/2022    CO2 31 (H) 06/13/2022    INR 0.9 10/01/2019    HGBA1C 5.7 (H) 01/11/2022       The 10-year ASCVD risk score (Portal " JAUN Hunter, et al., 2013) is: 24.9%    Values used to calculate the score:      Age: 71 years      Sex: Female      Is Non- : No      Diabetic: No      Tobacco smoker: No      Systolic Blood Pressure: 158 mmHg      Is BP treated: Yes      HDL Cholesterol: 47 mg/dL      Total Cholesterol: 181 mg/dL    (Imaging have been independently reviewed)    Assessment:         1. Essential hypertension    2. Hypertension, unspecified type    3. Hyperlipidemia, unspecified hyperlipidemia type    4. Screening for malignant neoplasm of colon    5. Macrocytosis without anemia    6. Prediabetes    7. Post menopausal problems          Plan:     Maria Elena was seen today for establish care.    Diagnoses and all orders for this visit:    Essential hypertension  -     doxazosin (CARDURA) 4 MG tablet; Take 1 tablet (4 mg total) by mouth every evening.  -     irbesartan-hydrochlorothiazide (AVALIDE) 300-12.5 mg per tablet; Take 1 tablet by mouth nightly.  -     COMPREHENSIVE METABOLIC PANEL; Future    Hypertension, unspecified type  -     carvediloL (COREG) 12.5 MG tablet; Take 1 tablet (12.5 mg total) by mouth 2 (two) times daily with meals.    Hyperlipidemia, unspecified hyperlipidemia type  -     atorvastatin (LIPITOR) 40 MG tablet; Take 1 tablet (40 mg total) by mouth every evening.    Screening for malignant neoplasm of colon  -     Ambulatory referral/consult to Gastroenterology; Future    Macrocytosis without anemia  -     CBC Auto Differential; Future    Prediabetes  -     Hemoglobin A1C; Future  -     Hemoglobin A1C; Future    Post menopausal problems  -     DXA Bone Density Spine And Hip; Future        Health Maintenance  - Lipids: ordered  - A1C: ordered  - Colon Ca Screen: approx 7 years ago, overdue (was Dr. Doe); referred to [facility]  - Immunizations:    Women's health  - Pap: s/p 6/2022 concern for cancer, but not cancer  - Mammo:  - Dexa:   - Contraception:    Follow up in about 3 months (around  11/15/2022).        All medications were reviewed including potential side effects and risks/benefits.  Pt was counseled to call back if anything worsens or if questions arise.    Monroe Cutler MD  Family Medicine  [facility]  [facility] [address] [address]  Phone [phone]  Fax [phone]

## 2022-08-15 ENCOUNTER — LAB VISIT (OUTPATIENT)
Dept: LAB | Facility: OTHER | Age: 72
End: 2022-08-15
Attending: STUDENT IN AN ORGANIZED HEALTH CARE EDUCATION/TRAINING PROGRAM
Payer: OTHER GOVERNMENT

## 2022-08-15 ENCOUNTER — CLINICAL SUPPORT (OUTPATIENT)
Dept: INTERNAL MEDICINE | Facility: CLINIC | Age: 72
End: 2022-08-15
Payer: OTHER GOVERNMENT

## 2022-08-15 ENCOUNTER — OFFICE VISIT (OUTPATIENT)
Dept: INTERNAL MEDICINE | Facility: CLINIC | Age: 72
End: 2022-08-15
Payer: OTHER GOVERNMENT

## 2022-08-15 VITALS
HEART RATE: 60 BPM | WEIGHT: 187.38 LBS | DIASTOLIC BLOOD PRESSURE: 78 MMHG | HEIGHT: 65 IN | BODY MASS INDEX: 31.22 KG/M2 | SYSTOLIC BLOOD PRESSURE: 158 MMHG | OXYGEN SATURATION: 97 %

## 2022-08-15 DIAGNOSIS — N95.9 POST MENOPAUSAL PROBLEMS: ICD-10-CM

## 2022-08-15 DIAGNOSIS — R73.03 PREDIABETES: ICD-10-CM

## 2022-08-15 DIAGNOSIS — E78.5 HYPERLIPIDEMIA, UNSPECIFIED HYPERLIPIDEMIA TYPE: ICD-10-CM

## 2022-08-15 DIAGNOSIS — D75.89 MACROCYTOSIS WITHOUT ANEMIA: ICD-10-CM

## 2022-08-15 DIAGNOSIS — Z12.11 SCREENING FOR MALIGNANT NEOPLASM OF COLON: ICD-10-CM

## 2022-08-15 DIAGNOSIS — I10 HYPERTENSION, UNSPECIFIED TYPE: ICD-10-CM

## 2022-08-15 DIAGNOSIS — I10 ESSENTIAL HYPERTENSION: ICD-10-CM

## 2022-08-15 DIAGNOSIS — I10 ESSENTIAL HYPERTENSION: Primary | ICD-10-CM

## 2022-08-15 DIAGNOSIS — Z23 IMMUNIZATION DUE: Primary | ICD-10-CM

## 2022-08-15 LAB
ALBUMIN SERPL BCP-MCNC: 3.8 G/DL (ref 3.5–5.2)
ALP SERPL-CCNC: 90 U/L (ref 55–135)
ALT SERPL W/O P-5'-P-CCNC: 22 U/L (ref 10–44)
ANION GAP SERPL CALC-SCNC: 7 MMOL/L (ref 8–16)
AST SERPL-CCNC: 17 U/L (ref 10–40)
BASOPHILS # BLD AUTO: 0.06 K/UL (ref 0–0.2)
BASOPHILS NFR BLD: 0.9 % (ref 0–1.9)
BILIRUB SERPL-MCNC: 0.5 MG/DL (ref 0.1–1)
BUN SERPL-MCNC: 16 MG/DL (ref 8–23)
CALCIUM SERPL-MCNC: 10.1 MG/DL (ref 8.7–10.5)
CHLORIDE SERPL-SCNC: 103 MMOL/L (ref 95–110)
CO2 SERPL-SCNC: 30 MMOL/L (ref 23–29)
CREAT SERPL-MCNC: 0.8 MG/DL (ref 0.5–1.4)
DIFFERENTIAL METHOD: ABNORMAL
EOSINOPHIL # BLD AUTO: 0.4 K/UL (ref 0–0.5)
EOSINOPHIL NFR BLD: 6 % (ref 0–8)
ERYTHROCYTE [DISTWIDTH] IN BLOOD BY AUTOMATED COUNT: 12.4 % (ref 11.5–14.5)
EST. GFR  (NO RACE VARIABLE): >60 ML/MIN/1.73 M^2
ESTIMATED AVG GLUCOSE: 114 MG/DL (ref 68–131)
GLUCOSE SERPL-MCNC: 100 MG/DL (ref 70–110)
HBA1C MFR BLD: 5.6 % (ref 4–5.6)
HCT VFR BLD AUTO: 41.7 % (ref 37–48.5)
HGB BLD-MCNC: 13.9 G/DL (ref 12–16)
IMM GRANULOCYTES # BLD AUTO: 0.01 K/UL (ref 0–0.04)
IMM GRANULOCYTES NFR BLD AUTO: 0.1 % (ref 0–0.5)
LYMPHOCYTES # BLD AUTO: 2.4 K/UL (ref 1–4.8)
LYMPHOCYTES NFR BLD: 34.6 % (ref 18–48)
MCH RBC QN AUTO: 32 PG (ref 27–31)
MCHC RBC AUTO-ENTMCNC: 33.3 G/DL (ref 32–36)
MCV RBC AUTO: 96 FL (ref 82–98)
MONOCYTES # BLD AUTO: 0.6 K/UL (ref 0.3–1)
MONOCYTES NFR BLD: 8.3 % (ref 4–15)
NEUTROPHILS # BLD AUTO: 3.5 K/UL (ref 1.8–7.7)
NEUTROPHILS NFR BLD: 50.1 % (ref 38–73)
NRBC BLD-RTO: 0 /100 WBC
PLATELET # BLD AUTO: 227 K/UL (ref 150–450)
PMV BLD AUTO: 12.1 FL (ref 9.2–12.9)
POTASSIUM SERPL-SCNC: 4.1 MMOL/L (ref 3.5–5.1)
PROT SERPL-MCNC: 7 G/DL (ref 6–8.4)
RBC # BLD AUTO: 4.34 M/UL (ref 4–5.4)
SODIUM SERPL-SCNC: 140 MMOL/L (ref 136–145)
WBC # BLD AUTO: 7.02 K/UL (ref 3.9–12.7)

## 2022-08-15 PROCEDURE — 90471 IMMUNIZATION ADMIN: CPT | Mod: S$GLB,,, | Performed by: STUDENT IN AN ORGANIZED HEALTH CARE EDUCATION/TRAINING PROGRAM

## 2022-08-15 PROCEDURE — 36415 COLL VENOUS BLD VENIPUNCTURE: CPT | Performed by: STUDENT IN AN ORGANIZED HEALTH CARE EDUCATION/TRAINING PROGRAM

## 2022-08-15 PROCEDURE — 99397 PER PM REEVAL EST PAT 65+ YR: CPT | Mod: 25,S$GLB,, | Performed by: STUDENT IN AN ORGANIZED HEALTH CARE EDUCATION/TRAINING PROGRAM

## 2022-08-15 PROCEDURE — 90471 TDAP VACCINE GREATER THAN OR EQUAL TO 7YO IM: ICD-10-PCS | Mod: S$GLB,,, | Performed by: STUDENT IN AN ORGANIZED HEALTH CARE EDUCATION/TRAINING PROGRAM

## 2022-08-15 PROCEDURE — 83036 HEMOGLOBIN GLYCOSYLATED A1C: CPT | Performed by: STUDENT IN AN ORGANIZED HEALTH CARE EDUCATION/TRAINING PROGRAM

## 2022-08-15 PROCEDURE — 99999 PR PBB SHADOW E&M-EST. PATIENT-LVL I: ICD-10-PCS | Mod: PBBFAC,,,

## 2022-08-15 PROCEDURE — 99999 PR PBB SHADOW E&M-EST. PATIENT-LVL IV: ICD-10-PCS | Mod: PBBFAC,,, | Performed by: STUDENT IN AN ORGANIZED HEALTH CARE EDUCATION/TRAINING PROGRAM

## 2022-08-15 PROCEDURE — 99999 PR PBB SHADOW E&M-EST. PATIENT-LVL I: CPT | Mod: PBBFAC,,,

## 2022-08-15 PROCEDURE — 99397 PR PREVENTIVE VISIT,EST,65 & OVER: ICD-10-PCS | Mod: 25,S$GLB,, | Performed by: STUDENT IN AN ORGANIZED HEALTH CARE EDUCATION/TRAINING PROGRAM

## 2022-08-15 PROCEDURE — 90715 TDAP VACCINE 7 YRS/> IM: CPT | Mod: S$GLB,,, | Performed by: STUDENT IN AN ORGANIZED HEALTH CARE EDUCATION/TRAINING PROGRAM

## 2022-08-15 PROCEDURE — 80053 COMPREHEN METABOLIC PANEL: CPT | Performed by: STUDENT IN AN ORGANIZED HEALTH CARE EDUCATION/TRAINING PROGRAM

## 2022-08-15 PROCEDURE — 90715 TDAP VACCINE GREATER THAN OR EQUAL TO 7YO IM: ICD-10-PCS | Mod: S$GLB,,, | Performed by: STUDENT IN AN ORGANIZED HEALTH CARE EDUCATION/TRAINING PROGRAM

## 2022-08-15 PROCEDURE — 85025 COMPLETE CBC W/AUTO DIFF WBC: CPT | Performed by: STUDENT IN AN ORGANIZED HEALTH CARE EDUCATION/TRAINING PROGRAM

## 2022-08-15 PROCEDURE — 99999 PR PBB SHADOW E&M-EST. PATIENT-LVL IV: CPT | Mod: PBBFAC,,, | Performed by: STUDENT IN AN ORGANIZED HEALTH CARE EDUCATION/TRAINING PROGRAM

## 2022-08-15 RX ORDER — CARVEDILOL 12.5 MG/1
12.5 TABLET ORAL 2 TIMES DAILY WITH MEALS
Qty: 90 TABLET | Refills: 3 | Status: SHIPPED | OUTPATIENT
Start: 2022-08-15 | End: 2022-11-29 | Stop reason: SDUPTHER

## 2022-08-15 RX ORDER — DOXAZOSIN 4 MG/1
4 TABLET ORAL NIGHTLY
Qty: 90 TABLET | Refills: 3 | Status: SHIPPED | OUTPATIENT
Start: 2022-08-15 | End: 2022-11-15

## 2022-08-15 RX ORDER — IRBESARTAN AND HYDROCHLOROTHIAZIDE 300; 12.5 MG/1; MG/1
1 TABLET, FILM COATED ORAL NIGHTLY
Qty: 90 TABLET | Refills: 1 | Status: SHIPPED | OUTPATIENT
Start: 2022-08-15 | End: 2023-02-23 | Stop reason: SDUPTHER

## 2022-08-15 RX ORDER — ATORVASTATIN CALCIUM 40 MG/1
40 TABLET, FILM COATED ORAL NIGHTLY
Qty: 90 TABLET | Refills: 3 | Status: ON HOLD | OUTPATIENT
Start: 2022-08-15 | End: 2023-08-17 | Stop reason: SDUPTHER

## 2022-08-15 NOTE — PROGRESS NOTES
"Patient was given vaccine information sheet for the Tdap immunization. The area of injection was palpated using the acromion process as a landmark. This area was cleaned with alcohol. Using a 25g 1" safety needle, 0.5mL of the vaccine was placed into the right deltoid muscle. The injection site was dressed with a bandage. Patient experienced no complications and was discharged in stable condition. Tdap Lot: [id number] Exp: 06/05/2023.      "

## 2022-08-15 NOTE — PATIENT INSTRUCTIONS
-Please call [facility] for appt with Dr. Doe    -Please take an over-the-counter calcium and vitamin D supplement such as Citracal D or Oscal D that includes about 800 international units of vitamin D daily and 1200 mg of calcium.

## 2022-08-29 ENCOUNTER — TELEPHONE (OUTPATIENT)
Dept: INTERNAL MEDICINE | Facility: CLINIC | Age: 72
End: 2022-08-29

## 2022-08-29 ENCOUNTER — CLINICAL SUPPORT (OUTPATIENT)
Dept: INTERNAL MEDICINE | Facility: CLINIC | Age: 72
End: 2022-08-29
Payer: OTHER GOVERNMENT

## 2022-08-29 PROCEDURE — 99999 PR PBB SHADOW E&M-EST. PATIENT-LVL II: ICD-10-PCS | Mod: PBBFAC,,,

## 2022-08-29 PROCEDURE — 99999 PR PBB SHADOW E&M-EST. PATIENT-LVL II: CPT | Mod: PBBFAC,,,

## 2022-08-29 NOTE — PROGRESS NOTES
"Maria Elena Jackson 71 y.o. female is here for Blood Pressure check. remote    Manual Blood pressure reading was 145/80 Pulse ? (Checked at the end of the visit)    If high, was it repeated after 15 minutes? no    Pt's Home blood pressure machine read in office NO Pulse N/A.     Diagnosed with Hypertension yes.    Patient took blood pressure medication today yes.  Last dose of blood pressure medication was taken at 0630 Patient took 1. Coreg 12.5 mg BID 2. Irbesartan-hydrochlorothiazide 300 12.5 mg nightly ( took this AM at 0630) 3. Cardura 4 mg every evening ( took yesterday at 10 PM )    All Medications and OTC medication updated yes    Does patient have record of home blood pressure readings / Blood Pressure Log yes. "154-150 over 80"    Does the pt have any complaints today in regards to their blood pressure medication? no. Concerned about taking three BP pills in evening and is taking Irbesartan Hctz in AM Patient is asymptomatic.     Were you sitting still for 5-10 minutes prior to taking your Blood pressure? yes   Has your blood pressure monitor ever been checked? no When was last time we checked your blood pressure monitor? NO  Updated vitals yes  Follow up date is 11/15/22  Dr. Cutler  notified.   Creatinine   Date Value Ref Range Status   08/15/2022 0.8 0.5 - 1.4 mg/dL Final     Sodium   Date Value Ref Range Status   08/15/2022 140 136 - 145 mmol/L Final     Potassium   Date Value Ref Range Status   08/15/2022 4.1 3.5 - 5.1 mmol/L Final         "

## 2022-09-08 ENCOUNTER — PATIENT OUTREACH (OUTPATIENT)
Dept: ADMINISTRATIVE | Facility: HOSPITAL | Age: 72
End: 2022-09-08
Payer: OTHER GOVERNMENT

## 2022-09-16 ENCOUNTER — PATIENT OUTREACH (OUTPATIENT)
Dept: INTERNAL MEDICINE | Facility: CLINIC | Age: 72
End: 2022-09-16
Payer: OTHER GOVERNMENT

## 2022-09-22 ENCOUNTER — PATIENT MESSAGE (OUTPATIENT)
Dept: ORTHOPEDICS | Facility: CLINIC | Age: 72
End: 2022-09-22
Payer: OTHER GOVERNMENT

## 2022-09-22 DIAGNOSIS — Z96.659 TOTAL KNEE REPLACEMENT STATUS, UNSPECIFIED LATERALITY: Primary | ICD-10-CM

## 2022-09-22 RX ORDER — AMOXICILLIN 500 MG/1
CAPSULE ORAL
Qty: 4 CAPSULE | Refills: 0 | Status: SHIPPED | OUTPATIENT
Start: 2022-09-22 | End: 2022-09-26 | Stop reason: SDUPTHER

## 2022-09-23 ENCOUNTER — PATIENT MESSAGE (OUTPATIENT)
Dept: INTERNAL MEDICINE | Facility: CLINIC | Age: 72
End: 2022-09-23
Payer: OTHER GOVERNMENT

## 2022-09-26 ENCOUNTER — PATIENT MESSAGE (OUTPATIENT)
Dept: ORTHOPEDICS | Facility: CLINIC | Age: 72
End: 2022-09-26
Payer: OTHER GOVERNMENT

## 2022-09-26 DIAGNOSIS — Z96.659 TOTAL KNEE REPLACEMENT STATUS, UNSPECIFIED LATERALITY: ICD-10-CM

## 2022-09-26 RX ORDER — AMOXICILLIN 500 MG/1
CAPSULE ORAL
Qty: 12 CAPSULE | Refills: 3 | Status: SHIPPED | OUTPATIENT
Start: 2022-09-26 | End: 2022-11-29

## 2022-11-01 NOTE — ASSESSMENT & PLAN NOTE
Home meds   DISPLAY PLAN FREE TEXT DISPLAY PLAN FREE TEXT DISPLAY PLAN FREE TEXT DISPLAY PLAN FREE TEXT DISPLAY PLAN FREE TEXT DISPLAY PLAN FREE TEXT DISPLAY PLAN FREE TEXT DISPLAY PLAN FREE TEXT DISPLAY PLAN FREE TEXT DISPLAY PLAN FREE TEXT DISPLAY PLAN FREE TEXT DISPLAY PLAN FREE TEXT DISPLAY PLAN FREE TEXT DISPLAY PLAN FREE TEXT DISPLAY PLAN FREE TEXT DISPLAY PLAN FREE TEXT DISPLAY PLAN FREE TEXT DISPLAY PLAN FREE TEXT DISPLAY PLAN FREE TEXT DISPLAY PLAN FREE TEXT DISPLAY PLAN FREE TEXT DISPLAY PLAN FREE TEXT DISPLAY PLAN FREE TEXT DISPLAY PLAN FREE TEXT

## 2022-11-08 ENCOUNTER — LAB VISIT (OUTPATIENT)
Dept: LAB | Facility: HOSPITAL | Age: 72
End: 2022-11-08
Payer: OTHER GOVERNMENT

## 2022-11-08 DIAGNOSIS — R73.03 PREDIABETES: ICD-10-CM

## 2022-11-08 LAB
ESTIMATED AVG GLUCOSE: 114 MG/DL (ref 68–131)
HBA1C MFR BLD: 5.6 % (ref 4–5.6)

## 2022-11-08 PROCEDURE — 83036 HEMOGLOBIN GLYCOSYLATED A1C: CPT | Performed by: STUDENT IN AN ORGANIZED HEALTH CARE EDUCATION/TRAINING PROGRAM

## 2022-11-08 PROCEDURE — 36415 COLL VENOUS BLD VENIPUNCTURE: CPT | Performed by: STUDENT IN AN ORGANIZED HEALTH CARE EDUCATION/TRAINING PROGRAM

## 2022-11-15 ENCOUNTER — HOSPITAL ENCOUNTER (OUTPATIENT)
Dept: RADIOLOGY | Facility: OTHER | Age: 72
Discharge: HOME | End: 2022-11-15
Attending: STUDENT IN AN ORGANIZED HEALTH CARE EDUCATION/TRAINING PROGRAM
Payer: OTHER GOVERNMENT

## 2022-11-15 ENCOUNTER — OFFICE VISIT (OUTPATIENT)
Dept: INTERNAL MEDICINE | Facility: CLINIC | Age: 72
End: 2022-11-15
Payer: OTHER GOVERNMENT

## 2022-11-15 VITALS
HEIGHT: 65 IN | BODY MASS INDEX: 31.77 KG/M2 | WEIGHT: 190.69 LBS | HEART RATE: 52 BPM | DIASTOLIC BLOOD PRESSURE: 70 MMHG | SYSTOLIC BLOOD PRESSURE: 162 MMHG | OXYGEN SATURATION: 95 %

## 2022-11-15 DIAGNOSIS — M81.0 AGE-RELATED OSTEOPOROSIS WITHOUT CURRENT PATHOLOGICAL FRACTURE: ICD-10-CM

## 2022-11-15 DIAGNOSIS — Z90.722 S/P TOTAL HYSTERECTOMY AND BILATERAL SALPINGO-OOPHORECTOMY: ICD-10-CM

## 2022-11-15 DIAGNOSIS — Z90.710 S/P TOTAL HYSTERECTOMY AND BILATERAL SALPINGO-OOPHORECTOMY: ICD-10-CM

## 2022-11-15 DIAGNOSIS — Z12.31 ENCOUNTER FOR SCREENING MAMMOGRAM FOR MALIGNANT NEOPLASM OF BREAST: ICD-10-CM

## 2022-11-15 DIAGNOSIS — Z90.79 S/P TOTAL HYSTERECTOMY AND BILATERAL SALPINGO-OOPHORECTOMY: ICD-10-CM

## 2022-11-15 DIAGNOSIS — N95.9 POST MENOPAUSAL PROBLEMS: ICD-10-CM

## 2022-11-15 DIAGNOSIS — R32 URINARY INCONTINENCE, UNSPECIFIED TYPE: ICD-10-CM

## 2022-11-15 DIAGNOSIS — I10 ESSENTIAL HYPERTENSION: Primary | ICD-10-CM

## 2022-11-15 DIAGNOSIS — Z98.890 STATUS POST HYSTEROSCOPY: ICD-10-CM

## 2022-11-15 DIAGNOSIS — E78.5 HYPERLIPIDEMIA, UNSPECIFIED HYPERLIPIDEMIA TYPE: ICD-10-CM

## 2022-11-15 PROBLEM — D75.89 MACROCYTOSIS WITHOUT ANEMIA: Status: RESOLVED | Noted: 2019-10-01 | Resolved: 2022-11-15

## 2022-11-15 PROBLEM — N95.0 POSTMENOPAUSAL BLEEDING: Status: RESOLVED | Noted: 2022-05-10 | Resolved: 2022-11-15

## 2022-11-15 PROBLEM — R93.89 THICKENED ENDOMETRIUM: Status: RESOLVED | Noted: 2022-05-10 | Resolved: 2022-11-15

## 2022-11-15 PROCEDURE — 99999 PR PBB SHADOW E&M-EST. PATIENT-LVL V: ICD-10-PCS | Mod: PBBFAC,,, | Performed by: STUDENT IN AN ORGANIZED HEALTH CARE EDUCATION/TRAINING PROGRAM

## 2022-11-15 PROCEDURE — 99215 PR OFFICE/OUTPT VISIT, EST, LEVL V, 40-54 MIN: ICD-10-PCS | Mod: S$GLB,,, | Performed by: STUDENT IN AN ORGANIZED HEALTH CARE EDUCATION/TRAINING PROGRAM

## 2022-11-15 PROCEDURE — 77080 DXA BONE DENSITY AXIAL: CPT | Mod: TC

## 2022-11-15 PROCEDURE — 77080 DXA BONE DENSITY AXIAL: CPT | Mod: 26,,, | Performed by: RADIOLOGY

## 2022-11-15 PROCEDURE — 99999 PR PBB SHADOW E&M-EST. PATIENT-LVL V: CPT | Mod: PBBFAC,,, | Performed by: STUDENT IN AN ORGANIZED HEALTH CARE EDUCATION/TRAINING PROGRAM

## 2022-11-15 PROCEDURE — 77080 DEXA BONE DENSITY SPINE HIP: ICD-10-PCS | Mod: 26,,, | Performed by: RADIOLOGY

## 2022-11-15 PROCEDURE — 99215 OFFICE O/P EST HI 40 MIN: CPT | Mod: S$GLB,,, | Performed by: STUDENT IN AN ORGANIZED HEALTH CARE EDUCATION/TRAINING PROGRAM

## 2022-11-15 RX ORDER — NIFEDIPINE 30 MG/1
30 TABLET, EXTENDED RELEASE ORAL DAILY
Qty: 30 TABLET | Refills: 3 | Status: SHIPPED | OUTPATIENT
Start: 2022-11-15 | End: 2022-11-29 | Stop reason: SDUPTHER

## 2022-11-15 NOTE — PROGRESS NOTES
[facility]    Subjective:       Patient ID: Maria Elena Jackson is a 72 y.o. female.    Chief Complaint: Hypertension (F/u)      History was obtained from the patient and supplemented through chart review.  This patient is known to me.     HPI:    Patient is a 72 y.o. female w/ HTN, HLD, family hx of colon cancer, ovarian cancer    Incontinence since hysterectomy  Urogyn referral  Worse w/ stress incontinence    HTN  Uncontrollled, better at home (nearly controlled)  coreg 12.5 BID, irbesartan-hctz 300-12.5 mg,   Stop Doxazosin 4, restart nifedipine 30  Disliked nifedipine due to swelling  Discussed R/B/Ae  Salt in chili last night, steak & baked potato, sensitive  Nurse remote Bp 2 weeks; discussed potential to reverse tactic if goes too low  Perhaps low when not having salt    HLD  lipitor 40  stable    Prediabetes  Diet counseled; discussed   stable    Mom had colon cancer, ovarian cancer,  early  S/p oopherectomy, hyst  Follows with gyn after hyst    Osteopenia -> osteoporosis  Discussed dosing of vit D/ca  New  3 times a week cross fit   Concerned about dental issues; referral to endocrinology for osteoporosis    Medical History  Past Medical History:   Diagnosis Date    Arthritis     Hyperlipidemia     Hypertension     S/P total hysterectomy and bilateral salpingo-oophorectomy 2022       Review of Systems   Constitutional:  Negative for fever.   HENT:  Negative for trouble swallowing.    Respiratory:  Negative for shortness of breath.    Cardiovascular:  Negative for chest pain.   Gastrointestinal:  Negative for constipation, diarrhea, nausea and vomiting.   Musculoskeletal:  Positive for arthralgias. Negative for gait problem.   Neurological:  Negative for dizziness and seizures.   Psychiatric/Behavioral:  Negative for hallucinations.        Surgical hx, family hx, social hx   Have been reviewed      Current Outpatient Medications:     amoxicillin (AMOXIL) 500 MG capsule, Take four  "tablets one hour prior to dental procedure, Disp: 12 capsule, Rfl: 3    atorvastatin (LIPITOR) 40 MG tablet, Take 1 tablet (40 mg total) by mouth every evening., Disp: 90 tablet, Rfl: 3    carvediloL (COREG) 12.5 MG tablet, Take 1 tablet (12.5 mg total) by mouth 2 (two) times daily with meals., Disp: 90 tablet, Rfl: 3    irbesartan-hydrochlorothiazide (AVALIDE) 300-12.5 mg per tablet, Take 1 tablet by mouth nightly., Disp: 90 tablet, Rfl: 1    acetaminophen (TYLENOL) 650 MG TbSR, Take 1 tablet (650 mg total) by mouth every 6 to 8 hours as needed (Pain). Alternate every 3 hours with ibuprofen. (Patient not taking: Reported on 8/15/2022), Disp: 60 tablet, Rfl: 1    cholecalciferol, vitamin D3, (VITAMIN D3) 25 mcg (1,000 unit) capsule, Take 1,000 Units by mouth once daily., Disp: , Rfl:     NIFEdipine (PROCARDIA-XL) 30 MG (OSM) 24 hr tablet, Take 1 tablet (30 mg total) by mouth once daily., Disp: 30 tablet, Rfl: 3    pneumoc 20-chelsie conj-dip cr,PF, (PREVNAR-20, PF,) 0.5 mL Syrg injection, Inject 0.5 mLs into the muscle., Disp: 0.5 mL, Rfl: 0    triamcinolone acetonide 0.1% (KENALOG) 0.1 % ointment, Apply topically., Disp: , Rfl:     varicella-zoster gE-AS01B, PF, (SHINGRIX, PF,) 50 mcg/0.5 mL injection, Inject 0.5 mLs into the muscle once. for 1 dose, Disp: 1 each, Rfl: 0    Objective:        Body mass index is 31.73 kg/m².  Vitals:    11/15/22 1505   BP: (!) 162/70   Pulse: (!) 52   SpO2: 95%   Weight: 86.5 kg (190 lb 11.2 oz)   Height: 5' 5" (1.651 m)   PainSc: 0-No pain       Physical Exam  Vitals and nursing note reviewed.   Constitutional:       General: She is not in acute distress.     Appearance: Normal appearance. She is not ill-appearing.   HENT:      Head: Normocephalic and atraumatic.   Eyes:      General: No scleral icterus.  Pulmonary:      Effort: Pulmonary effort is normal.   Abdominal:      General: There is no distension.   Musculoskeletal:         General: No deformity.      Cervical back: Normal " range of motion.   Skin:     General: Skin is warm and dry.   Neurological:      Mental Status: She is alert and oriented to person, place, and time.   Psychiatric:         Behavior: Behavior normal.         Lab Results   Component Value Date    WBC 7.02 08/15/2022    HGB 13.9 08/15/2022    HCT 41.7 08/15/2022     08/15/2022    CHOL 181 01/11/2022    TRIG 104 01/11/2022    HDL 47 01/11/2022    ALT 22 08/15/2022    AST 17 08/15/2022     08/15/2022    K 4.1 08/15/2022     08/15/2022    CREATININE 0.8 08/15/2022    BUN 16 08/15/2022    CO2 30 (H) 08/15/2022    INR 0.9 10/01/2019    HGBA1C 5.6 11/08/2022       The 10-year ASCVD risk score (Quyen JACOB, et al., 2019) is: 28.6%    Values used to calculate the score:      Age: 72 years      Sex: Female      Is Non- : No      Diabetic: No      Tobacco smoker: No      Systolic Blood Pressure: 162 mmHg      Is BP treated: Yes      HDL Cholesterol: 47 mg/dL      Total Cholesterol: 181 mg/dL    On statin    (Imaging have been independently reviewed)    Assessment:         1. Essential hypertension    2. Hyperlipidemia, unspecified hyperlipidemia type    3. Encounter for screening mammogram for malignant neoplasm of breast    4. Urinary incontinence, unspecified type    5. Age-related osteoporosis without current pathological fracture    6. Status post hysteroscopy with polypectomy    7. S/p RA-TLH/BSO/LND            Plan:     Maria Elena was seen today for hypertension.    Diagnoses and all orders for this visit:    Essential hypertension  -     NIFEdipine (PROCARDIA-XL) 30 MG (OSM) 24 hr tablet; Take 1 tablet (30 mg total) by mouth once daily.    Hyperlipidemia, unspecified hyperlipidemia type    Encounter for screening mammogram for malignant neoplasm of breast  -     Mammo Digital Screening Bilat w/ Shahriar; Future    Urinary incontinence, unspecified type  -     Ambulatory referral/consult to Urogynecology; Future    Age-related  osteoporosis without current pathological fracture  -     Ambulatory referral/consult to Endocrinology; Future    Status post hysteroscopy with polypectomy    S/p RA-TLH/BSO/LND        Health Maintenance  - Lipids:   - A1C:   - Colon Ca Screen: diverticulosis, 9/7/2022 repeat 5 years  - Immunizations:    Women's health  - Pap: s/p 6/2022 concern for cancer, but not cancer  - Mammo: na  - Dexa: osteoporosis 11/15/2022    Follow up in about 3 months (around 2/15/2023).      40 min were used in chart review, evaluation and counseling of patient re: risks of bisphosphonates, htn meds, treatment, documentation and review of results on same day of service     All medications were reviewed including potential side effects and risks/benefits.  Pt was counseled to call back if anything worsens or if questions arise.    Monroe Cutler MD  Family Medicine  [facility]  [address] [address]  Phone [phone]  Fax [phone]

## 2022-11-16 ENCOUNTER — IMMUNIZATION (OUTPATIENT)
Dept: PHARMACY | Facility: CLINIC | Age: 72
End: 2022-11-16
Payer: OTHER GOVERNMENT

## 2022-11-29 ENCOUNTER — CLINICAL SUPPORT (OUTPATIENT)
Dept: INTERNAL MEDICINE | Facility: CLINIC | Age: 72
End: 2022-11-29
Payer: OTHER GOVERNMENT

## 2022-11-29 ENCOUNTER — TELEPHONE (OUTPATIENT)
Dept: INTERNAL MEDICINE | Facility: CLINIC | Age: 72
End: 2022-11-29

## 2022-11-29 VITALS
HEART RATE: 59 BPM | SYSTOLIC BLOOD PRESSURE: 137 MMHG | DIASTOLIC BLOOD PRESSURE: 69 MMHG | SYSTOLIC BLOOD PRESSURE: 137 MMHG | DIASTOLIC BLOOD PRESSURE: 69 MMHG

## 2022-11-29 DIAGNOSIS — I10 HYPERTENSION, UNSPECIFIED TYPE: ICD-10-CM

## 2022-11-29 DIAGNOSIS — I10 ESSENTIAL HYPERTENSION: ICD-10-CM

## 2022-11-29 PROCEDURE — 99999 PR PBB SHADOW E&M-EST. PATIENT-LVL II: ICD-10-PCS | Mod: PBBFAC,,,

## 2022-11-29 PROCEDURE — 99999 PR PBB SHADOW E&M-EST. PATIENT-LVL II: CPT | Mod: PBBFAC,,,

## 2022-11-29 RX ORDER — CARVEDILOL 12.5 MG/1
12.5 TABLET ORAL 2 TIMES DAILY WITH MEALS
Qty: 180 TABLET | Refills: 3 | Status: SHIPPED | OUTPATIENT
Start: 2022-11-29 | End: 2023-02-23

## 2022-11-29 RX ORDER — NIFEDIPINE 30 MG/1
30 TABLET, EXTENDED RELEASE ORAL DAILY
Qty: 90 TABLET | Refills: 3 | Status: SHIPPED | OUTPATIENT
Start: 2022-11-29 | End: 2023-02-23

## 2022-11-29 NOTE — TELEPHONE ENCOUNTER
Maria Elena Jackson 72 y.o. female is here for Blood Pressure check. remote 137/69 Pulse 59    Manual Blood pressure reading was  See Above Pulse N/A. (Checked at the end of the visit)    If high, was it repeated after 15 minutes? no    Pt's Home blood pressure machine read in office NO, Pulse N/A.     Diagnosed with Hypertension yes.    Patient took blood pressure medication today yes.  Last dose of blood pressure medication was taken at 0700. Patient took 1. Coreg 12.5 mg BID  2. Nifedipine 30 mg daily  3.Irbesartan-hydrochlorothiazide 300-12.5 mg daily ( Yesterday at 9 PM).     All Medications and OTC medication updated yes    Does patient have record of home blood pressure readings / Blood Pressure Log yes. 11/22 150/77, 11/25 140/70, 11/28 146/66    Does the pt have any complaints today in regards to their blood pressure medication? no. Complains of ( States feet were swollen  a little bit the evening of Thanksgiving, but she just stood too much with the preparation and  activities). Patient is asymptomatic.     Were you sitting still for 5-10 minutes prior to taking your Blood pressure? yes   Has your blood pressure monitor ever been checked? no When was last time we checked your blood pressure monitor? NO  Updated vitals yes    Follow up date is 02/23/2023.   Dr. Cutler notified.   Creatinine   Date Value Ref Range Status   08/15/2022 0.8 0.5 - 1.4 mg/dL Final     Sodium   Date Value Ref Range Status   08/15/2022 140 136 - 145 mmol/L Final     Potassium   Date Value Ref Range Status   08/15/2022 4.1 3.5 - 5.1 mmol/L Final

## 2022-12-08 ENCOUNTER — OFFICE VISIT (OUTPATIENT)
Dept: UROGYNECOLOGY | Facility: CLINIC | Age: 72
End: 2022-12-08
Payer: OTHER GOVERNMENT

## 2022-12-08 VITALS
SYSTOLIC BLOOD PRESSURE: 146 MMHG | DIASTOLIC BLOOD PRESSURE: 66 MMHG | HEIGHT: 65 IN | WEIGHT: 191.81 LBS | BODY MASS INDEX: 31.96 KG/M2

## 2022-12-08 DIAGNOSIS — N39.41 URGE INCONTINENCE OF URINE: ICD-10-CM

## 2022-12-08 DIAGNOSIS — N39.3 STRESS INCONTINENCE OF URINE: Primary | ICD-10-CM

## 2022-12-08 DIAGNOSIS — N81.11 CYSTOCELE, MIDLINE: ICD-10-CM

## 2022-12-08 LAB
BILIRUB UR QL STRIP: NEGATIVE
GLUCOSE UR QL STRIP: NEGATIVE
KETONES UR QL STRIP: NEGATIVE
LEUKOCYTE ESTERASE UR QL STRIP: NEGATIVE
MICROSCOPIC COMMENT: ABNORMAL
PH, POC UA: 5
POC BLOOD, URINE: POSITIVE
POC NITRATES, URINE: NEGATIVE
POC RESIDUAL URINE VOLUME: 8 ML (ref 0–100)
PROT UR QL STRIP: NEGATIVE
RBC #/AREA URNS AUTO: 6 /HPF (ref 0–4)
SP GR UR STRIP: 1.01 (ref 1–1.03)
SQUAMOUS #/AREA URNS AUTO: 2 /HPF
UROBILINOGEN UR STRIP-ACNC: 0.1 (ref 0.1–1.1)
WBC #/AREA URNS AUTO: 0 /HPF (ref 0–5)

## 2022-12-08 PROCEDURE — 87086 URINE CULTURE/COLONY COUNT: CPT | Performed by: OBSTETRICS & GYNECOLOGY

## 2022-12-08 PROCEDURE — 99215 OFFICE O/P EST HI 40 MIN: CPT | Mod: S$GLB,,, | Performed by: OBSTETRICS & GYNECOLOGY

## 2022-12-08 PROCEDURE — 81001 URINALYSIS AUTO W/SCOPE: CPT | Performed by: OBSTETRICS & GYNECOLOGY

## 2022-12-08 PROCEDURE — 99215 PR OFFICE/OUTPT VISIT, EST, LEVL V, 40-54 MIN: ICD-10-PCS | Mod: S$GLB,,, | Performed by: OBSTETRICS & GYNECOLOGY

## 2022-12-08 PROCEDURE — 99999 PR PBB SHADOW E&M-EST. PATIENT-LVL III: CPT | Mod: PBBFAC,,, | Performed by: OBSTETRICS & GYNECOLOGY

## 2022-12-08 PROCEDURE — 99999 PR PBB SHADOW E&M-EST. PATIENT-LVL III: ICD-10-PCS | Mod: PBBFAC,,, | Performed by: OBSTETRICS & GYNECOLOGY

## 2022-12-08 NOTE — PROGRESS NOTES
"Chief Complaint   Patient presents with    Urinary Incontinence        HPI: Patient is a 72 y.o. female  who presents today with c/o leakage of urine. States that she has had leakage her whole life and has been wearing a panty liner for the last couple of years. Since her hysterectomy with Dr. Mcgraw on 22 her urinary symptoms are worse.   She reports that she leaks urine with coughing, laughing and sneezing. This is the symptom that has been occurring for many years and finds it to be very bothersome. She has not noticed any worsening of this symptom since surgery. She also experiences urgency and urgency urinary incontinence. This is the symptom that has gotten worse since her surgery but has been recently improving. She denies urinary frequency, voiding every 3-4 hours. She wakes at night to void, mostly once and infrequently two times. The urge will wake her from sleep. She has not tried any treatment for her symptoms. She has tried to kegel exercises years ago but seemed to get better although she never "kept up" with the exercises.   She drinks one glass of ice tea daily and three 8 ounce cups of coffee daily. She also drinks 8-12 ounces of water per day.     She reports normal BM's with infrequent constipation. She denies accidental bowel leakage.     She denies vaginal bulge, prolapse, heaviness and pressure symptoms. She is not currently sexually active but recall when she was some discomfort or pain.     Review of Systems   Constitutional:  Negative for activity change, appetite change, chills, fatigue, fever and unexpected weight change.   HENT:  Negative for nasal congestion, dental problem, hearing loss, mouth dryness and sore throat.    Eyes:  Negative for pain and eye dryness.   Respiratory:  Negative for cough, shortness of breath and wheezing.    Cardiovascular:  Negative for chest pain, palpitations and leg swelling.   Gastrointestinal:  Negative for abdominal distention, abdominal " pain, blood in stool, constipation and rectal pain.   Endocrine: Negative for cold intolerance and heat intolerance.   Genitourinary:  Negative for dyspareunia, hot flashes and vaginal dryness.   Musculoskeletal:  Positive for arthralgias. Negative for back pain, gait problem, joint swelling, myalgias, neck pain and neck stiffness.   Integumentary:  Negative for rash.   Neurological:  Negative for dizziness, tremors, seizures, speech difficulty, weakness, light-headedness, numbness and headaches.   Psychiatric/Behavioral:  Negative for confusion, dysphoric mood and sleep disturbance. The patient is not nervous/anxious.       Past Medical History:   Diagnosis Date    Arthritis     Hyperlipidemia     Hypertension     S/P total hysterectomy and bilateral salpingo-oophorectomy 6/29/2022       Past Surgical History:   Procedure Laterality Date    CHOLECYSTECTOMY      D&C Hysteroscopy      DILATION AND CURETTAGE OF UTERUS      HYSTEROSCOPY WITH DILATION AND CURETTAGE OF UTERUS N/A 5/10/2022    Procedure: HYSTEROSCOPY, WITH DILATION AND CURETTAGE OF UTERUS;  Surgeon: Marce Ellsworth MD;  Location: [facility];  Service: OB/GYN;  Laterality: N/A;    JOINT REPLACEMENT Right     laparoscopy      LYMPH NODE BIOPSY N/A 6/29/2022    Procedure: BIOPSY, LYMPH NODE;  Surgeon: Kevin Mcgraw MD;  Location: [facility] OR;  Service: OB/GYN;  Laterality: N/A;    TONSILLECTOMY      TOTAL KNEE ARTHROPLASTY Right 10/7/2019    Procedure: ARTHROPLASTY, KNEE, TOTAL;  Surgeon: John L. Ochsner Jr., MD;  Location: [facility] OR;  Service: Orthopedics;  Laterality: Right;    XI ROBOTIC HYSTERECTOMY, WITH SALPINGO-OOPHORECTOMY Bilateral 6/29/2022    Procedure: XI ROBOTIC HYSTERECTOMY,WITH SALPINGO-OOPHORECTOMY;  Surgeon: Kevin Mcgraw MD;  Location: [facility];  Service: OB/GYN;  Laterality: Bilateral;  Robotic assisted total hysterectomy, Bilateral salpingo-oophrectomy, [place] Node Mapping           Current Outpatient Medications:     acetaminophen  (TYLENOL) 650 MG TbSR, Take 1 tablet (650 mg total) by mouth every 6 to 8 hours as needed (Pain). Alternate every 3 hours with ibuprofen., Disp: 60 tablet, Rfl: 1    atorvastatin (LIPITOR) 40 MG tablet, Take 1 tablet (40 mg total) by mouth every evening., Disp: 90 tablet, Rfl: 3    carvediloL (COREG) 12.5 MG tablet, Take 1 tablet (12.5 mg total) by mouth 2 (two) times daily with meals., Disp: 180 tablet, Rfl: 3    cholecalciferol, vitamin D3, (VITAMIN D3) 25 mcg (1,000 unit) capsule, Take 1,000 Units by mouth once daily., Disp: , Rfl:     irbesartan-hydrochlorothiazide (AVALIDE) 300-12.5 mg per tablet, Take 1 tablet by mouth nightly., Disp: 90 tablet, Rfl: 1    NIFEdipine (PROCARDIA-XL) 30 MG (OSM) 24 hr tablet, Take 1 tablet (30 mg total) by mouth once daily., Disp: 90 tablet, Rfl: 3    pneumoc 20-chelsie conj-dip cr,PF, (PREVNAR-20, PF,) 0.5 mL Syrg injection, Inject 0.5 mLs into the muscle., Disp: 0.5 mL, Rfl: 0    triamcinolone acetonide 0.1% (KENALOG) 0.1 % ointment, Apply topically., Disp: , Rfl:     Review of patient's allergies indicates:  No Known Allergies    Family History   Problem Relation Age of Onset    Colon cancer Mother 55    Ovarian cancer Mother 35    Prostate cancer Father 50    Breast cancer Neg Hx        Social History     Socioeconomic History    Marital status:    Tobacco Use    Smoking status: Former     Packs/day: 1.50     Years: 22.00     Pack years: 33.00     Types: Cigarettes     Quit date:      Years since quittin.9    Smokeless tobacco: Never   Substance and Sexual Activity    Alcohol use: Yes     Comment: 2 glasses of wine in a month    Drug use: No    Sexual activity: Not Currently     Birth control/protection: Post-menopausal   Social History Narrative    Lives with spouse. Feels safe in her home.        OB History          2    Para   2    Term   0            AB        Living   2         SAB        IAB        Ectopic        Multiple        Live Births   2                  Gyn History    Mammogram: 1/11/22 BI-RADS 1, negative  Pap smear: 4/27/22 negative, s/p hysterectomy  LMP: No LMP recorded (lmp unknown). Patient is postmenopausal.   Postmenopausal bleeding: s/p hysterectomy      INITIAL PHYSICAL EXAMINATION    Vitals:    12/08/22 1402   BP: (!) 146/66      General: Healthy in appearance, Well nourished, Affect Normal, NAD.  Neck: Supple, No masses, Trachea midline, Thyroid normal size,  non-tender, no masses or nodules.  Nodes: No clavicular/cervical adenopathy.  Skin: Normal temperature, No atypical lesions or rash.  Heart: Normal sounds, no murmurs  Lungs: Normal respiratory effort.  Gastrointestinal: Non tender, Non distended, No masses, guarding or  rebound, No hepatosplenomegally, No hernia.  Ext: No clubbing, cyanosis, edema or varicosities.  DTR's: 2+ bilaterally  Strength 5/5 bilateral upper and lower extremities    Genitourinary-  Vulva: normal without lesions, masses, atrophy or pain  Urethra: meatus central and normal in appearance, no prolapse/caruncle, no masses or discharge. Empty supine stress test was positive.  Bladder: non-tender, no masses  Vagina: No discharge or lesions, moderate atrophy, no masses appreciated.  [See POP-Q]  Cervix: absent  Uterus:  absent  Adnexa: no masses, non tender.  Rectal: deferred  Neuro: S2-4- pin prick and light touch intact, Anal wink present  Levator strength: 1/5  Levator tenderness: left 0/10 and right 0/10    POP-Q Exam- pelvic organ prolapse quantitative    Aa -2  Anterior Wall Ba -2  Anterior wall C -7  Cervix or cuff   Gh 3  Genital hiatus pb 3  perineal body tvl 8  Total vaginal length   Ap -3  Posterior wall Bp -3  Posterior wall D n/a  Posterior fornix     without Uterus    POP-Q Stage:1      Office Visit on 12/08/2022   Component Date Value Ref Range Status    POC Blood, Urine 12/08/2022 Positive (A)  Negative Final    POC Bilirubin, Urine 12/08/2022 Negative  Negative Final    POC Urobilinogen, Urine  12/08/2022 0.1  0.1 - 1.1 Final    POC Ketones, Urine 12/08/2022 Negative  Negative Final    POC Protein, Urine 12/08/2022 Negative  Negative Final    POC Nitrates, Urine 12/08/2022 Negative  Negative Final    POC Glucose, Urine 12/08/2022 Negative  Negative Final    pH, UA 12/08/2022 5   Final    POC Specific Gravity, Urine 12/08/2022 1.010  1.003 - 1.029 Final    POC Leukocytes, Urine 12/08/2022 Negative  Negative Final    POC Residual Urine Volume 12/08/2022 8  0 - 100 mL Final        ASSESSMENT & PLAN:    Stress incontinence of urine  -     Ambulatory referral/consult to Urogynecology  -     Simple Urodynamics; Future    Urge incontinence of urine  -     Urinalysis Microscopic  -     Urine culture  -     Simple Urodynamics; Future    Cystocele, midline  Comments:  Grade 1, asymptomatic     Exam findings and treatment options were discussed in detail with the patient. Her symptoms are consistent with mixed urinary incontinence. We spent time in discussion today of the differences between UUI and ZANE. We reviewed treatment options of each type of incontinence as well, discussing in detail that for UUI she can try conservative measures such as bladder retraining, PT or medications and for ZANE options such as PT, pessary use or surgery. Additional information was provided. She does desire surgical intervention for the ZANE symptoms. She understands that the surgery is not meant to treat the UUI. For the UUI she would like to try conservative measures such as fluid titration and reduction of dietary irritants to the bladder. I have recommended further evaluation with multichannel urodynamic testing. After testing is completed we will review her treatment plan and discuss the risks and benefits of surgery again.      All questions were answered today. The patient was encouraged to contact the office as needed with any additional questions or concerns.     Total time spent on visit was 45 minutes.  This includes face to  face time and non-face to face time preparing to see the patient (eg, review of tests), Obtaining and/or reviewing separately obtained history, Documenting clinical information in the electronic or other health record, Independently interpreting resultsand communicating results to the patient/family/caregiver, or Care coordination.    Nedra Younger MD

## 2022-12-09 ENCOUNTER — PATIENT MESSAGE (OUTPATIENT)
Dept: UROGYNECOLOGY | Facility: CLINIC | Age: 72
End: 2022-12-09
Payer: OTHER GOVERNMENT

## 2022-12-09 ENCOUNTER — HOSPITAL ENCOUNTER (OUTPATIENT)
Dept: RADIOLOGY | Facility: HOSPITAL | Age: 72
Discharge: HOME | End: 2022-12-09
Attending: OBSTETRICS & GYNECOLOGY
Payer: OTHER GOVERNMENT

## 2022-12-09 DIAGNOSIS — R31.21 ASYMPTOMATIC MICROSCOPIC HEMATURIA: Primary | ICD-10-CM

## 2022-12-09 DIAGNOSIS — R31.21 ASYMPTOMATIC MICROSCOPIC HEMATURIA: ICD-10-CM

## 2022-12-09 PROCEDURE — 76770 US EXAM ABDO BACK WALL COMP: CPT | Mod: TC

## 2022-12-09 PROCEDURE — 76770 US EXAM ABDO BACK WALL COMP: CPT | Mod: 26,,, | Performed by: STUDENT IN AN ORGANIZED HEALTH CARE EDUCATION/TRAINING PROGRAM

## 2022-12-09 PROCEDURE — 76770 US RETROPERITONEAL COMPLETE: ICD-10-PCS | Mod: 26,,, | Performed by: STUDENT IN AN ORGANIZED HEALTH CARE EDUCATION/TRAINING PROGRAM

## 2022-12-11 LAB — BACTERIA UR CULT: NORMAL

## 2022-12-12 ENCOUNTER — PATIENT MESSAGE (OUTPATIENT)
Dept: UROGYNECOLOGY | Facility: CLINIC | Age: 72
End: 2022-12-12
Payer: OTHER GOVERNMENT

## 2022-12-12 DIAGNOSIS — I10 PRIMARY HYPERTENSION: Primary | ICD-10-CM

## 2022-12-13 ENCOUNTER — LAB VISIT (OUTPATIENT)
Dept: LAB | Facility: HOSPITAL | Age: 72
End: 2022-12-13
Attending: OBSTETRICS & GYNECOLOGY
Payer: OTHER GOVERNMENT

## 2022-12-13 DIAGNOSIS — I10 PRIMARY HYPERTENSION: ICD-10-CM

## 2022-12-13 LAB
ALBUMIN SERPL BCP-MCNC: 3.8 G/DL (ref 3.5–5.2)
ALP SERPL-CCNC: 78 U/L (ref 55–135)
ALT SERPL W/O P-5'-P-CCNC: 19 U/L (ref 10–44)
ANION GAP SERPL CALC-SCNC: 8 MMOL/L (ref 8–16)
AST SERPL-CCNC: 18 U/L (ref 10–40)
BILIRUB SERPL-MCNC: 0.7 MG/DL (ref 0.1–1)
BUN SERPL-MCNC: 17 MG/DL (ref 8–23)
CALCIUM SERPL-MCNC: 10.2 MG/DL (ref 8.7–10.5)
CHLORIDE SERPL-SCNC: 101 MMOL/L (ref 95–110)
CO2 SERPL-SCNC: 30 MMOL/L (ref 23–29)
CREAT SERPL-MCNC: 0.8 MG/DL (ref 0.5–1.4)
EST. GFR  (NO RACE VARIABLE): >60 ML/MIN/1.73 M^2
GLUCOSE SERPL-MCNC: 86 MG/DL (ref 70–110)
POTASSIUM SERPL-SCNC: 4 MMOL/L (ref 3.5–5.1)
PROT SERPL-MCNC: 7.2 G/DL (ref 6–8.4)
SODIUM SERPL-SCNC: 139 MMOL/L (ref 136–145)

## 2022-12-13 PROCEDURE — 36415 COLL VENOUS BLD VENIPUNCTURE: CPT | Mod: PO | Performed by: OBSTETRICS & GYNECOLOGY

## 2022-12-13 PROCEDURE — 80053 COMPREHEN METABOLIC PANEL: CPT | Performed by: OBSTETRICS & GYNECOLOGY

## 2022-12-29 ENCOUNTER — OFFICE VISIT (OUTPATIENT)
Dept: ORTHOPEDICS | Facility: CLINIC | Age: 72
End: 2022-12-29
Payer: OTHER GOVERNMENT

## 2022-12-29 VITALS
WEIGHT: 191.81 LBS | RESPIRATION RATE: 18 BRPM | HEART RATE: 62 BPM | DIASTOLIC BLOOD PRESSURE: 63 MMHG | BODY MASS INDEX: 31.96 KG/M2 | SYSTOLIC BLOOD PRESSURE: 146 MMHG | HEIGHT: 65 IN

## 2022-12-29 DIAGNOSIS — S42.295A OTHER CLOSED NONDISPLACED FRACTURE OF PROXIMAL END OF LEFT HUMERUS, INITIAL ENCOUNTER: Primary | ICD-10-CM

## 2022-12-29 PROCEDURE — 99213 PR OFFICE/OUTPT VISIT, EST, LEVL III, 20-29 MIN: ICD-10-PCS | Mod: S$GLB,,, | Performed by: PHYSICIAN ASSISTANT

## 2022-12-29 PROCEDURE — 99213 OFFICE O/P EST LOW 20 MIN: CPT | Mod: S$GLB,,, | Performed by: PHYSICIAN ASSISTANT

## 2022-12-29 PROCEDURE — 99999 PR PBB SHADOW E&M-EST. PATIENT-LVL IV: ICD-10-PCS | Mod: PBBFAC,,, | Performed by: PHYSICIAN ASSISTANT

## 2022-12-29 PROCEDURE — 99999 PR PBB SHADOW E&M-EST. PATIENT-LVL IV: CPT | Mod: PBBFAC,,, | Performed by: PHYSICIAN ASSISTANT

## 2022-12-29 RX ORDER — METHOCARBAMOL 500 MG/1
500 TABLET, FILM COATED ORAL 4 TIMES DAILY
Qty: 40 TABLET | Refills: 0 | Status: SHIPPED | OUTPATIENT
Start: 2022-12-29 | End: 2023-01-08

## 2022-12-30 NOTE — PROGRESS NOTES
Subjective:      Patient ID: Maria Elena Jackson is a 72 y.o. female.    Chief Complaint: Pain, Injury, and Swelling of the Left Shoulder    HPI    Patient is a 72 year old female who while on a trip in [place] slipped on ice and fell onto her left arm 12/23/22 . She went to urgent care a few days later and was diagnosed with a fracture. She has been treated oumou sling. She stated that her pain is controlled with OTC medication. No numbness or tingling.     Review of Systems   Constitutional: Negative for chills and fever.   Cardiovascular:  Negative for chest pain.   Respiratory:  Negative for cough and shortness of breath.    Skin:  Negative for color change, dry skin, itching, nail changes, poor wound healing and rash.   Musculoskeletal:  Positive for falls.        Left shoulder fracture   Neurological:  Negative for dizziness.   Psychiatric/Behavioral:  Negative for altered mental status. The patient is not nervous/anxious.    All other systems reviewed and are negative.      Objective:            General    Constitutional: She is oriented to person, place, and time. She appears well-developed and well-nourished. No distress.   HENT:   Head: Atraumatic.   Eyes: Conjunctivae are normal.   Cardiovascular:  Normal rate.            Pulmonary/Chest: Effort normal.   Neurological: She is alert and oriented to person, place, and time.   Psychiatric: She has a normal mood and affect. Her behavior is normal.             Left Shoulder Exam      Comments:  Presented to clinic with sling in place, FROm finger wrist and elbow. NVI         RADS: outside x-ray done 12/27/22                      Assessment       Encounter Diagnosis   Name Primary?    Other closed nondisplaced fracture of proximal end of left humerus, initial encounter Yes          Plan:       Discussed plan with the patient. At this time plan is for the patient to be treated non-operative. Continue sling. NWB. Ok range of motion finger wrist and elbow no range  of motion of shoulder. Pain medication : Controlled with OTC, no refill needed Return to clinic at 2 weeks post injury. At that time xray of her shoulder needed OOS 2 view Ap scap Y, Consider pendulums.

## 2023-01-06 ENCOUNTER — HOSPITAL ENCOUNTER (OUTPATIENT)
Dept: RADIOLOGY | Facility: HOSPITAL | Age: 73
Discharge: HOME | End: 2023-01-06
Attending: PHYSICIAN ASSISTANT
Payer: OTHER GOVERNMENT

## 2023-01-06 ENCOUNTER — OFFICE VISIT (OUTPATIENT)
Dept: ORTHOPEDICS | Facility: CLINIC | Age: 73
End: 2023-01-06
Payer: OTHER GOVERNMENT

## 2023-01-06 VITALS — BODY MASS INDEX: 31.96 KG/M2 | WEIGHT: 191.81 LBS | HEIGHT: 65 IN

## 2023-01-06 DIAGNOSIS — S42.295A OTHER CLOSED NONDISPLACED FRACTURE OF PROXIMAL END OF LEFT HUMERUS, INITIAL ENCOUNTER: Primary | ICD-10-CM

## 2023-01-06 DIAGNOSIS — S42.295A OTHER CLOSED NONDISPLACED FRACTURE OF PROXIMAL END OF LEFT HUMERUS, INITIAL ENCOUNTER: ICD-10-CM

## 2023-01-06 PROCEDURE — 73030 X-RAY EXAM OF SHOULDER: CPT | Mod: TC,LT

## 2023-01-06 PROCEDURE — 99999 PR PBB SHADOW E&M-EST. PATIENT-LVL III: ICD-10-PCS | Mod: PBBFAC,,, | Performed by: PHYSICIAN ASSISTANT

## 2023-01-06 PROCEDURE — 99999 PR PBB SHADOW E&M-EST. PATIENT-LVL III: CPT | Mod: PBBFAC,,, | Performed by: PHYSICIAN ASSISTANT

## 2023-01-06 PROCEDURE — 99213 PR OFFICE/OUTPT VISIT, EST, LEVL III, 20-29 MIN: ICD-10-PCS | Mod: S$GLB,,, | Performed by: PHYSICIAN ASSISTANT

## 2023-01-06 PROCEDURE — 99213 OFFICE O/P EST LOW 20 MIN: CPT | Mod: S$GLB,,, | Performed by: PHYSICIAN ASSISTANT

## 2023-01-06 PROCEDURE — 73030 XR SHOULDER COMPLETE 2 OR MORE VIEWS LEFT: ICD-10-PCS | Mod: 26,LT,, | Performed by: RADIOLOGY

## 2023-01-06 PROCEDURE — 73030 X-RAY EXAM OF SHOULDER: CPT | Mod: 26,LT,, | Performed by: RADIOLOGY

## 2023-01-06 NOTE — PROGRESS NOTES
Subjective:      Patient ID: Maria Elena Jackson is a 72 y.o. female.    Chief Complaint: Follow-up (LEFT SHOULDER)    HPI    Patient is a 72 year old female who while on a trip in [place] slipped on ice and fell onto her left arm 12/23/22 . She went to urgent care a few days later and was diagnosed with a fracture. She has been treated oumou sling. She stated that her pain is controlled with OTC medication. No numbness or tingling.     01/06/23: Over all doing better. Pain is controlled. She is using sling and has worked on range of motion of finger wrist and elbow. She has no numbness or tingling.     Review of Systems   Constitutional: Negative for chills and fever.   Cardiovascular:  Negative for chest pain.   Respiratory:  Negative for cough and shortness of breath.    Skin:  Negative for color change, dry skin, itching, nail changes, poor wound healing and rash.   Musculoskeletal:  Positive for falls.        Left shoulder fracture   Neurological:  Negative for dizziness.   Psychiatric/Behavioral:  Negative for altered mental status. The patient is not nervous/anxious.    All other systems reviewed and are negative.      Objective:            General    Constitutional: She is oriented to person, place, and time. She appears well-developed and well-nourished. No distress.   HENT:   Head: Atraumatic.   Eyes: Conjunctivae are normal.   Cardiovascular:  Normal rate.            Pulmonary/Chest: Effort normal.   Neurological: She is alert and oriented to person, place, and time.   Psychiatric: She has a normal mood and affect. Her behavior is normal.             Left Shoulder Exam      Comments:  Presented to clinic with sling in place, FROm finger wrist and elbow. NVI       RADS: reviewed by myself: Acute fracture involving the proximal humeral diametaphyseal region-surgical neck area with likely associated greater trochanteric component (comminuted).  Based on these images, there is mild inferior subluxation of the  humeral head with respect to the glenoid.  No other additional fractures.  Some minimal degenerative spurring about the acromioclavicular articulation.        Assessment       Encounter Diagnosis   Name Primary?    Other closed nondisplaced fracture of proximal end of left humerus, initial encounter Yes          Plan:       Discussed plan with the patient. At this time plan is for the patient to be treated non-operative. Continue sling. NWB. Ok range of motion finger wrist and elbow no range of motion of shoulder except pendullum. Pain medication : Controlled with OTC, no refill needed Return to clinic at 2 weeks . At that time xray of her shoulder needed OOS 2 view Ap scap Y, Consider progressive ROM.

## 2023-01-08 ENCOUNTER — PATIENT MESSAGE (OUTPATIENT)
Dept: ORTHOPEDICS | Facility: CLINIC | Age: 73
End: 2023-01-08
Payer: OTHER GOVERNMENT

## 2023-01-10 ENCOUNTER — PATIENT MESSAGE (OUTPATIENT)
Dept: UROGYNECOLOGY | Facility: CLINIC | Age: 73
End: 2023-01-10
Payer: OTHER GOVERNMENT

## 2023-01-20 ENCOUNTER — OFFICE VISIT (OUTPATIENT)
Dept: ORTHOPEDICS | Facility: CLINIC | Age: 73
End: 2023-01-20
Payer: OTHER GOVERNMENT

## 2023-01-20 ENCOUNTER — HOSPITAL ENCOUNTER (OUTPATIENT)
Dept: RADIOLOGY | Facility: HOSPITAL | Age: 73
Discharge: HOME | End: 2023-01-20
Attending: PHYSICIAN ASSISTANT
Payer: OTHER GOVERNMENT

## 2023-01-20 VITALS — BODY MASS INDEX: 31.96 KG/M2 | HEIGHT: 65 IN | WEIGHT: 191.81 LBS

## 2023-01-20 DIAGNOSIS — S42.295A OTHER CLOSED NONDISPLACED FRACTURE OF PROXIMAL END OF LEFT HUMERUS, INITIAL ENCOUNTER: Primary | ICD-10-CM

## 2023-01-20 DIAGNOSIS — S42.295A OTHER CLOSED NONDISPLACED FRACTURE OF PROXIMAL END OF LEFT HUMERUS, INITIAL ENCOUNTER: ICD-10-CM

## 2023-01-20 PROCEDURE — 73030 XR SHOULDER COMPLETE 2 OR MORE VIEWS LEFT: ICD-10-PCS | Mod: 26,LT,, | Performed by: RADIOLOGY

## 2023-01-20 PROCEDURE — 73030 X-RAY EXAM OF SHOULDER: CPT | Mod: TC,LT

## 2023-01-20 PROCEDURE — 99999 PR PBB SHADOW E&M-EST. PATIENT-LVL III: ICD-10-PCS | Mod: PBBFAC,,, | Performed by: PHYSICIAN ASSISTANT

## 2023-01-20 PROCEDURE — 99213 PR OFFICE/OUTPT VISIT, EST, LEVL III, 20-29 MIN: ICD-10-PCS | Mod: S$GLB,,, | Performed by: PHYSICIAN ASSISTANT

## 2023-01-20 PROCEDURE — 99213 OFFICE O/P EST LOW 20 MIN: CPT | Mod: S$GLB,,, | Performed by: PHYSICIAN ASSISTANT

## 2023-01-20 PROCEDURE — 73030 X-RAY EXAM OF SHOULDER: CPT | Mod: 26,LT,, | Performed by: RADIOLOGY

## 2023-01-20 PROCEDURE — 99999 PR PBB SHADOW E&M-EST. PATIENT-LVL III: CPT | Mod: PBBFAC,,, | Performed by: PHYSICIAN ASSISTANT

## 2023-01-20 NOTE — PROGRESS NOTES
Subjective:      Patient ID: Maria Elena Jackson is a 72 y.o. female.    Chief Complaint: Follow-up (left shoulder)    HPI    Patient is a 72 year old female who while on a trip in [place] slipped on ice and fell onto her left arm 12/23/22 . She went to urgent care a few days later and was diagnosed with a fracture. She has been treated oumou sling. She stated that her pain is controlled with OTC medication. No numbness or tingling.     01/06/23: Over all doing better. Pain is controlled. She is using sling and has worked on range of motion of finger wrist and elbow. She has no numbness or tingling.     01/20/23: Doing well. Continued use of sling no issues. Reports some tightness to traps area. No numbness or tingling.     Review of Systems   Constitutional: Negative for chills and fever.   Cardiovascular:  Negative for chest pain.   Respiratory:  Negative for cough and shortness of breath.    Skin:  Negative for color change, dry skin, itching, nail changes, poor wound healing and rash.   Musculoskeletal:  Positive for falls.        Left shoulder fracture   Neurological:  Negative for dizziness.   Psychiatric/Behavioral:  Negative for altered mental status. The patient is not nervous/anxious.    All other systems reviewed and are negative.      Objective:            General    Constitutional: She is oriented to person, place, and time. She appears well-developed and well-nourished. No distress.   HENT:   Head: Atraumatic.   Eyes: Conjunctivae are normal.   Cardiovascular:  Normal rate.            Pulmonary/Chest: Effort normal.   Neurological: She is alert and oriented to person, place, and time.   Psychiatric: She has a normal mood and affect. Her behavior is normal.             Left Shoulder Exam      Comments:  Presented to clinic with sling in place, FROm finger wrist and elbow. NVI       RADS: reviewed by myself:   Redemonstration of a recent fracture of the proximal left humerus, with no detrimental interval  change since 01/06/2023..        Assessment       Encounter Diagnosis   Name Primary?    Other closed nondisplaced fracture of proximal end of left humerus, initial encounter Yes          Plan:       Discussed plan with the patient. At this time plan is for the patient to be treated non-operative. Continue sling. NWB. Ok progressive range of motion and full range of motion of  finger wrist and elbow . Order aplced for PT/OT. Patient is to maker appointment herself. Pain medication : Controlled with OTC, no refill needed Return to clinic at 6 weeks . At that time xray of her shoulder needed OOS 2 view Ap scap Y,

## 2023-01-23 ENCOUNTER — CLINICAL SUPPORT (OUTPATIENT)
Dept: REHABILITATION | Facility: HOSPITAL | Age: 73
End: 2023-01-23
Payer: OTHER GOVERNMENT

## 2023-01-23 DIAGNOSIS — S42.295A OTHER CLOSED NONDISPLACED FRACTURE OF PROXIMAL END OF LEFT HUMERUS, INITIAL ENCOUNTER: ICD-10-CM

## 2023-01-23 PROCEDURE — 97110 THERAPEUTIC EXERCISES: CPT

## 2023-01-23 PROCEDURE — 97162 PT EVAL MOD COMPLEX 30 MIN: CPT

## 2023-01-23 PROCEDURE — 97140 MANUAL THERAPY 1/> REGIONS: CPT

## 2023-01-23 NOTE — PROGRESS NOTES
[facility]   Physical Therapy Initial Evaluation     Date: 1/23/2023   Name: Maria Elena Jackson  Clinic Number: [id number]    Therapy Diagnosis:   Encounter Diagnosis   Name Primary?    Other closed nondisplaced fracture of proximal end of left humerus, initial encounter      Physician: Marcia Rodriguez PA-C    Physician Orders: PT Eval and Treat NWB (L) UE / wean out of sling  Medical Diagnosis from Referral:   Encounter Diagnosis   Name Primary?    Other closed nondisplaced fracture of proximal end of left humerus, initial encounter      Evaluation Date: 1/23/2023  Authorization Period Expiration: TBD  Plan of Care Expiration: 3/23/23  Progress Note Due: 2/23/23  Visit # / Visits authorized: 1/ TBD   FOTO: 1/3    Precautions: Standard; NWB (L) UE - wean out of sling    Time In: 12:15 PM  Time Out: 1:18 PM  Total Appointment Time (timed & untimed codes): 53 minutes      SUBJECTIVE     History of current condition / Date of onset: Pt is a 72 year old male who fell on the (L) UE on Dec 23, 2022 [place] a [place] in [place].  Pt sought medial attention in [place] a few days later and a fracture of the proximal humerus was identified.  The patient was placed in an UE sling and followed up with Marcia Rodriguez PA-C who referred the patient to this outpatient PT clinic for treatment.    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   Prior Level of Function: I with ADLs  Current Level of Function: mod (I) with ADLs    Pain:  Current 0/10, worst 7/10, best 0/10   Location: L shoulder mid humerus  Description: sore / achy / sometimes really sharp  Aggravating Factors: picking up something; reaching up or behind back;  not driving but not far;    Easing Factors: use of sling    Patients goals: PLOF     Medical History:   Past Medical History:   Diagnosis Date    Arthritis     Hyperlipidemia      Hypertension     S/P total hysterectomy and bilateral salpingo-oophorectomy 2022       Surgical History:   Maria Elena Jackson  has a past surgical history that includes Cholecystectomy; laparoscopy; Tonsillectomy; Dilation and curettage of uterus; D&C Hysteroscopy; Total knee arthroplasty (Right, 10/7/2019); Joint replacement (Right); Hysteroscopy with dilation and curettage of uterus (N/A, 5/10/2022); xi robotic hysterectomy, with salpingo-oophorectomy (Bilateral, 2022); and Lymph node biopsy (N/A, 2022).    Medications:   Maria Elena has a current medication list which includes the following prescription(s): acetaminophen, atorvastatin, calcium-vitamin d, carvedilol, cholecalciferol (vitamin d3), irbesartan-hydrochlorothiazide, nifedipine, pneumoc 20-chelsie conj-dip cr(pf), and triamcinolone acetonide 0.1%.    Allergies:   Review of patient's allergies indicates:  No Known Allergies       OBJECTIVE     ..Shoulder Musculoskeletal Exam    Inspection    Left      Left shoulder inspection is normal.    Inspection additional comments: Pt sensation grossly intact to light touch throughout the (B) UE.  PT noted mild swelling in the involved (L) UE.    Palpation    Right      Right shoulder palpation is normal.    Left      Tenderness: present        Anterior shoulder: mild        Posterior shoulder: mild        AC joint: none        Sternoclavicular joint: none        Rotator cuff: mild        Greater tuberosity: mild        Trapezius: none        Medial scapula: none        Superior pole of scapula: none        Inferior pole of scapula: none        Bicipital groove: mild        Lateral arm: mild    Range of Motion    Right      Right shoulder range of motion is normal.     Left      Active ROM: abnormal.       Passive ROM: abnormal.       Active forward elevation: 70.       Shoulder active abduction: 60.     Range of motion additional comments: (L) Shoulder:  ER at 0 de deg  ER at 45 de deg  ER  at 70 de deg  IR at 70 de deg    Strength    Right      Right shoulder strength is normal.     Strength additional comments: Pt demo 5/5 MMT throughout the (R) UE;  PT did not perform MMT of the invovled (L) UE 2* precautions but PT notes sig weaknss of the involved RC / UE.      Scapula    Right      Right shoulder scapula is normal.    Special Tests    Right      Right shoulder special tests are normal.    Special tests additional comments: Special orthopedic testing of the involved (L) UE was not performed 2* post op precautions.    General    Psychiatric: normal mood and affect    Neurological: alert and oriented x3       Limitation/Restriction for FOTO Shoulder Survey    Therapist reviewed FOTO scores for Maria Elena Jackson on 2023.   FOTO documents entered into [employer] - see Media section.    Limitation Score: see EMR         TREATMENT     Total Treatment time (time-based codes) separate from Evaluation: 23 minutes      Martin received the treatments listed below:      therapeutic exercises to develop strength, endurance, ROM, flexibility, posture, and core stabilization for 8 minutes including:  Scap Sq 3 x 10 reps BW  Shrugs 3 x 10 reps BW  Gentle Supine Scaption with Wand 10 x 10 sec hold    manual therapy techniques: Joint mobilizations, Manual traction, and Soft tissue Mobilization were applied to the: L shoulder for 8 minutes, including:  L shoulder PROM / mobs; gentle oscillations / distraction     Hot pack 10 min pre treatment L shoulder with arm propped on table  (CE 10 min L shoulder post treatment with arm propped on table      PATIENT EDUCATION AND HOME EXERCISES     Education provided:   - HEP    Written Home Exercises Provided: yes. Exercises were reviewed and Martin was able to demonstrate them prior to the end of the session.  Martin demonstrated good  understanding of the education provided. See EMR under Patient Instructions for exercises provided during therapy  sessions.    ASSESSMENT     Maria Elena is a 72 y.o. female referred to outpatient Physical Therapy with a medical diagnosis of fracture of the (L) proximal humerus. Patient prognosis is good and pt will benefit from skilled PT to address the following impairments and return her to her highest functional level without pain or limitation.    Impairments:  Subjective complaints of (L) shoulder pain with functional activities  Decreased / painful L shoulder AROM/PROM  Decreased (L) UE / RC strength  Decreased (B) scapular stabilizer strength  Rounded shoulders   Decreased posterior capsule flexibility   Impaired functional mobility     Patient prognosis is Good.   Patient will benefit from skilled outpatient Physical Therapy to address the deficits stated above and in the chart below, provide patient /family education, and to maximize patientt's level of independence.     Plan of care discussed with patient: Yes  Patient's spiritual, cultural and educational needs considered and patient is agreeable to the plan of care and goals as stated below:     Anticipated Barriers for therapy: none    Medical Necessity is demonstrated by the following  History  Co-morbidities and personal factors that may impact the plan of care Co-morbidities:   Past Medical History:   Diagnosis Date    Arthritis     Hyperlipidemia     Hypertension     S/P total hysterectomy and bilateral salpingo-oophorectomy 6/29/2022       Personal Factors:   no deficits     moderate   Examination  Body Structures and Functions, activity limitations and participation restrictions that may impact the plan of care Body Regions:   upper extremities    Body Systems:    gross symmetry  ROM  strength  gross coordinated movement    Participation Restrictions:   none    Activity limitations:   Learning and applying knowledge  no deficits    General Tasks and Commands  no deficits    Communication  no deficits    Mobility  no deficits    Self care  no  deficits    Domestic Life  no deficits    Interactions/Relationships  no deficits    Life Areas  no deficits    Community and Social Life  no deficits         moderate   Clinical Presentation evolving clinical presentation with changing clinical characteristics moderate   Decision Making/ Complexity Score: moderate     Goals:  Short Term Goals: 1 weeks   Pt (I) with HEP    Long Term Goals: 6 weeks   Pt reports 0/10 pain in the (R) shoulder with functional activities   Pt demo normal / painfree R shoulder AROM/PROM (3 months post injury)  Pt demo at least 120 deg of active (R) shoulder flexion   Pt demo 0 inch difference on Apley's Scratch Test (L) UE < (R) UE  Pt demo at least 4+/5 MMT throughout the (B) UE (3 months post injury)    PLAN   Plan of care Certification: 1/23/2023 to 3/23/23.    Outpatient Physical Therapy 2 times weekly for 6 weeks to include the following interventions: Electrical Stimulation  , Manual Therapy, Moist Heat/ Ice, Neuromuscular Re-ed, Patient Education, Self Care, Therapeutic Activities, and Therapeutic Exercise.     Salvador Harper, PT      I CERTIFY THE NEED FOR THESE SERVICES FURNISHED UNDER THIS PLAN OF TREATMENT AND WHILE UNDER MY CARE   Physician's comments:     Physician's Signature: ___________________________________________________

## 2023-01-27 ENCOUNTER — PROCEDURE VISIT (OUTPATIENT)
Dept: UROGYNECOLOGY | Facility: CLINIC | Age: 73
End: 2023-01-27
Payer: OTHER GOVERNMENT

## 2023-01-27 ENCOUNTER — CLINICAL SUPPORT (OUTPATIENT)
Dept: REHABILITATION | Facility: HOSPITAL | Age: 73
End: 2023-01-27
Payer: OTHER GOVERNMENT

## 2023-01-27 VITALS
BODY MASS INDEX: 30.78 KG/M2 | HEIGHT: 65 IN | SYSTOLIC BLOOD PRESSURE: 150 MMHG | DIASTOLIC BLOOD PRESSURE: 70 MMHG | WEIGHT: 184.75 LBS

## 2023-01-27 DIAGNOSIS — S42.295A OTHER CLOSED NONDISPLACED FRACTURE OF PROXIMAL END OF LEFT HUMERUS, INITIAL ENCOUNTER: Primary | ICD-10-CM

## 2023-01-27 DIAGNOSIS — R31.21 ASYMPTOMATIC MICROSCOPIC HEMATURIA: Primary | ICD-10-CM

## 2023-01-27 DIAGNOSIS — N39.3 STRESS INCONTINENCE OF URINE: ICD-10-CM

## 2023-01-27 DIAGNOSIS — M25.512 ACUTE PAIN OF LEFT SHOULDER: ICD-10-CM

## 2023-01-27 DIAGNOSIS — N39.41 URGE INCONTINENCE OF URINE: ICD-10-CM

## 2023-01-27 LAB
BILIRUB SERPL-MCNC: NORMAL MG/DL
BLOOD URINE, POC: NORMAL
CLARITY, POC UA: CLEAR
COLOR, POC UA: NORMAL
GLUCOSE UR QL STRIP: NORMAL
KETONES UR QL STRIP: NORMAL
LEUKOCYTE ESTERASE URINE, POC: NORMAL
NITRITE, POC UA: NORMAL
PH, POC UA: 5
PROTEIN, POC: NORMAL
SPECIFIC GRAVITY, POC UA: 1.02
UROBILINOGEN, POC UA: NORMAL

## 2023-01-27 PROCEDURE — 51797 PR VOIDING PRESS STUDY INTRA-ABDOMINAL VOID: ICD-10-PCS | Mod: S$GLB,,, | Performed by: OBSTETRICS & GYNECOLOGY

## 2023-01-27 PROCEDURE — 51728 CYSTOMETROGRAM W/VP: CPT | Mod: S$GLB,,, | Performed by: OBSTETRICS & GYNECOLOGY

## 2023-01-27 PROCEDURE — 52000 CYSTOURETHROSCOPY: CPT | Mod: 59,S$GLB,, | Performed by: OBSTETRICS & GYNECOLOGY

## 2023-01-27 PROCEDURE — 52000 CYSTOSCOPY: ICD-10-PCS | Mod: 59,S$GLB,, | Performed by: OBSTETRICS & GYNECOLOGY

## 2023-01-27 PROCEDURE — 81002 URINALYSIS NONAUTO W/O SCOPE: CPT | Mod: S$GLB,,, | Performed by: OBSTETRICS & GYNECOLOGY

## 2023-01-27 PROCEDURE — 97140 MANUAL THERAPY 1/> REGIONS: CPT

## 2023-01-27 PROCEDURE — 51797 INTRAABDOMINAL PRESSURE TEST: CPT | Mod: S$GLB,,, | Performed by: OBSTETRICS & GYNECOLOGY

## 2023-01-27 PROCEDURE — 51784 ANAL/URINARY MUSCLE STUDY: CPT | Mod: 51,S$GLB,, | Performed by: OBSTETRICS & GYNECOLOGY

## 2023-01-27 PROCEDURE — 51741 ELECTRO-UROFLOWMETRY FIRST: CPT | Mod: 51,S$GLB,, | Performed by: OBSTETRICS & GYNECOLOGY

## 2023-01-27 PROCEDURE — 81002 POCT URINE DIPSTICK WITHOUT MICROSCOPE: ICD-10-PCS | Mod: S$GLB,,, | Performed by: OBSTETRICS & GYNECOLOGY

## 2023-01-27 PROCEDURE — 51728 PR COMPLEX CYSTOMETROGRAM VOIDING PRESSURE STUDIES: ICD-10-PCS | Mod: S$GLB,,, | Performed by: OBSTETRICS & GYNECOLOGY

## 2023-01-27 PROCEDURE — 97110 THERAPEUTIC EXERCISES: CPT

## 2023-01-27 PROCEDURE — 51784 PR ANAL/URINARY MUSCLE STUDY: ICD-10-PCS | Mod: 51,S$GLB,, | Performed by: OBSTETRICS & GYNECOLOGY

## 2023-01-27 PROCEDURE — 51741 PR UROFLOWMETRY, COMPLEX: ICD-10-PCS | Mod: 51,S$GLB,, | Performed by: OBSTETRICS & GYNECOLOGY

## 2023-01-27 RX ORDER — SULFAMETHOXAZOLE AND TRIMETHOPRIM 800; 160 MG/1; MG/1
1 TABLET ORAL
Status: COMPLETED | OUTPATIENT
Start: 2023-01-27 | End: 2023-01-27

## 2023-01-27 RX ORDER — LIDOCAINE HYDROCHLORIDE 20 MG/ML
JELLY TOPICAL ONCE
Status: COMPLETED | OUTPATIENT
Start: 2023-01-27 | End: 2023-01-27

## 2023-01-27 RX ADMIN — LIDOCAINE HYDROCHLORIDE 5 ML: 20 JELLY TOPICAL at 03:01

## 2023-01-27 RX ADMIN — SULFAMETHOXAZOLE AND TRIMETHOPRIM 1 TABLET: 800; 160 TABLET ORAL at 03:01

## 2023-01-27 NOTE — PROCEDURES
Complex Urodynamics    Date/Time: 1/27/2023 1:00 PM  Performed by: Nedra Younger MD  Authorized by: Nedra Younger MD   Preparation: Patient was prepped and draped in the usual sterile fashion.    Sedation:  Patient sedated: no    Patient tolerance: patient tolerated the procedure well with no immediate complications  Comments: TITLE OF OPERATION:  Complex cystometry.  Complex uroflowmetry.  Electromyography with surface electrodes.  Pressure voiding flow study.  Abdominal pressure measurement.  Leak point pressure measurement.    INDICATIONS:  Mixed urinary incontinence      SURGEONS NARRATIVE:  A time out was performed in which the patient identity and procedure were confirmed.  Urodynamic evaluation was performed using a computerized system ([employer]  Uroflowmetry was performed on the patient in the sitting position without catheters in place.  Subsequent urodynamic testing was performed with the patient in the lithotomy position at 45 degrees. Water charged catheters were used with sterile water as the infusion medium. Vesical and abdominal (rectal) pressures were measured, and detrusor pressure was calculated. EMG activity was recorded with surface electrodes. During filling, room temperature sterile water was infused at a rate of 30 cubic centimeters per minute. The patient was asked cough after instillation of each 100cc volume. Two Valsalva leak point pressures and two cough leak point pressures were performed with the catheters in place at 300 cubic centimeters and again at maximum capacity. Valsalva leak point pressure was defined as the difference between vesical pressure at which leakage was noted (visualized at the external urethral meatus) and the baseline vesical pressure. Following urodynamic testing, a pressure flow study was performed with the patient in the sitting position. Vesical and abdominal pressures were monitored and detrusor pressures were calculated. After the  pressure flow study, the catheters were then removed. The patient tolerated the procedure well.     Urine dipstick: negative.    1.  VOIDING PHASE:      a.  Uroflowmetry:  Prolapse reduction: No  Voided volume:  90 mL   Voiding time:   17 seconds  Max flow:  10.7 mL/s  Avg flow:   5.2 mL/s   PVR:   10 mL    The overall configuration of this uroflow study was  continuous.      b.  Pressure flow:  Prolapse reduction: No  Voided volume:   586 mL  Voiding time:   3:29 seconds  Peak flow:  11.6 mL/s   Avg flow:  3.7 mL/s  Max det pressure:  37  cm H20  Det pressure at max flow: 1.6 cm H20  Void initiated by  detrusor contraction, urethral relaxation.    Urethral relaxation (EMG):  yes.    PVR (calculated):  21 mL    The overall configuration of this pressure flow study was interrupted.      2.  FILLING PHASE:  1st sensation: 45 mL  First desire:  325 mL  Strong desire:  506 mL  Urgency/ capacity:  624 mL  Compliance (calculated)  7.09 mL/cm H20  EMG activity during filling:   quiet  Detrusor contractions observed: No.      3.  URETHRAL FUNCTION/STORAGE PHASE:    a.  WITH prolapse reduction:  CLPP (300 mL): Negative  at  170 cm H20  VLPP (300 mL): Negative  at  118 cm H20   CLPP (450 mL ):    Positive  at  74 cm H20  VLPP (450 mL):     Positive  at  137 cm H20  b.  WITHOUT prolapse reduction:  CLPP (MAX ):  Positive  at  180 cm H20  VLPP (MAX):    Positive  at  153 cm H20    These findings are consistent with Positive urodynamic stress incontinence.    Assessment:  UF  Normal.  PF  Abnormal interrupted.  Compliance  Normal.  Max capacity  Normal at 624 mL.  DO (--).  ZANE (+).      Plan:  Will review options for midurethral slings        Cystoscopy    Date/Time: 1/27/2023 1:00 PM  Performed by: Nedra Younger MD  Authorized by: Nedra Younger MD     Consent Done?:  Yes (Written)  Timeout: prior to procedure the correct patient, procedure, and site was verified    Prep: patient was prepped and draped in usual sterile  fashion    Local anesthesia used?: Yes    Anesthesia:  Intraurethral instillation  Local anesthetic:  Lidocaine 2% topical gel  Anesthetic total (ml):  10  Indications: hematuria    Position:  Dorsal lithotomy  Anesthesia:  Intraurethral instillation  Patient sedated?: No    Preparation: Patient was prepped and draped in usual sterile fashion    Scope type:  Flexible cystoscope  Stent removed: No    External exam normal: Yes    Digital exam performed: No     patient tolerated the procedure well with no immediate complications      Patient was placed in dorsal lithotomy position. Perineum was prepped and draped using sterile technique.The urethra was prepped with betadine, and 10 mL of 2% lidocaine gel were instilled into the bladder through the urethra.   Next, cystourethroscopy was performed utilizing a flexible scope. The flexible cystourethroscope was introduced into the bladder under direct visualization. The bladder was distended with approximately 300 cubic centimeters of sterile water. A systematic survey was performed in which the bladder was surveyed using multiple sequential passes in a clockwise fashion from the bladder dome to the bladder base to the urethrovesical junction. The trigone and ureteral orifices were observed. The scope was then flipped back on itself, and the urethrovesical junction was viewed. Upon completion of the bladder survey, the telescope was then completely withdrawn.      Meatus: normal in appearance  Urethra: normal in appearance  Ureteral orifices: normal in appearance with clear efflux noted from bilateral orifices  Bladder Diverticulum: none  Bladder Stone(s): none  Bladder Tumor: none   Bladder Mucosa: normal in appearance  Bladder Wall: normal in appearance    Patient tolerated procedure well without complications. Antibiotic prophylaxis and pyridium was given.    ASSESSMENT/PLAN  Asymptomatic microscopic hematuria  -     Cystoscopy; Future; Expected date: 01/27/2023  -      Cystoscopy    Stress incontinence of urine  -     Simple Urodynamics  -     POCT URINE DIPSTICK WITHOUT MICROSCOPE    Urge incontinence of urine  -     Simple Urodynamics  -     POCT URINE DIPSTICK WITHOUT MICROSCOPE    Other orders  -     Cancel: Cystoscopy        71 yo with mixed urinary incontinence and asymptomatic microscopic hematuria presented today for urodynamic testing and cystoscopy. Reviewed the normal findings of her cystoscopy today. We have discussed her renal ultrasound results which shows evidence of underlying renal disease as evidenced by elevated resistive indices. She will be scheduled to see Nephrology for further assessment.   She will schedule a follow-up appointment to review the urodynamic test results and discuss options for intervention. Patient most likely to proceed with surgical intervention however is recovering from an arm fracture and wants to wait until she is feeling well before undergoing surgery.     Nedra Younger

## 2023-02-01 ENCOUNTER — CLINICAL SUPPORT (OUTPATIENT)
Dept: REHABILITATION | Facility: HOSPITAL | Age: 73
End: 2023-02-01
Payer: OTHER GOVERNMENT

## 2023-02-01 DIAGNOSIS — M25.512 ACUTE PAIN OF LEFT SHOULDER: ICD-10-CM

## 2023-02-01 DIAGNOSIS — S42.295A OTHER CLOSED NONDISPLACED FRACTURE OF PROXIMAL END OF LEFT HUMERUS, INITIAL ENCOUNTER: Primary | ICD-10-CM

## 2023-02-01 PROCEDURE — 97140 MANUAL THERAPY 1/> REGIONS: CPT

## 2023-02-01 PROCEDURE — 97110 THERAPEUTIC EXERCISES: CPT

## 2023-02-01 NOTE — PROGRESS NOTES
[facility]   Physical Therapy Treatment Note     Name: Maria Elena Salazar [place]  Clinic Number: [id number]    Therapy Diagnosis:   Encounter Diagnoses   Name Primary?    Other closed nondisplaced fracture of proximal end of left humerus, initial encounter Yes    Acute pain of left shoulder      Physician: Marcia Rodriguez PA-C    Visit Date: 1/27/2023    Physician Orders: PT Eval and Treat NWB (L) UE / wean out of sling  Medical Diagnosis from Referral:   Encounter Diagnosis   Name Primary?    Other closed nondisplaced fracture of proximal end of left humerus, initial encounter      Evaluation Date: 1/23/2023  Authorization Period Expiration: TBD  Plan of Care Expiration: 3/23/23  Progress Note Due: 2/23/23  Visit # / Visits authorized: 1/ TBD   FOTO: 1/3    Precautions: Standard; NWB (L) UE - wean out of sling    PTA Visit #: 0/5     Time In: 7:00 AM  Time Out: 8:15 AM  Total Billable Time: 45 minutes    SUBJECTIVE     Pt reports: no increased pain in the L shoulder following last eval / treatment session.  She reports compliance with HEP.  She was compliant with home exercise program.  Response to previous treatment: improved mobility   Functional change: no sig change    Pain: 4/10  Location: (L) lateral upper arm    OBJECTIVE     Objective Measures updated at progress report unless specified.     Treatment     Martin received the treatments listed below:      therapeutic exercises to develop strength, endurance, ROM, flexibility, and posture for 23 minutes including:  L abd st with MH arm propped on table as tolerated 10 min with intermittent rest breaks  Scap Squeezes 3 x 10 reps BW  Shrugs 3 x 10 reps BW  (L) wrist ext 3 x 10 reps   (L) ER st with Wand 10 x 10 sec hold  (L) supine scaption 3 x 10 reps AA  (L) sidelying ER 3 x 10 reps AA  (L) abd st with ICE arm propped on table as tolerated 10 min with intermittent rest breaks    manual therapy techniques: Joint mobilizations and Soft  tissue Mobilization were applied to the: L shoulder for 15 minutes, including:  Gentle pain free L shoulder PROM / mobilization; gentle oscillations and shoulder distraction        Patient Education and Home Exercises     Home Exercises Provided and Patient Education Provided     Education provided:   - HEP    Written Home Exercises Provided: yes. Exercises were reviewed and Martin was able to demonstrate them prior to the end of the session.  Martin demonstrated good  understanding of the education provided. See EMR under Patient Instructions for exercises provided during therapy sessions    ASSESSMENT     Pt tolerated initial PT intervention well overall; she demo restricted PROM of the invovled shoulder with asscoiated wekness in the scap stabilizers and RC.  She perforemd gentle painfree AA movements today focusing on ROM.  WE will progress the patient as tolerated.    Martin Is progressing well towards her goals.   Pt prognosis is Good.     Pt will continue to benefit from skilled outpatient physical therapy to address the deficits listed in the problem list box on initial evaluation, provide pt/family education and to maximize pt's level of independence in the home and community environment.     Pt's spiritual, cultural and educational needs considered and pt agreeable to plan of care and goals.     Anticipated barriers to physical therapy: none    Goals: PLOF    PLAN     Continue with PT POC 2 x a week for 6 weeks    Salvador Harper, PT

## 2023-02-01 NOTE — PROGRESS NOTES
[facility]   Physical Therapy Treatment Note     Name: Maria Elena Salazar [place]  Clinic Number: [id number]    Therapy Diagnosis:   Encounter Diagnoses   Name Primary?    Other closed nondisplaced fracture of proximal end of left humerus, initial encounter Yes    Acute pain of left shoulder      Physician: Marcia Rodriguez PA-C    Visit Date: 2/1/2023    Physician Orders: PT Eval and Treat NWB (L) UE / wean out of sling  Medical Diagnosis from Referral:   Encounter Diagnosis   Name Primary?    Other closed nondisplaced fracture of proximal end of left humerus, initial encounter      Evaluation Date: 1/23/2023  Authorization Period Expiration: TBD  Plan of Care Expiration: 3/23/23  Progress Note Due: 2/23/23  Visit # / Visits authorized: 1/ TBD   FOTO: 1/3    Precautions: Standard; NWB (L) UE - wean out of sling    PTA Visit #: 0/5     Time In: 9:50 AM  Time Out: 10:54 AM  Total Billable Time: 53 minutes    SUBJECTIVE     Pt reports: Pt reports little to no pain at rest.  She states the (L) UE is moving a lot better overall.  Response to previous treatment: improved mobility   Functional change: no sig change    Pain: 0/10  Location: (L) lateral upper arm    OBJECTIVE     Objective Measures updated at progress report unless specified.     Treatment     Martin received the treatments listed below:      therapeutic exercises to develop strength, endurance, ROM, flexibility, and posture for 38 minutes including:  L abd st with MH arm propped on table as tolerated 10 min with intermittent rest breaks  (L) shoulder Pulleys 3 min 20 /10  Scap Squeezes 3 x 10 reps BW  Shrugs 3 x 10 reps BW  (L) wrist ext 3 x 10 reps   (L) ER st with Wand 10 x 10 sec hold  (L) supine scaption 3 x 10 reps AA to ACT  (L) sidelying ER 3 x 10 reps AA to ACT  Supine punchouts 3 x 10 reps  (L) IR st with Wants  (L) abd st with ICE arm propped on table as tolerated 10 min with intermittent rest breaks    manual therapy  techniques: Joint mobilizations and Soft tissue Mobilization were applied to the: L shoulder for 15 minutes, including:  Gentle pain free L shoulder PROM / mobilization; gentle oscillations and shoulder distraction        Patient Education and Home Exercises     Home Exercises Provided and Patient Education Provided     Education provided:   - HEP    Written Home Exercises Provided: yes. Exercises were reviewed and Martin was able to demonstrate them prior to the end of the session.  Martin demonstrated good  understanding of the education provided. See EMR under Patient Instructions for exercises provided during therapy sessions    ASSESSMENT     Pt continues to make steady progress towards all PT goals;  she tolerated IR stretch today gently with cord and was encouraged to perform at home in a painfree manner;  she demo improved L shoulder mobility and wants to continue with her outpatient PT.    Martin Is progressing well towards her goals.   Pt prognosis is Good.     Pt will continue to benefit from skilled outpatient physical therapy to address the deficits listed in the problem list box on initial evaluation, provide pt/family education and to maximize pt's level of independence in the home and community environment.     Pt's spiritual, cultural and educational needs considered and pt agreeable to plan of care and goals.     Anticipated barriers to physical therapy: none    Goals: PLOF    PLAN     Continue with PT POC 2 x a week for 6 weeks    Salvador Harper, PT

## 2023-02-03 ENCOUNTER — CLINICAL SUPPORT (OUTPATIENT)
Dept: REHABILITATION | Facility: HOSPITAL | Age: 73
End: 2023-02-03
Payer: OTHER GOVERNMENT

## 2023-02-03 DIAGNOSIS — S42.295A OTHER CLOSED NONDISPLACED FRACTURE OF PROXIMAL END OF LEFT HUMERUS, INITIAL ENCOUNTER: Primary | ICD-10-CM

## 2023-02-03 DIAGNOSIS — M25.512 ACUTE PAIN OF LEFT SHOULDER: ICD-10-CM

## 2023-02-03 PROCEDURE — 97140 MANUAL THERAPY 1/> REGIONS: CPT

## 2023-02-03 PROCEDURE — 97110 THERAPEUTIC EXERCISES: CPT

## 2023-02-03 NOTE — PROGRESS NOTES
..[facility]   Physical Therapy Treatment Note     Name: Maria Elena Jackson  Clinic Number: [id number]    Therapy Diagnosis:   No diagnosis found.    Physician: Marcia Rodriguez PA-C    Visit Date: 2/3/2023    Physician Orders: PT Eval and Treat NWB (L) UE / wean out of sling  Medical Diagnosis from Referral:   Encounter Diagnosis   Name Primary?    Other closed nondisplaced fracture of proximal end of left humerus, initial encounter      Evaluation Date: 1/23/2023  Authorization Period Expiration: TBD  Plan of Care Expiration: 3/23/23  Progress Note Due: 2/23/23  Visit # / Visits authorized: 1/ TBD   FOTO: 1/3    Precautions: Standard; NWB (L) UE - wean out of sling    PTA Visit #: 0/5     Time In: 10:00 AM  Time Out: 10:54 AM  Total Billable Time: 53 minutes    SUBJECTIVE     Pt reports: Pt states her shoulder mobility is really improving.  Response to previous treatment: improved mobility   Functional change: no sig change    Pain: 0/10  Location: (L) lateral upper arm    OBJECTIVE     Objective Measures updated at progress report unless specified.     Treatment     Martin received the treatments listed below:      therapeutic exercises to develop strength, endurance, ROM, flexibility, and posture for 38 minutes including:  L abd st with MH arm propped on table as tolerated 10 min with intermittent rest breaks  (L) shoulder Pulleys 3 min 20 /10  Scap Squeezes 3 x 10 reps red  Shrugs 3 x 10 reps 1 lb  (L) wrist ext 3 x 10 reps   (L) ER st with Wand 10 x 10 sec hold  (L) supine scaption 3 x 10 reps AA to ACT  (L) sidelying ER 3 x 10 reps AA to ACT  Supine punchouts 3 x 10 reps  (L) IR st with Wand 10 x 10 sec hold  (L) abd st with ICE arm propped on table as tolerated 10 min with intermittent rest breaks    manual therapy techniques: Joint mobilizations and Soft tissue Mobilization were applied to the: L shoulder for 15 minutes, including:  Gentle pain free L shoulder PROM / mobilization;  gentle oscillations and shoulder distraction        Patient Education and Home Exercises     Home Exercises Provided and Patient Education Provided     Education provided:   - HEP    Written Home Exercises Provided: yes. Exercises were reviewed and Martin was able to demonstrate them prior to the end of the session.  Martin demonstrated good  understanding of the education provided. See EMR under Patient Instructions for exercises provided during therapy sessions    ASSESSMENT     Pt remains with limited passive abduction and tolerated gentle Grade I-II inferior glides to the involved shoulder well without issue;  she demo improved L UE / RC strength and will be ready for active supine scaption next PT visit.   She will continue to benefit from skilled PT.    Martin Is progressing well towards her goals.   Pt prognosis is Good.     Pt will continue to benefit from skilled outpatient physical therapy to address the deficits listed in the problem list box on initial evaluation, provide pt/family education and to maximize pt's level of independence in the home and community environment.     Pt's spiritual, cultural and educational needs considered and pt agreeable to plan of care and goals.     Anticipated barriers to physical therapy: none    Goals: PLOF    PLAN     Continue with PT POC 2 x a week for 6 weeks    Salvador Harper, PT

## 2023-02-06 ENCOUNTER — CLINICAL SUPPORT (OUTPATIENT)
Dept: REHABILITATION | Facility: HOSPITAL | Age: 73
End: 2023-02-06
Attending: STUDENT IN AN ORGANIZED HEALTH CARE EDUCATION/TRAINING PROGRAM
Payer: OTHER GOVERNMENT

## 2023-02-06 DIAGNOSIS — S42.295A OTHER CLOSED NONDISPLACED FRACTURE OF PROXIMAL END OF LEFT HUMERUS, INITIAL ENCOUNTER: Primary | ICD-10-CM

## 2023-02-06 DIAGNOSIS — M25.512 ACUTE PAIN OF LEFT SHOULDER: ICD-10-CM

## 2023-02-06 PROBLEM — S42.202A CLOSED FRACTURE OF LEFT PROXIMAL HUMERUS: Status: ACTIVE | Noted: 2023-02-06

## 2023-02-06 PROCEDURE — 97110 THERAPEUTIC EXERCISES: CPT

## 2023-02-06 PROCEDURE — 97140 MANUAL THERAPY 1/> REGIONS: CPT

## 2023-02-06 NOTE — PROGRESS NOTES
..[facility]   Physical Therapy Treatment Note     Name: Maria Elena Salazar [place]  Clinic Number: [id number]    Therapy Diagnosis:   Encounter Diagnoses   Name Primary?    Other closed nondisplaced fracture of proximal end of left humerus, initial encounter Yes    Acute pain of left shoulder        Physician: Marcia Rodriguez PA-C    Visit Date: 2/6/2023    Physician Orders: PT Eval and Treat NWB (L) UE / wean out of sling  Medical Diagnosis from Referral:   Encounter Diagnosis   Name Primary?    Other closed nondisplaced fracture of proximal end of left humerus, initial encounter      Evaluation Date: 1/23/2023  Authorization Period Expiration: TBD  Plan of Care Expiration: 3/23/23  Progress Note Due: 2/23/23  Visit # / Visits authorized: 1/ TBD   FOTO: 1/3    Precautions: Standard; NWB (L) UE - wean out of sling    PTA Visit #: 0/5     Time In: 10:47  AM  Time Out: 11:40 AM  Total Billable Time: 48 minutes    SUBJECTIVE     Pt reports: Pt reports 0/10 pain in the R shoulder upon entering PT today;  she states she was able to do her hair today without too much issue.    Response to previous treatment: improved mobility   Functional change: no sig change    Pain: 0/10  Location: (L) lateral upper arm    OBJECTIVE     Objective Measures updated at progress report unless specified.     Treatment     Martin received the treatments listed below:      therapeutic exercises to develop strength, endurance, ROM, flexibility, and posture for 30 minutes including:  L abd st with MH arm propped on table 10 min  (L) shoulder Pulleys 3 min 20 /10 scaption  (L) IR st with Strap 10 x 10 sec hold  Scap Squeezes 3 x 10 reps red  Shrugs 3 x 10 reps 1   Bicep Curls 3 x 10 reps act  (L) wrist ext 3 x 10 reps 3 lbs   (L) ER st with Wand 10 x 10 sec hold  (L) shoulder flexion 10 x 10 sec hold  (L) supine scaption 3 x 10 reps ACT  (L) sidelying ER 3 x 10 reps ACT  Supine punchouts 3 x 10 reps  ACT  (L) abd st with ICE  arm propped on table 10 min    manual therapy techniques: Joint mobilizations and Soft tissue Mobilization were applied to the: L shoulder for 8minutes, including:  Gentle pain free L shoulder PROM / mobilization; gentle oscillations and shoulder distraction        Patient Education and Home Exercises     Home Exercises Provided and Patient Education Provided     Education provided:   - HEP    Written Home Exercises Provided: yes. Exercises were reviewed and Martin was able to demonstrate them prior to the end of the session.  Martin demonstrated good  understanding of the education provided. See EMR under Patient Instructions for exercises provided during therapy sessions    ASSESSMENT     Pt demo marked improvement in her PROM of the L shoulder since I.E. as well as her ability to reach above 90 deg with normal shoulder biomechanics;  she continues to make steady progress towards all PT goals and we will continue to hold any resistive strengthening of the UE / RC fo rtime being.  The patient will continue to benefit from skilled PT.    Martin Is progressing well towards her goals.   Pt prognosis is Good.     Pt will continue to benefit from skilled outpatient physical therapy to address the deficits listed in the problem list box on initial evaluation, provide pt/family education and to maximize pt's level of independence in the home and community environment.     Pt's spiritual, cultural and educational needs considered and pt agreeable to plan of care and goals.     Anticipated barriers to physical therapy: none    Goals: PLOF    PLAN     Continue with PT POC 2 x a week for 6 weeks    Salvador Harper, PT

## 2023-02-08 ENCOUNTER — CLINICAL SUPPORT (OUTPATIENT)
Dept: REHABILITATION | Facility: HOSPITAL | Age: 73
End: 2023-02-08
Attending: ORTHOPAEDIC SURGERY
Payer: OTHER GOVERNMENT

## 2023-02-08 ENCOUNTER — OFFICE VISIT (OUTPATIENT)
Dept: UROGYNECOLOGY | Facility: CLINIC | Age: 73
End: 2023-02-08
Payer: OTHER GOVERNMENT

## 2023-02-08 VITALS
SYSTOLIC BLOOD PRESSURE: 150 MMHG | BODY MASS INDEX: 30.49 KG/M2 | DIASTOLIC BLOOD PRESSURE: 70 MMHG | HEIGHT: 65 IN | WEIGHT: 183 LBS

## 2023-02-08 DIAGNOSIS — N39.41 URGE INCONTINENCE OF URINE: ICD-10-CM

## 2023-02-08 DIAGNOSIS — S42.295A OTHER CLOSED NONDISPLACED FRACTURE OF PROXIMAL END OF LEFT HUMERUS, INITIAL ENCOUNTER: Primary | ICD-10-CM

## 2023-02-08 DIAGNOSIS — M25.512 ACUTE PAIN OF LEFT SHOULDER: ICD-10-CM

## 2023-02-08 DIAGNOSIS — N81.11 CYSTOCELE, MIDLINE: ICD-10-CM

## 2023-02-08 DIAGNOSIS — N39.3 STRESS INCONTINENCE OF URINE: Primary | ICD-10-CM

## 2023-02-08 PROCEDURE — 99213 OFFICE O/P EST LOW 20 MIN: CPT | Mod: S$GLB,,, | Performed by: OBSTETRICS & GYNECOLOGY

## 2023-02-08 PROCEDURE — 99999 PR PBB SHADOW E&M-EST. PATIENT-LVL III: ICD-10-PCS | Mod: PBBFAC,,, | Performed by: OBSTETRICS & GYNECOLOGY

## 2023-02-08 PROCEDURE — 97140 MANUAL THERAPY 1/> REGIONS: CPT

## 2023-02-08 PROCEDURE — 99999 PR PBB SHADOW E&M-EST. PATIENT-LVL III: CPT | Mod: PBBFAC,,, | Performed by: OBSTETRICS & GYNECOLOGY

## 2023-02-08 PROCEDURE — 97110 THERAPEUTIC EXERCISES: CPT

## 2023-02-08 PROCEDURE — 99213 PR OFFICE/OUTPT VISIT, EST, LEVL III, 20-29 MIN: ICD-10-PCS | Mod: S$GLB,,, | Performed by: OBSTETRICS & GYNECOLOGY

## 2023-02-08 NOTE — PROGRESS NOTES
Chief Complaint   Patient presents with    Follow Up        HPI: Patient is a 72 y.o. female  presents today to review urodynamic test results. Patient continues to recover from a broken arm. She is out of her cast but remain in PT for another three weeks. States that she anticipated her overall recovery being another 3 months.    Reports both stress urinary incontinence and urgency urinary incontinence symptoms but states that she is more bothered by her stress urinary incontinence. She does want to proceed with surical intervention for her symptoms.       REVIEW OF SYSTEMS:  A full 14-point ROS was performed and was significant for those  mentioned in the HPI.    The following portions of the patient's history were reviewed and updated as appropriate: allergies, current medications, past medical history, past surgical history and problem list.    PHYSICAL EXAMINATION    Vitals:    23 1424   BP: (!) 150/70        General: Healthy in appearance, Well nourished, Affect Normal, NAD.    ASSESSMENT & PLAN:    Stress incontinence of urine    Urge incontinence of urine    Cystocele, midline  Comments:  Grade 1       71 yo with mixed urinary incontinence, predominantly stress urinary incontinence presented today to review her urodynamic test results. Testing noted that she has a normal uroflowmetry pattern at the start of testing. Her end pressure flow study showed an interrupted pattern but her bladder compliance during filling was normal and her bladder capacity was above normal at 624 mL. She was noted to have stress urinary incontinence at 450 mL. No detrusor instability was seen.   Reviewed treatment options including midurethral slings versus ronen-urethral bulking. Patient opts to proceed with midurethral sling. Given her age and concomitant urgency urinary incontinence symptoms opt to proceed with TVT-O and cystoscopy. Patient would like to wait for three months before surgery until her arm heals. Will  plan to sign consent close to surgery although risks and benefits were reviewed today.    All questions were answered today. The patient was encouraged to contact the office as needed with any additional questions or concerns.     Total time spent on visit was 20 minutes.  This includes face to face time and non-face to face time preparing to see the patient (eg, review of tests), Obtaining and/or reviewing separately obtained history, Documenting clinical information in the electronic or other health record, Independently interpreting resultsand communicating results to the patient/family/caregiver, or Care coordination.    Nedra Younger MD

## 2023-02-08 NOTE — PROGRESS NOTES
..[facility]   Physical Therapy Treatment Note     Name: Maria Elena Salazar [place]  Clinic Number: [id number]    Therapy Diagnosis:   Encounter Diagnoses   Name Primary?    Other closed nondisplaced fracture of proximal end of left humerus, initial encounter Yes    Acute pain of left shoulder        Physician: Marcia Rodriguez PA-C    Visit Date: 2/8/2023    Physician Orders: PT Eval and Treat NWB (L) UE / wean out of sling  Medical Diagnosis from Referral:   Encounter Diagnosis   Name Primary?    Other closed nondisplaced fracture of proximal end of left humerus, initial encounter      Evaluation Date: 1/23/2023  Authorization Period Expiration: TBD  Plan of Care Expiration: 3/23/23  Progress Note Due: 2/23/23  Visit # / Visits authorized: 6/ TBD   FOTO: 1/3    Precautions: Standard; NWB (L) UE - wean out of sling    PTA Visit #: 0/5     Time In: 9:50  AM  Time Out: 10:53 AM  Total Billable Time: 53 minutes    SUBJECTIVE     Pt reports: Pt states she is doing well overall and reports little to no pain in the shoulder;  she states she is spending up to 15 hours a day out of the sling.  She reports compliance with HEP.  Response to previous treatment: improved mobility   Functional change: no sig change    Pain: 0/10  Location: (L) lateral upper arm    OBJECTIVE     Objective Measures updated at progress report unless specified.     Treatment     Martin received the treatments listed below:      therapeutic exercises to develop strength, endurance, ROM, flexibility, and posture for 38 minutes including:  L abd st with MH arm propped on table 10 min  (L) shoulder Pulleys 3 min 20 /10 scaption  (L) IR st with Strap 10 x 10 sec hold  Scap Squeezes 3 x 10 reps red  Shrugs 3 x 10 reps 2 lbs  Bicep Curls 3 x 10 reps 2 lbs  (L) wrist ext 3 x 10 reps 3 lbs   (L) ER st with Wand 10 x 10 sec hold  (L) shoulder flexion with wand10 x 10 sec hold  (L) supine scaption 3 x 10 reps ACT  (L) sidelying ER 3 x 10  reps ACT  (L) prone ext 3 x 10 reps BW  Supine punchouts 3 x 10 reps  ACT  (L) abd st with ICE arm propped on table 10 min    manual therapy techniques: Joint mobilizations and Soft tissue Mobilization were applied to the: L shoulder for 15 minutes, including:  Gentle pain free L shoulder PROM / mobilization; gentle oscillations and shoulder distraction        Patient Education and Home Exercises     Home Exercises Provided and Patient Education Provided     Education provided:   - HEP    Written Home Exercises Provided: yes. Exercises were reviewed and Martin was able to demonstrate them prior to the end of the session.  Martin demonstrated good  understanding of the education provided. See EMR under Patient Instructions for exercises provided during therapy sessions    ASSESSMENT     PT added prone L shoulder extension today to treatment plan which was tolerated well with min verbal cues for form and technique;  she continues to require verbal / tactile cues in order to perform supine scaption with correct technique;  she will continue to benefit from skilled PT.    Martin Is progressing well towards her goals.   Pt prognosis is Good.     Pt will continue to benefit from skilled outpatient physical therapy to address the deficits listed in the problem list box on initial evaluation, provide pt/family education and to maximize pt's level of independence in the home and community environment.     Pt's spiritual, cultural and educational needs considered and pt agreeable to plan of care and goals.     Anticipated barriers to physical therapy: none    Goals: PLOF    PLAN     Continue with PT POC 2 x a week for 6 weeks    Salvador Harper, PT

## 2023-02-10 ENCOUNTER — TELEPHONE (OUTPATIENT)
Dept: NEPHROLOGY | Facility: CLINIC | Age: 73
End: 2023-02-10
Payer: OTHER GOVERNMENT

## 2023-02-10 DIAGNOSIS — E78.5 HYPERLIPIDEMIA, UNSPECIFIED HYPERLIPIDEMIA TYPE: Primary | ICD-10-CM

## 2023-02-13 ENCOUNTER — LAB VISIT (OUTPATIENT)
Dept: LAB | Facility: HOSPITAL | Age: 73
End: 2023-02-13
Attending: INTERNAL MEDICINE
Payer: OTHER GOVERNMENT

## 2023-02-13 ENCOUNTER — CLINICAL SUPPORT (OUTPATIENT)
Dept: REHABILITATION | Facility: HOSPITAL | Age: 73
End: 2023-02-13
Attending: INTERNAL MEDICINE
Payer: OTHER GOVERNMENT

## 2023-02-13 DIAGNOSIS — S42.295A OTHER CLOSED NONDISPLACED FRACTURE OF PROXIMAL END OF LEFT HUMERUS, INITIAL ENCOUNTER: Primary | ICD-10-CM

## 2023-02-13 DIAGNOSIS — E78.5 HYPERLIPIDEMIA, UNSPECIFIED HYPERLIPIDEMIA TYPE: ICD-10-CM

## 2023-02-13 DIAGNOSIS — M25.512 ACUTE PAIN OF LEFT SHOULDER: ICD-10-CM

## 2023-02-13 LAB
ANION GAP SERPL CALC-SCNC: 13 MMOL/L (ref 8–16)
BASOPHILS # BLD AUTO: 0.05 K/UL (ref 0–0.2)
BASOPHILS NFR BLD: 0.7 % (ref 0–1.9)
BUN SERPL-MCNC: 19 MG/DL (ref 8–23)
CALCIUM SERPL-MCNC: 10 MG/DL (ref 8.7–10.5)
CHLORIDE SERPL-SCNC: 101 MMOL/L (ref 95–110)
CO2 SERPL-SCNC: 25 MMOL/L (ref 23–29)
CREAT SERPL-MCNC: 0.7 MG/DL (ref 0.5–1.4)
DIFFERENTIAL METHOD: ABNORMAL
EOSINOPHIL # BLD AUTO: 0.2 K/UL (ref 0–0.5)
EOSINOPHIL NFR BLD: 2.9 % (ref 0–8)
ERYTHROCYTE [DISTWIDTH] IN BLOOD BY AUTOMATED COUNT: 11.9 % (ref 11.5–14.5)
EST. GFR  (NO RACE VARIABLE): >60 ML/MIN/1.73 M^2
GLUCOSE SERPL-MCNC: 90 MG/DL (ref 70–110)
HCT VFR BLD AUTO: 43.6 % (ref 37–48.5)
HGB BLD-MCNC: 13.8 G/DL (ref 12–16)
IMM GRANULOCYTES # BLD AUTO: 0.02 K/UL (ref 0–0.04)
IMM GRANULOCYTES NFR BLD AUTO: 0.3 % (ref 0–0.5)
LYMPHOCYTES # BLD AUTO: 2.9 K/UL (ref 1–4.8)
LYMPHOCYTES NFR BLD: 39.9 % (ref 18–48)
MCH RBC QN AUTO: 30.9 PG (ref 27–31)
MCHC RBC AUTO-ENTMCNC: 31.7 G/DL (ref 32–36)
MCV RBC AUTO: 98 FL (ref 82–98)
MONOCYTES # BLD AUTO: 0.6 K/UL (ref 0.3–1)
MONOCYTES NFR BLD: 8 % (ref 4–15)
NEUTROPHILS # BLD AUTO: 3.5 K/UL (ref 1.8–7.7)
NEUTROPHILS NFR BLD: 48.2 % (ref 38–73)
NRBC BLD-RTO: 0 /100 WBC
PLATELET # BLD AUTO: 264 K/UL (ref 150–450)
PMV BLD AUTO: 12.7 FL (ref 9.2–12.9)
POTASSIUM SERPL-SCNC: 3.6 MMOL/L (ref 3.5–5.1)
RBC # BLD AUTO: 4.47 M/UL (ref 4–5.4)
SODIUM SERPL-SCNC: 139 MMOL/L (ref 136–145)
WBC # BLD AUTO: 7.16 K/UL (ref 3.9–12.7)

## 2023-02-13 PROCEDURE — 36415 COLL VENOUS BLD VENIPUNCTURE: CPT | Performed by: INTERNAL MEDICINE

## 2023-02-13 PROCEDURE — 80048 BASIC METABOLIC PNL TOTAL CA: CPT | Performed by: INTERNAL MEDICINE

## 2023-02-13 PROCEDURE — 97110 THERAPEUTIC EXERCISES: CPT

## 2023-02-13 PROCEDURE — 97140 MANUAL THERAPY 1/> REGIONS: CPT

## 2023-02-13 PROCEDURE — 85025 COMPLETE CBC W/AUTO DIFF WBC: CPT | Performed by: INTERNAL MEDICINE

## 2023-02-15 ENCOUNTER — OFFICE VISIT (OUTPATIENT)
Dept: NEPHROLOGY | Facility: CLINIC | Age: 73
End: 2023-02-15
Payer: OTHER GOVERNMENT

## 2023-02-15 VITALS
HEART RATE: 77 BPM | OXYGEN SATURATION: 97 % | DIASTOLIC BLOOD PRESSURE: 70 MMHG | HEIGHT: 65 IN | SYSTOLIC BLOOD PRESSURE: 140 MMHG | BODY MASS INDEX: 31.07 KG/M2 | WEIGHT: 186.5 LBS

## 2023-02-15 DIAGNOSIS — N28.1 RENAL CYST: Primary | ICD-10-CM

## 2023-02-15 DIAGNOSIS — N20.0 NEPHROLITHIASIS: ICD-10-CM

## 2023-02-15 DIAGNOSIS — I10 PRIMARY HYPERTENSION: ICD-10-CM

## 2023-02-15 PROCEDURE — 99999 PR PBB SHADOW E&M-EST. PATIENT-LVL III: CPT | Mod: PBBFAC,,, | Performed by: INTERNAL MEDICINE

## 2023-02-15 PROCEDURE — 99204 PR OFFICE/OUTPT VISIT, NEW, LEVL IV, 45-59 MIN: ICD-10-PCS | Mod: S$GLB,,, | Performed by: INTERNAL MEDICINE

## 2023-02-15 PROCEDURE — 99999 PR PBB SHADOW E&M-EST. PATIENT-LVL III: ICD-10-PCS | Mod: PBBFAC,,, | Performed by: INTERNAL MEDICINE

## 2023-02-15 PROCEDURE — 99204 OFFICE O/P NEW MOD 45 MIN: CPT | Mod: S$GLB,,, | Performed by: INTERNAL MEDICINE

## 2023-02-15 NOTE — PROGRESS NOTES
..[facility]   Physical Therapy Treatment Note     Name: Maria Elena Salazar [place]  Clinic Number: [id number]    Therapy Diagnosis:   Encounter Diagnoses   Name Primary?    Other closed nondisplaced fracture of proximal end of left humerus, initial encounter Yes    Acute pain of left shoulder        Physician: Marcia Rodriguez PA-C    Visit Date: 2/13/2023    Physician Orders: PT Eval and Treat NWB (L) UE / wean out of sling  Medical Diagnosis from Referral:   Encounter Diagnosis   Name Primary?    Other closed nondisplaced fracture of proximal end of left humerus, initial encounter      Evaluation Date: 1/23/2023  Authorization Period Expiration: TBD  Plan of Care Expiration: 3/23/23  Progress Note Due: 2/23/23  Visit # / Visits authorized: 7/ TBD   FOTO: 1/3    Precautions: Standard; NWB (L) UE - wean out of sling    PTA Visit #: 0/5     Time In: 9:50  AM  Time Out: 10:56 AM  Total Billable Time: 55 minutes    SUBJECTIVE     Pt reports: Pt with no new complaints of pain today;  she remains with minimal complants of pain and really no pain at rest.  Response to previous treatment: improved mobility   Functional change: no sig change    Pain: 0/10  Location: (L) lateral upper arm    OBJECTIVE     Objective Measures updated at progress report unless specified.     Treatment     Martin received the treatments listed below:      therapeutic exercises to develop strength, endurance, ROM, flexibility, and posture for 30 minutes including:  L abd st with MH arm propped on table 10 min  (L) shoulder Pulleys 3 min 20 /10 scaption  (L) IR st with Strap 10 x 10 sec hold  Scap Squeezes 3 x 10 reps green  Shrugs 3 x 10 reps 3 lbs  Bicep Curls 3 x 10 reps 2 lbs  (L) wrist ext 3 x 10 reps 3 lbs   (L) ER st with Wand 10 x 10 sec hold  (L) shoulder flexion with wand10 x 10 sec hold  (L) supine scaption 3 x 10 reps ACT  (L) sidelying ER 3 x 10 reps 1 lbs  (L) prone ext 3 x 10 reps BW  Supine punchouts 3 x 10 reps   1 lb  (L) abd st with ICE arm propped on table 10 min    manual therapy techniques: Joint mobilizations and Soft tissue Mobilization were applied to the: L shoulder for 15 minutes, including:  Gentle pain free L shoulder PROM / mobilization; gentle oscillations and shoulder distraction        Patient Education and Home Exercises     Home Exercises Provided and Patient Education Provided     Education provided:   - HEP    Written Home Exercises Provided: yes. Exercises were reviewed and Martin was able to demonstrate them prior to the end of the session.  Martin demonstrated good  understanding of the education provided. See EMR under Patient Instructions for exercises provided during therapy sessions    ASSESSMENT     Pt continues to make steady progress towards all PT goals and tolerated increased / light resistance during RC / UE strengthening without issue.  She remains with limited / painful (R) shoulder PROM and decreased inferior capsule flexiblity which was addressed via shoulder mobilization.  She will be progressed as tolerated.    Martin Is progressing well towards her goals.   Pt prognosis is Good.     Pt will continue to benefit from skilled outpatient physical therapy to address the deficits listed in the problem list box on initial evaluation, provide pt/family education and to maximize pt's level of independence in the home and community environment.     Pt's spiritual, cultural and educational needs considered and pt agreeable to plan of care and goals.     Anticipated barriers to physical therapy: none    Goals: PLOF    PLAN     Continue with PT POC 2 x a week for 6 weeks    Salvador Harper, PT

## 2023-02-15 NOTE — PROGRESS NOTES
REASON FOR CONSULT: Renal cyst    REFERRING PHYSICIAN: Monroe Cutler MD      HISTORY OF PRESENT ILLNESS: 72 y.o. female who is new to me  has a past medical history of Arthritis, Hyperlipidemia, Hypertension, and S/P total hysterectomy and bilateral salpingo-oophorectomy (6/29/2022). patient was referred here for cysts on US   The patient denies taking NSAIDs or new antibiotics, recreational drugs, recent episode of dehydration, diarrhea, nausea or vomiting, acute illness, hospitalization or exposure to IV radiocontrast.     ROS:  General: negative for chills, or fatigue  ENT: No epistaxis or headaches  Hematological and Lymphatic: No bleeding problems or blood clots.  Endocrine: No skin changes or temperature intolerance  Respiratory: No cough, shortness of breath, or wheezing  Cardiovascular: No chest pain or dyspnea   Gastrointestinal: No abdominal pain, change in bowel habits  Genito-Urinary: No dysuria, trouble voiding, or hematuria  Musculoskeletal: ROS: negative for - joint pain, joint stiffness, joint swelling, muscle pain or muscular weakness  Neurological: No focal weakness, no numbness  Dermatological: No rash or ulcers.    PAST MEDICAL HISTORY:  Past Medical History:   Diagnosis Date    Arthritis     Hyperlipidemia     Hypertension     S/P total hysterectomy and bilateral salpingo-oophorectomy 6/29/2022       PAST SURGICAL HISTORY:  Past Surgical History:   Procedure Laterality Date    CHOLECYSTECTOMY      D&C Hysteroscopy      DILATION AND CURETTAGE OF UTERUS      HYSTEROSCOPY WITH DILATION AND CURETTAGE OF UTERUS N/A 5/10/2022    Procedure: HYSTEROSCOPY, WITH DILATION AND CURETTAGE OF UTERUS;  Surgeon: Marce Ellsworth MD;  Location: [facility] OR;  Service: OB/GYN;  Laterality: N/A;    JOINT REPLACEMENT Right     laparoscopy      LYMPH NODE BIOPSY N/A 6/29/2022    Procedure: BIOPSY, LYMPH NODE;  Surgeon: Kevin Mcgraw MD;  Location: [facility] OR;  Service: OB/GYN;  Laterality: N/A;    TONSILLECTOMY       TOTAL KNEE ARTHROPLASTY Right 10/7/2019    Procedure: ARTHROPLASTY, KNEE, TOTAL;  Surgeon: John L. [facility] Jr., MD;  Location: [facility] OR;  Service: Orthopedics;  Laterality: Right;    XI ROBOTIC HYSTERECTOMY, WITH SALPINGO-OOPHORECTOMY Bilateral 2022    Procedure: XI ROBOTIC HYSTERECTOMY,WITH SALPINGO-OOPHORECTOMY;  Surgeon: Kevin Mcgraw MD;  Location: [facility] OR;  Service: OB/GYN;  Laterality: Bilateral;  Robotic assisted total hysterectomy, Bilateral salpingo-oophrectomy, [place] Node Mapping         FAMILY HISTORY:   Family History   Problem Relation Age of Onset    Colon cancer Mother 55    Ovarian cancer Mother 35    Prostate cancer Father 50    Breast cancer Neg Hx        SOCIAL HISTORY:  Social History     Socioeconomic History    Marital status:    Tobacco Use    Smoking status: Former     Packs/day: 1.50     Years: 22.00     Pack years: 33.00     Types: Cigarettes     Quit date:      Years since quittin.    Smokeless tobacco: Never   Substance and Sexual Activity    Alcohol use: Yes     Comment: 2 glasses of wine in a month    Drug use: No    Sexual activity: Not Currently     Birth control/protection: Post-menopausal   Social History Narrative    Lives with spouse. Feels safe in her home.        ALLERGIES:  Review of patient's allergies indicates:  No Known Allergies    MEDICATIONS:    Current Outpatient Medications:     acetaminophen (TYLENOL) 650 MG TbSR, Take 1 tablet (650 mg total) by mouth every 6 to 8 hours as needed (Pain). Alternate every 3 hours with ibuprofen., Disp: 60 tablet, Rfl: 1    atorvastatin (LIPITOR) 40 MG tablet, Take 1 tablet (40 mg total) by mouth every evening., Disp: 90 tablet, Rfl: 3    calcium-vitamin D 250 mg-2.5 mcg (100 unit) per tablet, Take 1 tablet by mouth 2 (two) times daily., Disp: , Rfl:     carvediloL (COREG) 12.5 MG tablet, Take 1 tablet (12.5 mg total) by mouth 2 (two) times daily with meals., Disp: 180 tablet, Rfl: 3    cholecalciferol, vitamin  D3, (VITAMIN D3) 25 mcg (1,000 unit) capsule, Take 1,000 Units by mouth once daily., Disp: , Rfl:     irbesartan-hydrochlorothiazide (AVALIDE) 300-12.5 mg per tablet, Take 1 tablet by mouth nightly., Disp: 90 tablet, Rfl: 1    NIFEdipine (PROCARDIA-XL) 30 MG (OSM) 24 hr tablet, Take 1 tablet (30 mg total) by mouth once daily., Disp: 90 tablet, Rfl: 3    pneumoc 20-rodger conj-dip cr,PF, (PREVNAR-20, PF,) 0.5 mL Syrg injection, Inject 0.5 mLs into the muscle., Disp: 0.5 mL, Rfl: 0    triamcinolone acetonide 0.1% (KENALOG) 0.1 % ointment, Apply topically., Disp: , Rfl:    Medication List with Changes/Refills   Current Medications    ACETAMINOPHEN (TYLENOL) 650 MG TBSR    Take 1 tablet (650 mg total) by mouth every 6 to 8 hours as needed (Pain). Alternate every 3 hours with ibuprofen.    ATORVASTATIN (LIPITOR) 40 MG TABLET    Take 1 tablet (40 mg total) by mouth every evening.    CALCIUM-VITAMIN D 250 MG-2.5 MCG (100 UNIT) PER TABLET    Take 1 tablet by mouth 2 (two) times daily.    CARVEDILOL (COREG) 12.5 MG TABLET    Take 1 tablet (12.5 mg total) by mouth 2 (two) times daily with meals.    CHOLECALCIFEROL, VITAMIN D3, (VITAMIN D3) 25 MCG (1,000 UNIT) CAPSULE    Take 1,000 Units by mouth once daily.    IRBESARTAN-HYDROCHLOROTHIAZIDE (AVALIDE) 300-12.5 MG PER TABLET    Take 1 tablet by mouth nightly.    NIFEDIPINE (PROCARDIA-XL) 30 MG (OSM) 24 HR TABLET    Take 1 tablet (30 mg total) by mouth once daily.    PNEUMOC 20-RODGER CONJ-DIP CR,PF, (PREVNAR-20, PF,) 0.5 ML SYRG INJECTION    Inject 0.5 mLs into the muscle.    TRIAMCINOLONE ACETONIDE 0.1% (KENALOG) 0.1 % OINTMENT    Apply topically.        PHYSICAL EXAM:  LMP  (LMP Unknown)     General: No distress, No fever or chills  Head: Normocephalic,atraumatic  Eyes: conjunctivae/corneas clear. PERRL, EOM's intact.  Nose: Nares normal. Mucosa normal. No drainage or sinus tenderness.  Neck: No adenopathy,no carotid bruit,no JVD  Lungs:Clear to auscultation bilaterally, No  Crackles  Heart: Regular rate and rhythm, no murmur, gallops or rubs  Abdomen: Soft, no tenderness, bowel sounds normal  Extremities: Atraumatic, no edema in LE  Skin: Turgor normal. No rashes or ulcers  Neurologic: No focal weakness, oriented.          LABS:  Lab Results   Component Value Date    CREATININE 0.7 02/13/2023       Prot/Creat Ratio, Urine   Date Value Ref Range Status   02/13/2023 Unable to calculate 0.00 - 0.20 Final       Lab Results   Component Value Date     02/13/2023    K 3.6 02/13/2023    CO2 25 02/13/2023       last PTH   Lab Results   Component Value Date    CALCIUM 10.0 02/13/2023       Lab Results   Component Value Date    HGB 13.8 02/13/2023        Lab Results   Component Value Date    HGBA1C 5.6 11/08/2022       Lab Results   Component Value Date    LDLCALC 113.2 01/11/2022           ASSESSMENT:    1- B/L renal cysts:  - US from 12/2022:  Bilateral nonobstructive nephrolithiasis.   Bilateral simple to minimally complex renal cysts.   Bilateral elevated arterial resistive indices suggestive of medical renal disease.  No hydronephrosis.  - UA unremarkable   - normal scr cr   -Avoid NSAIDs intake    2-HTN: uncontrolled here but per pt its controlled at home   -Continue current BP meds regimen      RTC in       Thanks for allowing me to participate in the care of this patient.     9:21 AM    VEGA NG MD  NEPHROLOGY ATTENDING

## 2023-02-23 ENCOUNTER — OFFICE VISIT (OUTPATIENT)
Dept: INTERNAL MEDICINE | Facility: CLINIC | Age: 73
End: 2023-02-23
Payer: OTHER GOVERNMENT

## 2023-02-23 VITALS
DIASTOLIC BLOOD PRESSURE: 70 MMHG | HEART RATE: 64 BPM | BODY MASS INDEX: 30.59 KG/M2 | SYSTOLIC BLOOD PRESSURE: 148 MMHG | WEIGHT: 183.63 LBS | OXYGEN SATURATION: 95 % | HEIGHT: 65 IN

## 2023-02-23 DIAGNOSIS — Z90.79 S/P TOTAL HYSTERECTOMY AND BILATERAL SALPINGO-OOPHORECTOMY: ICD-10-CM

## 2023-02-23 DIAGNOSIS — Z90.722 S/P TOTAL HYSTERECTOMY AND BILATERAL SALPINGO-OOPHORECTOMY: ICD-10-CM

## 2023-02-23 DIAGNOSIS — Z98.890 STATUS POST HYSTEROSCOPY: ICD-10-CM

## 2023-02-23 DIAGNOSIS — Z90.710 S/P TOTAL HYSTERECTOMY AND BILATERAL SALPINGO-OOPHORECTOMY: ICD-10-CM

## 2023-02-23 DIAGNOSIS — I10 ESSENTIAL HYPERTENSION: Primary | ICD-10-CM

## 2023-02-23 DIAGNOSIS — E78.5 HYPERLIPIDEMIA, UNSPECIFIED HYPERLIPIDEMIA TYPE: ICD-10-CM

## 2023-02-23 DIAGNOSIS — I10 HYPERTENSION, UNSPECIFIED TYPE: ICD-10-CM

## 2023-02-23 DIAGNOSIS — M81.0 AGE-RELATED OSTEOPOROSIS WITHOUT CURRENT PATHOLOGICAL FRACTURE: ICD-10-CM

## 2023-02-23 PROCEDURE — 99999 PR PBB SHADOW E&M-EST. PATIENT-LVL III: CPT | Mod: PBBFAC,,, | Performed by: STUDENT IN AN ORGANIZED HEALTH CARE EDUCATION/TRAINING PROGRAM

## 2023-02-23 PROCEDURE — 99215 OFFICE O/P EST HI 40 MIN: CPT | Mod: S$GLB,,, | Performed by: STUDENT IN AN ORGANIZED HEALTH CARE EDUCATION/TRAINING PROGRAM

## 2023-02-23 PROCEDURE — 99215 PR OFFICE/OUTPT VISIT, EST, LEVL V, 40-54 MIN: ICD-10-PCS | Mod: S$GLB,,, | Performed by: STUDENT IN AN ORGANIZED HEALTH CARE EDUCATION/TRAINING PROGRAM

## 2023-02-23 PROCEDURE — 99999 PR PBB SHADOW E&M-EST. PATIENT-LVL III: ICD-10-PCS | Mod: PBBFAC,,, | Performed by: STUDENT IN AN ORGANIZED HEALTH CARE EDUCATION/TRAINING PROGRAM

## 2023-02-23 RX ORDER — CARVEDILOL 6.25 MG/1
6.25 TABLET ORAL 2 TIMES DAILY WITH MEALS
Qty: 180 TABLET | Refills: 2 | Status: SHIPPED | OUTPATIENT
Start: 2023-02-23 | End: 2023-02-23

## 2023-02-23 RX ORDER — IRBESARTAN AND HYDROCHLOROTHIAZIDE 300; 12.5 MG/1; MG/1
1 TABLET, FILM COATED ORAL NIGHTLY
Qty: 90 TABLET | Refills: 3 | Status: SHIPPED | OUTPATIENT
Start: 2023-02-23 | End: 2024-02-26

## 2023-02-23 RX ORDER — NIFEDIPINE 60 MG/1
60 TABLET, EXTENDED RELEASE ORAL DAILY
Qty: 90 TABLET | Refills: 3 | Status: SHIPPED | OUTPATIENT
Start: 2023-02-23 | End: 2024-02-26

## 2023-02-23 RX ORDER — CARVEDILOL 6.25 MG/1
6.25 TABLET ORAL 2 TIMES DAILY WITH MEALS
Qty: 120 TABLET | Refills: 2 | Status: SHIPPED | OUTPATIENT
Start: 2023-02-23 | End: 2023-02-23 | Stop reason: SDUPTHER

## 2023-02-23 RX ORDER — CARVEDILOL 12.5 MG/1
12.5 TABLET ORAL 2 TIMES DAILY WITH MEALS
Qty: 120 TABLET | Refills: 2 | Status: SHIPPED | OUTPATIENT
Start: 2023-02-23 | End: 2024-01-29 | Stop reason: SDUPTHER

## 2023-02-24 ENCOUNTER — CLINICAL SUPPORT (OUTPATIENT)
Dept: REHABILITATION | Facility: HOSPITAL | Age: 73
End: 2023-02-24
Payer: OTHER GOVERNMENT

## 2023-02-24 DIAGNOSIS — S42.295A OTHER CLOSED NONDISPLACED FRACTURE OF PROXIMAL END OF LEFT HUMERUS, INITIAL ENCOUNTER: Primary | ICD-10-CM

## 2023-02-24 DIAGNOSIS — M25.512 ACUTE PAIN OF LEFT SHOULDER: ICD-10-CM

## 2023-02-24 PROCEDURE — 97110 THERAPEUTIC EXERCISES: CPT

## 2023-02-24 PROCEDURE — 97140 MANUAL THERAPY 1/> REGIONS: CPT

## 2023-02-24 NOTE — PROGRESS NOTES
[facility]   Physical Therapy Treatment Note     Name: Maria Elena Salazar Banks  Clinic Number: [id number]    Therapy Diagnosis:   No diagnosis found.      Physician: Marcia Rodriguez PA-C    Visit Date: 2/24/2023    Physician Orders: PT Eval and Treat NWB (L) UE / wean out of sling  Medical Diagnosis from Referral:   Encounter Diagnosis   Name Primary?    Other closed nondisplaced fracture of proximal end of left humerus, initial encounter      Evaluation Date: 1/23/2023  Authorization Period Expiration: TBD  Plan of Care Expiration: 3/23/23  Progress Note Due: 2/23/23  Visit # / Visits authorized: 7/ TBD   FOTO: 1/3    Precautions: Standard; NWB (L) UE - wean out of sling    PTA Visit #: 0/5     Time In: 9:08  AM  Time Out: 10:17 AM  Total Billable Time: 53 minutes    SUBJECTIVE     Pt reports: Pt continues to state the L UE and shoulder is doing better each day.  Response to previous treatment: improved mobility   Functional change: no sig change    Pain: 0/10  Location: (L) lateral upper arm    OBJECTIVE     Objective Measures updated at progress report unless specified.     Treatment     Martin received the treatments listed below:      therapeutic exercises to develop strength, endurance, ROM, flexibility, and posture for 38 minutes including:  L abd st with MH arm propped on table 10 min  (L) shoulder Pulleys 3 min 20 /10 scaption  (L) IR st with Strap 10 x 10 sec hold  Scap Squeezes 3 x 10 reps green  Shrugs 3 x 10 reps 3 lbs  Bicep Curls 3 x 10 reps 2 lbs  (L) wrist ext 3 x 10 reps 3 lbs  not today  (L) ER st with Wand 10 x 10 sec hold  (L) shoulder flexion with wand10 x 10 sec hold  (L) supine scaption 3 x 10 reps ACT  (L) sidelying ER 3 x 10 reps 1 lbs  (L) prone ext 3 x 10 reps BW  Supine punchouts 3 x 10 reps  1 lb  (L) abd st with ICE arm propped on table 10 min    manual therapy techniques: Joint mobilizations and Soft tissue Mobilization were applied to the: L shoulder for 15  minutes, including:  Gentle pain free L shoulder PROM / mobilization; gentle oscillations and shoulder distraction        Patient Education and Home Exercises     Home Exercises Provided and Patient Education Provided     Education provided:   - HEP    Written Home Exercises Provided: yes. Exercises were reviewed and Martin was able to demonstrate them prior to the end of the session.  Martin demonstrated good  understanding of the education provided. See EMR under Patient Instructions for exercises provided during therapy sessions    ASSESSMENT     Pt  remains with limited PROM of the invovled (R) shoulder and decreased (R) UE / RC stength all of which continue to improve with PT.  She requires min verbal cues in order to perform most there ex with correct technique.  We will continue to advance her there ex as tolreated and the patinet will continue to benefit from skilled PT.    Martin Is progressing well towards her goals.   Pt prognosis is Good.     Pt will continue to benefit from skilled outpatient physical therapy to address the deficits listed in the problem list box on initial evaluation, provide pt/family education and to maximize pt's level of independence in the home and community environment.     Pt's spiritual, cultural and educational needs considered and pt agreeable to plan of care and goals.     Anticipated barriers to physical therapy: none    Goals: PLOF    PLAN     Continue with PT POC 2 x a week for 6 weeks    Salvador Harper, PT

## 2023-02-27 ENCOUNTER — PATIENT MESSAGE (OUTPATIENT)
Dept: INTERNAL MEDICINE | Facility: CLINIC | Age: 73
End: 2023-02-27
Payer: OTHER GOVERNMENT

## 2023-02-28 ENCOUNTER — E-VISIT (OUTPATIENT)
Dept: INTERNAL MEDICINE | Facility: CLINIC | Age: 73
End: 2023-02-28
Payer: OTHER GOVERNMENT

## 2023-02-28 ENCOUNTER — PATIENT MESSAGE (OUTPATIENT)
Dept: ORTHOPEDICS | Facility: CLINIC | Age: 73
End: 2023-02-28
Payer: OTHER GOVERNMENT

## 2023-02-28 DIAGNOSIS — I10 ESSENTIAL HYPERTENSION: ICD-10-CM

## 2023-02-28 DIAGNOSIS — U07.1 COVID-19: Primary | ICD-10-CM

## 2023-02-28 DIAGNOSIS — E78.5 HYPERLIPIDEMIA, UNSPECIFIED HYPERLIPIDEMIA TYPE: ICD-10-CM

## 2023-02-28 PROCEDURE — 99442 PR PHYSICIAN TELEPHONE EVALUATION 11-20 MIN: ICD-10-PCS | Mod: 95,,, | Performed by: STUDENT IN AN ORGANIZED HEALTH CARE EDUCATION/TRAINING PROGRAM

## 2023-02-28 PROCEDURE — 99442 PR PHYSICIAN TELEPHONE EVALUATION 11-20 MIN: CPT | Mod: 95,,, | Performed by: STUDENT IN AN ORGANIZED HEALTH CARE EDUCATION/TRAINING PROGRAM

## 2023-02-28 NOTE — PROGRESS NOTES
Patient ID: Maria Elena Jackson is a 72 y.o. female.    Chief Complaint: COVID-19 Concerns    The patient initiated a request through [employer] on 2/28/2023 for evaluation and management with a chief complaint of COVID-19 Concerns     I evaluated the questionnaire submission on 02/28/2023  .    [facility] Peq Evisit Upper Respitatory/Cough Questionnaire    2/28/2023  4:28 PM CST - Filed by Patient   Do you agree to participate in an E-Visit? Yes   If you have any of the following symptoms, please present to your local ER or call 911:  I acknowledge   What is the main issue that you would like for your doctor to address today? Covid   Are you able to take your vital signs? Yes   Systolic Blood Pressure: 165   Diastolic Blood Pressure: 72   Weight: 185   Height: 65   Pulse: 75   Temp: 101.8   Resp:    Pulse Ox:    Are you currently pregnant, could you be pregnant, or are you breast feeding? None of the above   What symptoms do you currently have?  Chills;  Cough;  Diarrhea;  Fatigue;  Runny nose   Have you had a fever? Yes   What has been the range of your fever? 100.4 or greater   When did your symptoms first appear? 2/25/2023   In the last two weeks, have you been in close contact with someone who has COVID-19 or the Flu? Yes , Covid-19   In the last two weeks, have you worked or volunteered in a healthcare facility or as a ? Healthcare facilities include a hospital, medical or dental clinic, long-term care facility, or nursing home Yes   Do you live in a long-term care facility, nursing home, group home, or homeless shelter? No   List what you have done or taken to help your symptoms. Taken tylenol and dayquil   How severe are your symptoms? Mild   Have you taken an at home Covid test? Yes   What were the results? Positive   Have you taken a Flu test? No   Have you been fully vaccinated for COVID? (2 Pfizer, 2 Moderna or 1 Homero & Homero vaccine injections) Yes   Have you received a booster? Yes   Have  you recieved a Flu shot? Yes   When did you recieve your Flu shot? 10/1/2022   Do you have transportation to get tested for COVID if it is indicated and ordered for you at an [facility] location? Yes   Provide any information you feel is important to your history not asked above    Please attach any relevant images or files           Active Problem List with Overview Notes    Diagnosis Date Noted    Closed fracture of left proximal humerus 02/06/2023    Acute pain of left shoulder 02/06/2023    Age-related osteoporosis without current pathological fracture 11/15/2022    Urinary incontinence 11/15/2022    S/p RA-TLH/BSO/LND 06/29/2022    Status post hysteroscopy with polypectomy 05/10/2022    Muscle weakness of lower extremity 10/30/2019    S/P total knee replacement using cement, right 10/07/2019    Status post total right knee replacement 10/7/2019 10/04/2019    Essential hypertension 10/01/2019    HLD (hyperlipidemia) 10/01/2019    Edema 10/01/2019      Recent Labs Obtained:  No visits with results within 7 Day(s) from this visit.   Latest known visit with results is:   Lab Visit on 02/13/2023   Component Date Value Ref Range Status    Sodium 02/13/2023 139  136 - 145 mmol/L Final    Potassium 02/13/2023 3.6  3.5 - 5.1 mmol/L Final    Chloride 02/13/2023 101  95 - 110 mmol/L Final    CO2 02/13/2023 25  23 - 29 mmol/L Final    Glucose 02/13/2023 90  70 - 110 mg/dL Final    BUN 02/13/2023 19  8 - 23 mg/dL Final    Creatinine 02/13/2023 0.7  0.5 - 1.4 mg/dL Final    Calcium 02/13/2023 10.0  8.7 - 10.5 mg/dL Final    Anion Gap 02/13/2023 13  8 - 16 mmol/L Final    eGFR 02/13/2023 >60.0  >60 mL/min/1.73 m^2 Final    WBC 02/13/2023 7.16  3.90 - 12.70 K/uL Final    RBC 02/13/2023 4.47  4.00 - 5.40 M/uL Final    Hemoglobin 02/13/2023 13.8  12.0 - 16.0 g/dL Final    Hematocrit 02/13/2023 43.6  37.0 - 48.5 % Final    MCV 02/13/2023 98  82 - 98 fL Final    MCH 02/13/2023 30.9  27.0 - 31.0 pg Final    MCHC 02/13/2023 31.7 (L)   32.0 - 36.0 g/dL Final    RDW 02/13/2023 11.9  11.5 - 14.5 % Final    Platelets 02/13/2023 264  150 - 450 K/uL Final    MPV 02/13/2023 12.7  9.2 - 12.9 fL Final    Immature Granulocytes 02/13/2023 0.3  0.0 - 0.5 % Final    Gran # (ANC) 02/13/2023 3.5  1.8 - 7.7 K/uL Final    Immature Grans (Abs) 02/13/2023 0.02  0.00 - 0.04 K/uL Final    Comment: Mild elevation in immature granulocytes is non specific and   can be seen in a variety of conditions including stress response,   acute inflammation, trauma and pregnancy. Correlation with other   laboratory and clinical findings is essential.      Lymph # 02/13/2023 2.9  1.0 - 4.8 K/uL Final    Mono # 02/13/2023 0.6  0.3 - 1.0 K/uL Final    Eos # 02/13/2023 0.2  0.0 - 0.5 K/uL Final    Baso # 02/13/2023 0.05  0.00 - 0.20 K/uL Final    nRBC 02/13/2023 0  0 /100 WBC Final    Gran % 02/13/2023 48.2  38.0 - 73.0 % Final    Lymph % 02/13/2023 39.9  18.0 - 48.0 % Final    Mono % 02/13/2023 8.0  4.0 - 15.0 % Final    Eosinophil % 02/13/2023 2.9  0.0 - 8.0 % Final    Basophil % 02/13/2023 0.7  0.0 - 1.9 % Final    Differential Method 02/13/2023 Automated   Final       Encounter Diagnoses   Name Primary?    COVID-19 Yes    Essential hypertension     Hyperlipidemia, unspecified hyperlipidemia type         Orders Placed This Encounter   Procedures    COVID-19 Home Symptom Monitoring  - Duration (days): 14     Order Specific Question:   Duration (days)     Answer:   14      Medications Ordered This Encounter   Medications    nirmatrelvir-ritonavir 300 mg (150 mg x 2)-100 mg copackaged tablets (EUA)     Sig: Take 3 tablets by mouth 2 (two) times daily for 5 days. Each dose contains 2 nirmatrelvir (pink tablets) and 1 ritonavir (white tablet). Take all 3 tablets together     Dispense:  30 tablet     Refill:  0        E-Visit Time Tracking:    Day 1 Time (in minutes): 17     Total Time (in minutes): 17       Maria Elena was seen today for covid-19 concerns.    Diagnoses and all orders for  this visit:    COVID-19  -     nirmatrelvir-ritonavir 300 mg (150 mg x 2)-100 mg copackaged tablets (EUA); Take 3 tablets by mouth 2 (two) times daily for 5 days. Each dose contains 2 nirmatrelvir (pink tablets) and 1 ritonavir (white tablet). Take all 3 tablets together  -     COVID-19 Home Symptom Monitoring  - Duration (days): 14    Essential hypertension    Hyperlipidemia, unspecified hyperlipidemia type      Monroe Cutler

## 2023-03-06 ENCOUNTER — CLINICAL SUPPORT (OUTPATIENT)
Dept: REHABILITATION | Facility: HOSPITAL | Age: 73
End: 2023-03-06
Payer: OTHER GOVERNMENT

## 2023-03-06 DIAGNOSIS — S42.295A OTHER CLOSED NONDISPLACED FRACTURE OF PROXIMAL END OF LEFT HUMERUS, INITIAL ENCOUNTER: Primary | ICD-10-CM

## 2023-03-06 DIAGNOSIS — M25.512 ACUTE PAIN OF LEFT SHOULDER: ICD-10-CM

## 2023-03-06 PROCEDURE — 97110 THERAPEUTIC EXERCISES: CPT

## 2023-03-06 PROCEDURE — 97140 MANUAL THERAPY 1/> REGIONS: CPT

## 2023-03-06 NOTE — PROGRESS NOTES
..[facility]   Physical Therapy Treatment Note     Name: Maria Elena Salazar [place]  Clinic Number: [id number]    Therapy Diagnosis:   Encounter Diagnosis   Name Primary?    Other closed nondisplaced fracture of proximal end of left humerus, initial encounter          Physician: Marcia Rodriguez PA-C    Visit Date: 3/6/2023    Physician Orders: PT Eval and Treat NWB (L) UE / wean out of sling  Medical Diagnosis from Referral:   Encounter Diagnosis   Name Primary?    Other closed nondisplaced fracture of proximal end of left humerus, initial encounter      Evaluation Date: 1/23/2023  Authorization Period Expiration: TBD  Plan of Care Expiration: 3/23/23  Progress Note Due: 2/23/23  Visit # / Visits authorized: 7/ TBD   FOTO: 1/3    Precautions: Standard; NWB (L) UE - wean out of sling    PTA Visit #: 0/5     Time In: 6:56  AM  Time Out: 7:40 AM  Total Billable Time: 44 minutes    SUBJECTIVE     Pt reports: Pt state she had COVID last week and thus didn't attend PT but is doing a lot better today.  She rates the pain as typically 0/10 on a VAS.  She cotinues to report difficulty donning / doffing a bra.  Response to previous treatment: improved mobility   Functional change: no sig change    Pain: 0/10  Location: (L) lateral upper arm    OBJECTIVE     Objective Measures updated at progress report unless specified.     Treatment     Martin received the treatments listed below:      therapeutic exercises to develop strength, endurance, ROM, flexibility, and posture for 23 minutes including:  L abd st with MH arm propped on table 10 min (NT)  (L) shoulder Pulleys 3 min 20 /10 scaption  (L) IR st with Strap 10 x 10 sec hold  Scap Squeezes 3 x 10 reps green  Shrugs 3 x 10 reps 3 lbs  Bicep Curls 3 x 10 reps 2 lbs  (L) wrist ext 3 x 10 reps 3 lbs  not today  (L) ER st with Wand 10 x 10 sec hold  (L) shoulder flexion with wand10 x 10 sec hold  (L) supine scaption 3 x 10 reps 3 x 10 reps 2 lbs  (L) sidelying  ER 3 x 10 reps 2 lbs  (L) prone ext 3 x 10 reps BW  Supine punchouts 3 x 10 reps  2 lbs  (L) abd st with ICE arm propped on table 10 min    manual therapy techniques: Joint mobilizations and Soft tissue Mobilization were applied to the: L shoulder for 15 minutes, including:  Gentle pain free L shoulder PROM / mobilization; gentle oscillations and shoulder distraction        Patient Education and Home Exercises     Home Exercises Provided and Patient Education Provided     Education provided:   - HEP    Written Home Exercises Provided: yes. Exercises were reviewed and Martin was able to demonstrate them prior to the end of the session.  Martin demonstrated good  understanding of the education provided. See EMR under Patient Instructions for exercises provided during therapy sessions    ASSESSMENT     Mrs. Jackson demo much improved PROM of the involved shoulder in all 5 planes but remains with limitation particularly into IR and flexion.  She ocntinues to improved her RC / UE and will continue to benefit from skilled PT.    Martin Is progressing well towards her goals.   Pt prognosis is Good.     Pt will continue to benefit from skilled outpatient physical therapy to address the deficits listed in the problem list box on initial evaluation, provide pt/family education and to maximize pt's level of independence in the home and community environment.     Pt's spiritual, cultural and educational needs considered and pt agreeable to plan of care and goals.     Anticipated barriers to physical therapy: none    Goals: PLOF    PLAN     Continue with PT POC 2 x a week for 6 weeks    Salvador Harper, PT

## 2023-03-08 ENCOUNTER — CLINICAL SUPPORT (OUTPATIENT)
Dept: REHABILITATION | Facility: HOSPITAL | Age: 73
End: 2023-03-08
Attending: STUDENT IN AN ORGANIZED HEALTH CARE EDUCATION/TRAINING PROGRAM
Payer: OTHER GOVERNMENT

## 2023-03-08 DIAGNOSIS — M25.512 ACUTE PAIN OF LEFT SHOULDER: ICD-10-CM

## 2023-03-08 DIAGNOSIS — S42.295A OTHER CLOSED NONDISPLACED FRACTURE OF PROXIMAL END OF LEFT HUMERUS, INITIAL ENCOUNTER: Primary | ICD-10-CM

## 2023-03-08 PROCEDURE — 97140 MANUAL THERAPY 1/> REGIONS: CPT

## 2023-03-08 PROCEDURE — 97110 THERAPEUTIC EXERCISES: CPT

## 2023-03-08 NOTE — PROGRESS NOTES
..[facility]   Physical Therapy Treatment Note     Name: Maria Elena Salazar [place]  Clinic Number: [id number]    Therapy Diagnosis:   Encounter Diagnosis   Name Primary?    Other closed nondisplaced fracture of proximal end of left humerus, initial encounter          Physician: Marcia Rodriguez PA-C    Visit Date: 3/8/2023    Physician Orders: PT Eval and Treat NWB (L) UE / wean out of sling  Medical Diagnosis from Referral:   Encounter Diagnosis   Name Primary?    Other closed nondisplaced fracture of proximal end of left humerus, initial encounter      Evaluation Date: 1/23/2023  Authorization Period Expiration: TBD  Plan of Care Expiration: 3/23/23  Progress Note Due: 2/23/23  Visit # / Visits authorized: 9/ TBD   FOTO: 1/3    Precautions: Standard; NWB (L) UE - wean out of sling    PTA Visit #: 0/5     Time In: 6:56  AM  Time Out: 7:40 AM  Total Billable Time: 44 minutes    SUBJECTIVE     Pt reports: Pt states she was able to wear a bra for first time yesterday and was very pleased with that.  She reports 0/10 pain in the L shoulder.  She remains pleased with her progress overall.  Response to previous treatment: improved mobility   Functional change: no sig change    Pain: 0/10  Location: (L) lateral upper arm    OBJECTIVE     Objective Measures updated at progress report unless specified.     Treatment     Martin received the treatments listed below:      therapeutic exercises to develop strength, endurance, ROM, flexibility, and posture for 23 minutes including:  L abd st with MH arm propped on table 10 min   (L) shoulder Pulleys 3 min 20 /10 scaption  (L) IR st with Strap 10 x 10 sec hold  Scap Squeezes 3 x 10 reps green  Shrugs 3 x 10 reps 3 lbs  Bicep Curls 3 x 10 reps 3 lbs  (L) wrist ext 3 x 10 reps 3 lbs  not today  (L) ER st with Wand 10 x 10 sec hold  (L) shoulder flexion with wand10 x 10 sec hold  (L) supine scaption 3 x 10 reps 3 x 10 reps 2 lbs  (L) sidelying ER 3 x 10 reps 2  lbs  (L) prone ext 3 x 10 reps BW  Supine punchouts 3 x 10 reps  2 lbs  (L) abd st with ICE arm propped on table 10 min    manual therapy techniques: Joint mobilizations and Soft tissue Mobilization were applied to the: L shoulder for 15 minutes, including:  Gentle pain free L shoulder PROM / mobilization; gentle oscillations and shoulder distraction        Patient Education and Home Exercises     Home Exercises Provided and Patient Education Provided     Education provided:   - HEP    Written Home Exercises Provided: yes. Exercises were reviewed and Maritn was able to demonstrate them prior to the end of the session.  Martin demonstrated good  understanding of the education provided. See EMR under Patient Instructions for exercises provided during therapy sessions    ASSESSMENT     Mrs. Jackson continues to steadily improve her passive IR and active flexion demnstarting improved shoudler flexion with normalizing joint biomechanics.  Continued mild UT substituation is noted during arm elevation which lessens with verbal cueing.  Overall, she is making good progress towards all PT     Martin Is progressing well towards her goals.   Pt prognosis is Good.     Pt will continue to benefit from skilled outpatient physical therapy to address the deficits listed in the problem list box on initial evaluation, provide pt/family education and to maximize pt's level of independence in the home and community environment.     Pt's spiritual, cultural and educational needs considered and pt agreeable to plan of care and goals.     Anticipated barriers to physical therapy: none    Goals: PLOF    PLAN     Continue with PT POC 2 x a week for 6 weeks    Salvador Harper, PT

## 2023-03-09 ENCOUNTER — TELEPHONE (OUTPATIENT)
Dept: INTERNAL MEDICINE | Facility: CLINIC | Age: 73
End: 2023-03-09

## 2023-03-09 ENCOUNTER — CLINICAL SUPPORT (OUTPATIENT)
Dept: INTERNAL MEDICINE | Facility: CLINIC | Age: 73
End: 2023-03-09
Payer: OTHER GOVERNMENT

## 2023-03-09 VITALS — DIASTOLIC BLOOD PRESSURE: 69 MMHG | SYSTOLIC BLOOD PRESSURE: 139 MMHG | HEART RATE: 62 BPM

## 2023-03-09 PROCEDURE — 99999 PR PBB SHADOW E&M-EST. PATIENT-LVL II: CPT | Mod: PBBFAC,,,

## 2023-03-09 PROCEDURE — 99999 PR PBB SHADOW E&M-EST. PATIENT-LVL II: ICD-10-PCS | Mod: PBBFAC,,,

## 2023-03-09 NOTE — PROGRESS NOTES
Maria Elena Jackson 72 y.o. female is here for Blood Pressure check. remote    Manual Blood pressure reading was 139/69, Pulse 62. (Checked at the end of the visit)    If high, was it repeated after 15 minutes? no    Diagnosed with Hypertension yes.    Patient took blood pressure medication today yes.  Last dose of blood pressure medication was taken at 0730. Patient took Carvedilol and Nifedipine.     All Medications and OTC medication updated yes    Does patient have record of home blood pressure readings / Blood Pressure Log yes. 119/56; 121/55; 137/54    Does the pt have any complaints today in regards to their blood pressure medication? no. Patient is asymptomatic.     Were you sitting still for 5-10 minutes prior to taking your Blood pressure? yes     Has your blood pressure monitor ever been checked? yes When was last time we checked your blood pressure monitor? 02/2023    Updated vitals yes    Follow up date is 5/23/2023.     Dr. Cutler notified.     Creatinine   Date Value Ref Range Status   02/13/2023 0.7 0.5 - 1.4 mg/dL Final     Sodium   Date Value Ref Range Status   02/13/2023 139 136 - 145 mmol/L Final     Potassium   Date Value Ref Range Status   02/13/2023 3.6 3.5 - 5.1 mmol/L Final

## 2023-03-14 ENCOUNTER — CLINICAL SUPPORT (OUTPATIENT)
Dept: REHABILITATION | Facility: HOSPITAL | Age: 73
End: 2023-03-14
Payer: OTHER GOVERNMENT

## 2023-03-14 DIAGNOSIS — S42.295A OTHER CLOSED NONDISPLACED FRACTURE OF PROXIMAL END OF LEFT HUMERUS, INITIAL ENCOUNTER: ICD-10-CM

## 2023-03-14 DIAGNOSIS — M25.512 ACUTE PAIN OF LEFT SHOULDER: Primary | ICD-10-CM

## 2023-03-14 PROCEDURE — 97140 MANUAL THERAPY 1/> REGIONS: CPT

## 2023-03-14 PROCEDURE — 97110 THERAPEUTIC EXERCISES: CPT

## 2023-03-16 ENCOUNTER — HOSPITAL ENCOUNTER (OUTPATIENT)
Dept: CARDIOLOGY | Facility: HOSPITAL | Age: 73
Discharge: HOME | End: 2023-03-16
Attending: STUDENT IN AN ORGANIZED HEALTH CARE EDUCATION/TRAINING PROGRAM
Payer: OTHER GOVERNMENT

## 2023-03-16 ENCOUNTER — CLINICAL SUPPORT (OUTPATIENT)
Dept: REHABILITATION | Facility: HOSPITAL | Age: 73
End: 2023-03-16
Payer: OTHER GOVERNMENT

## 2023-03-16 VITALS
BODY MASS INDEX: 30.49 KG/M2 | SYSTOLIC BLOOD PRESSURE: 125 MMHG | HEART RATE: 65 BPM | WEIGHT: 183 LBS | HEIGHT: 65 IN | DIASTOLIC BLOOD PRESSURE: 70 MMHG

## 2023-03-16 DIAGNOSIS — I10 ESSENTIAL HYPERTENSION: ICD-10-CM

## 2023-03-16 DIAGNOSIS — M25.512 ACUTE PAIN OF LEFT SHOULDER: Primary | ICD-10-CM

## 2023-03-16 DIAGNOSIS — S42.295A OTHER CLOSED NONDISPLACED FRACTURE OF PROXIMAL END OF LEFT HUMERUS, INITIAL ENCOUNTER: ICD-10-CM

## 2023-03-16 LAB
ASCENDING AORTA: 3.33 CM
AV INDEX (PROSTH): 0.71
AV MEAN GRADIENT: 9 MMHG
AV PEAK GRADIENT: 18 MMHG
AV VALVE AREA: 2.68 CM2
AV VELOCITY RATIO: 0.7
BSA FOR ECHO PROCEDURE: 1.95 M2
CV ECHO LV RWT: 0.34 CM
DOP CALC AO PEAK VEL: 2.1 M/S
DOP CALC AO VTI: 48.52 CM
DOP CALC LVOT AREA: 3.8 CM2
DOP CALC LVOT DIAMETER: 2.2 CM
DOP CALC LVOT PEAK VEL: 1.47 M/S
DOP CALC LVOT STROKE VOLUME: 129.98 CM3
DOP CALC MV VTI: 49.75 CM
DOP CALCLVOT PEAK VEL VTI: 34.21 CM
E WAVE DECELERATION TIME: 245.76 MSEC
E/A RATIO: 0.75
E/E' RATIO: 12.67 M/S
ECHO LV POSTERIOR WALL: 0.82 CM (ref 0.6–1.1)
EJECTION FRACTION: 65 %
FRACTIONAL SHORTENING: 38 % (ref 28–44)
INTERVENTRICULAR SEPTUM: 0.83 CM (ref 0.6–1.1)
LA MAJOR: 4.55 CM
LA MINOR: 4.77 CM
LA WIDTH: 3.65 CM
LEFT ATRIUM SIZE: 4.61 CM
LEFT ATRIUM VOLUME INDEX MOD: 27.2 ML/M2
LEFT ATRIUM VOLUME INDEX: 35.1 ML/M2
LEFT ATRIUM VOLUME MOD: 51.66 CM3
LEFT ATRIUM VOLUME: 66.61 CM3
LEFT INTERNAL DIMENSION IN SYSTOLE: 2.98 CM (ref 2.1–4)
LEFT VENTRICLE DIASTOLIC VOLUME INDEX: 56.43 ML/M2
LEFT VENTRICLE DIASTOLIC VOLUME: 107.21 ML
LEFT VENTRICLE MASS INDEX: 69 G/M2
LEFT VENTRICLE SYSTOLIC VOLUME INDEX: 18.1 ML/M2
LEFT VENTRICLE SYSTOLIC VOLUME: 34.43 ML
LEFT VENTRICULAR INTERNAL DIMENSION IN DIASTOLE: 4.79 CM (ref 3.5–6)
LEFT VENTRICULAR MASS: 131.38 G
LV LATERAL E/E' RATIO: 11.88 M/S
LV SEPTAL E/E' RATIO: 13.57 M/S
MV A" WAVE DURATION": 13.13 MSEC
MV MEAN GRADIENT: 3 MMHG
MV PEAK A VEL: 1.26 M/S
MV PEAK E VEL: 0.95 M/S
MV PEAK GRADIENT: 7 MMHG
MV VALVE AREA BY CONTINUITY EQUATION: 2.61 CM2
PISA TR MAX VEL: 2.85 M/S
PULM VEIN S/D RATIO: 1.6
PV PEAK D VEL: 0.3 M/S
PV PEAK S VEL: 0.48 M/S
RA MAJOR: 4.19 CM
RA PRESSURE: 3 MMHG
RA WIDTH: 2.63 CM
RIGHT VENTRICULAR END-DIASTOLIC DIMENSION: 3.19 CM
SINUS: 2.88 CM
STJ: 3.09 CM
TDI LATERAL: 0.08 M/S
TDI SEPTAL: 0.07 M/S
TDI: 0.08 M/S
TR MAX PG: 32 MMHG
TRICUSPID ANNULAR PLANE SYSTOLIC EXCURSION: 2.21 CM
TV REST PULMONARY ARTERY PRESSURE: 35 MMHG

## 2023-03-16 PROCEDURE — 97140 MANUAL THERAPY 1/> REGIONS: CPT

## 2023-03-16 PROCEDURE — 97110 THERAPEUTIC EXERCISES: CPT

## 2023-03-16 PROCEDURE — 93306 TTE W/DOPPLER COMPLETE: CPT

## 2023-03-16 PROCEDURE — 93306 ECHO (CUPID ONLY): ICD-10-PCS | Mod: 26,,, | Performed by: INTERNAL MEDICINE

## 2023-03-16 PROCEDURE — 93306 TTE W/DOPPLER COMPLETE: CPT | Mod: 26,,, | Performed by: INTERNAL MEDICINE

## 2023-03-22 ENCOUNTER — CLINICAL SUPPORT (OUTPATIENT)
Dept: REHABILITATION | Facility: HOSPITAL | Age: 73
End: 2023-03-22
Payer: OTHER GOVERNMENT

## 2023-03-22 PROCEDURE — 97110 THERAPEUTIC EXERCISES: CPT

## 2023-03-22 PROCEDURE — 97140 MANUAL THERAPY 1/> REGIONS: CPT

## 2023-03-22 NOTE — PLAN OF CARE
Outpatient Therapy Updated Plan of Care     Visit Date: 3/22/2023  Name: Maria Elena Jackson  Clinic Number: [id number]    Therapy Diagnosis: No diagnosis found.  Physician: Marcia Rodriguez, NOEMI    Physician Orders: PT Eval and Treat NWB (L) UE / wean out of sling  Medical Diagnosis: S42.295A (ICD-10-CM) - Other closed nondisplaced fracture of proximal end of left humerus, initial encounter  Evaluation Date: 1/23/23    Total Visits Received: 11      Current Certification Period:  1/23/23 to 3/23/23  Precautions:  standard, NWB (L) UW   Functional Level Prior to Evaluation:  independent     Subjective     Update: Pt stated that (L) shoulder has been feeling good, no pain currently in (L) shoulder. Pt stated that this past week she was out of town so was not able to do exercises so (L) shoulder is feeling stiff. Pt stated that she is still having difficulty hooking bra, sleeping if she rolls over onto (L) side, and reaching for certain things with (L) UE. Pt sated that she is scheduled to see PA tomorrow.     Objective     Update:   Shoulder Right  Left Pain/Dysfunction with Movement    AROM MMT AROM NT= not tested    flexion 165 5/5 145    abduction 165 5/5 148    Internal rotation 90 5/5 65    ER at 0° abd 85 5/5 55    ER 45 deg abd 75 NT 65    ER at 90° abd 90 NT 55        Assessment     Update: Since initial evaluation on 1/23/23 pt has attended 10 PT follow up sessions. Pt has made good progress with her PT sessions. Pt demo improvements in (L) shoulder AROM compared to measurements taken on initial evaluation. Despite these improvements pt still presents with decreased (L) shoulder AROM in all planes measured above and decreased functional use of (L) UE. Pt will continue to benefit from skilled PT in order to return pt to her highest level of function with decreased pain and limitation.     Previous Short Term Goals Status:       Short Term Goals: 1 weeks   Pt (I) with HEP - progressing not met     Long Term  Goals: 6 weeks   Pt reports 0/10 pain in the (R) shoulder with functional activities - Not Met  Pt demo normal / painfree R shoulder AROM/PROM (3 months post injury) - progressing not met  Pt demo at least 120 deg of active (R) shoulder flexion  Met  Pt demo 0 inch difference on Apley's Scratch Test (L) UE < (R) UE - Not tested  Pt demo at least 4+/5 MMT throughout the (B) UE (3 months post injury) - Not tested    New Short Term Goals Status:   continue with STG 1 and LTG 1,3,4,5  Long Term Goal Status:   continue per initial plan of care.  Reasons for Recertification of Therapy:   required updated POC    Plan     Updated Certification Period: 3/22/2023 to 5/5/23  Recommended Treatment Plan: 2 times per week for 6 weeks: Manual Therapy, Moist Heat/ Ice, Neuromuscular Re-ed, Patient Education, Self Care, Therapeutic Activities, Therapeutic Exercise, and IASTM, dry needling, and modalities prn  Other Recommendations: n/a    Elvira Roman, PT  3/22/2023      I CERTIFY THE NEED FOR THESE SERVICES FURNISHED UNDER THIS PLAN OF TREATMENT AND WHILE UNDER MY CARE    Physician's comments:        Physician's Signature: ___________________________________________________

## 2023-03-22 NOTE — PROGRESS NOTES
"  Physical Therapy Daily Treatment Note     Name: Maria Elena Jackson  Clinic Number: [id number]    Therapy Diagnosis: No diagnosis found.  Physician: Marcia Rodriguez PA-C    Visit Date: 3/22/2023    Physician Orders: PT Eval and Treat NWB (L) UE / wean out of sling  Medical Diagnosis from Referral:   Encounter Diagnosis   Name Primary?    Other closed nondisplaced fracture of proximal end of left humerus, initial encounter      Evaluation Date: 1/23/2023  Authorization Period Expiration: 12/31/23  Plan of Care Expiration: 3/23/23  Progress Note Due: 2/23/23  Visit # / Visits authorized: 12/20  FOTO: 12    Time In: 9:50  am   Time Out: 10:34 am   Total Billable Time: 44 minutes    Precautions: Standard and NWB (L) UE    Subjective     Pt reports: that (L) shoulder has been feeling good, no pain currently in (L) shoulder. Pt stated that this past week she was out of town so was not able to do exercises so (L) shoulder is feeling stiff. Pt stated that she is still having difficulty hooking bra, sleeping if she rolls over onto (L) side, and reaching for certain things with (L) UE. Pt sated that she is scheduled to see PA tomorrow.   She was not compliant with home exercise program.  Response to previous treatment: no adverse effects  Functional change: improved (L) shoulder AROM    Pain: 0/10  (L) shoulder       Objective       Objective measurements and therapeutic exercises to develop strength, endurance, ROM, flexibility, and posture for 29 minutes including:    (L) shoulder Pulleys 3 min scaption/3 min flexion  (L) IR st with Strap 10 x 10 sec hold  (L) shoulder ER w/wand at 45 and 90 deg abd 2x10 3"   (L) shoulder wand abduction 2x10 3" hold    manual therapy techniques: were applied to the: L shoulder for 15 minutes, including:  Manual PROM to (L) shoulder in scaption, abduction, IR, ER ( at 0, 45, and 90 deg abd)     Home Exercises Provided and Patient Education Provided     Education provided:   - Educated pt on " performing wand ER at 45 and 90 deg abduction - pt verbalized understanding      Assessment     See treatment section   Martin Is progressing well towards her goals.   Pt prognosis is Good.     Pt will continue to benefit from skilled outpatient physical therapy to address the deficits listed in the problem list box on initial evaluation, provide pt/family education and to maximize pt's level of independence in the home and community environment.     Pt's spiritual, cultural and educational needs considered and pt agreeable to plan of care and goals.    Anticipated barriers to physical therapy: none    Goals: see treatment section     Plan     See treatment section     Elvira Roman, PT

## 2023-03-23 ENCOUNTER — HOSPITAL ENCOUNTER (OUTPATIENT)
Dept: RADIOLOGY | Facility: HOSPITAL | Age: 73
Discharge: HOME | End: 2023-03-23
Attending: PHYSICIAN ASSISTANT
Payer: OTHER GOVERNMENT

## 2023-03-23 ENCOUNTER — OFFICE VISIT (OUTPATIENT)
Dept: ORTHOPEDICS | Facility: CLINIC | Age: 73
End: 2023-03-23
Payer: OTHER GOVERNMENT

## 2023-03-23 VITALS — HEIGHT: 65 IN | BODY MASS INDEX: 30.49 KG/M2 | WEIGHT: 183 LBS

## 2023-03-23 DIAGNOSIS — S42.295A OTHER CLOSED NONDISPLACED FRACTURE OF PROXIMAL END OF LEFT HUMERUS, INITIAL ENCOUNTER: ICD-10-CM

## 2023-03-23 DIAGNOSIS — S42.295A OTHER CLOSED NONDISPLACED FRACTURE OF PROXIMAL END OF LEFT HUMERUS, INITIAL ENCOUNTER: Primary | ICD-10-CM

## 2023-03-23 PROCEDURE — 73030 XR SHOULDER COMPLETE 2 OR MORE VIEWS LEFT: ICD-10-PCS | Mod: 26,LT,, | Performed by: RADIOLOGY

## 2023-03-23 PROCEDURE — 99213 OFFICE O/P EST LOW 20 MIN: CPT | Mod: S$GLB,,, | Performed by: PHYSICIAN ASSISTANT

## 2023-03-23 PROCEDURE — 99999 PR PBB SHADOW E&M-EST. PATIENT-LVL III: ICD-10-PCS | Mod: PBBFAC,,, | Performed by: PHYSICIAN ASSISTANT

## 2023-03-23 PROCEDURE — 99213 PR OFFICE/OUTPT VISIT, EST, LEVL III, 20-29 MIN: ICD-10-PCS | Mod: S$GLB,,, | Performed by: PHYSICIAN ASSISTANT

## 2023-03-23 PROCEDURE — 99999 PR PBB SHADOW E&M-EST. PATIENT-LVL III: CPT | Mod: PBBFAC,,, | Performed by: PHYSICIAN ASSISTANT

## 2023-03-23 PROCEDURE — 73030 X-RAY EXAM OF SHOULDER: CPT | Mod: TC,LT

## 2023-03-23 PROCEDURE — 73030 X-RAY EXAM OF SHOULDER: CPT | Mod: 26,LT,, | Performed by: RADIOLOGY

## 2023-03-23 NOTE — PROGRESS NOTES
Subjective:      Patient ID: Maria Elena Jackson is a 72 y.o. female.    Chief Complaint: No chief complaint on file.    HPI    Patient is a 72 year old female who while on a trip in [place] slipped on ice and fell onto her left arm 12/23/22 . She went to urgent care a few days later and was diagnosed with a fracture. She has been treated oumou sling. She stated that her pain is controlled with OTC medication. No numbness or tingling.     01/06/23: Over all doing better. Pain is controlled. She is using sling and has worked on range of motion of finger wrist and elbow. She has no numbness or tingling.     01/20/23: Doing well. Continued use of sling no issues. Reports some tightness to traps area. No numbness or tingling.     03/23/23: Over all doing ok,. She is attending PT and has had a increase in function. She has continued issues mainly with internal rotation. She reports minimal pain. Will have intermittent stiffness. No numbness or tingling.     Review of Systems   Constitutional: Negative for chills and fever.   Cardiovascular:  Negative for chest pain.   Respiratory:  Negative for cough and shortness of breath.    Skin:  Negative for color change, dry skin, itching, nail changes, poor wound healing and rash.   Musculoskeletal:  Positive for falls.        Left shoulder fracture   Neurological:  Negative for dizziness.   Psychiatric/Behavioral:  Negative for altered mental status. The patient is not nervous/anxious.    All other systems reviewed and are negative.      Objective:            General    Constitutional: She is oriented to person, place, and time. She appears well-developed and well-nourished. No distress.   HENT:   Head: Atraumatic.   Eyes: Conjunctivae are normal.   Cardiovascular:  Normal rate.            Pulmonary/Chest: Effort normal.   Neurological: She is alert and oriented to person, place, and time.   Psychiatric: She has a normal mood and affect. Her behavior is normal.             Left  Shoulder Exam     Range of Motion   Active abduction:  90   Forward Flexion:  140   Adduction: normal  External Rotation 90 degrees: 100   Internal rotation 90 degrees:  40      Comments:   FROm finger wrist and elbow. NVI       RADS: reviewed by myself:   Redemonstration of a recent fracture of the proximal left humerus, with no detrimental interval change since 01/06/2023..        Assessment       Encounter Diagnosis   Name Primary?    Other closed nondisplaced fracture of proximal end of left humerus, initial encounter Yes            Plan:       Discussed plan with the patient. At this time plan is for the patient to be treated non-operative. Continue sling. NWB. Ok progressive range of motion and full range of motion of  finger wrist and elbow . Order aplced for PT/OT. Patient is to maker appointment herself. Pain medication : Controlled with OTC, no refill needed Return to clinic at 6 weeks . At that time xray of her shoulder needed OOS 2 view Ap scap Y,

## 2023-03-24 ENCOUNTER — CLINICAL SUPPORT (OUTPATIENT)
Dept: REHABILITATION | Facility: HOSPITAL | Age: 73
End: 2023-03-24
Payer: OTHER GOVERNMENT

## 2023-03-24 PROCEDURE — 97110 THERAPEUTIC EXERCISES: CPT

## 2023-03-24 PROCEDURE — 97140 MANUAL THERAPY 1/> REGIONS: CPT

## 2023-03-24 NOTE — PROGRESS NOTES
"  Physical Therapy Daily Treatment Note     Name: Maria Elena Jackson  Clinic Number: [id number]    Therapy Diagnosis: No diagnosis found.  Physician: Marcia Rodriguez PA-C    Visit Date: 3/24/2023    Physician Orders: PT Eval and Treat NWB (L) UE / wean out of sling  Medical Diagnosis from Referral:   Encounter Diagnosis   Name Primary?    Other closed nondisplaced fracture of proximal end of left humerus, initial encounter      Evaluation Date: 1/23/2023  Authorization Period Expiration: 12/31/23  Plan of Care Expiration: 3/23/23  Progress Note Due: 2/23/23  Visit # / Visits authorized: 13/20  FOTO: 13    Time In: 9:15 am   Time Out: 10:01 am   Total Billable Time: 46 minutes    Precautions: Standard    Subjective     Pt reports: that she over did it stretching (L) shoulder into ER at 90 deg abduction at home and (L) shoulder and neck were bothering her yesterday.Pt stated that she is not currently experiencing pain in (L) shoulder. Pt stated that she saw PA yesterday who told her she could do what ever she wanted, no restrictions and she did not need to follow up with her.   She was compliant with home exercise program.  Response to previous treatment: no adverse effects  Functional change: improved (L) shoulder AROM      Pain: 0/10 (L) shoulder     Objective       therapeutic exercises to develop strength, endurance, ROM, flexibility, and posture for 31 minutes including:     (L) shoulder Pulleys 3 min scaption/3 min flexion  (L) IR st with Strap 10 x 10 sec hold  (L) shoulder ER w/wand at 45 and 90 deg abd 2x10 3"   (L) shoulder wand abduction 2x10 3" hold  Supine punches 2x10 3" 2#   SL shoulder abduction 2x10 L   SL shoulder ER 2x10 2#   Prone scaption 2x10   Prone T 2x10   Prone shoulder extension with ER 2x10 to body level     manual therapy techniques: were applied to the: L shoulder for 15 minutes, including:  Manual PROM to (L) shoulder in scaption, abduction, IR, ER ( at 0, 45, and 90 deg abd)       Home " Exercises Provided and Patient Education Provided     Education provided:   - HEP     Written Home Exercises Provided: yes.  Exercises were reviewed and Martin was able to demonstrate them prior to the end of the session.  Martin demonstrated good  understanding of the education provided.     See EMR under Patient Instructions for exercises provided 3/24/2023.      Assessment     Manual (L) shoulder PROM performed to pt's tolerance. Pt tolerated therapy session without any adverse reactions.   Martin Is progressing well towards her goals.   Pt prognosis is Good.     Pt will continue to benefit from skilled outpatient physical therapy to address the deficits listed in the problem list box on initial evaluation, provide pt/family education and to maximize pt's level of independence in the home and community environment.     Pt's spiritual, cultural and educational needs considered and pt agreeable to plan of care and goals.    Anticipated barriers to physical therapy: none    Goals:     Short Term Goals: 1 weeks   Pt (I) with HEP - progressing not met     Long Term Goals: 6 weeks   Pt reports 0/10 pain in the (R) shoulder with functional activities - Not Met  Pt demo normal / painfree R shoulder AROM/PROM (3 months post injury) - progressing not met  Pt demo at least 120 deg of active (R) shoulder flexion  Met  Pt demo 0 inch difference on Apley's Scratch Test (L) UE < (R) UE - Not tested  Pt demo at least 4+/5 MMT throughout the (B) UE (3 months post injury) - Not tested     New Short Term Goals Status:   continue with STG 1 and LTG 1,3,4,5  Plan     Continue per POC, progress as tolerated     Elvira Roman, PT

## 2023-03-27 ENCOUNTER — CLINICAL SUPPORT (OUTPATIENT)
Dept: REHABILITATION | Facility: HOSPITAL | Age: 73
End: 2023-03-27
Payer: OTHER GOVERNMENT

## 2023-03-27 PROCEDURE — 97110 THERAPEUTIC EXERCISES: CPT

## 2023-03-27 PROCEDURE — 97140 MANUAL THERAPY 1/> REGIONS: CPT

## 2023-03-27 NOTE — PROGRESS NOTES
"  Physical Therapy Daily Treatment Note     Name: Maria Elena Salazar [facility] Number: [id number]    Therapy Diagnosis: No diagnosis found.  Physician: Marcia Rodriguez PA-C    Visit Date: 3/27/2023    Physician Orders: PT Eval and Treat NWB (L) UE / wean out of sling  Medical Diagnosis from Referral:   Encounter Diagnosis   Name Primary?    Other closed nondisplaced fracture of proximal end of left humerus, initial encounter      Evaluation Date: 1/23/2023  Authorization Period Expiration: 12/31/23  Plan of Care Expiration: 5/5/23  Progress Note Due: 2/23/23  Visit # / Visits authorized: 14/20  FOTO: 14    Time In: 7:03 am   Time Out: 7:50 am   Total Billable Time: 47 minutes    Precautions: Standard    Subjective     Pt reports: (L) shoulder is feeling good, no pain currently.   She was compliant with home exercise program.  Response to previous treatment: no adverse effects  Functional change: improved (L) shoulder AROM     Pain: 0/10  Location: left shoulder      Objective         therapeutic exercises to develop strength, endurance, ROM, flexibility, and posture for 37 minutes including:     (L) shoulder Pulleys 3 min scaption/3 min flexion  (L) IR st with Strap 10 x 10 sec hold  (L) shoulder ER w/wand at 45 and 90 deg abd 2x10 3"   (L) shoulder wand abduction 2x10 3" hold not performed   Supine punches 2x10 3" 2#   SL shoulder abduction 2x10 L   SL shoulder ER 2x10 2#   Prone scaption 2x10   Prone T 2x10   Prone shoulder extension with ER 2x10 to body level  Sleeper stretch 10"x10 L     manual therapy techniques: were applied to the: L shoulder for 10 minutes, including:  Manual PROM to (L) shoulder in scaption, abduction, IR (not performed), ER ( at 0, 45, and 90 deg abd)       Home Exercises Provided and Patient Education Provided     Education provided:   - HEP     Written Home Exercises Provided: Patient instructed to cont prior HEP.   Martin demonstrated good  understanding of the education provided.     See " EMR under Patient Instructions for exercises provided prior visit.      Assessment     Pt received VC/TC for scap retraction when performing prone therex. Pt demo improvement in (L) shoulder ER AROM at 90 deg abduction today compared to prior measurement. Pt reported no increased pain in (L) shoulder at end of session.   Martin Is progressing well towards her goals.   Pt prognosis is Good.     Pt will continue to benefit from skilled outpatient physical therapy to address the deficits listed in the problem list box on initial evaluation, provide pt/family education and to maximize pt's level of independence in the home and community environment.     Pt's spiritual, cultural and educational needs considered and pt agreeable to plan of care and goals.    Anticipated barriers to physical therapy: none    Goals:     Short Term Goals: 1 weeks   Pt (I) with HEP - progressing not met     Long Term Goals: 6 weeks   Pt reports 0/10 pain in the (R) shoulder with functional activities - Not Met  Pt demo normal / painfree R shoulder AROM/PROM (3 months post injury) - progressing not met  Pt demo at least 120 deg of active (R) shoulder flexion  Met  Pt demo 0 inch difference on Apley's Scratch Test (L) UE < (R) UE - Not tested  Pt demo at least 4+/5 MMT throughout the (B) UE (3 months post injury) - Not tested     New Short Term Goals Status:   continue with STG 1 and LTG 1,3,4,5    Plan     Continue per POC, progress as tolerated     Elvira Roman, PT

## 2023-03-30 ENCOUNTER — CLINICAL SUPPORT (OUTPATIENT)
Dept: REHABILITATION | Facility: HOSPITAL | Age: 73
End: 2023-03-30
Payer: OTHER GOVERNMENT

## 2023-03-30 DIAGNOSIS — M25.512 ACUTE PAIN OF LEFT SHOULDER: ICD-10-CM

## 2023-03-30 DIAGNOSIS — S42.295A OTHER CLOSED NONDISPLACED FRACTURE OF PROXIMAL END OF LEFT HUMERUS, INITIAL ENCOUNTER: Primary | ICD-10-CM

## 2023-03-30 PROCEDURE — 97110 THERAPEUTIC EXERCISES: CPT

## 2023-03-30 PROCEDURE — 97140 MANUAL THERAPY 1/> REGIONS: CPT

## 2023-03-30 NOTE — PROGRESS NOTES
"  Physical Therapy Daily Treatment Note     Name: Maria Elena Jackson  [facility] Number: [id number]    Therapy Diagnosis:   Encounter Diagnoses   Name Primary?    Other closed nondisplaced fracture of proximal end of left humerus, initial encounter Yes    Acute pain of left shoulder      Physician: Marcia Rodriguez PA-C    Visit Date: 3/30/2023    Physician Orders: PT Eval and Treat NWB (L) UE / wean out of sling  Medical Diagnosis from Referral:   Encounter Diagnosis   Name Primary?    Other closed nondisplaced fracture of proximal end of left humerus, initial encounter      Evaluation Date: 1/23/2023  Authorization Period Expiration: 12/31/23  Plan of Care Expiration: 5/5/23  Progress Note Due: 2/23/23  Visit # / Visits authorized: 14/20  FOTO: 14    Time In: 7:30 am   Time Out: 8:30 am   Total Billable Time: 53 minutes    Precautions: Standard    Subjective     Pt reports:  Pt states she followed up with NOEMI who stated she could do whatever she wanted per patient.  The patient complains mostly of decreased strength and ROM but no sig pain in the shoulder.  She was compliant with home exercise program.  Response to previous treatment: no adverse effects  Functional change: improved (L) shoulder AROM     Pain: 0/10  Location: left shoulder      Objective         therapeutic exercises to develop strength, endurance, ROM, flexibility, and posture for 45 minutes including:     UBE 6 min 3 and 3  (L) shoulder Pulleys 3 min scaption/3 min flexion  Scap Squeezes 3 x 10 reps blue  (L) ER / IR with T Band 3 x 10 reps green / blue  Bicep Curls 3 x 10 reps 4 lbs  (L) IR st with Strap 10 x 10 sec hold  Supine punches 3x10 4#   SL shoulder ER 3 x10 3#   Supine scaption 3 x10 3 lbs  Prone T 3 x 10 reps 2 lbs  Prone shoulder extension with ER 3 x 10 reps 2 lBS  Sleeper stretch 10"x10 L not today     manual therapy techniques: were applied to the: L shoulder for 8 minutes, including:  Manual PROM to (L) shoulder in scaption, " abduction, IR (not performed), ER ( at 0, 45, and 90 deg abd)       Home Exercises Provided and Patient Education Provided     Education provided:   - HEP     Written Home Exercises Provided: Patient instructed to cont prior HEP.   Martin demonstrated good  understanding of the education provided.     See EMR under Patient Instructions for exercises provided prior visit.      Assessment     Pt is making excellent progress towards all PT goals but remains with limited L shoulder PROM particulalry into flexion and IR and sig RC weakness for which she will continue to benefit from skilled PT.  She will be progressed as tolerated.   Martin Is progressing well towards her goals.   Pt prognosis is Good.     Pt will continue to benefit from skilled outpatient physical therapy to address the deficits listed in the problem list box on initial evaluation, provide pt/family education and to maximize pt's level of independence in the home and community environment.     Pt's spiritual, cultural and educational needs considered and pt agreeable to plan of care and goals.    Anticipated barriers to physical therapy: none    Goals:     Short Term Goals: 1 weeks   Pt (I) with HEP - progressing not met     Long Term Goals: 6 weeks   Pt reports 0/10 pain in the (R) shoulder with functional activities - Not Met  Pt demo normal / painfree R shoulder AROM/PROM (3 months post injury) - progressing not met  Pt demo at least 120 deg of active (R) shoulder flexion  Met  Pt demo 0 inch difference on Apley's Scratch Test (L) UE < (R) UE - Not tested  Pt demo at least 4+/5 MMT throughout the (B) UE (3 months post injury) - Not tested     New Short Term Goals Status:   continue with STG 1 and LTG 1,3,4,5    Plan     Continue per POC, progress as tolerated     Salvador Harper, PT

## 2023-03-31 NOTE — PROGRESS NOTES
..[facility]   Physical Therapy Treatment Note     Name: Maria Elena Salazar [place]  Clinic Number: [id number]    Therapy Diagnosis:   Encounter Diagnosis   Name Primary?    Other closed nondisplaced fracture of proximal end of left humerus, initial encounter          Physician: Marcia Rodriguez PA-C    Visit Date: 3/16/2023    Physician Orders: PT Eval and Treat NWB (L) UE / wean out of sling  Medical Diagnosis from Referral:   Encounter Diagnosis   Name Primary?    Other closed nondisplaced fracture of proximal end of left humerus, initial encounter      Evaluation Date: 1/23/2023  Authorization Period Expiration: TBD  Plan of Care Expiration: 3/23/23  Progress Note Due: 2/23/23  Visit # / Visits authorized: 15/ TBD   FOTO: 1/3    Precautions: Standard; NWB (L) UE - wean out of sling    PTA Visit #: 0/5     Time In: 7:45  PM  Time Out: 8:30 PM  Total Billable Time: 38 minutes    SUBJECTIVE     Pt reports: Pt states her ability to reach overhead and behind her back continues to improve.  She reports minimal pain in the (L) shoulder and is pleased with her progress in PT.  Response to previous treatment: improved mobility   Functional change: no sig change    Pain: 0/10  Location: (L) lateral upper arm    OBJECTIVE     Objective Measures updated at progress report unless specified.     Treatment     Martin received the treatments listed below:      therapeutic exercises to develop strength, endurance, ROM, flexibility, and posture for 23 minutes including:  L abd st with MH arm propped on table 10 min   (L) shoulder Pulleys 3 min 20 /10 scaption  (L) IR st with Strap 10 x 10 sec hold  Scap Squeezes 3 x 10 reps green  Shrugs 3 x 10 reps 3 lbs  Bicep Curls 3 x 10 reps 3 lbs  (L) wrist ext 3 x 10 reps 3 lbs  not today  (L) ER st with Wand 10 x 10 sec hold  (L) shoulder flexion with wand10 x 10 sec hold  (L) supine scaption 3 x 10 reps 3 x 10 reps 2 lbs  (L) sidelying ER 3 x 10 reps 2 lbs  (L) prone  ext 3 x 10 reps BW  Supine punchouts 3 x 10 reps  2 lbs  (L) abd st with ICE arm propped on table 10 min    manual therapy techniques: Joint mobilizations and Soft tissue Mobilization were applied to the: L shoulder for 15 minutes, including:  Gentle pain free L shoulder PROM / mobilization; gentle oscillations and shoulder distraction        Patient Education and Home Exercises     Home Exercises Provided and Patient Education Provided     Education provided:   - HEP    Written Home Exercises Provided: yes. Exercises were reviewed and Martin was able to demonstrate them prior to the end of the session.  Martin demonstrated good  understanding of the education provided. See EMR under Patient Instructions for exercises provided during therapy sessions    ASSESSMENT     Pt demo much improved posterior capsule tightness in the (L) shoulder and increased (L) RC / UE strength and will be ready for increased resistance during a variety of there ex next PT visit.  She will continue to benefit from skilled PT.    Martin Is progressing well towards her goals.   Pt prognosis is Good.     Pt will continue to benefit from skilled outpatient physical therapy to address the deficits listed in the problem list box on initial evaluation, provide pt/family education and to maximize pt's level of independence in the home and community environment.     Pt's spiritual, cultural and educational needs considered and pt agreeable to plan of care and goals.     Anticipated barriers to physical therapy: none    Goals: PLOF    PLAN     Continue with PT POC 2 x a week for 6 weeks    Salvador Harper, PT

## 2023-03-31 NOTE — PROGRESS NOTES
..[facility]   Physical Therapy Treatment Note     Name: Maria Elena Salazar [place]  Clinic Number: [id number]    Therapy Diagnosis:   Encounter Diagnosis   Name Primary?    Other closed nondisplaced fracture of proximal end of left humerus, initial encounter          Physician: Marcia Rodriguez PA-C    Visit Date: 3/14/2023    Physician Orders: PT Eval and Treat NWB (L) UE / wean out of sling  Medical Diagnosis from Referral:   Encounter Diagnosis   Name Primary?    Other closed nondisplaced fracture of proximal end of left humerus, initial encounter      Evaluation Date: 1/23/2023  Authorization Period Expiration: TBD  Plan of Care Expiration: 3/23/23  Progress Note Due: 2/23/23  Visit # / Visits authorized: 15/ TBD   FOTO: 1/3    Precautions: Standard; NWB (L) UE - wean out of sling    PTA Visit #: 0/5     Time In: 7:30  PM  Time Out: 8:15 PM  Total Billable Time: 38 minutes    SUBJECTIVE     Pt reports: Pt with no new complaints of pain today.  She states the L shoulder continues to feel better each week.  Response to previous treatment: improved mobility   Functional change: no sig change    Pain: 0/10  Location: (L) lateral upper arm    OBJECTIVE     Objective Measures updated at progress report unless specified.     Treatment     Martin received the treatments listed below:      therapeutic exercises to develop strength, endurance, ROM, flexibility, and posture for 23 minutes including:  L abd st with MH arm propped on table 10 min   (L) shoulder Pulleys 3 min 20 /10 scaption  (L) IR st with Strap 10 x 10 sec hold  Scap Squeezes 3 x 10 reps green  Shrugs 3 x 10 reps 3 lbs  Bicep Curls 3 x 10 reps 3 lbs  (L) wrist ext 3 x 10 reps 3 lbs  not today  (L) ER st with Wand 10 x 10 sec hold  (L) shoulder flexion with wand10 x 10 sec hold  (L) supine scaption 3 x 10 reps 3 x 10 reps 2 lbs  (L) sidelying ER 3 x 10 reps 2 lbs  (L) prone ext 3 x 10 reps BW  Supine punchouts 3 x 10 reps  2 lbs  (L) abd  st with ICE arm propped on table 10 min    manual therapy techniques: Joint mobilizations and Soft tissue Mobilization were applied to the: L shoulder for 15 minutes, including:  Gentle pain free L shoulder PROM / mobilization; gentle oscillations and shoulder distraction        Patient Education and Home Exercises     Home Exercises Provided and Patient Education Provided     Education provided:   - HEP    Written Home Exercises Provided: yes. Exercises were reviewed and Martin was able to demonstrate them prior to the end of the session.  Martin demonstrated good  understanding of the education provided. See EMR under Patient Instructions for exercises provided during therapy sessions    ASSESSMENT     Pt with no sig change in her overall functional status today.  She tolerated treatment intervention with no adverse effects and remains with decreased UE / RC strength and limited PROM of the (R) shoulder.  She will continue to benefit from skilled PT.    Martin Is progressing well towards her goals.   Pt prognosis is Good.     Pt will continue to benefit from skilled outpatient physical therapy to address the deficits listed in the problem list box on initial evaluation, provide pt/family education and to maximize pt's level of independence in the home and community environment.     Pt's spiritual, cultural and educational needs considered and pt agreeable to plan of care and goals.     Anticipated barriers to physical therapy: none    Goals: PLOF    PLAN     Continue with PT POC 2 x a week for 6 weeks    Salvador Harper, PT

## 2023-04-04 ENCOUNTER — CLINICAL SUPPORT (OUTPATIENT)
Dept: REHABILITATION | Facility: HOSPITAL | Age: 73
End: 2023-04-04
Payer: OTHER GOVERNMENT

## 2023-04-04 PROCEDURE — 97140 MANUAL THERAPY 1/> REGIONS: CPT

## 2023-04-04 PROCEDURE — 97110 THERAPEUTIC EXERCISES: CPT

## 2023-04-04 NOTE — PROGRESS NOTES
"  Physical Therapy Daily Treatment Note     Name: Maria Elena Jackson  Clinic Number: [id number]    Therapy Diagnosis: No diagnosis found.  Physician: Marcia Rodriguez PA-C    Visit Date: 4/4/2023    Physician Orders: PT Eval and Treat NWB (L) UE / wean out of sling  Medical Diagnosis from Referral:   Encounter Diagnosis   Name Primary?    Other closed nondisplaced fracture of proximal end of left humerus, initial encounter      Evaluation Date: 1/23/2023  Authorization Period Expiration: 12/31/23  Plan of Care Expiration: 5/5/23  Progress Note Due: 2/23/23  Visit # / Visits authorized: 15/20  FOTO: 15    Time In: 8:18 am   Time Out: 9:00 am   Total Billable Time: 42 minutes    Precautions: Standard    Subjective     Pt reports: that (L) shoulder has been feeling fine, no pain currently.  She was compliant with home exercise program.  Response to previous treatment: no adverse effects  Functional change: improved (L) shoulder AROM    Pain: 0/10 (L) shoulder       Objective       therapeutic exercises to develop strength, endurance, ROM, flexibility, and posture for 32 minutes including:     UBE 6 min 3 and 3 not performed   (L) shoulder Pulleys 3 min scaption/3 min flexion  Scap Squeezes 3 x 10 reps blue  (L) ER / IR with T Band 3 x 10 reps green / blue not performed  Bicep Curls 3 x 10 reps 4 lbs not performed   (L) IR st with Strap 10 x 10 sec hold  Supine punches 3x10 4#   SL shoulder ER  3 x 10 2#  SL shoulder abduction 2x10 L 0 2#  Supine scaption 3 x10 3 lbs - not performed  Prone scaption 3x10   Prone T 3 x 10 reps 1 #  Prone shoulder extension with ER 3 x 10 reps 2 lBS - not performed   Sleeper stretch 10"x10 L not performed      manual therapy techniques: were applied to the: L shoulder for 10 minutes, including:  Manual PROM to (L) shoulder in flexion, IR  ER ( at 45 and 90 deg abd)         Home Exercises Provided and Patient Education Provided     Education provided:   - HEP     Written Home Exercises " Provided: Patient instructed to cont prior HEP.   Martin demonstrated good  understanding of the education provided.     See EMR under Patient Instructions for exercises provided prior visit.      Assessment     Improved (L) shoulder PROM noted during manual therapy. Pt tolerated all therex without c/o increased pain in (L) shoulder and reported no increased pain in (L) shoulder at end of session.   Martin Is progressing well towards her goals.   Pt prognosis is Good.     Pt will continue to benefit from skilled outpatient physical therapy to address the deficits listed in the problem list box on initial evaluation, provide pt/family education and to maximize pt's level of independence in the home and community environment.     Pt's spiritual, cultural and educational needs considered and pt agreeable to plan of care and goals.      Anticipated barriers to physical therapy: none    Goals:     Short Term Goals: 1 weeks   Pt (I) with HEP - progressing not met     Long Term Goals: 6 weeks   Pt reports 0/10 pain in the (R) shoulder with functional activities - Not Met  Pt demo normal / painfree R shoulder AROM/PROM (3 months post injury) - progressing not met  Pt demo at least 120 deg of active (R) shoulder flexion  Met  Pt demo 0 inch difference on Apley's Scratch Test (L) UE < (R) UE - Not tested  Pt demo at least 4+/5 MMT throughout the (B) UE (3 months post injury) - Not tested       Plan     Continue per POC, progress as tolerated     Elvira Roman, PT

## 2023-04-06 ENCOUNTER — CLINICAL SUPPORT (OUTPATIENT)
Dept: REHABILITATION | Facility: HOSPITAL | Age: 73
End: 2023-04-06
Payer: OTHER GOVERNMENT

## 2023-04-06 PROCEDURE — 97140 MANUAL THERAPY 1/> REGIONS: CPT

## 2023-04-06 PROCEDURE — 97110 THERAPEUTIC EXERCISES: CPT

## 2023-04-06 NOTE — PROGRESS NOTES
"  Physical Therapy Daily Treatment Note     Name: Maria Elena Salazar [place]  Clinic Number: [id number]    Therapy Diagnosis: No diagnosis found.  Physician: Marcia Rodriguez PA-C    Visit Date: 4/6/2023    Physician Orders: PT Eval and Treat NWB (L) UE / wean out of sling  Medical Diagnosis from Referral:   Encounter Diagnosis   Name Primary?    Other closed nondisplaced fracture of proximal end of left humerus, initial encounter      Evaluation Date: 1/23/2023  Authorization Period Expiration: 12/31/23  Plan of Care Expiration: 5/5/23  Progress Note Due: 2/23/23  Visit # / Visits authorized: 16/20  FOTO: 17      Time In: 5:22 pm  Time Out: 6:11 pm   Total Billable Time: 49 minutes    Precautions: Standard    Subjective     Pt reports: no pain in (L) shoulder.  She was compliant with home exercise program.  Response to previous treatment: no adverse effects  Functional change: improved (L) shoulder AROM     Pain: 0/10 (L) shoulder       Objective     therapeutic exercises to develop strength, endurance, ROM, flexibility, and posture for 39 minutes including:     UBE 6 min 3 and 3 not performed   (L) shoulder Pulleys 3 min scaption/3 min flexion  Scap Squeezes 3 x 10 reps blue  (L) ER / IR with T Band 3 x 10 reps green / blue not performed  Bicep Curls 3 x 10 reps 4 lbs not performed   (L) IR st with Strap 20 x 10 sec hold  Supine punches 3x10 4#   SL shoulder ER  3 x 10 2#  SL shoulder abduction 2x10 L 0 2#  Supine scaption 3 x10 3 lbs - not performed  Prone scaption 3x10   Prone T 3 x 10 reps 1 #  Prone shoulder extension with ER 3 x 10 reps 2 #  Sleeper stretch 10"x10 L      manual therapy techniques: were applied to the: L shoulder for 10 minutes, including:  Manual PROM to (L) shoulder in flexion, IR  ER ( at 45 and 90 deg abd)     Home Exercises Provided and Patient Education Provided     Education provided:   - HEP     Written Home Exercises Provided: yes.  Exercises were reviewed and Martin was able to demonstrate them " prior to the end of the session.  Martin demonstrated good  understanding of the education provided.     See EMR under Patient Instructions for exercises provided 4/6/2023.      Assessment     Pt tolerated all therex without c/o increased pain and reported no increased pain at the end of therapy session.   Martin Is progressing well towards her goals.   Pt prognosis is Good.     Pt will continue to benefit from skilled outpatient physical therapy to address the deficits listed in the problem list box on initial evaluation, provide pt/family education and to maximize pt's level of independence in the home and community environment.     Pt's spiritual, cultural and educational needs considered and pt agreeable to plan of care and goals.    Anticipated barriers to physical therapy: none    Goals:     Short Term Goals: 1 weeks   Pt (I) with HEP - progressing not met     Long Term Goals: 6 weeks   Pt reports 0/10 pain in the (R) shoulder with functional activities - Not Met  Pt demo normal / painfree R shoulder AROM/PROM (3 months post injury) - progressing not met  Pt demo at least 120 deg of active (R) shoulder flexion  Met  Pt demo 0 inch difference on Apley's Scratch Test (L) UE < (R) UE - Not tested  Pt demo at least 4+/5 MMT throughout the (B) UE (3 months post injury) - Not tested    Plan     Continue per POC, progress as tolerated     Elvira Roman, PT

## 2023-04-10 ENCOUNTER — CLINICAL SUPPORT (OUTPATIENT)
Dept: REHABILITATION | Facility: HOSPITAL | Age: 73
End: 2023-04-10
Payer: OTHER GOVERNMENT

## 2023-04-10 PROCEDURE — 97110 THERAPEUTIC EXERCISES: CPT

## 2023-04-10 NOTE — PROGRESS NOTES
"  Physical Therapy Daily Treatment Note     Name: Maria Elena Salazar [place]  Clinic Number: [id number]    Therapy Diagnosis: No diagnosis found.  Physician: Marcia Rodriguez PA-C    Visit Date: 4/10/2023    Physician Orders: PT Eval and Treat NWB (L) UE / wean out of sling  Medical Diagnosis from Referral:   Encounter Diagnosis   Name Primary?    Other closed nondisplaced fracture of proximal end of left humerus, initial encounter      Evaluation Date: 1/23/2023  Authorization Period Expiration: 12/31/23  Plan of Care Expiration: 5/5/23  Progress Note Due: 2/23/23  Visit # / Visits authorized: 17/20  FOTO: 18    Time In: 10:02 am   Time Out: 10:47 am   Total Billable Time: 45 minutes    Precautions: Standard    Subjective     Pt reports: (L) shoulder is feeling good. Pt stated that she went to exercise class in gym on Friday and did what she could, was a little sore after.   She was compliant with home exercise program.  Response to previous treatment: no adverse effects  Functional change: improved (L) shoulder AROM     Pain: 0/10 (L) shoulder       Objective       therapeutic exercises to develop strength, endurance, ROM, flexibility, and posture for 45 minutes including:     Pulleys 3 min scaption/3 min flexion  Wall walks 2x10 3" flexion    Scap Squeezes 3 x 10 reps blue not performed  (L) ER / IR with T Band 2 x 10 reps green / blue   Bicep Curls 3 x 10 reps 4 lbs not performed   (L) IR st with Strap 20 x 10 sec hold  Supine punches 3x10 4#   SL shoulder ER  3 x 10 2#  SL shoulder abduction 3x10 L 2#  Supine scaption 3 x10 3 lbs - not performed  Prone scaption 3x10   Prone T 3 x 10 reps 1 #  Prone shoulder extension with ER 3 x 10 reps 2 #  Sleeper stretch 10"x10 L        Home Exercises Provided and Patient Education Provided     Education provided:   -  HEP    Written Home Exercises Provided: Patient instructed to cont prior HEP.  Martin demonstrated good  understanding of the education provided.     See EMR under " Patient Instructions for exercises provided prior visit.      Assessment     Pt received VC to perform wall walks in range where she felt a stretch, but not increased pain. Pt reported no pain in (L) shoulder at end of session.   Martin Is progressing well towards her goals.   Pt prognosis is Good.     Pt will continue to benefit from skilled outpatient physical therapy to address the deficits listed in the problem list box on initial evaluation, provide pt/family education and to maximize pt's level of independence in the home and community environment.     Pt's spiritual, cultural and educational needs considered and pt agreeable to plan of care and goals.    Anticipated barriers to physical therapy: none    Goals:     Short Term Goals: 1 weeks   Pt (I) with HEP - progressing not met     Long Term Goals: 6 weeks   Pt reports 0/10 pain in the (R) shoulder with functional activities - Not Met  Pt demo normal / painfree R shoulder AROM/PROM (3 months post injury) - progressing not met  Pt demo at least 120 deg of active (R) shoulder flexion  Met  Pt demo 0 inch difference on Apley's Scratch Test (L) UE < (R) UE - Not tested  Pt demo at least 4+/5 MMT throughout the (B) UE (3 months post injury) - Not tested    Plan     Continue per POC, progress as tolerated     Elvira Roman, PT

## 2023-04-12 ENCOUNTER — CLINICAL SUPPORT (OUTPATIENT)
Dept: REHABILITATION | Facility: HOSPITAL | Age: 73
End: 2023-04-12
Payer: OTHER GOVERNMENT

## 2023-04-12 PROCEDURE — 97110 THERAPEUTIC EXERCISES: CPT

## 2023-04-12 NOTE — PROGRESS NOTES
"  Physical Therapy Daily Treatment Note     Name: Maria Elena Jackson  Clinic Number: [id number]    Therapy Diagnosis: No diagnosis found.  Physician: Marcia Rodriguez PA-C    Visit Date: 4/12/2023    Physician Orders: PT Eval and Treat NWB (L) UE / wean out of sling  Medical Diagnosis from Referral:   Encounter Diagnosis   Name Primary?    Other closed nondisplaced fracture of proximal end of left humerus, initial encounter      Evaluation Date: 1/23/2023  Authorization Period Expiration: 12/31/23  Plan of Care Expiration: 5/5/23  Progress Note Due: 2/23/23  Visit # / Visits authorized: 18/20  FOTO: 19    Time In: 9:01 am   Time Out: 9:48 am   Total Billable Time: 39 minutes    Precautions: Standard    Subjective     Pt reports: that she has been participating in exercise class, using less weight on (L) UE.  She was compliant with home exercise program.  Response to previous treatment: no adverse effects  Functional change: improved (L) shoulder AROM     Pain: 0/10 (L) shoulder       Objective       Objective measurements and therapeutic exercises to develop strength, endurance, ROM, flexibility, and posture for 47 minutes including:     Pulleys 3 min scaption/3 min flexion  Wall walks 2x10 3" flexion    Scap Squeezes 3 x 10 reps blue not performed  (L) ER / IR with T Band 3 x 10 reps green / blue   (L) IR st with Strap 20 x 10 sec hold  Supine punches 3x10 4#   SL shoulder ER  3 x 10 2#  SL shoulder abduction 3x10 L 2#  Supine scaption 3 x10 3 lbs - not performed  Prone scaption 3x10 not performed  Prone T 3 x 10 reps 1 # not performed  Prone shoulder extension with ER 3 x 10 reps 2 # not performed   Sleeper stretch 10"x10 L   Shoulder extensions BTB 2x10       Shoulder Right   Left Pain/Dysfunction with Movement     AROM MMT AROM NT= not tested    flexion 165 5/5 155     abduction 165 5/5 160     Internal rotation 90 5/5 67     ER at 0° abd 85 5/5 55     ER 45 deg abd 75 NT 75     ER at 90° abd 90 NT 70         Home " Exercises Provided and Patient Education Provided     Education provided:   - HEP     Written Home Exercises Provided: Patient instructed to cont prior HEP.   Martin demonstrated good  understanding of the education provided.     See EMR under Patient Instructions for exercises provided prior visit.      Assessment     Updated measurements taken today and pt demo improved (L) shoulder flexion, abduction, and ER AROM at 45 and 90 deg abduction compared to prior measurements. Pt reported no increased pain in (L) shoulder at end of session.   Martin Is progressing well towards her goals.   Pt prognosis is Good.     Pt will continue to benefit from skilled outpatient physical therapy to address the deficits listed in the problem list box on initial evaluation, provide pt/family education and to maximize pt's level of independence in the home and community environment.     Pt's spiritual, cultural and educational needs considered and pt agreeable to plan of care and goals.    Anticipated barriers to physical therapy: none    Goals:     Short Term Goals: 1 weeks   Pt (I) with HEP - progressing not met     Long Term Goals: 6 weeks   Pt reports 0/10 pain in the (R) shoulder with functional activities - Not Met  Pt demo normal / painfree R shoulder AROM/PROM (3 months post injury) - progressing not met  Pt demo at least 120 deg of active (R) shoulder flexion  Met  Pt demo 0 inch difference on Apley's Scratch Test (L) UE < (R) UE - Not tested  Pt demo at least 4+/5 MMT throughout the (B) UE (3 months post injury) - Not tested    Plan     Continue per POC, progress as tolerated    Elvira Roman, PT

## 2023-04-17 ENCOUNTER — CLINICAL SUPPORT (OUTPATIENT)
Dept: REHABILITATION | Facility: HOSPITAL | Age: 73
End: 2023-04-17
Payer: OTHER GOVERNMENT

## 2023-04-17 PROCEDURE — 97110 THERAPEUTIC EXERCISES: CPT

## 2023-04-17 NOTE — PROGRESS NOTES
"  Physical Therapy Daily Treatment Note     Name: Maria Elena Salazar [place]  Clinic Number: [id number]    Therapy Diagnosis: No diagnosis found.  Physician: Marcia Rodriguez PA-C    Visit Date: 4/17/2023    Physician Orders: PT Eval and Treat NWB (L) UE / wean out of sling  Medical Diagnosis from Referral:   Encounter Diagnosis   Name Primary?    Other closed nondisplaced fracture of proximal end of left humerus, initial encounter      Evaluation Date: 1/23/2023  Authorization Period Expiration: 12/31/23  Plan of Care Expiration: 5/5/23  Progress Note Due: 2/23/23  Visit # / Visits authorized: 19/20  FOTO: 20 NEXT    Time In: 7:02 am   Time Out: 7:44 am   Total Billable Time: 42 minutes    Precautions: Standard    Subjective     Pt reports: no pain currently.  She was compliant with home exercise program.  Response to previous treatment: no adverse effects  Functional change: improved (L) Shoulder AROM    Pain: 0/10 (L) shoulder     Objective       therapeutic exercises to develop strength, endurance, ROM, flexibility, and posture for 42 minutes including:     Pulleys 3 min scaption/3 min flexion  Wall walks 2x10 3" flexion  and abduction  Scap Squeezes 3 x 10 reps blue not performed  (L) ER / IR with T Band 3 x 10 reps green / blue   (L) IR st with Strap 20 x 10 sec hold  Supine punches 3x10 4#   SL shoulder ER  3 x 10 2#  SL shoulder abduction 3x10 L 1#  Prone scaption 3x10 1#  Prone T 3 x 10 reps 1 #   Prone shoulder extension with ER 3 x 10 reps 2 #   Sleeper stretch 10"x10 L   Shoulder extensions BTB 2x10   Corner pec stretch 3x30"       Home Exercises Provided and Patient Education Provided     Education provided:   - HEP     Written Home Exercises Provided: Patient instructed to cont prior HEP.  Martin demonstrated good  understanding of the education provided.     See EMR under Patient Instructions for exercises provided prior visit.      Assessment     Added abduction wall walks and corner pec stretch for improved " shoulder ROM and posture. Pt tolerated all therex without any adverse reactions and reported no increased pain at end of session.   Martin Is progressing well towards her goals.   Pt prognosis is Good.     Pt will continue to benefit from skilled outpatient physical therapy to address the deficits listed in the problem list box on initial evaluation, provide pt/family education and to maximize pt's level of independence in the home and community environment.     Pt's spiritual, cultural and educational needs considered and pt agreeable to plan of care and goals.    Anticipated barriers to physical therapy: none    Goals:     Short Term Goals: 1 weeks   Pt (I) with HEP - progressing not met     Long Term Goals: 6 weeks   Pt reports 0/10 pain in the (R) shoulder with functional activities - Not Met  Pt demo normal / painfree R shoulder AROM/PROM (3 months post injury) - progressing not met  Pt demo at least 120 deg of active (R) shoulder flexion  Met  Pt demo 0 inch difference on Apley's Scratch Test (L) UE < (R) UE - Not tested  Pt demo at least 4+/5 MMT throughout the (B) UE (3 months post injury) - Not tested    Plan     Continue per POC, progress as tolerated     Elvira Roman, PT

## 2023-04-19 ENCOUNTER — CLINICAL SUPPORT (OUTPATIENT)
Dept: REHABILITATION | Facility: HOSPITAL | Age: 73
End: 2023-04-19
Payer: OTHER GOVERNMENT

## 2023-04-19 PROCEDURE — 97110 THERAPEUTIC EXERCISES: CPT

## 2023-04-19 NOTE — PROGRESS NOTES
"  Physical Therapy Daily Treatment Note     Name: Maria Elena Salazar [place]  Clinic Number: [id number]    Therapy Diagnosis: No diagnosis found.  Physician: Marcia Rodriguez PA-C    Visit Date: 4/19/2023    Physician Orders: PT Eval and Treat NWB (L) UE / wean out of sling  Medical Diagnosis from Referral:   Encounter Diagnosis   Name Primary?    Other closed nondisplaced fracture of proximal end of left humerus, initial encounter      Evaluation Date: 1/23/2023  Authorization Period Expiration: 12/31/23  Plan of Care Expiration: 5/5/23  Progress Note Due: 2/23/23  Visit # / Visits authorized: 19/20  FOTO: 20     Time In: 9:05 am   Time Out: 9:50 am   Total Billable Time: 45 minutes    Precautions: Standard    Subjective     Pt reports: no pain in (L) shoulder currently. Pt stated that everyday (L) shoulder feels better.   She was compliant with home exercise program.  Response to previous treatment: no adverse effects  Functional change: improved (L) shoulder AROM    Pain: 0/10 (L) shoulder       Objective       therapeutic exercises to develop strength, endurance, ROM, flexibility, and posture for 45 minutes including:     Pulleys 3 min scaption/3 min flexion  Wall walks 2x10 3" flexion  and abduction  Scap Squeezes 3 x 10 reps blue not performed  (L) ER / IR with T Band 3 x 10 reps green / blue   (L) IR st with Strap 20 x 10 sec hold  Supine punches 3x10 4#   SL shoulder ER  3 x 10 2#  SL shoulder abduction 3x10 L 1#  Prone scaption 3x10 1#  Prone T 3 x 10 reps 1 #   Prone shoulder extension with ER 3 x 10 reps 2 #   Sleeper stretch 10"x10 L   Shoulder extensions BTB 3x10   Corner pec stretch 3x30"   Ball on wall circles x20 cw/ccw      Home Exercises Provided and Patient Education Provided     Education provided:   - HEP    Written Home Exercises Provided: Patient instructed to cont prior HEP.    Martin demonstrated good  understanding of the education provided.     See EMR under Patient Instructions for exercises " provided prior visit.      Assessment     Pt received VC for proper technique when performing prone shoulder extensions and prone Ts. Pt tolerated all therex without c/o increased pain.   Martin Is progressing well towards her goals.   Pt prognosis is Good.     Pt will continue to benefit from skilled outpatient physical therapy to address the deficits listed in the problem list box on initial evaluation, provide pt/family education and to maximize pt's level of independence in the home and community environment.     Pt's spiritual, cultural and educational needs considered and pt agreeable to plan of care and goals.    Anticipated barriers to physical therapy: none    Goals:     Short Term Goals: 1 weeks   Pt (I) with HEP - progressing not met     Long Term Goals: 6 weeks   Pt reports 0/10 pain in the (R) shoulder with functional activities - Not Met  Pt demo normal / painfree R shoulder AROM/PROM (3 months post injury) - progressing not met  Pt demo at least 120 deg of active (R) shoulder flexion  Met  Pt demo 0 inch difference on Apley's Scratch Test (L) UE < (R) UE - Not tested  Pt demo at least 4+/5 MMT throughout the (B) UE (3 months post injury) - Not tested    Plan     Continue per POC, progress as tolerated     Elvira Roman, PT

## 2023-04-20 ENCOUNTER — PATIENT MESSAGE (OUTPATIENT)
Dept: ORTHOPEDICS | Facility: CLINIC | Age: 73
End: 2023-04-20
Payer: OTHER GOVERNMENT

## 2023-04-20 DIAGNOSIS — M79.641 BILATERAL HAND PAIN: Primary | ICD-10-CM

## 2023-04-20 DIAGNOSIS — M79.642 BILATERAL HAND PAIN: Primary | ICD-10-CM

## 2023-04-21 ENCOUNTER — HOSPITAL ENCOUNTER (OUTPATIENT)
Dept: RADIOLOGY | Facility: HOSPITAL | Age: 73
Discharge: HOME | End: 2023-04-21
Attending: ORTHOPAEDIC SURGERY
Payer: OTHER GOVERNMENT

## 2023-04-21 DIAGNOSIS — M79.642 BILATERAL HAND PAIN: ICD-10-CM

## 2023-04-21 DIAGNOSIS — M79.641 BILATERAL HAND PAIN: ICD-10-CM

## 2023-04-21 PROCEDURE — 73130 X-RAY EXAM OF HAND: CPT | Mod: 26,50,, | Performed by: RADIOLOGY

## 2023-04-21 PROCEDURE — 73130 XR HAND COMPLETE 3 VIEWS BILATERAL: ICD-10-PCS | Mod: 26,50,, | Performed by: RADIOLOGY

## 2023-04-21 PROCEDURE — 73130 X-RAY EXAM OF HAND: CPT | Mod: TC,50

## 2023-04-24 ENCOUNTER — OFFICE VISIT (OUTPATIENT)
Dept: ORTHOPEDICS | Facility: CLINIC | Age: 73
End: 2023-04-24
Payer: OTHER GOVERNMENT

## 2023-04-24 VITALS — BODY MASS INDEX: 30.32 KG/M2 | WEIGHT: 182 LBS | HEIGHT: 65 IN

## 2023-04-24 DIAGNOSIS — M65.341 TRIGGER FINGER, RIGHT RING FINGER: Primary | ICD-10-CM

## 2023-04-24 PROCEDURE — 99213 PR OFFICE/OUTPT VISIT, EST, LEVL III, 20-29 MIN: ICD-10-PCS | Mod: S$GLB,,, | Performed by: ORTHOPAEDIC SURGERY

## 2023-04-24 PROCEDURE — 99213 OFFICE O/P EST LOW 20 MIN: CPT | Mod: S$GLB,,, | Performed by: ORTHOPAEDIC SURGERY

## 2023-04-24 PROCEDURE — 99999 PR PBB SHADOW E&M-EST. PATIENT-LVL III: CPT | Mod: PBBFAC,,, | Performed by: ORTHOPAEDIC SURGERY

## 2023-04-24 PROCEDURE — 99999 PR PBB SHADOW E&M-EST. PATIENT-LVL III: ICD-10-PCS | Mod: PBBFAC,,, | Performed by: ORTHOPAEDIC SURGERY

## 2023-04-24 NOTE — PROGRESS NOTES
[facility]  History & Physical  Orthopedics    SUBJECTIVE:      COVID-19 attestation:  This patient was treated during the COVID-19 pandemic.  This was discussed with the patient, they are aware of our current policies and procedures, were given the option of delaying their visit and or switching to a virtual visit, delaying their surgery when applicable, and they elect to proceed.    Chief Complaint: right thumb pain    Referring Provider: No ref. provider found     History of Present Illness:  Patient is a 72 y.o. right hand dominant female who presents today with complaints of right thumb pain. This started a month ago. She has been having popping at her thumb IP with thumb extension as well as pain diffusely at the palmar aspect of the thumb. The pain is worse with palpation and thumb extension. She is having difficulty grasping objects due to the pain. She has not tried any OTC medications or gels or immobilization. She also does crossfit for the past year. Notes that when she was a child she injured her thumb but not sure if she broke anything.    Interval history April 26, 2022:  The patient returns today for re-evaluation of her right thumb.  She has been having some right thumb pain consistent with a right trigger thumb greater than right thumb CMC arthritis.  I injected this almost a year ago which helped great.  She notes that she was mistakenly scheduled with a physician's assistant in this office who performed another right trigger thumb injection about a month ago.  This did not provide any relief.  She returns for re-evaluation with no other complaints.    Interval history April 24, 2023: The patient returns today for re-evaluation.  She notes that her right thumb fortunately has resolved since her last visit.  The right ring finger does continue to click and trigger occasionally but fortunately this really is not painful whatsoever.  It seems to be improving and the triggering  itself is not dysfunctional.  She returns for re-evaluation with no other complaints.    The patient is a/an assistant in an orthodontist's office.    Onset of symptoms/DOI was 1 month ago.    Symptoms are aggravated by activity and movement.    Symptoms are alleviated by rest.    Symptoms consist of pain.    The patient rates their pain as a 5/10.    Attempted treatment(s) and/or interventions include activity modifications, rest     The patient denies any fevers, chills, N/V, D/C and presents for evaluation.       Past Medical History:   Diagnosis Date    Arthritis     Hyperlipidemia     Hypertension     S/P total hysterectomy and bilateral salpingo-oophorectomy 6/29/2022     Past Surgical History:   Procedure Laterality Date    CHOLECYSTECTOMY      D&C Hysteroscopy      DILATION AND CURETTAGE OF UTERUS      HYSTEROSCOPY WITH DILATION AND CURETTAGE OF UTERUS N/A 5/10/2022    Procedure: HYSTEROSCOPY, WITH DILATION AND CURETTAGE OF UTERUS;  Surgeon: Marce Ellsworth MD;  Location: [facility];  Service: OB/GYN;  Laterality: N/A;    JOINT REPLACEMENT Right     laparoscopy      LYMPH NODE BIOPSY N/A 6/29/2022    Procedure: BIOPSY, LYMPH NODE;  Surgeon: Kevin Mcgraw MD;  Location: [facility];  Service: OB/GYN;  Laterality: N/A;    TONSILLECTOMY      TOTAL KNEE ARTHROPLASTY Right 10/7/2019    Procedure: ARTHROPLASTY, KNEE, TOTAL;  Surgeon: John L. Ochsner Jr., MD;  Location: [facility] OR;  Service: Orthopedics;  Laterality: Right;    XI ROBOTIC HYSTERECTOMY, WITH SALPINGO-OOPHORECTOMY Bilateral 6/29/2022    Procedure: XI ROBOTIC HYSTERECTOMY,WITH SALPINGO-OOPHORECTOMY;  Surgeon: Kevin Mcgraw MD;  Location: [facility];  Service: OB/GYN;  Laterality: Bilateral;  Robotic assisted total hysterectomy, Bilateral salpingo-oophrectomy, Curtis Node Mapping       Review of patient's allergies indicates:  No Known Allergies  Social History     Social History Narrative    Lives with spouse. Feels safe in her home.      Family History  "  Problem Relation Age of Onset    Colon cancer Mother 55    Ovarian cancer Mother 35    Prostate cancer Father 50    Breast cancer Neg Hx          Current Outpatient Medications:     atorvastatin (LIPITOR) 40 MG tablet, Take 1 tablet (40 mg total) by mouth every evening. (Patient taking differently: Take 40 mg by mouth every evening. 3 times a week), Disp: 90 tablet, Rfl: 3    calcium-vitamin D 250 mg-2.5 mcg (100 unit) per tablet, Take 1 tablet by mouth 2 (two) times daily., Disp: , Rfl:     carvediloL (COREG) 12.5 MG tablet, Take 1 tablet (12.5 mg total) by mouth 2 (two) times daily with meals., Disp: 120 tablet, Rfl: 2    cholecalciferol, vitamin D3, (VITAMIN D3) 25 mcg (1,000 unit) capsule, Take 1,000 Units by mouth once daily., Disp: , Rfl:     irbesartan-hydrochlorothiazide (AVALIDE) 300-12.5 mg per tablet, Take 1 tablet by mouth nightly., Disp: 90 tablet, Rfl: 3    NIFEdipine (PROCARDIA-XL) 60 MG (OSM) 24 hr tablet, Take 1 tablet (60 mg total) by mouth once daily., Disp: 90 tablet, Rfl: 3      Review of Systems:  Constitutional: no fever or chills  Eyes: no visual changes  ENT: no nasal congestion or sore throat  Respiratory: no cough or shortness of breath  Cardiovascular: no chest pain  Gastrointestinal: no nausea or vomiting, tolerating diet  Musculoskeletal: pain and soreness    OBJECTIVE:      Vital Signs (Most Recent):  Vitals:    04/24/23 1035   Weight: 82.6 kg (182 lb)   Height: 5' 5" (1.651 m)     Body mass index is 30.29 kg/m².      Physical Exam:  Constitutional: The patient appears well-developed and well-nourished. No distress.   Skin: No lesions appreciated  Head: Normocephalic and atraumatic.   Nose: Nose normal.   Ears: No deformities seen  Eyes: Conjunctivae and EOM are normal.   Neck: No tracheal deviation present.   Cardiovascular: Normal rate and intact distal pulses.    Pulmonary/Chest: Effort normal. No respiratory distress.   Abdominal: There is no guarding.   Neurological: The patient " is alert.   Psychiatric: The patient has a normal mood and affect.     Right Hand/Wrist Examination:    Observation/Inspection:  Swelling  none    Deformity  none  Discoloration  none     Scars   none    Atrophy  none    HAND/WRIST EXAMINATION:  Finkelstein's Test   Neg  WHAT Test    Neg  Snuff box tenderness   Neg  Gutierres's Test    Neg  Hook of Hamate Tenderness  Neg  CMC grind    positive  Circumduction test   positive  Mild tenderness palpation over the right ring finger A1 pulley with no triggering today.        Neurovascular Exam:  Digits WWP, brisk CR < 3s throughout  NVI motor/LTS to M/R/U nerves, radial pulse 2+  Tinel's Test - Carpal Tunnel  Neg  Tinel's Test - Cubital Tunnel  Neg  Phalen's Test    Neg  Median Nerve Compression Test Neg    ROM hand full, painless     ROM wrist full, painless    ROM elbow full, painless    Abdomen not guarded  Respirations nonlabored  Perfusion intact    Diagnostic Results:     Imaging - I independently viewed the patient's imaging as well as the radiology report.  Xrays of the patient's right thumb demonstrate no evidence of any acute fractures or dislocations, mild age related degenerative changes.      ASSESSMENT/PLAN:      72 y.o. yo female with right ring finger trigger finger      Plan: The patient and I had a thorough discussion today.  We discussed the working diagnosis as well as several other potential alternative diagnoses.  Treatment options were discussed, both conservative and surgical.  Conservative treatment options would include things such as activity modifications, workplace modifications, a period of rest, oral vs topical OTC and prescription anti-inflammatory medications, occupational therapy, splinting/bracing, immobilization, corticosteroid injections, and others.  Surgical options were discussed as well.     At this time, the patient has improved significantly since her last visit.  She is still suffering from a very mild trigger finger of the right  ring finger but this does not limit or bother her and it is not painful.  It never really locks but just clicks and triggers intermittently.  She would like to attempt a trial of nocturnal splinting and provided a handout for this.  She can follow up any time should this worsen persist or should she wish to further discuss injections or surgery.        Should the patient's symptoms worsen, persist, or fail to improve they should return for reevaluation and I would be happy to see them back anytime.        Michael Peña M.D.    Please be aware that this note has been generated with the assistance of [employer] voice-to-text.  Please excuse any spelling or grammatical errors.    Thank you for choosing Dr. Michael Peña for your orthopedic hand and upper extremity care. It is our goal to provide you with exceptional care that will help keep you healthy, active, and get you back in the game.     If you felt that you received exemplary care today, please consider leaving feedback for Dr. Peña on [email or web address] at [email or web address].    Please do not hesitate to reach out to us via email, phone, or MyChart with any questions, concerns, or feedback.

## 2023-04-26 ENCOUNTER — CLINICAL SUPPORT (OUTPATIENT)
Dept: REHABILITATION | Facility: HOSPITAL | Age: 73
End: 2023-04-26
Payer: OTHER GOVERNMENT

## 2023-04-26 PROCEDURE — 97110 THERAPEUTIC EXERCISES: CPT

## 2023-04-26 NOTE — PROGRESS NOTES
"  Physical Therapy Daily Treatment Note     Name: Maria Elena Salazar [facility] Number: [id number]    Therapy Diagnosis: No diagnosis found.  Physician: Marcia Rodriguez PA-C    Visit Date: 4/26/2023    Physician Orders: PT Eval and Treat NWB (L) UE / wean out of sling  Medical Diagnosis from Referral:   Encounter Diagnosis   Name Primary?    Other closed nondisplaced fracture of proximal end of left humerus, initial encounter      Evaluation Date: 1/23/2023  Authorization Period Expiration: 12/31/23  Plan of Care Expiration: 5/5/23  Progress Note Due: 2/23/23  Visit # / Visits authorized: 19/20  FOTO: 20     Time In: 9:00 am   Time Out: 9:50 am   Total Billable Time: 28 minutes    Precautions: Standard    Subjective     Pt reports: that (L) shoulder has been feeling good, no pain currently.  She was partially compliant with home exercise program.  Response to previous treatment: no adverse effects  Functional change: improved (L) shoulder AROM    Pain: 0/10 (L) shoulder       Objective     therapeutic exercises to develop strength,ROM, flexibility  for 50 minutes including:     Pulleys 3 min scaption/3 min flexion  Wall walks 2x10 3" flexion  and abduction  Scap Squeezes 3 x 10 reps blue not performed  Wand ER at 0,45, 90 deg abd 2x10 3" ea   (L) ER / IR with T Band 3 x 10 reps green / blue not performed  (L) IR st with Strap 20 x 10 sec hold  Supine punches 3x10 4#   SL shoulder ER  3 x 10 2#  SL shoulder abduction 3x10 L 1#  Prone scaption 3x10 1#  Prone T 3 x 10 reps 1 #   Prone shoulder extension with ER 3 x 10 reps 2 #  not performed   Sleeper stretch 10"x10 L   Shoulder extensions BTB 3x10  not performed   Corner pec stretch 3x30" not performed   Ball on wall circles x20 cw/ccw flexion and abduction      Home Exercises Provided and Patient Education Provided     Education provided:   - HEP     Written Home Exercises Provided: Patient instructed to cont prior HEP.    Martin demonstrated good  understanding of the " education provided.     See EMR under Patient Instructions for exercises provided prior visit.      Assessment     Added back wand ER at different angles today to promote (L) shoulder ER AROM since pt admits she has not been doing this exercise at home. Pt tolerated therapy session without c/o increased pain.   Martin Is progressing well towards her goals.   Pt prognosis is Good.     Pt will continue to benefit from skilled outpatient physical therapy to address the deficits listed in the problem list box on initial evaluation, provide pt/family education and to maximize pt's level of independence in the home and community environment.     Pt's spiritual, cultural and educational needs considered and pt agreeable to plan of care and goals.    Anticipated barriers to physical therapy: none    Goals:     Short Term Goals: 1 weeks   Pt (I) with HEP - progressing not met     Long Term Goals: 6 weeks   Pt reports 0/10 pain in the (R) shoulder with functional activities - Not Met  Pt demo normal / painfree R shoulder AROM/PROM (3 months post injury) - progressing not met  Pt demo at least 120 deg of active (R) shoulder flexion  Met  Pt demo 0 inch difference on Apley's Scratch Test (L) UE < (R) UE - Not tested  Pt demo at least 4+/5 MMT throughout the (B) UE (3 months post injury) - Not tested    Plan     Continue per POC, progress as tolerated     Elvira Roman, PT

## 2023-04-28 ENCOUNTER — CLINICAL SUPPORT (OUTPATIENT)
Dept: REHABILITATION | Facility: HOSPITAL | Age: 73
End: 2023-04-28
Payer: OTHER GOVERNMENT

## 2023-04-28 PROCEDURE — 97110 THERAPEUTIC EXERCISES: CPT

## 2023-04-28 NOTE — PROGRESS NOTES
"  Physical Therapy Daily Treatment Note     Name: Maria Elena Salazar [place]  Clinic Number: [id number]    Therapy Diagnosis: No diagnosis found.  Physician: Marcia Rodriguez PA-C    Visit Date: 4/28/2023    Physician Orders: PT Eval and Treat NWB (L) UE / wean out of sling  Medical Diagnosis from Referral:   Encounter Diagnosis   Name Primary?    Other closed nondisplaced fracture of proximal end of left humerus, initial encounter      Evaluation Date: 1/23/2023  Authorization Period Expiration: 12/31/23  Plan of Care Expiration: 5/5/23  Progress Note Due: 2/23/23  Visit # / Visits authorized: 23/40      Time In: 9:16 am   Time Out: 9:58 am   Total Billable Time: 42 minutes    Precautions: Standard    Subjective     Pt reports: no pain in (L) shoulder and stated that she went to workout class this morning.   She was compliant with home exercise program.  Response to previous treatment: no adverse effects   Functional change: improved (L) shoulder AROM     Pain: 0/10 (L) shoulder       Objective       therapeutic exercises to develop strength,ROM, flexibility  for 42 minutes including:     Pulleys 3 min scaption/3 min flexion  Wall walks 2x10 3" flexion  and abduction  Scap Squeezes 3 x 10 reps blue not performed  Wand ER at 0,45, 90 deg abd 2x10 3" ea not performed  (L) ER / IR with T Band 3 x 10 reps green / blue not performed   (L) IR st with Strap 20 x 10 sec hold  Supine punches 3x10 4#   SL shoulder ER  3 x 10 2#  SL shoulder abduction 3x10 L 1#  Prone scaption 3x10 1#  Prone T 3 x 10 reps 1 #   Prone shoulder extension with ER 3 x 10 reps 2 #    Sleeper stretch 10"x10 L   Shoulder extensions BTB 3x10  not performed   Corner pec stretch 3x30"   Ball on wall circles x20 cw/ccw flexion and abduction    Home Exercises Provided and Patient Education Provided     Education provided:   - HEP     Written Home Exercises Provided: Patient instructed to cont prior HEP.   Martin demonstrated good  understanding of the education " provided.     See EMR under Patient Instructions for exercises provided prior visit.      Assessment     Pt was able to tolerate all therex without c/o increased pain.   Martin Is progressing well towards her goals.   Pt prognosis is Good.     Pt will continue to benefit from skilled outpatient physical therapy to address the deficits listed in the problem list box on initial evaluation, provide pt/family education and to maximize pt's level of independence in the home and community environment.     Pt's spiritual, cultural and educational needs considered and pt agreeable to plan of care and goals.    Anticipated barriers to physical therapy: none    Goals:     Short Term Goals: 1 weeks   Pt (I) with HEP - progressing not met     Long Term Goals: 6 weeks   Pt reports 0/10 pain in the (R) shoulder with functional activities - Not Met  Pt demo normal / painfree R shoulder AROM/PROM (3 months post injury) - progressing not met  Pt demo at least 120 deg of active (R) shoulder flexion  Met  Pt demo 0 inch difference on Apley's Scratch Test (L) UE < (R) UE - Not tested  Pt demo at least 4+/5 MMT throughout the (B) UE (3 months post injury) - Not tested    Plan     Continue per POC, progress as tolerated    Elvira Roman, PT

## 2023-05-01 ENCOUNTER — TELEPHONE (OUTPATIENT)
Dept: NEPHROLOGY | Facility: CLINIC | Age: 73
End: 2023-05-01
Payer: OTHER GOVERNMENT

## 2023-05-01 ENCOUNTER — CLINICAL SUPPORT (OUTPATIENT)
Dept: REHABILITATION | Facility: HOSPITAL | Age: 73
End: 2023-05-01
Payer: OTHER GOVERNMENT

## 2023-05-01 PROCEDURE — 97110 THERAPEUTIC EXERCISES: CPT

## 2023-05-01 NOTE — PROGRESS NOTES
"  Physical Therapy Daily Treatment Note     Name: Maria Elena Salazar [place]  Clinic Number: [id number]    Therapy Diagnosis: No diagnosis found.  Physician: Marcia Rodriguez PA-C    Visit Date: 5/1/2023  Physician Orders: PT Eval and Treat NWB (L) UE / wean out of sling  Medical Diagnosis from Referral:   Encounter Diagnosis   Name Primary?    Other closed nondisplaced fracture of proximal end of left humerus, initial encounter      Evaluation Date: 1/23/2023  Authorization Period Expiration: 12/31/23  Plan of Care Expiration: 5/5/23  Progress Note Due: 2/23/23  Visit # / Visits authorized: 24/40      Time In: 10:04 am   Time Out: 10:47 am   Total Billable Time: 43 minutes    Precautions: Standard    Subjective     Pt reports: that (L) shoulder is feeling good.  She was partially compliant with home exercise program.  Response to previous treatment: no adverse effects  Functional change: improved (L) shoulder AROM     Pain: 0/10 (L) Shoulder       Objective       therapeutic exercises to develop strength,ROM, flexibility  for 43 minutes including:     Pulleys 3 min scaption/3 min flexion  Wall walks 2x10 3" flexion  and abduction  Wand ER at 0,45, 90 deg abd 2x10 3" ea not performed  (L) ER / IR with T Band 3 x 10 reps green / blue   (L) IR st with Strap 20 x 10 sec hold  Supine punches 3x10 4#   SL shoulder ER  3 x 10 2#  SL shoulder abduction 3x10 L 1#  Prone scaption 3x10 1#  Prone T 3 x 10 reps 1 #   Prone shoulder extension with ER 3 x 10 reps 2 #    Sleeper stretch 10"x10 L   Corner pec stretch 3x30"   Ball on wall circles x20 cw/ccw flexion and abduction  Scap clocks 1x10 ea B OTB      Home Exercises Provided and Patient Education Provided     Education provided:   - HEP    Written Home Exercises Provided: yes.  Exercises were reviewed and Martin was able to demonstrate them prior to the end of the session.  Martin demonstrated good  understanding of the education provided.     See EMR under Patient Instructions for " exercises provided 5/1/2023.      Assessment     Added scap clocks today for additional periscapular strengthening. Pt tolerated therapy session without c/o increased pain. Gave pt updated HEP today. Pt should be appropriate to continue with HEP after next visit.   Martin Is progressing well towards her goals.   Pt prognosis is Good.     Pt will continue to benefit from skilled outpatient physical therapy to address the deficits listed in the problem list box on initial evaluation, provide pt/family education and to maximize pt's level of independence in the home and community environment.     Pt's spiritual, cultural and educational needs considered and pt agreeable to plan of care and goals.    Anticipated barriers to physical therapy: none    Goals:     Short Term Goals: 1 weeks   Pt (I) with HEP - progressing not met     Long Term Goals: 6 weeks   Pt reports 0/10 pain in the (R) shoulder with functional activities - Not Met  Pt demo normal / painfree R shoulder AROM/PROM (3 months post injury) - progressing not met  Pt demo at least 120 deg of active (R) shoulder flexion  Met  Pt demo 0 inch difference on Apley's Scratch Test (L) UE < (R) UE - Not tested  Pt demo at least 4+/5 MMT throughout the (B) UE (3 months post injury) - Not tested    Plan     Continue per POC, progress as tolerated     Elvira Roman, PT

## 2023-05-05 ENCOUNTER — CLINICAL SUPPORT (OUTPATIENT)
Dept: REHABILITATION | Facility: HOSPITAL | Age: 73
End: 2023-05-05
Payer: OTHER GOVERNMENT

## 2023-05-05 PROCEDURE — 97110 THERAPEUTIC EXERCISES: CPT

## 2023-05-05 NOTE — PROGRESS NOTES
"  Physical Therapy Daily Treatment Note     Name: Maria Elena Jackson  Clinic Number: [id number]    Therapy Diagnosis: No diagnosis found.  Physician: Marcia Rodriguez PA-C    Visit Date: 5/5/2023    Physician Orders: PT Eval and Treat NWB (L) UE / wean out of sling  Medical Diagnosis from Referral:   Encounter Diagnosis   Name Primary?    Other closed nondisplaced fracture of proximal end of left humerus, initial encounter      Evaluation Date: 1/23/2023  Authorization Period Expiration: 12/31/23  Plan of Care Expiration: 5/5/23  Progress Note Due: 2/23/23  Visit # / Visits authorized: 25/40    Time In: 10:02 am   Time Out: 10:47 am   Total Billable Time: 45 minutes    Precautions: Standard    Subjective     Pt reports: (L) shoulder is still feeling good, no pain.  She was compliant with home exercise program.  Response to previous treatment: no adverse effects  Functional change: improvements in (L) shoulder AROM     Pain: 0/10 (L) shoulder       Objective       Objective measurements and therapeutic exercises to develop strength,ROM, flexibility  for 45 minutes including:     Pulleys 3 min scaption/3 min flexion  Wall walks 2x10 3" flexion  and abduction  Wand ER at 45, 90 deg abd 2x10 3" ea   (L) ER / IR with T Band 3 x 10 reps green / blue not performed  (L) IR st with Strap 20 x 10 sec hold  Supine punches 3x10 4# not performed  SL shoulder ER  3 x 10 2# not performed  SL shoulder abduction 3x10 L 1# not performed  Prone scaption 3x10 1# not performed  Prone T 3 x 10 reps 1 #  not performed  Prone shoulder extension with ER 3 x 10 reps 2 #  - not performed  Sleeper stretch 10"x10 L   Corner pec stretch 3x30" not performed  Ball on wall circles x20 cw/ccw flexion and abduction not performed  Scap clocks 1x10 ea B OTB not performed         Shoulder Right   Left  Pain/Dysfunction with Movement     AROM MMT AROM MMT NT= not tested    flexion 165 5/5 160 5/5     abduction 165 5/5 165 4+/5     Internal rotation 90 5/5 " 78 5/5     ER at 0° abd 85 5/5 75 4+/5     ER 45 deg abd 75 NT 75 NT     ER at 90° abd 90 NT 75 NT           Home Exercises Provided and Patient Education Provided     Education provided:   - HEP     Written Home Exercises Provided: Patient instructed to cont prior HEP.  Martin demonstrated good  understanding of the education provided.     See EMR under Patient Instructions for exercises provided prior visit.      Assessment     Updated measurements taken today and pt does demo improvements in (L) shoulder AROM compared to prior measurements. Pt also demo good strength in planes measured above. Pt has consistently reported that she has not been experiencing pain in (L) shoulder. Pt is appropriate to continue with HEP at this time. See plan below.   Martin Is progressing well towards her goals.   Pt prognosis is Good.     Pt will continue to benefit from skilled outpatient physical therapy to address the deficits listed in the problem list box on initial evaluation, provide pt/family education and to maximize pt's level of independence in the home and community environment.     Pt's spiritual, cultural and educational needs considered and pt agreeable to plan of care and goals.    Anticipated barriers to physical therapy: none    Goals:     Short Term Goals: 1 weeks   Pt (I) with HEP - Met     Long Term Goals: 6 weeks   Pt reports 0/10 pain in the (R) shoulder with functional activities -Met   Pt demo normal / painfree R shoulder AROM/PROM (3 months post injury) - Partially Met  Pt demo at least 120 deg of active (R) shoulder flexion  Met  Pt demo 0 inch difference on Apley's Scratch Test (L) UE < (R) UE - Not tested  Pt demo at least 4+/5 MMT throughout the (B) UE (3 months post injury) - Met    Plan     Plan for pt to continue with HEP at this time. Discussed with pt that I would wait approximately one month to discharge her and if within the month she experiences flare of pain/symptoms to contact clinic to schedule  a follow up apt. If pt does not contact clinic to schedule a follow up apt then will plan to discharge pt in approximately one month. Pt verbalized understanding and was agreeable to plan.     Elvira Roman, PT

## 2023-05-09 ENCOUNTER — PATIENT OUTREACH (OUTPATIENT)
Dept: ADMINISTRATIVE | Facility: HOSPITAL | Age: 73
End: 2023-05-09
Payer: OTHER GOVERNMENT

## 2023-05-20 NOTE — PROGRESS NOTES
[facility]    Subjective:       Patient ID: Maria Elena Jackson is a 72 y.o. female.    Chief Complaint: Hypertension (F/u)        History was obtained from the patient and supplemented through chart review.  This patient is known to me.     HPI:    Patient is a 72 y.o. female w/ HTN, HLD, family hx of colon cancer, ovarian cancer presents for BP f/u    Recent arm fracture;Healing and doing OT, doing well    Incontinence since hyst; Following with Urogyn Dr. Younger; Bladder leakage planned for sling, likely.      HTN  controlled  coreg 12.5 BID, irbesartan-hctz 300-12.5 mg, nifedipine 60  No CP/HA/SOB/Vision changes     Wishes for help losing weight, wondering about GLP-1  No contraindications  Will attempt Rx  Prediabetes, obesity, BMI 31  Failed diet and exercise    HLD  lipitor 40 three times a week nightly (worried about LFTs despite education)  stable    Prediabetes  Diet counseled; discussed   Stable 5.6     Mom had colon cancer, ovarian cancer,  early  S/p oopherectomy, hyst; Follows with gyn after hyst    Osteopenia -> osteoporosis  Discussed dosing of vit D/ca  3 times a week cross fit   Wants to hold off on any meds  Concerned about dental issues; awaiting to see Dr. Daniel Valadez (will need to reschedule due to travel)  Referred to endocrinology; pt did not wait for appt due to concern over finances    Wt Readings from Last 15 Encounters:   23 85.1 kg (187 lb 9.8 oz)   23 82.6 kg (182 lb)   23 83 kg (182 lb 15.7 oz)   23 83 kg (183 lb)   23 83.3 kg (183 lb 10.3 oz)   02/15/23 84.6 kg (186 lb 8.2 oz)   23 83 kg (182 lb 15.7 oz)   23 83.8 kg (184 lb 11.9 oz)   23 87 kg (191 lb 12.8 oz)   23 87 kg (191 lb 12.8 oz)   22 87 kg (191 lb 12.8 oz)   22 87 kg (191 lb 12.8 oz)   11/15/22 86.5 kg (190 lb 11.2 oz)   08/15/22 85 kg (187 lb 6.3 oz)   22 83 kg (183 lb)        Medical History  Past Medical History:   Diagnosis Date  "   Arthritis     Hyperlipidemia     Hypertension     S/P total hysterectomy and bilateral salpingo-oophorectomy 6/29/2022       Review of Systems   Constitutional:  Negative for fever.   HENT:  Negative for trouble swallowing.    Respiratory:  Negative for shortness of breath.    Cardiovascular:  Negative for chest pain.   Gastrointestinal:  Negative for constipation, diarrhea, nausea and vomiting.   Genitourinary:         Leaking urine   Musculoskeletal:  Positive for arthralgias. Negative for gait problem.   Neurological:  Negative for dizziness and seizures.   Psychiatric/Behavioral:  Negative for hallucinations.        Surgical hx, family hx, social hx   Have been reviewed      Current Outpatient Medications:     atorvastatin (LIPITOR) 40 MG tablet, Take 1 tablet (40 mg total) by mouth every evening. (Patient taking differently: Take 40 mg by mouth every evening. 3 times a week), Disp: 90 tablet, Rfl: 3    calcium-vitamin D 250 mg-2.5 mcg (100 unit) per tablet, Take 1 tablet by mouth 2 (two) times daily., Disp: , Rfl:     carvediloL (COREG) 12.5 MG tablet, Take 1 tablet (12.5 mg total) by mouth 2 (two) times daily with meals., Disp: 120 tablet, Rfl: 2    irbesartan-hydrochlorothiazide (AVALIDE) 300-12.5 mg per tablet, Take 1 tablet by mouth nightly., Disp: 90 tablet, Rfl: 3    NIFEdipine (PROCARDIA-XL) 60 MG (OSM) 24 hr tablet, Take 1 tablet (60 mg total) by mouth once daily., Disp: 90 tablet, Rfl: 3    cholecalciferol, vitamin D3, (VITAMIN D3) 25 mcg (1,000 unit) capsule, Take 1,000 Units by mouth once daily., Disp: , Rfl:     semaglutide (OZEMPIC) 0.25 mg or 0.5 mg (2 mg/3 mL) pen injector, Inject 0.25 mg into the skin every 7 days., Disp: 4.82 mL, Rfl: 0    Objective:        Body mass index is 31.22 kg/m².  Vitals:    05/23/23 1449   BP: 136/68   Pulse: 63   SpO2: (!) 94%   Weight: 85.1 kg (187 lb 9.8 oz)   Height: 5' 5" (1.651 m)   PainSc: 0-No pain           Physical Exam  Vitals and nursing note reviewed. "   Constitutional:       General: She is not in acute distress.     Appearance: Normal appearance. She is not ill-appearing.   HENT:      Head: Normocephalic and atraumatic.   Eyes:      General: No scleral icterus.  Pulmonary:      Effort: Pulmonary effort is normal.   Abdominal:      General: There is no distension.   Musculoskeletal:         General: No deformity.      Cervical back: Normal range of motion.   Skin:     General: Skin is warm and dry.   Neurological:      Mental Status: She is alert and oriented to person, place, and time.   Psychiatric:         Behavior: Behavior normal.         Lab Results   Component Value Date    WBC 7.16 02/13/2023    HGB 13.8 02/13/2023    HCT 43.6 02/13/2023     02/13/2023    CHOL 181 01/11/2022    TRIG 104 01/11/2022    HDL 47 01/11/2022    ALT 19 12/13/2022    AST 18 12/13/2022     02/13/2023    K 3.6 02/13/2023     02/13/2023    CREATININE 0.7 02/13/2023    BUN 19 02/13/2023    CO2 25 02/13/2023    INR 0.9 10/01/2019    HGBA1C 5.6 11/08/2022       The 10-year ASCVD risk score (Quyen DK, et al., 2019) is: 21%    Values used to calculate the score:      Age: 72 years      Sex: Female      Is Non- : No      Diabetic: No      Tobacco smoker: No      Systolic Blood Pressure: 136 mmHg      Is BP treated: Yes      HDL Cholesterol: 47 mg/dL      Total Cholesterol: 181 mg/dL    On lipitor 3 times a week    (Imaging have been independently reviewed)    Assessment:         1. Essential hypertension    2. Hyperlipidemia, unspecified hyperlipidemia type    3. Status post hysteroscopy with polypectomy    4. S/p RA-TLH/BSO/LND    5. Prediabetes    6. Obesity (BMI 30-39.9)    7. At risk for bleeding                Plan:     Maria Elena was seen today for hypertension.    Diagnoses and all orders for this visit:    Essential hypertension  -     Comprehensive Metabolic Panel; Future  -     TSH; Future    Hyperlipidemia, unspecified hyperlipidemia  type  -     Lipid Panel; Future    Status post hysteroscopy with polypectomy    S/p RA-TLH/BSO/LND    Prediabetes  -     semaglutide (OZEMPIC) 0.25 mg or 0.5 mg (2 mg/3 mL) pen injector; Inject 0.25 mg into the skin every 7 days.  -     TSH; Future  -     Cancel: Hemoglobin A1C; Future  -     Hemoglobin A1C; Future    Obesity (BMI 30-39.9)  -     semaglutide (OZEMPIC) 0.25 mg or 0.5 mg (2 mg/3 mL) pen injector; Inject 0.25 mg into the skin every 7 days.    At risk for bleeding  -     CBC Auto Differential; Future              Health Maintenance  - Lipids:   - A1C:   - Colon Ca Screen: diverticulosis, 9/7/2022 repeat 5 years  - Immunizations:    Women's health  - Pap: s/p 6/2022 concern for cancer, but not cancer  - Mammo: rescheduled  - Dexa: osteoporosis 11/15/2022    Follow up in about 6 months (around 11/23/2023) for annual or sooner if needed/ozempic .        Pt wishes for less frequent appts, but also wants ozempic    All medications were reviewed including potential side effects and risks/benefits.  Pt was counseled to call back if anything worsens or if questions arise.    Monroe Cutler MD  Family Medicine  [facility]  [address] [place] [id number]  Phone [phone]  Fax [phone]

## 2023-05-23 ENCOUNTER — OFFICE VISIT (OUTPATIENT)
Dept: INTERNAL MEDICINE | Facility: CLINIC | Age: 73
End: 2023-05-23
Payer: OTHER GOVERNMENT

## 2023-05-23 VITALS
DIASTOLIC BLOOD PRESSURE: 68 MMHG | HEART RATE: 63 BPM | BODY MASS INDEX: 31.26 KG/M2 | OXYGEN SATURATION: 94 % | SYSTOLIC BLOOD PRESSURE: 136 MMHG | WEIGHT: 187.63 LBS | HEIGHT: 65 IN

## 2023-05-23 DIAGNOSIS — I10 ESSENTIAL HYPERTENSION: Primary | ICD-10-CM

## 2023-05-23 DIAGNOSIS — R73.03 PREDIABETES: ICD-10-CM

## 2023-05-23 DIAGNOSIS — Z90.79 S/P TOTAL HYSTERECTOMY AND BILATERAL SALPINGO-OOPHORECTOMY: ICD-10-CM

## 2023-05-23 DIAGNOSIS — Z90.710 S/P TOTAL HYSTERECTOMY AND BILATERAL SALPINGO-OOPHORECTOMY: ICD-10-CM

## 2023-05-23 DIAGNOSIS — Z98.890 STATUS POST HYSTEROSCOPY: ICD-10-CM

## 2023-05-23 DIAGNOSIS — E78.5 HYPERLIPIDEMIA, UNSPECIFIED HYPERLIPIDEMIA TYPE: ICD-10-CM

## 2023-05-23 DIAGNOSIS — Z90.722 S/P TOTAL HYSTERECTOMY AND BILATERAL SALPINGO-OOPHORECTOMY: ICD-10-CM

## 2023-05-23 DIAGNOSIS — E66.9 OBESITY (BMI 30-39.9): ICD-10-CM

## 2023-05-23 DIAGNOSIS — Z91.89 AT RISK FOR BLEEDING: ICD-10-CM

## 2023-05-23 PROCEDURE — 99999 PR PBB SHADOW E&M-EST. PATIENT-LVL IV: ICD-10-PCS | Mod: PBBFAC,,, | Performed by: STUDENT IN AN ORGANIZED HEALTH CARE EDUCATION/TRAINING PROGRAM

## 2023-05-23 PROCEDURE — 99214 OFFICE O/P EST MOD 30 MIN: CPT | Mod: S$GLB,,, | Performed by: STUDENT IN AN ORGANIZED HEALTH CARE EDUCATION/TRAINING PROGRAM

## 2023-05-23 PROCEDURE — 99214 PR OFFICE/OUTPT VISIT, EST, LEVL IV, 30-39 MIN: ICD-10-PCS | Mod: S$GLB,,, | Performed by: STUDENT IN AN ORGANIZED HEALTH CARE EDUCATION/TRAINING PROGRAM

## 2023-05-23 PROCEDURE — 99999 PR PBB SHADOW E&M-EST. PATIENT-LVL IV: CPT | Mod: PBBFAC,,, | Performed by: STUDENT IN AN ORGANIZED HEALTH CARE EDUCATION/TRAINING PROGRAM

## 2023-05-23 RX ORDER — SEMAGLUTIDE 0.68 MG/ML
0.25 INJECTION, SOLUTION SUBCUTANEOUS
Qty: 4.82 ML | Refills: 0 | Status: SHIPPED | OUTPATIENT
Start: 2023-05-23 | End: 2023-07-05 | Stop reason: SDUPTHER

## 2023-05-24 ENCOUNTER — OFFICE VISIT (OUTPATIENT)
Dept: UROGYNECOLOGY | Facility: CLINIC | Age: 73
End: 2023-05-24
Payer: OTHER GOVERNMENT

## 2023-05-24 VITALS
DIASTOLIC BLOOD PRESSURE: 64 MMHG | WEIGHT: 187.63 LBS | SYSTOLIC BLOOD PRESSURE: 135 MMHG | HEIGHT: 65 IN | BODY MASS INDEX: 31.26 KG/M2 | HEART RATE: 63 BPM

## 2023-05-24 DIAGNOSIS — N39.41 URGE INCONTINENCE OF URINE: ICD-10-CM

## 2023-05-24 DIAGNOSIS — N39.3 STRESS INCONTINENCE OF URINE: Primary | ICD-10-CM

## 2023-05-24 PROCEDURE — 99999 PR PBB SHADOW E&M-EST. PATIENT-LVL III: ICD-10-PCS | Mod: PBBFAC,,, | Performed by: OBSTETRICS & GYNECOLOGY

## 2023-05-24 PROCEDURE — 99213 OFFICE O/P EST LOW 20 MIN: CPT | Mod: S$GLB,,, | Performed by: OBSTETRICS & GYNECOLOGY

## 2023-05-24 PROCEDURE — 99999 PR PBB SHADOW E&M-EST. PATIENT-LVL III: CPT | Mod: PBBFAC,,, | Performed by: OBSTETRICS & GYNECOLOGY

## 2023-05-24 PROCEDURE — 99213 PR OFFICE/OUTPT VISIT, EST, LEVL III, 20-29 MIN: ICD-10-PCS | Mod: S$GLB,,, | Performed by: OBSTETRICS & GYNECOLOGY

## 2023-05-24 RX ORDER — DOXAZOSIN 4 MG/1
4 TABLET ORAL
COMMUNITY
Start: 2023-02-13

## 2023-05-24 NOTE — PROGRESS NOTES
Chief Complaint   Patient presents with    consents      3 month follow up         HPI: Patient is a 72 y.o. female  with both stress urinary incontinence and urgency urinary incontinence symptoms but states that she is more bothered by her stress urinary incontinence. She does want to proceed with surical intervention for her symptoms.  Patient underwent urodynamic testing and cystoscopy on 23. Cystoscopy was unremarkable and her urodynamic testing showed that she has a normal uroflowmetry pattern at the start of testing. Her end pressure flow study showed an interrupted pattern but her bladder compliance during filling was normal and her bladder capacity was above normal at 624 mL. She was noted to have stress urinary incontinence at 450 mL. No detrusor instability was seen.   No new complaints today.     REVIEW OF SYSTEMS:  A full 14-point ROS was performed and was significant for those  mentioned in the HPI.    The following portions of the patient's history were reviewed and updated as appropriate: allergies, current medications, past medical history, past surgical history and problem list.    PHYSICAL EXAMINATION    Vitals:    23 1404   BP: 135/64   Pulse: 63        General: Healthy in appearance, Well nourished, Affect Normal, NAD.       ASSESSMENT & PLAN:    Stress incontinence of urine    Urge incontinence of urine       71 yo with mixed urinary incontinence, predominantly stress urinary incontinence who presented to review options once more for intervention. Reviewed treatment options including midurethral slings versus ronen-urethral bulking. Patient opts to proceed with midurethral sling. Given her age and concomitant urgency urinary incontinence symptoms opt to proceed with TVT-O and cystoscopy.   We reviewed the risks and benefits of the planned surgical procedure which included but were not limited to the potential conversion to an open or vaginal procedure, pelvic pain, pain with  intercourse, infection, bleeding, need for transfusion, risks of transfusion, injury to bowel, urinary tract, blood vessels or nerves, urinary retention, prolonged catheterization, mesh erosion or infection, no change in symptoms, new onset ZANE or UUI, change in urine stream, recurrent UTI's, fistula formation, need for ostomy (colostomy/ileostomy/ nephrostomy/ ileostomy) and need for further surgery. Patient was provided the opportunity to ask questions, All questions were answered. The informed consent was signed and a copy was provided to the patient.     FDA safety advisory and mesh risks specific to the patient's procedure were reviewed.   Patient would like to have surgery in late August.     All questions were answered today. The patient was encouraged to contact the office as needed with any additional questions or concerns.     Total time spent on visit was 20 minutes.  This includes face to face time and non-face to face time preparing to see the patient (eg, review of tests), Obtaining and/or reviewing separately obtained history, Documenting clinical information in the electronic or other health record, Independently interpreting resultsand communicating results to the patient/family/caregiver, or Care coordination.    Nedra Younger MD

## 2023-05-29 ENCOUNTER — TELEPHONE (OUTPATIENT)
Dept: UROGYNECOLOGY | Facility: CLINIC | Age: 73
End: 2023-05-29
Payer: OTHER GOVERNMENT

## 2023-05-29 DIAGNOSIS — N39.3 STRESS INCONTINENCE OF URINE: Primary | ICD-10-CM

## 2023-05-29 DIAGNOSIS — Z01.818 PREOP TESTING: Primary | ICD-10-CM

## 2023-05-30 ENCOUNTER — TELEPHONE (OUTPATIENT)
Dept: INTERNAL MEDICINE | Facility: CLINIC | Age: 73
End: 2023-05-30
Payer: OTHER GOVERNMENT

## 2023-05-30 ENCOUNTER — HOSPITAL ENCOUNTER (OUTPATIENT)
Dept: RADIOLOGY | Facility: HOSPITAL | Age: 73
Discharge: HOME | End: 2023-05-30
Attending: STUDENT IN AN ORGANIZED HEALTH CARE EDUCATION/TRAINING PROGRAM
Payer: OTHER GOVERNMENT

## 2023-05-30 VITALS — BODY MASS INDEX: 31.22 KG/M2 | HEIGHT: 65 IN

## 2023-05-30 DIAGNOSIS — Z12.31 ENCOUNTER FOR SCREENING MAMMOGRAM FOR MALIGNANT NEOPLASM OF BREAST: ICD-10-CM

## 2023-05-30 PROCEDURE — 77063 MAMMO DIGITAL SCREENING BILAT WITH TOMO: ICD-10-PCS | Mod: 26,,, | Performed by: RADIOLOGY

## 2023-05-30 PROCEDURE — 77067 MAMMO DIGITAL SCREENING BILAT WITH TOMO: ICD-10-PCS | Mod: 26,,, | Performed by: RADIOLOGY

## 2023-05-30 PROCEDURE — 77067 SCR MAMMO BI INCL CAD: CPT | Mod: TC

## 2023-05-30 PROCEDURE — 77067 SCR MAMMO BI INCL CAD: CPT | Mod: 26,,, | Performed by: RADIOLOGY

## 2023-05-30 PROCEDURE — 77063 BREAST TOMOSYNTHESIS BI: CPT | Mod: 26,,, | Performed by: RADIOLOGY

## 2023-05-30 NOTE — TELEPHONE ENCOUNTER
----- Message from Vangie Giles NP sent at 5/29/2023  7:45 PM CDT -----  Maria Elena Rich is scheduled to have a midurethral sling and cystoscopy with Dr. Younger on 08/17/2023.  Please assist with surgical clearance.   When completed,  please place a note in Epic stating whether the patient is cleared for surgery. If you have any questions our office number is [phone], feel free to contact us.     Sincerely,     DEMARIO Lobato-BC  Female Pelvic Medicine& Reconstructive surgery  [facility], Department of Urogynecology

## 2023-05-30 NOTE — TELEPHONE ENCOUNTER
----- Message from Monroe Cutler MD sent at 5/30/2023  9:08 AM CDT -----    Needs pre-op clearance with available MD/PA please    ----- Message -----  From: Vangie Giles NP  Sent: 5/29/2023   7:46 PM CDT  To: Monroe Cutler MD, #    Maria Elena Richens is scheduled to have a midurethral sling and cystoscopy with Dr. Younger on 08/17/2023.  Please assist with surgical clearance.   When completed,  please place a note in Epic stating whether the patient is cleared for surgery. If you have any questions our office number is [phone], feel free to contact us.     Sincerely,     Vangie Giles, [employer]-BC  Female Pelvic Medicine& Reconstructive surgery  [facility], Department of Urogynecology

## 2023-06-15 ENCOUNTER — HOSPITAL ENCOUNTER (OUTPATIENT)
Dept: RADIOLOGY | Facility: HOSPITAL | Age: 73
Discharge: HOME | End: 2023-06-15
Attending: INTERNAL MEDICINE
Payer: OTHER GOVERNMENT

## 2023-06-15 DIAGNOSIS — N28.1 RENAL CYST: ICD-10-CM

## 2023-06-15 PROCEDURE — 76770 US RETROPERITONEAL COMPLETE: ICD-10-PCS | Mod: 26,,, | Performed by: RADIOLOGY

## 2023-06-15 PROCEDURE — 76770 US EXAM ABDO BACK WALL COMP: CPT | Mod: 26,,, | Performed by: RADIOLOGY

## 2023-06-15 PROCEDURE — 76770 US EXAM ABDO BACK WALL COMP: CPT | Mod: TC

## 2023-06-21 ENCOUNTER — TELEPHONE (OUTPATIENT)
Dept: OBSTETRICS AND GYNECOLOGY | Facility: CLINIC | Age: 73
End: 2023-06-21
Payer: OTHER GOVERNMENT

## 2023-06-21 ENCOUNTER — OFFICE VISIT (OUTPATIENT)
Dept: OBSTETRICS AND GYNECOLOGY | Facility: CLINIC | Age: 73
End: 2023-06-21
Attending: OBSTETRICS & GYNECOLOGY
Payer: OTHER GOVERNMENT

## 2023-06-21 VITALS
WEIGHT: 184.06 LBS | SYSTOLIC BLOOD PRESSURE: 140 MMHG | DIASTOLIC BLOOD PRESSURE: 60 MMHG | HEIGHT: 65 IN | BODY MASS INDEX: 30.67 KG/M2

## 2023-06-21 DIAGNOSIS — Z01.419 WELL WOMAN EXAM: Primary | ICD-10-CM

## 2023-06-21 DIAGNOSIS — N95.1 MENOPAUSAL VAGINAL DRYNESS: ICD-10-CM

## 2023-06-21 PROCEDURE — 99999 PR PBB SHADOW E&M-EST. PATIENT-LVL III: CPT | Mod: PBBFAC,,, | Performed by: OBSTETRICS & GYNECOLOGY

## 2023-06-21 PROCEDURE — 99397 PR PREVENTIVE VISIT,EST,65 & OVER: ICD-10-PCS | Mod: S$GLB,,, | Performed by: OBSTETRICS & GYNECOLOGY

## 2023-06-21 PROCEDURE — 99999 PR PBB SHADOW E&M-EST. PATIENT-LVL III: ICD-10-PCS | Mod: PBBFAC,,, | Performed by: OBSTETRICS & GYNECOLOGY

## 2023-06-21 PROCEDURE — 99397 PER PM REEVAL EST PAT 65+ YR: CPT | Mod: S$GLB,,, | Performed by: OBSTETRICS & GYNECOLOGY

## 2023-06-21 RX ORDER — CARVEDILOL 6.25 MG/1
6.25 TABLET ORAL 2 TIMES DAILY
COMMUNITY
Start: 2023-05-26 | End: 2023-08-02 | Stop reason: CLARIF

## 2023-06-21 RX ORDER — ESTRADIOL 10 UG/1
10 INSERT VAGINAL
Qty: 24 TABLET | Refills: 3 | Status: ON HOLD | OUTPATIENT
Start: 2023-06-22 | End: 2023-08-17 | Stop reason: HOSPADM

## 2023-06-21 RX ORDER — HYDROCORTISONE 25 MG/G
CREAM TOPICAL 2 TIMES DAILY
Status: ON HOLD | COMMUNITY
Start: 2023-05-26 | End: 2023-08-17 | Stop reason: HOSPADM

## 2023-06-21 RX ORDER — CARVEDILOL 12.5 MG/1
1 TABLET ORAL 2 TIMES DAILY WITH MEALS
COMMUNITY
Start: 2022-11-11 | End: 2023-08-02 | Stop reason: CLARIF

## 2023-06-21 RX ORDER — ATORVASTATIN CALCIUM 40 MG/1
1 TABLET, FILM COATED ORAL NIGHTLY
COMMUNITY
Start: 2022-12-06 | End: 2023-08-02 | Stop reason: CLARIF

## 2023-06-21 NOTE — PROGRESS NOTES
CC: Well woman exam    Maria Elena Jackson is a 72 y.o. female  status post hyst/bso presents for a well woman exam.       HSC/D&C for PMPB.  Path benign polyp.       HSC/D&C:  Complex Hyperplasia with Atypia     RALH/BSO/SLN (Dr Mcgraw):  complex atypical hyperplasia in association with benign polyp and adenomyosis.  No malignancy.    No history of abnormal pap   MMG normal   DEXA:  Osteoporosis both hips    C/o vaginal dryness.    Requests ERT for osteoporosis     Past Medical History:   Diagnosis Date    Arthritis     Hyperlipidemia     Hypertension     S/P total hysterectomy and bilateral salpingo-oophorectomy 2022     Past Surgical History:   Procedure Laterality Date    CHOLECYSTECTOMY      D&C Hysteroscopy      DILATION AND CURETTAGE OF UTERUS      HYSTEROSCOPY WITH DILATION AND CURETTAGE OF UTERUS N/A 5/10/2022    Procedure: HYSTEROSCOPY, WITH DILATION AND CURETTAGE OF UTERUS;  Surgeon: Marce Ellsworth MD;  Location: [facility];  Service: OB/GYN;  Laterality: N/A;    JOINT REPLACEMENT Right     laparoscopy      LYMPH NODE BIOPSY N/A 2022    Procedure: BIOPSY, LYMPH NODE;  Surgeon: Kevin Mcgraw MD;  Location: [facility] OR;  Service: OB/GYN;  Laterality: N/A;    TONSILLECTOMY      TOTAL KNEE ARTHROPLASTY Right 10/7/2019    Procedure: ARTHROPLASTY, KNEE, TOTAL;  Surgeon: John L. [facility]ner Jr., MD;  Location: [facility] OR;  Service: Orthopedics;  Laterality: Right;    XI ROBOTIC HYSTERECTOMY, WITH SALPINGO-OOPHORECTOMY Bilateral 2022    Procedure: XI ROBOTIC HYSTERECTOMY,WITH SALPINGO-OOPHORECTOMY;  Surgeon: Kevin Mcgraw MD;  Location: [facility];  Service: OB/GYN;  Laterality: Bilateral;  Robotic assisted total hysterectomy, Bilateral salpingo-oophrectomy, [place] Node Mapping       Family History   Problem Relation Age of Onset    Colon cancer Mother 55    Ovarian cancer Mother 35    Prostate cancer Father 50    Breast cancer Neg Hx      Social History     Tobacco Use  "   Smoking status: Former     Packs/day: 1.50     Years: 22.00     Pack years: 33.00     Types: Cigarettes     Quit date:      Years since quittin.4    Smokeless tobacco: Never   Substance Use Topics    Alcohol use: Yes     Comment: 2 glasses of wine in a month    Drug use: No     OB History          2    Para   2    Term   0            AB        Living   2         SAB        IAB        Ectopic        Multiple        Live Births   2                 BP (!) 140/60   Ht 5' 5" (1.651 m)   Wt 83.5 kg (184 lb 1.4 oz)   LMP  (LMP Unknown)   BMI 30.63 kg/m²     ROS:    GENERAL: Denies weight gain or weight loss. Feeling well overall.   SKIN: Denies rash or lesions.   HEAD: Denies head injury or headache.   NODES: Denies enlarged lymph nodes.   CHEST: Denies chest pain or shortness of breath.   CARDIOVASCULAR: Denies palpitations or left sided chest pain.   ABDOMEN: No abdominal pain, constipation, diarrhea, nausea, vomiting or rectal bleeding.   URINARY: No frequency, dysuria, hematuria, or burning on urination.  REPRODUCTIVE: See HPI.   BREASTS: The patient performs breast self-examination and denies pain, lumps, or nipple discharge.   HEMATOLOGIC: No easy bruisability or excessive bleeding.   MUSCULOSKELETAL: Denies joint pain or swelling.   NEUROLOGIC: Denies syncope or weakness.   PSYCHIATRIC: Denies depression, anxiety or mood swings.      PHYSICAL EXAM:     APPEARANCE: Well nourished, well developed, in no acute distress.  AFFECT: WNL, alert and oriented x 3.  SKIN: No acne or hirsutism.  NECK: Neck symmetric without masses or thyromegaly.  NODES: No inguinal, cervical, axillary or femoral lymph node enlargement.  CHEST: Good respiratory effort.   ABDOMEN: Soft. No tenderness or masses. No hepatosplenomegaly. No hernias.  BREASTS: Symmetrical, no skin changes or visible lesions. No palpable masses, nipple discharge bilaterally.  PELVIC: Normal external female genitalia without lesions. " Normal hair distribution. Adequate perineal body, normal urethral meatus. Vagina atrophic without lesions or discharge. No significant cystocele or rectocele. Bimanual exam shows uterus and cervix to be surgically absent. Adnexa without masses or tenderness.  EXTREMITIES: No edema.    ASSESSMENT & PLAN    ICD-10-CM ICD-9-CM    1. Well woman exam  Z01.419 V72.31       2. Menopausal vaginal dryness  N95.1 627.2 estradioL (VAGIFEM) 10 mcg Tab             Discussed r/b/indications for ERT.  ERT currently not indicated for treatment of osteoporosis.  Patient was counseled today on A.C.S. Pap guidelines and recommendations for yearly pelvic exams, mammograms and monthly self breast exams; to see her PCP for other health maintenance.

## 2023-06-21 NOTE — TELEPHONE ENCOUNTER
----- Message from Raquel Lainez sent at 6/21/2023  8:38 AM CDT -----  Name of Who is Calling: TAYLOR BLANKENSHIP [[id number]]              What is the request in detail: Patient requesting a call back to discuss possibly coming in at an earlier time              Can the clinic reply by [facility]: No              What Number to Call Back if not in [facility]: [phone]

## 2023-06-30 ENCOUNTER — PATIENT MESSAGE (OUTPATIENT)
Dept: INTERNAL MEDICINE | Facility: CLINIC | Age: 73
End: 2023-06-30
Payer: OTHER GOVERNMENT

## 2023-06-30 ENCOUNTER — PATIENT MESSAGE (OUTPATIENT)
Dept: SURGERY | Facility: OTHER | Age: 73
End: 2023-06-30
Payer: OTHER GOVERNMENT

## 2023-07-05 DIAGNOSIS — R73.03 PREDIABETES: ICD-10-CM

## 2023-07-05 DIAGNOSIS — E66.9 OBESITY (BMI 30-39.9): ICD-10-CM

## 2023-07-05 RX ORDER — SEMAGLUTIDE 0.68 MG/ML
0.25 INJECTION, SOLUTION SUBCUTANEOUS
Qty: 4.82 ML | Refills: 0 | Status: SHIPPED | OUTPATIENT
Start: 2023-07-05 | End: 2023-08-21

## 2023-07-05 NOTE — TELEPHONE ENCOUNTER
Care Due:                  Date            Visit Type   Department     Provider  --------------------------------------------------------------------------------                                EP -                              PRIMARY      [facility] INTERNAL  Last Visit: 05-      CARE ([facility])   BAYLEE Cutler                              EP -                              PRIMARY      [facility] INTERNAL  Next Visit: 11-      CARE ([facility])   BAYLEE Cutler                                                            Last  Test          Frequency    Reason                     Performed    Due Date  --------------------------------------------------------------------------------    HBA1C.......  6 months...  semaglutide..............  11- 05-    Lipid Panel.  12 months..  atorvastatin.............  01- 01-    [facility] Embedded Care Due Messages. Reference number: [id number].   7/05/2023 11:07:38 AM CDT

## 2023-07-13 ENCOUNTER — PATIENT MESSAGE (OUTPATIENT)
Dept: ADMINISTRATIVE | Facility: OTHER | Age: 73
End: 2023-07-13
Payer: OTHER GOVERNMENT

## 2023-08-02 ENCOUNTER — HOSPITAL ENCOUNTER (OUTPATIENT)
Dept: RADIOLOGY | Facility: OTHER | Age: 73
Discharge: HOME | End: 2023-08-02
Attending: NURSE PRACTITIONER
Payer: OTHER GOVERNMENT

## 2023-08-02 ENCOUNTER — ANESTHESIA EVENT (OUTPATIENT)
Dept: SURGERY | Facility: OTHER | Age: 73
End: 2023-08-02
Payer: OTHER GOVERNMENT

## 2023-08-02 ENCOUNTER — HOSPITAL ENCOUNTER (OUTPATIENT)
Dept: PREADMISSION TESTING | Facility: OTHER | Age: 73
Discharge: HOME | End: 2023-08-02
Attending: OBSTETRICS & GYNECOLOGY
Payer: OTHER GOVERNMENT

## 2023-08-02 ENCOUNTER — OFFICE VISIT (OUTPATIENT)
Dept: INTERNAL MEDICINE | Facility: CLINIC | Age: 73
End: 2023-08-02
Payer: OTHER GOVERNMENT

## 2023-08-02 VITALS
SYSTOLIC BLOOD PRESSURE: 132 MMHG | DIASTOLIC BLOOD PRESSURE: 60 MMHG | HEART RATE: 60 BPM | WEIGHT: 181.19 LBS | HEIGHT: 65 IN | BODY MASS INDEX: 30.19 KG/M2 | OXYGEN SATURATION: 94 %

## 2023-08-02 VITALS
SYSTOLIC BLOOD PRESSURE: 128 MMHG | BODY MASS INDEX: 30.16 KG/M2 | TEMPERATURE: 98 F | HEART RATE: 68 BPM | DIASTOLIC BLOOD PRESSURE: 58 MMHG | HEIGHT: 65 IN | OXYGEN SATURATION: 94 % | RESPIRATION RATE: 16 BRPM | WEIGHT: 181 LBS

## 2023-08-02 DIAGNOSIS — E66.9 OBESITY (BMI 30-39.9): ICD-10-CM

## 2023-08-02 DIAGNOSIS — I10 ESSENTIAL HYPERTENSION: ICD-10-CM

## 2023-08-02 DIAGNOSIS — R73.03 PREDIABETES: ICD-10-CM

## 2023-08-02 DIAGNOSIS — E78.5 HYPERLIPIDEMIA, UNSPECIFIED HYPERLIPIDEMIA TYPE: ICD-10-CM

## 2023-08-02 DIAGNOSIS — Z01.818 PREOP TESTING: ICD-10-CM

## 2023-08-02 DIAGNOSIS — Z01.818 PRE-OP EVALUATION: Primary | ICD-10-CM

## 2023-08-02 LAB
ANION GAP SERPL CALC-SCNC: 9 MMOL/L (ref 8–16)
BASOPHILS # BLD AUTO: 0.03 K/UL (ref 0–0.2)
BASOPHILS NFR BLD: 0.3 % (ref 0–1.9)
BUN SERPL-MCNC: 20 MG/DL (ref 8–23)
CALCIUM SERPL-MCNC: 9.9 MG/DL (ref 8.7–10.5)
CHLORIDE SERPL-SCNC: 102 MMOL/L (ref 95–110)
CO2 SERPL-SCNC: 27 MMOL/L (ref 23–29)
CREAT SERPL-MCNC: 0.9 MG/DL (ref 0.5–1.4)
DIFFERENTIAL METHOD: NORMAL
EOSINOPHIL # BLD AUTO: 0.3 K/UL (ref 0–0.5)
EOSINOPHIL NFR BLD: 3 % (ref 0–8)
ERYTHROCYTE [DISTWIDTH] IN BLOOD BY AUTOMATED COUNT: 11.8 % (ref 11.5–14.5)
EST. GFR  (NO RACE VARIABLE): >60 ML/MIN/1.73 M^2
GLUCOSE SERPL-MCNC: 95 MG/DL (ref 70–110)
HCT VFR BLD AUTO: 41.5 % (ref 37–48.5)
HGB BLD-MCNC: 13.8 G/DL (ref 12–16)
IMM GRANULOCYTES # BLD AUTO: 0.03 K/UL (ref 0–0.04)
IMM GRANULOCYTES NFR BLD AUTO: 0.3 % (ref 0–0.5)
LYMPHOCYTES # BLD AUTO: 2.8 K/UL (ref 1–4.8)
LYMPHOCYTES NFR BLD: 29.2 % (ref 18–48)
MCH RBC QN AUTO: 30.9 PG (ref 27–31)
MCHC RBC AUTO-ENTMCNC: 33.3 G/DL (ref 32–36)
MCV RBC AUTO: 93 FL (ref 82–98)
MONOCYTES # BLD AUTO: 0.6 K/UL (ref 0.3–1)
MONOCYTES NFR BLD: 6.2 % (ref 4–15)
NEUTROPHILS # BLD AUTO: 5.8 K/UL (ref 1.8–7.7)
NEUTROPHILS NFR BLD: 61 % (ref 38–73)
NRBC BLD-RTO: 0 /100 WBC
PLATELET # BLD AUTO: 231 K/UL (ref 150–450)
PMV BLD AUTO: 11 FL (ref 9.2–12.9)
POTASSIUM SERPL-SCNC: 3.6 MMOL/L (ref 3.5–5.1)
RBC # BLD AUTO: 4.47 M/UL (ref 4–5.4)
SODIUM SERPL-SCNC: 138 MMOL/L (ref 136–145)
WBC # BLD AUTO: 9.48 K/UL (ref 3.9–12.7)

## 2023-08-02 PROCEDURE — 99214 OFFICE O/P EST MOD 30 MIN: CPT | Mod: S$GLB,,, | Performed by: PHYSICIAN ASSISTANT

## 2023-08-02 PROCEDURE — 99999 PR PBB SHADOW E&M-EST. PATIENT-LVL IV: CPT | Mod: PBBFAC,,, | Performed by: PHYSICIAN ASSISTANT

## 2023-08-02 PROCEDURE — 93005 ELECTROCARDIOGRAM TRACING: CPT

## 2023-08-02 PROCEDURE — 71046 X-RAY EXAM CHEST 2 VIEWS: CPT | Mod: 26,,, | Performed by: RADIOLOGY

## 2023-08-02 PROCEDURE — 99214 PR OFFICE/OUTPT VISIT, EST, LEVL IV, 30-39 MIN: ICD-10-PCS | Mod: S$GLB,,, | Performed by: PHYSICIAN ASSISTANT

## 2023-08-02 PROCEDURE — 71046 X-RAY EXAM CHEST 2 VIEWS: CPT | Mod: TC,FY

## 2023-08-02 PROCEDURE — 99999 PR PBB SHADOW E&M-EST. PATIENT-LVL IV: ICD-10-PCS | Mod: PBBFAC,,, | Performed by: PHYSICIAN ASSISTANT

## 2023-08-02 PROCEDURE — 71046 XR CHEST PA AND LATERAL PRE-OP: ICD-10-PCS | Mod: 26,,, | Performed by: RADIOLOGY

## 2023-08-02 PROCEDURE — 85025 COMPLETE CBC W/AUTO DIFF WBC: CPT | Performed by: NURSE PRACTITIONER

## 2023-08-02 PROCEDURE — 36415 COLL VENOUS BLD VENIPUNCTURE: CPT | Performed by: NURSE PRACTITIONER

## 2023-08-02 PROCEDURE — 80048 BASIC METABOLIC PNL TOTAL CA: CPT | Performed by: NURSE PRACTITIONER

## 2023-08-02 PROCEDURE — 93010 ELECTROCARDIOGRAM REPORT: CPT | Mod: ,,, | Performed by: INTERNAL MEDICINE

## 2023-08-02 PROCEDURE — 93010 EKG 12-LEAD: ICD-10-PCS | Mod: ,,, | Performed by: INTERNAL MEDICINE

## 2023-08-02 RX ORDER — ACETAMINOPHEN 500 MG
1000 TABLET ORAL
Status: CANCELLED | OUTPATIENT
Start: 2023-08-02 | End: 2023-08-02

## 2023-08-02 RX ORDER — PREGABALIN 75 MG/1
75 CAPSULE ORAL ONCE
Status: CANCELLED | OUTPATIENT
Start: 2023-08-02 | End: 2023-08-02

## 2023-08-02 RX ORDER — LIDOCAINE HYDROCHLORIDE 10 MG/ML
0.5 INJECTION, SOLUTION EPIDURAL; INFILTRATION; INTRACAUDAL; PERINEURAL ONCE
Status: CANCELLED | OUTPATIENT
Start: 2023-08-02 | End: 2023-08-02

## 2023-08-02 RX ORDER — SODIUM CHLORIDE, SODIUM LACTATE, POTASSIUM CHLORIDE, CALCIUM CHLORIDE 600; 310; 30; 20 MG/100ML; MG/100ML; MG/100ML; MG/100ML
INJECTION, SOLUTION INTRAVENOUS CONTINUOUS
Status: CANCELLED | OUTPATIENT
Start: 2023-08-02

## 2023-08-02 NOTE — DISCHARGE INSTRUCTIONS
Information to Prepare you for your Surgery    PRE-ADMIT TESTING -  [phone]    [address] [facility]          Your surgery has been scheduled at [facility]. We are pleased to have the opportunity to serve you. For Further Information please call [phone].    On the day of surgery please report to the Information Desk on the 1st floor.    CONTACT YOUR PHYSICIAN'S OFFICE THE DAY PRIOR TO YOUR SURGERY TO OBTAIN YOUR ARRIVAL TIME.     The evening before surgery do not eat anything after 9 p.m. ( this includes hard candy, chewing gum and mints).  You may only have GATORADE, POWERADE AND WATER  from 9 p.m. until you leave your home.   DO NOT DRINK ANY LIQUIDS ON THE WAY TO THE HOSPITAL.      Why does your anesthesiologist allow you to drink Gatorade/Powerade before surgery?  Gatorade/Powerade helps to increase your comfort before surgery and to decrease your nausea after surgery. The carbohydrates in Gatorade/Powerade help reduce your body's stress response to surgery.  If you are a diabetic-drink only water prior to surgery.       Patients may have 2 visitors pre and post procedure. Only 2 visitors will be allowed in the Surgical building with the patient. No one under the age of 12 will be allowed into the facility.    SPECIAL MEDICATION INSTRUCTIONS: TAKE medications checked off by the Anesthesiologist on your Medication List.    Angiogram Patients: Take medications as instructed by your physician, including aspirin.     Surgery Patients:    If you take ASPIRIN - Your PHYSICIAN/SURGEON will need to inform you IF/OR when you need to stop taking aspirin prior to your surgery.     The week prior to surgery do not ot take any medications containing IBUPROFEN or NSAIDS ( Advil, Motrin, Goodys, BC, Aleve, Naproxen etc) If you are not sure if you should take a medicine please call your surgeon's office.  Ok to take Tylenol    Do Not Wear any make-up  (especially eye make-up) to surgery. Please remove any false eyelashes or eyelash extensions. If you arrive the day of surgery with makeup/eyelashes on you will be required to remove prior to surgery. (There is a risk of corneal abrasions if eye makeup/eyelash extensions are not removed)      Leave all valuables at home.   Do Not wear any jewelry or watches, including any metal in body piercings. Jewelry must be removed prior to coming to the hospital.  There is a possibility that rings that are unable to be removed may be cut off if they are on the surgical extremity.    Please remove all hair extensions, wigs, clips and any other metal accessories/ ornaments from your hair.  These items may pose a flammable/fire risk in Surgery and must be removed.    Do not shave your surgical area at least 5 days prior to your surgery. The surgical prep will be performed at the hospital according to Infection Control regulations.    Contact Lens must be removed before surgery. Either do not wear the contact lens or bring a case and solution for storage.  Please bring a container for eyeglasses or dentures as required.  Bring any paperwork your physician has provided, such as consent forms,  history and physicals, doctor's orders, etc.   Bring comfortable clothes that are loose fitting to wear upon discharge. Take into consideration the type of surgery being performed.  Maintain your diet as advised per your physician the day prior to surgery.      Adequate rest the night before surgery is advised.   Park in the Parking lot behind the hospital or in the [facility] across the street from the parking lot. Parking is complimentary.  If you will be discharged the same day as your procedure, please arrange for a responsible adult to drive you home or to accompany you if traveling by taxi.   YOU WILL NOT BE PERMITTED TO DRIVE OR TO LEAVE THE HOSPITAL ALONE AFTER SURGERY.   If you are being discharged the same day, it is  strongly recommended that you arrange for someone to remain with you for the first 24 hrs following your surgery.    The Surgeon will speak to your family/visitor after your surgery regarding the outcome of your surgery and post op care.  The Surgeon may speak to you after your surgery, but there is a possibility you may not remember the details.  Please check with your family members regarding the conversation with the Surgeon.    We strongly recommend whoever is bringing you home be present for discharge instructions.  This will ensure a thorough understanding for your post op home care.    ALL CHILDREN MUST ALWAYS BE ACCOMPANIED BY AN ADULT.    Visitors-Refer to current Visitor policy handouts.    Thank you for your cooperation.  The Staff of [facility].            Bathing Instructions with Hibiclens    Shower the evening before and morning of your procedure with Chlorhexidine (Hibiclens)  do not use Chlorhexidine on your face or genitals. Do not get in your eyes.  Wash your face with water and your regular face wash/soap  Use your regular shampoo  Apply Chlorhexidine (Hibiclens) directly on your skin or on a wet washcloth and wash gently. When showering: Move away from the shower stream when applying Chlorhexidine (Hibiclens) to avoid rinsing off too soon.  Rinse thoroughly with warm water  Do not dilute Chlorhexidine (Hibiclens)   Dry off as usual, do not use any deodorant, powder, body lotions, perfume, after shave or cologne.

## 2023-08-02 NOTE — ANESTHESIA PREPROCEDURE EVALUATION
08/02/2023  Maria Elena Jackson is a 72 y.o., female.      Pre-op Assessment    I have reviewed the Patient Summary Reports.     I have reviewed the Nursing Notes. I have reviewed the NPO Status.   I have reviewed the Medications.     Review of Systems  Anesthesia Hx:  History of prior surgery of interest to airway management or planning: Previous anesthesia: General 6/22 robotic hyst with general anesthesia.  Airway issues documented on chart review include mask, easy, GETA, videolaryngoscope used , view on video-laryngoscopy Grade I    Denies Family Hx of Anesthesia complications.   Denies Personal Hx of Anesthesia complications.   Social:  Non-Smoker    Hematology/Oncology:  Hematology Normal   Oncology Normal     EENT/Dental:EENT/Dental Normal   Cardiovascular:   Hypertension ECG has been reviewed.    Pulmonary:  Pulmonary Normal    Renal/:  Renal/ Normal     Hepatic/GI:  Hepatic/GI Normal    Musculoskeletal:  Musculoskeletal Normal    Neurological:  Neurology Normal    Endocrine:   On ozempic July 25th, last dose, no sx's Obesity / BMI > 30  Dermatological:  Skin Normal    Psych:  Psychiatric Normal           Physical Exam    Airway:  Mallampati: III       Dental:  Lower front teeth bridge       Anesthesia Plan  Type of Anesthesia, risks & benefits discussed:    Anesthesia Type: Gen ETT  Intra-op Monitoring Plan: Standard ASA Monitors  Post Op Pain Control Plan: multimodal analgesia  Induction:  IV  Airway Plan: Video  Informed Consent: Informed consent signed with the Patient and all parties understand the risks and agree with anesthesia plan.  All questions answered.   ASA Score: 2  Anesthesia Plan Notes: EKG/CXR/lab ordered by surgeon    Will be off ozempic 3 weeks, no sx's    EKG SB 56    Ready For Surgery From Anesthesia Perspective.     .

## 2023-08-02 NOTE — PROGRESS NOTES
INTERNAL MEDICINE PROGRESS NOTE  Pre-op eval    CHIEF COMPLAINT     Chief Complaint   Patient presents with    Pre-op Exam       HPI     Maria Elena Jackson is a 72 y.o. female who presents for a follow-up visit today.    PCP is Monroe Cutler MD, patient is known to me.     She will be having a mid urethral sling by Dr. Younger on 8/17/2023. She reports that today in clinic she is feeling well.     Cr: normal baseline   DM: no history   CHF: no history   MI: no history   CVA: no history   TIA: no history   Trouble with anesthesia in the past. None   Family history of sudden cardiac death? None   ADLs: able to ascend a flight of stairs with ease.   Anticoagulation: none   IVANIA: none   Cigarettes: former smoker, 22 pack year history   Opioids: none   Dental implants/hardware that is removable: None     RCRI% 3.9        Past Medical History:  Past Medical History:   Diagnosis Date    Arthritis     Hyperlipidemia     Hypertension     S/P total hysterectomy and bilateral salpingo-oophorectomy 6/29/2022       Home Medications:  Prior to Admission medications    Medication Sig Start Date End Date Taking? Authorizing Provider   atorvastatin (LIPITOR) 40 MG tablet Take 1 tablet (40 mg total) by mouth every evening.  Patient taking differently: Take 40 mg by mouth every evening. 3 times a week 8/15/22 8/15/23  Monroe Cutler MD   atorvastatin (LIPITOR) 40 MG tablet Take 1 tablet by mouth every evening. 12/6/22   Historical Provider   calcium-vitamin D 250 mg-2.5 mcg (100 unit) per tablet Take 1 tablet by mouth 2 (two) times daily.    Historical Provider   carvediloL (COREG) 12.5 MG tablet Take 1 tablet (12.5 mg total) by mouth 2 (two) times daily with meals. 2/23/23 2/23/24  Monroe Cutler MD   carvediloL (COREG) 12.5 MG tablet Take 1 tablet by mouth 2 (two) times daily with meals. 11/11/22   Historical Provider   carvediloL (COREG) 6.25 MG tablet Take 6.25 mg by mouth 2 (two) times daily. 5/26/23   Historical  Provider   cholecalciferol, vitamin D3, (VITAMIN D3) 25 mcg (1,000 unit) capsule Take 1,000 Units by mouth once daily.    Historical Provider   doxazosin (CARDURA) 4 MG tablet Take 4 mg by mouth. 2/13/23   Historical Provider   estradioL (VAGIFEM) 10 mcg Tab Place 1 tablet (10 mcg total) vaginally twice a week. 6/22/23 6/21/24  Marce Ellsworth MD   hydrocortisone 2.5 % cream Apply topically 2 (two) times daily. 5/26/23   Historical Provider   irbesartan-hydrochlorothiazide (AVALIDE) 300-12.5 mg per tablet Take 1 tablet by mouth nightly. 2/23/23 2/23/24  Monroe Cutler MD   NIFEdipine (PROCARDIA-XL) 60 MG (OSM) 24 hr tablet Take 1 tablet (60 mg total) by mouth once daily. 2/23/23 2/23/24  Monroe Cutler MD   semaglutide (OZEMPIC) 0.25 mg or 0.5 mg (2 mg/3 mL) pen injector Inject 0.25 mg into the skin every 7 days. 7/5/23   Monroe Cutler MD       Review of Systems:  Review of Systems   Constitutional:  Negative for chills and fever.   HENT:  Negative for sore throat and trouble swallowing.    Eyes:  Negative for visual disturbance.   Respiratory:  Negative for cough and shortness of breath.    Cardiovascular:  Negative for chest pain.   Gastrointestinal:  Negative for abdominal pain, constipation, diarrhea, nausea and vomiting.   Genitourinary:  Negative for dysuria and flank pain.   Musculoskeletal:  Negative for back pain, neck pain and neck stiffness.   Skin:  Negative for rash.   Neurological:  Negative for dizziness, syncope, weakness and headaches.   Psychiatric/Behavioral:  Negative for confusion.        Health Maintainence:   Immunizations:  Health Maintenance         Date Due Completion Date    COVID-19 Vaccine (5 - Pfizer series) 03/10/2023 11/10/2022    Influenza Vaccine (1) 09/01/2023 11/7/2022    Hemoglobin A1c (Prediabetes) 11/08/2023 11/8/2022    Mammogram 05/30/2024 5/30/2023    DEXA Scan 11/15/2025 11/15/2022    Lipid Panel 01/11/2027 1/11/2022    Colorectal Cancer Screening  09/07/2027 9/7/2022    TETANUS VACCINE 08/15/2032 8/15/2022             PHYSICAL EXAM     LMP  (LMP Unknown)     Physical Exam  Vitals and nursing note reviewed.   Constitutional:       Appearance: Normal appearance.      Comments: Healthy appearing female in NAD or apparent pain. She makes good eye contact, speaks in clear full sentences and ambulates with ease.        HENT:      Head: Normocephalic and atraumatic.      Nose: Nose normal.      Mouth/Throat:      Pharynx: Oropharynx is clear.   Eyes:      Conjunctiva/sclera: Conjunctivae normal.   Cardiovascular:      Rate and Rhythm: Normal rate and regular rhythm.      Pulses: Normal pulses.   Pulmonary:      Effort: No respiratory distress.   Abdominal:      Tenderness: There is no abdominal tenderness.   Musculoskeletal:         General: Normal range of motion.      Cervical back: No rigidity.   Skin:     General: Skin is warm and dry.      Capillary Refill: Capillary refill takes less than 2 seconds.      Findings: No rash.   Neurological:      General: No focal deficit present.      Mental Status: She is alert.      Gait: Gait normal.   Psychiatric:         Mood and Affect: Mood normal.         LABS     Lab Results   Component Value Date    HGBA1C 5.6 11/08/2022     CMP  Sodium   Date Value Ref Range Status   02/13/2023 139 136 - 145 mmol/L Final     Potassium   Date Value Ref Range Status   02/13/2023 3.6 3.5 - 5.1 mmol/L Final     Chloride   Date Value Ref Range Status   02/13/2023 101 95 - 110 mmol/L Final     CO2   Date Value Ref Range Status   02/13/2023 25 23 - 29 mmol/L Final     Glucose   Date Value Ref Range Status   02/13/2023 90 70 - 110 mg/dL Final     BUN   Date Value Ref Range Status   02/13/2023 19 8 - 23 mg/dL Final     Creatinine   Date Value Ref Range Status   02/13/2023 0.7 0.5 - 1.4 mg/dL Final     Calcium   Date Value Ref Range Status   02/13/2023 10.0 8.7 - 10.5 mg/dL Final     Total Protein   Date Value Ref Range Status   12/13/2022 7.2  "6.0 - 8.4 g/dL Final     Albumin   Date Value Ref Range Status   12/13/2022 3.8 3.5 - 5.2 g/dL Final     Total Bilirubin   Date Value Ref Range Status   12/13/2022 0.7 0.1 - 1.0 mg/dL Final     Comment:     For infants and newborns, interpretation of results should be based  on gestational age, weight and in agreement with clinical  observations.    Premature Infant recommended reference ranges:  Up to 24 hours.............<8.0 mg/dL  Up to 48 hours............<12.0 mg/dL  3-5 days..................<15.0 mg/dL  6-29 days.................<15.0 mg/dL       Alkaline Phosphatase   Date Value Ref Range Status   12/13/2022 78 55 - 135 U/L Final     AST   Date Value Ref Range Status   12/13/2022 18 10 - 40 U/L Final     ALT   Date Value Ref Range Status   12/13/2022 19 10 - 44 U/L Final     Anion Gap   Date Value Ref Range Status   02/13/2023 13 8 - 16 mmol/L Final     eGFR if    Date Value Ref Range Status   06/13/2022 >60 >60 mL/min/1.73 m^2 Final   06/13/2022 >60 >60 mL/min/1.73 m^2 Final     eGFR if non    Date Value Ref Range Status   06/13/2022 >60 >60 mL/min/1.73 m^2 Final     Comment:     Calculation used to obtain the estimated glomerular filtration  rate (eGFR) is the CKD-EPI equation.      06/13/2022 >60 >60 mL/min/1.73 m^2 Final     Comment:     Calculation used to obtain the estimated glomerular filtration  rate (eGFR) is the CKD-EPI equation.        Lab Results   Component Value Date    WBC 7.16 02/13/2023    HGB 13.8 02/13/2023    HCT 43.6 02/13/2023    MCV 98 02/13/2023     02/13/2023     Lab Results   Component Value Date    CHOL 181 01/11/2022     Lab Results   Component Value Date    HDL 47 01/11/2022     Lab Results   Component Value Date    LDLCALC 113.2 01/11/2022     Lab Results   Component Value Date    TRIG 104 01/11/2022     Lab Results   Component Value Date    CHOLHDL 26.0 01/11/2022     No results found for: "TSH", "I5YFGDR", "Q5OKJKL", " ""THYROIDAB"    ASSESSMENT/PLAN     Maria Elena Jackson is a 72 y.o. female     Maria Elena was seen today for pre-op exam. She has apt with pre-op team later today -pt reports that blood work will be drawn at that apt-  will defer blood work to pre-op provider however will get updated EKG and UA for eval. She is aware of plan. She is expected to be medically optimized for this low risk procedure. She is expected to have RCRI of 3.9%.     Diagnoses and all orders for this visit:    Pre-op evaluation  -     EKG 12-lead; Future  -     Urinalysis, Reflex to Urine Culture Urine, Clean Catch; Future    Essential hypertension    Hyperlipidemia, unspecified hyperlipidemia type    Prediabetes    Obesity (BMI 30-39.9)          Luann Davies PA-C   Department of Internal Medicine - [facility]   10:38 AM      "

## 2023-08-16 ENCOUNTER — TELEPHONE (OUTPATIENT)
Dept: UROGYNECOLOGY | Facility: CLINIC | Age: 73
End: 2023-08-16
Payer: OTHER GOVERNMENT

## 2023-08-17 ENCOUNTER — HOSPITAL ENCOUNTER (OUTPATIENT)
Facility: OTHER | Age: 73
Discharge: HOME | End: 2023-08-17
Attending: OBSTETRICS & GYNECOLOGY | Admitting: OBSTETRICS & GYNECOLOGY
Payer: OTHER GOVERNMENT

## 2023-08-17 ENCOUNTER — ANESTHESIA (OUTPATIENT)
Dept: SURGERY | Facility: OTHER | Age: 73
End: 2023-08-17
Payer: OTHER GOVERNMENT

## 2023-08-17 ENCOUNTER — PATIENT MESSAGE (OUTPATIENT)
Dept: SURGERY | Facility: OTHER | Age: 73
End: 2023-08-17
Payer: OTHER GOVERNMENT

## 2023-08-17 DIAGNOSIS — Z01.818 PREOP TESTING: ICD-10-CM

## 2023-08-17 DIAGNOSIS — N39.3 STRESS INCONTINENCE: Primary | ICD-10-CM

## 2023-08-17 DIAGNOSIS — E78.5 HYPERLIPIDEMIA, UNSPECIFIED HYPERLIPIDEMIA TYPE: ICD-10-CM

## 2023-08-17 DIAGNOSIS — Z98.890 POST-OPERATIVE STATE: ICD-10-CM

## 2023-08-17 LAB
ABO + RH BLD: NORMAL
BLD GP AB SCN CELLS X3 SERPL QL: NORMAL
SPECIMEN OUTDATE: NORMAL

## 2023-08-17 PROCEDURE — D9220A PRA ANESTHESIA: Mod: ANES,,, | Performed by: ANESTHESIOLOGY

## 2023-08-17 PROCEDURE — 63600175 PHARM REV CODE 636 W HCPCS: Performed by: OBSTETRICS & GYNECOLOGY

## 2023-08-17 PROCEDURE — 63600175 PHARM REV CODE 636 W HCPCS: Performed by: NURSE ANESTHETIST, CERTIFIED REGISTERED

## 2023-08-17 PROCEDURE — 36000706: Performed by: OBSTETRICS & GYNECOLOGY

## 2023-08-17 PROCEDURE — 51798 US URINE CAPACITY MEASURE: CPT | Performed by: OBSTETRICS & GYNECOLOGY

## 2023-08-17 PROCEDURE — D9220A PRA ANESTHESIA: ICD-10-PCS | Mod: ANES,,, | Performed by: ANESTHESIOLOGY

## 2023-08-17 PROCEDURE — 37000008 HC ANESTHESIA 1ST 15 MINUTES: Performed by: OBSTETRICS & GYNECOLOGY

## 2023-08-17 PROCEDURE — 36000707: Performed by: OBSTETRICS & GYNECOLOGY

## 2023-08-17 PROCEDURE — 86900 BLOOD TYPING SEROLOGIC ABO: CPT | Performed by: OBSTETRICS & GYNECOLOGY

## 2023-08-17 PROCEDURE — C1771 REP DEV, URINARY, W/SLING: HCPCS | Performed by: OBSTETRICS & GYNECOLOGY

## 2023-08-17 PROCEDURE — 37000009 HC ANESTHESIA EA ADD 15 MINS: Performed by: OBSTETRICS & GYNECOLOGY

## 2023-08-17 PROCEDURE — 25000003 PHARM REV CODE 250: Performed by: ANESTHESIOLOGY

## 2023-08-17 PROCEDURE — 25000003 PHARM REV CODE 250: Performed by: NURSE ANESTHETIST, CERTIFIED REGISTERED

## 2023-08-17 PROCEDURE — 71000016 HC POSTOP RECOV ADDL HR: Performed by: OBSTETRICS & GYNECOLOGY

## 2023-08-17 PROCEDURE — 71000033 HC RECOVERY, INTIAL HOUR: Performed by: OBSTETRICS & GYNECOLOGY

## 2023-08-17 PROCEDURE — 71000015 HC POSTOP RECOV 1ST HR: Performed by: OBSTETRICS & GYNECOLOGY

## 2023-08-17 PROCEDURE — 57288 REPAIR BLADDER DEFECT: CPT | Mod: ,,, | Performed by: OBSTETRICS & GYNECOLOGY

## 2023-08-17 PROCEDURE — 57288 PR SLING OPER STRES INCONTINENCE: ICD-10-PCS | Mod: ,,, | Performed by: OBSTETRICS & GYNECOLOGY

## 2023-08-17 PROCEDURE — 63600175 PHARM REV CODE 636 W HCPCS: Performed by: ANESTHESIOLOGY

## 2023-08-17 PROCEDURE — 36415 COLL VENOUS BLD VENIPUNCTURE: CPT | Performed by: OBSTETRICS & GYNECOLOGY

## 2023-08-17 PROCEDURE — D9220A PRA ANESTHESIA: ICD-10-PCS | Mod: CRNA,,, | Performed by: NURSE ANESTHETIST, CERTIFIED REGISTERED

## 2023-08-17 PROCEDURE — 25000003 PHARM REV CODE 250: Performed by: OBSTETRICS & GYNECOLOGY

## 2023-08-17 PROCEDURE — D9220A PRA ANESTHESIA: Mod: CRNA,,, | Performed by: NURSE ANESTHETIST, CERTIFIED REGISTERED

## 2023-08-17 DEVICE — SYS TVT OBTURATOR: Type: IMPLANTABLE DEVICE | Site: VAGINA | Status: FUNCTIONAL

## 2023-08-17 RX ORDER — PROCHLORPERAZINE EDISYLATE 5 MG/ML
5 INJECTION INTRAMUSCULAR; INTRAVENOUS EVERY 6 HOURS PRN
Status: DISCONTINUED | OUTPATIENT
Start: 2023-08-17 | End: 2023-08-17 | Stop reason: HOSPADM

## 2023-08-17 RX ORDER — ACETAMINOPHEN 500 MG
1000 TABLET ORAL
Status: COMPLETED | OUTPATIENT
Start: 2023-08-17 | End: 2023-08-17

## 2023-08-17 RX ORDER — ATORVASTATIN CALCIUM 40 MG/1
40 TABLET, FILM COATED ORAL NIGHTLY
Qty: 90 TABLET | Refills: 3 | Status: SHIPPED | OUTPATIENT
Start: 2023-08-17 | End: 2024-08-16

## 2023-08-17 RX ORDER — MUPIROCIN 20 MG/G
OINTMENT TOPICAL
Status: DISCONTINUED | OUTPATIENT
Start: 2023-08-17 | End: 2023-08-17 | Stop reason: HOSPADM

## 2023-08-17 RX ORDER — OXYCODONE HYDROCHLORIDE 5 MG/1
5 TABLET ORAL
Status: DISCONTINUED | OUTPATIENT
Start: 2023-08-17 | End: 2023-08-17 | Stop reason: HOSPADM

## 2023-08-17 RX ORDER — PROPOFOL 10 MG/ML
VIAL (ML) INTRAVENOUS
Status: DISCONTINUED | OUTPATIENT
Start: 2023-08-17 | End: 2023-08-17

## 2023-08-17 RX ORDER — PROCHLORPERAZINE EDISYLATE 5 MG/ML
5 INJECTION INTRAMUSCULAR; INTRAVENOUS EVERY 30 MIN PRN
Status: DISCONTINUED | OUTPATIENT
Start: 2023-08-17 | End: 2023-08-17 | Stop reason: HOSPADM

## 2023-08-17 RX ORDER — DIPHENHYDRAMINE HCL 25 MG
25 CAPSULE ORAL EVERY 4 HOURS PRN
Status: DISCONTINUED | OUTPATIENT
Start: 2023-08-17 | End: 2023-08-17 | Stop reason: HOSPADM

## 2023-08-17 RX ORDER — DIPHENHYDRAMINE HYDROCHLORIDE 50 MG/ML
25 INJECTION INTRAMUSCULAR; INTRAVENOUS EVERY 4 HOURS PRN
Status: DISCONTINUED | OUTPATIENT
Start: 2023-08-17 | End: 2023-08-17 | Stop reason: HOSPADM

## 2023-08-17 RX ORDER — ONDANSETRON 8 MG/1
8 TABLET, ORALLY DISINTEGRATING ORAL EVERY 8 HOURS PRN
Status: DISCONTINUED | OUTPATIENT
Start: 2023-08-17 | End: 2023-08-17 | Stop reason: HOSPADM

## 2023-08-17 RX ORDER — PREGABALIN 75 MG/1
75 CAPSULE ORAL ONCE
Status: COMPLETED | OUTPATIENT
Start: 2023-08-17 | End: 2023-08-17

## 2023-08-17 RX ORDER — DOCUSATE SODIUM 100 MG/1
100 CAPSULE, LIQUID FILLED ORAL 2 TIMES DAILY
Qty: 60 CAPSULE | Refills: 1 | Status: SHIPPED | OUTPATIENT
Start: 2023-08-17

## 2023-08-17 RX ORDER — FAMOTIDINE 20 MG/1
20 TABLET, FILM COATED ORAL
Status: COMPLETED | OUTPATIENT
Start: 2023-08-17 | End: 2023-08-17

## 2023-08-17 RX ORDER — MEPERIDINE HYDROCHLORIDE 25 MG/ML
12.5 INJECTION INTRAMUSCULAR; INTRAVENOUS; SUBCUTANEOUS ONCE AS NEEDED
Status: DISCONTINUED | OUTPATIENT
Start: 2023-08-17 | End: 2023-08-17 | Stop reason: HOSPADM

## 2023-08-17 RX ORDER — ONDANSETRON 2 MG/ML
INJECTION INTRAMUSCULAR; INTRAVENOUS
Status: DISCONTINUED | OUTPATIENT
Start: 2023-08-17 | End: 2023-08-17

## 2023-08-17 RX ORDER — DOCUSATE SODIUM 100 MG/1
100 CAPSULE, LIQUID FILLED ORAL 2 TIMES DAILY
Status: DISCONTINUED | OUTPATIENT
Start: 2023-08-17 | End: 2023-08-17 | Stop reason: HOSPADM

## 2023-08-17 RX ORDER — DEXAMETHASONE SODIUM PHOSPHATE 4 MG/ML
INJECTION, SOLUTION INTRA-ARTICULAR; INTRALESIONAL; INTRAMUSCULAR; INTRAVENOUS; SOFT TISSUE
Status: DISCONTINUED | OUTPATIENT
Start: 2023-08-17 | End: 2023-08-17

## 2023-08-17 RX ORDER — HYDROCODONE BITARTRATE AND ACETAMINOPHEN 5; 325 MG/1; MG/1
1 TABLET ORAL EVERY 6 HOURS PRN
Qty: 15 TABLET | Refills: 0 | Status: SHIPPED | OUTPATIENT
Start: 2023-08-17 | End: 2023-11-28

## 2023-08-17 RX ORDER — IBUPROFEN 600 MG/1
600 TABLET ORAL EVERY 6 HOURS PRN
Status: DISCONTINUED | OUTPATIENT
Start: 2023-08-17 | End: 2023-08-17 | Stop reason: HOSPADM

## 2023-08-17 RX ORDER — LIDOCAINE HYDROCHLORIDE 10 MG/ML
0.5 INJECTION, SOLUTION EPIDURAL; INFILTRATION; INTRACAUDAL; PERINEURAL ONCE
Status: DISCONTINUED | OUTPATIENT
Start: 2023-08-17 | End: 2023-08-17 | Stop reason: HOSPADM

## 2023-08-17 RX ORDER — SODIUM CHLORIDE 0.9 % (FLUSH) 0.9 %
3 SYRINGE (ML) INJECTION
Status: DISCONTINUED | OUTPATIENT
Start: 2023-08-17 | End: 2023-08-17 | Stop reason: HOSPADM

## 2023-08-17 RX ORDER — CEFAZOLIN SODIUM 1 G/3ML
2 INJECTION, POWDER, FOR SOLUTION INTRAMUSCULAR; INTRAVENOUS
Status: DISCONTINUED | OUTPATIENT
Start: 2023-08-17 | End: 2023-08-17 | Stop reason: HOSPADM

## 2023-08-17 RX ORDER — SODIUM CHLORIDE, SODIUM LACTATE, POTASSIUM CHLORIDE, CALCIUM CHLORIDE 600; 310; 30; 20 MG/100ML; MG/100ML; MG/100ML; MG/100ML
INJECTION, SOLUTION INTRAVENOUS CONTINUOUS
Status: DISCONTINUED | OUTPATIENT
Start: 2023-08-17 | End: 2023-08-17 | Stop reason: HOSPADM

## 2023-08-17 RX ORDER — HYDROCODONE BITARTRATE AND ACETAMINOPHEN 5; 325 MG/1; MG/1
1 TABLET ORAL EVERY 4 HOURS PRN
Status: DISCONTINUED | OUTPATIENT
Start: 2023-08-17 | End: 2023-08-17 | Stop reason: HOSPADM

## 2023-08-17 RX ORDER — ROCURONIUM BROMIDE 10 MG/ML
INJECTION, SOLUTION INTRAVENOUS
Status: DISCONTINUED | OUTPATIENT
Start: 2023-08-17 | End: 2023-08-17

## 2023-08-17 RX ORDER — EPHEDRINE SULFATE 50 MG/ML
INJECTION, SOLUTION INTRAVENOUS
Status: DISCONTINUED | OUTPATIENT
Start: 2023-08-17 | End: 2023-08-17

## 2023-08-17 RX ORDER — OXYCODONE AND ACETAMINOPHEN 10; 325 MG/1; MG/1
1 TABLET ORAL EVERY 4 HOURS PRN
Status: DISCONTINUED | OUTPATIENT
Start: 2023-08-17 | End: 2023-08-17 | Stop reason: HOSPADM

## 2023-08-17 RX ORDER — FENTANYL CITRATE 50 UG/ML
INJECTION, SOLUTION INTRAMUSCULAR; INTRAVENOUS
Status: DISCONTINUED | OUTPATIENT
Start: 2023-08-17 | End: 2023-08-17

## 2023-08-17 RX ORDER — LIDOCAINE HYDROCHLORIDE 20 MG/ML
INJECTION INTRAVENOUS
Status: DISCONTINUED | OUTPATIENT
Start: 2023-08-17 | End: 2023-08-17

## 2023-08-17 RX ORDER — BUPIVACAINE HYDROCHLORIDE 2.5 MG/ML
INJECTION, SOLUTION EPIDURAL; INFILTRATION; INTRACAUDAL
Status: DISCONTINUED | OUTPATIENT
Start: 2023-08-17 | End: 2023-08-17 | Stop reason: HOSPADM

## 2023-08-17 RX ORDER — HYDROMORPHONE HYDROCHLORIDE 2 MG/ML
0.4 INJECTION, SOLUTION INTRAMUSCULAR; INTRAVENOUS; SUBCUTANEOUS EVERY 5 MIN PRN
Status: DISCONTINUED | OUTPATIENT
Start: 2023-08-17 | End: 2023-08-17 | Stop reason: HOSPADM

## 2023-08-17 RX ORDER — IBUPROFEN 600 MG/1
600 TABLET ORAL EVERY 6 HOURS PRN
Qty: 30 TABLET | Refills: 1 | Status: SHIPPED | OUTPATIENT
Start: 2023-08-17

## 2023-08-17 RX ADMIN — SODIUM CHLORIDE, SODIUM LACTATE, POTASSIUM CHLORIDE, AND CALCIUM CHLORIDE: 600; 310; 30; 20 INJECTION, SOLUTION INTRAVENOUS at 06:08

## 2023-08-17 RX ADMIN — ONDANSETRON HYDROCHLORIDE 4 MG: 2 INJECTION INTRAMUSCULAR; INTRAVENOUS at 07:08

## 2023-08-17 RX ADMIN — SUGAMMADEX 200 MG: 100 INJECTION, SOLUTION INTRAVENOUS at 08:08

## 2023-08-17 RX ADMIN — CEFAZOLIN 2 G: 330 INJECTION, POWDER, FOR SOLUTION INTRAMUSCULAR; INTRAVENOUS at 07:08

## 2023-08-17 RX ADMIN — FENTANYL CITRATE 25 MCG: 50 INJECTION, SOLUTION INTRAMUSCULAR; INTRAVENOUS at 07:08

## 2023-08-17 RX ADMIN — LIDOCAINE HYDROCHLORIDE 60 MG: 20 INJECTION, SOLUTION INTRAVENOUS at 07:08

## 2023-08-17 RX ADMIN — DEXAMETHASONE SODIUM PHOSPHATE 4 MG: 4 INJECTION, SOLUTION INTRAMUSCULAR; INTRAVENOUS at 07:08

## 2023-08-17 RX ADMIN — FENTANYL CITRATE 100 MCG: 50 INJECTION, SOLUTION INTRAMUSCULAR; INTRAVENOUS at 07:08

## 2023-08-17 RX ADMIN — EPHEDRINE SULFATE 10 MG: 50 INJECTION INTRAVENOUS at 07:08

## 2023-08-17 RX ADMIN — ACETAMINOPHEN 1000 MG: 500 TABLET ORAL at 05:08

## 2023-08-17 RX ADMIN — EPHEDRINE SULFATE 5 MG: 50 INJECTION INTRAVENOUS at 07:08

## 2023-08-17 RX ADMIN — FAMOTIDINE 20 MG: 20 TABLET, FILM COATED ORAL at 05:08

## 2023-08-17 RX ADMIN — GLYCOPYRROLATE 0.1 MG: 0.2 INJECTION, SOLUTION INTRAMUSCULAR; INTRAVITREAL at 07:08

## 2023-08-17 RX ADMIN — PROPOFOL 150 MG: 10 INJECTION, EMULSION INTRAVENOUS at 07:08

## 2023-08-17 RX ADMIN — ROCURONIUM BROMIDE 50 MG: 10 SOLUTION INTRAVENOUS at 07:08

## 2023-08-17 RX ADMIN — MUPIROCIN: 20 OINTMENT TOPICAL at 05:08

## 2023-08-17 RX ADMIN — PREGABALIN 75 MG: 75 CAPSULE ORAL at 05:08

## 2023-08-17 RX ADMIN — OXYCODONE HYDROCHLORIDE 5 MG: 5 TABLET ORAL at 08:08

## 2023-08-17 NOTE — H&P
H&P completed on 2023 has been reviewed, the patient has been examined and:  I concur with the findings and no changes have occurred since H&P was written.    Active Hospital Problems    Diagnosis  POA    *Urinary incontinence [R32]  Yes      Resolved Hospital Problems   No resolved problems to display.     Jenni Grubbs M.D.  OB/GYN PGY- 4    ----------------------------------------------------------     Chief Complaint   Patient presents with    consents        3 month follow up          HPI: Patient is a 72 y.o. female  with both stress urinary incontinence and urgency urinary incontinence symptoms but states that she is more bothered by her stress urinary incontinence. She does want to proceed with surical intervention for her symptoms.  Patient underwent urodynamic testing and cystoscopy on 23. Cystoscopy was unremarkable and her urodynamic testing showed that she has a normal uroflowmetry pattern at the start of testing. Her end pressure flow study showed an interrupted pattern but her bladder compliance during filling was normal and her bladder capacity was above normal at 624 mL. She was noted to have stress urinary incontinence at 450 mL. No detrusor instability was seen.   No new complaints today.      REVIEW OF SYSTEMS:  A full 14-point ROS was performed and was significant for those  mentioned in the HPI.     The following portions of the patient's history were reviewed and updated as appropriate: allergies, current medications, past medical history, past surgical history and problem list.     PHYSICAL EXAMINATION         Vitals:     23 1404   BP: 135/64   Pulse: 63         General: Healthy in appearance, Well nourished, Affect Normal, NAD.        ASSESSMENT & PLAN:     Stress incontinence of urine     Urge incontinence of urine        71 yo with mixed urinary incontinence, predominantly stress urinary incontinence who presented to review options once more for intervention.  Reviewed treatment options including midurethral slings versus ronen-urethral bulking. Patient opts to proceed with midurethral sling. Given her age and concomitant urgency urinary incontinence symptoms opt to proceed with TVT-O and cystoscopy.   We reviewed the risks and benefits of the planned surgical procedure which included but were not limited to the potential conversion to an open or vaginal procedure, pelvic pain, pain with intercourse, infection, bleeding, need for transfusion, risks of transfusion, injury to bowel, urinary tract, blood vessels or nerves, urinary retention, prolonged catheterization, mesh erosion or infection, no change in symptoms, new onset ZANE or UUI, change in urine stream, recurrent UTI's, fistula formation, need for ostomy (colostomy/ileostomy/ nephrostomy/ ileostomy) and need for further surgery. Patient was provided the opportunity to ask questions, All questions were answered. The informed consent was signed and a copy was provided to the patient.      FDA safety advisory and mesh risks specific to the patient's procedure were reviewed.   Patient would like to have surgery in late August.      All questions were answered today. The patient was encouraged to contact the office as needed with any additional questions or concerns.      Total time spent on visit was 20 minutes.  This includes face to face time and non-face to face time preparing to see the patient (eg, review of tests), Obtaining and/or reviewing separately obtained history, Documenting clinical information in the electronic or other health record, Independently interpreting resultsand communicating results to the patient/family/caregiver, or Care coordination.     Nedra Younger MD

## 2023-08-17 NOTE — DISCHARGE SUMMARY
"Discharge Summary  Gynecology      Admit Date: 8/17/2023    Discharge Date and Time: 8/17/2023     Attending Physician: Nedra Younger MD    Principal Diagnoses: Urinary incontinence    Active Hospital Problems    Diagnosis  POA    *Urinary incontinence [R32]  Yes      Resolved Hospital Problems   No resolved problems to display.       Procedures: Procedure(s) (LRB):  SLING, MIDURETHRAL (N/A)  CYSTOSCOPY (N/A)    Discharged Condition: good    Hospital Course: Patient presented for scheduled procedure. Patient was passed back to OR for above procedure. Please see OP note for further details. Tolerated procedure well and patient was taken to recovery in a stable condition. Prior to discharge patient was able to void, ambulate, tolerate PO and pain was well controlled with PO meds. Patient was given routine post-op instructions for which patient voiced understanding. Patient was subsequently discharged home.      Consults: None    Significant Diagnostic Studies:  No results for input(s): "WBC", "HGB", "HCT", "MCV", "PLT" in the last 168 hours.     Treatments:   1. Surgery as above    Disposition: Home or Self Care    Patient Instructions:   Current Discharge Medication List        START taking these medications    Details   docusate sodium (COLACE) 100 MG capsule Take 1 capsule (100 mg total) by mouth 2 (two) times daily.  Qty: 60 capsule, Refills: 1      HYDROcodone-acetaminophen (NORCO) 5-325 mg per tablet Take 1 tablet by mouth every 6 (six) hours as needed for Pain.  Qty: 15 tablet, Refills: 0    Comments: Quantity prescribed more than 7 day supply? No      ibuprofen (ADVIL,MOTRIN) 600 MG tablet Take 1 tablet (600 mg total) by mouth every 6 (six) hours as needed for Pain.  Qty: 30 tablet, Refills: 1           CONTINUE these medications which have CHANGED    Details   atorvastatin (LIPITOR) 40 MG tablet Take 1 tablet (40 mg total) by mouth every evening. 3 times a week  Qty: 90 tablet, Refills: 3    Associated " Diagnoses: Hyperlipidemia, unspecified hyperlipidemia type           CONTINUE these medications which have NOT CHANGED    Details   calcium-vitamin D 250 mg-2.5 mcg (100 unit) per tablet Take 1 tablet by mouth 2 (two) times daily.      carvediloL (COREG) 12.5 MG tablet Take 1 tablet (12.5 mg total) by mouth 2 (two) times daily with meals.  Qty: 120 tablet, Refills: 2    Comments: 12.5 mg BID should be the correct dose. Please discontinue other dosing and instructions  Associated Diagnoses: Essential hypertension      cholecalciferol, vitamin D3, (VITAMIN D3) 25 mcg (1,000 unit) capsule Take 1,000 Units by mouth once daily.      irbesartan-hydrochlorothiazide (AVALIDE) 300-12.5 mg per tablet Take 1 tablet by mouth nightly.  Qty: 90 tablet, Refills: 3    Comments: .  Associated Diagnoses: Essential hypertension      NIFEdipine (PROCARDIA-XL) 60 MG (OSM) 24 hr tablet Take 1 tablet (60 mg total) by mouth once daily.  Qty: 90 tablet, Refills: 3    Comments: .  Associated Diagnoses: Essential hypertension      semaglutide (OZEMPIC) 0.25 mg or 0.5 mg (2 mg/3 mL) pen injector Inject 0.25 mg into the skin every 7 days.  Qty: 4.82 mL, Refills: 0    Associated Diagnoses: Prediabetes; Obesity (BMI 30-39.9)      doxazosin (CARDURA) 4 MG tablet Take 4 mg by mouth.           STOP taking these medications       estradioL (VAGIFEM) 10 mcg Tab Comments:   Reason for Stopping:         hydrocortisone 2.5 % cream Comments:   Reason for Stopping:               Discharge Procedure Orders   Diet general     Pelvic Rest     Ice to affected area     Lifting restrictions     Call MD for:  temperature >100.4     Call MD for:  persistent nausea and vomiting     Call MD for:  severe uncontrolled pain     Call MD for:  difficulty breathing, headache or visual disturbances     Call MD for:  redness, tenderness, or signs of infection (pain, swelling, redness, odor or green/yellow discharge around incision site)     Call MD for:  hives     Call MD  for:  persistent dizziness or light-headedness     Weight bearing restrictions (specify)           Jenni Grubbs M.D.  OB/GYN PGY- 4

## 2023-08-17 NOTE — ANESTHESIA PROCEDURE NOTES
Intubation    Date/Time: 8/17/2023 7:08 AM    Performed by: Kendra Vogt  Authorized by: Sommer Rocha MD    Intubation:     Induction:  Intravenous    Intubated:  Postinduction    Mask Ventilation:  Easy mask    Attempts:  1    Attempted By:  Student    Method of Intubation:  Video laryngoscopy    Blade:  Dumont 3    Laryngeal View Grade: Grade I - full view of cords      Difficult Airway Encountered?: No      Complications:  None    Airway Device:  Oral endotracheal tube    Airway Device Size:  7.5    Style/Cuff Inflation:  Cuffed (inflated to minimal occlusive pressure)    Tube secured:  22    Secured at:  The teeth    Placement Verified By:  Capnometry    Complicating Factors:  None    Findings Post-Intubation:  Atraumatic/condition of teeth unchanged and BS equal bilateral

## 2023-08-17 NOTE — OR NURSING
Per MD, reinsert storm catheter and discharge patient home with catheter. Patient to return to clinic on Monday.

## 2023-08-17 NOTE — OR NURSING
Active voiding trial done at 1030 and 300 cc of sterile water instilled into bladder, within 45 patient voided 120cc. Resident on call notified and ordered for a bladder scan to be done.

## 2023-08-17 NOTE — DISCHARGE INSTRUCTIONS
PAIN CONTROL/MEDICATIONS:     * Take prescribed medications as directed . It is important for your healing process and return to normal activity that you take these pain medicatons to be comfortable.     * DO NOT DRIVE or perform other activities that require you to be alert while taking narcotics, as these medications can make you very drowsy.     * You may restart ALL of your normal medications taken prior to surgery when you get home, unless instructed otherwise on discharge from the hospital.                       Please contact your primary care physician (PCP) for any questions regarding these medications.     ACTIVITY:      * Following surgery, you may be a little sore in the abdomen or pelvis-return to activity slowly and in moderation. Especially during the first week after your surgery, allow friends/family to take care of performing major house hold tasks (cooking & cleaning). This will help you get better faster.      * You may resume driving when you are no longer taking narcotic medications and when you are able to push the brakes on your car without feeling significant                    abdominal discomfort     *NOTHING in the vagina for 6 weeks (creams, tampons, intercourse, etc)     *DO NOT lift anything more than 10 lbs for 6 weeks ( ex. Gallon of milk)     * After 6 weeks, when lifting anything more than 10 lbs, use proper lifting technique to reduce strain:      1. Keep items close to your body during lifting.      2. Keep feet shoulder width apart, bend your knees, keep your back straight, and lift with your legs.      3. If you feel like you are straining, ask someone to help you.         DIET/BOWEL FUNCTION     * Upon returning to home, you may eat or drink anything you like.     * In order to avoid straining with bowel movements which may affect your  comfort and healing process; take the prescribed stool softener as directed.                 Also, stay well hydrated and increase dietary  fiber intake as needed.     * If you do not have a bowel movement by 4 days after your surgery, take milk of  magnesia as directed up to 4 times in a row. If you still have no bowel movement after this, call your MD for further instructions     URINATION:     * Following surgery, you may notice that your voiding is slightly different ( voiding  frequently, slower stream, feeling of post void urgency, and even occasional urinary leaking.) this is normal and will improve with time as the nerves and tissues heal .     *If you are unable to void normally prior to discharge, you'll be sent home with either an indwelling urinary catheter ( ALFRED) or taught intermittent self catheterization. Someone will teach you about either of these before you leave the hospital if needed.      1. If sent home with indwelling (Alfred) , you will need to return to clinic within 1 week for removal of device and a voiding trial. Please call [phone] as soon as you  get home to schedule this appointment.      2. If you're sent home with instructions to perform intermittent self-catheterization, you will be instructed to keep a record of this process. After 3 days of recording information, please call your surgeon's office ([phone]) during office hours with these results, and you will be instructed about what to do next.       POSTOPERATIVE FOLLOWUP     *Unless instructed otherwise, when you get home from the hospital, please call your surgeon's office at [phone] to schedule a postoperative check.     * Additionally, if you are sent home requiring catheterization, don't forget to schedule follow up appointments as noted above.       PLEASE WATCH FOR THE FOLLOWING SYMPTOMS POST OP      *FEVER >101 f    *PAINFUL URINATION    *INCREASED VAGINAL BLEEDING    *PERSISTENT NAUSEA/VOMITING    *WORSENING PAIN     If you experience any of these, please contact your surgeon  An indwelling urinary catheter is a flexible plastic tube that is  inserted through the opening that carries urine from the  bladder to the outside of the body (urethra), to drain urine. The tube is kept in place by a small balloon that is inflated  once the tube is securely in the bladder. Urine drains into a bag that is attached to the thigh through this catheter.      To care for your urinary catheter at home:     Make sure that urine is flowing out of the catheter into the drainage bag.   Check the area around the insertion site for signs of infections (such as inflammation and irritated/swollen/  red/tender skin), and/or leakage of urine around the catheter   Keep the urinary drainage bag below the level of the bladder (to prevent backflow into the bladder).   Make sure that the urinary drainage bag does not drag and pull on the catheter.    Caring for your catheter:     Clean the area around the drainage tube twice each day.   Use a mild soap and water around the drainage tube.   Rinse well and dry with a clean towel.      Draining the urine collection bag:    The bag that collects urine may be strapped to your thigh. You will need to empty the bag at regular intervals,  typically every 2 to 4 hours, or whenever the catheter bag is half-full, and at bedtime.   Wash your hands with soap and water. If you are emptying another persons catheter bag, you may wish to wear  disposable gloves. Wash your hands before you put the gloves on and after removing them.

## 2023-08-17 NOTE — ANESTHESIA POSTPROCEDURE EVALUATION
Anesthesia Post Evaluation    Patient: Maria Elena Jackson    Procedure(s) Performed: Procedure(s) (LRB):  SLING, MIDURETHRAL (N/A)  CYSTOSCOPY (N/A)    Final Anesthesia Type: general      Patient location during evaluation: PACU  Patient participation: Yes- Able to Participate  Level of consciousness: awake and alert  Post-procedure vital signs: reviewed and stable  Pain management: adequate  Airway patency: patent    PONV status at discharge: No PONV  Anesthetic complications: no      Cardiovascular status: blood pressure returned to baseline  Respiratory status: unassisted  Hydration status: euvolemic  Follow-up not needed.          Vitals Value Taken Time   /58 08/17/23 1010   Temp 36.4 °C (97.5 °F) 08/17/23 0910   Pulse 76 08/17/23 1010   Resp 18 08/17/23 1010   SpO2 93 % 08/17/23 1010         Event Time   Out of Recovery 09:07:42         Pain/Stacy Score: Pain Rating Prior to Med Admin: 4 (8/17/2023  8:38 AM)  Pain Rating Post Med Admin: 0 (8/17/2023  8:50 AM)  Stacy Score: 10 (8/17/2023 10:10 AM)

## 2023-08-17 NOTE — PLAN OF CARE
Maria Elena Jackson has met all discharge criteria from Phase II. Vital Signs are stable, ambulating  without difficulty. Discharge instructions given, patient verbalized understanding. Discharged from facility with storm catheter per order via wheelchair in stable condition.

## 2023-08-17 NOTE — TRANSFER OF CARE
Anesthesia Transfer of Care Note    Patient: Maria Elena Jackson    Procedure(s) Performed: Procedure(s) (LRB):  SLING, MIDURETHRAL (N/A)  CYSTOSCOPY (N/A)    Patient location: PACU    Anesthesia Type: general    Transport from OR: Transported from OR on 6-10 L/min O2 by face mask with adequate spontaneous ventilation    Post pain: adequate analgesia    Post assessment: no apparent anesthetic complications and tolerated procedure well    Post vital signs: stable    Level of consciousness: awake and responds to stimulation    Nausea/Vomiting: no nausea/vomiting    Complications: none    Transfer of care protocol was followed      Last vitals:   Visit Vitals  BP (!) 128/58 (BP Location: Right arm, Patient Position: Lying)   Pulse 78   Temp 36.2 °C (97.2 °F) (Temporal)   Resp 18   LMP  (LMP Unknown)   SpO2 100%   Breastfeeding No

## 2023-08-17 NOTE — OP NOTE
[facility]  Urogynecology  Operative Note    SUMMARY     Date of Procedure: 8/17/2023     Procedure: Procedure(s) (LRB):  SLING, MIDURETHRAL (N/A)  CYSTOSCOPY (N/A)       Surgeon(s) and Role:     * Nedra Younger MD - Primary    Assisting Surgeon: Jenni Grubbs PGY-4    Pre-Operative Diagnosis: Stress incontinence of urine [N39.3]    Post-Operative Diagnosis: Post-Op Diagnosis Codes:     * Stress incontinence of urine [N39.3]    Anesthesia: General    Operative Findings (including complications, if any): Normal appearing bladder and urethra. No lesions, tumors or foreign body. Minimal squamous metaplasia in the trigone. Normal appearing ureteral orifices in the normal position with clear ureteral efflux noted bilaterally.     Description of Technical Procedures:   Patient was taken to the Operating room and prepped and draped in the normal sterile fashion.    Given the patient's findings of stress incontinence, attention was turned to the TVT obturator procedure. A Romo catheter was placed in the bladder. With a finger in the vagina, the obturator foramen was palpated bilaterally. Local anesthetic was injected into the skin at the lateral thigh folds and into the obturator membrane and the deep tissues of both thighs. Using gentle traction on the Romo catheter to identify the bladder neck, local anesthetic was injected into the vaginal mucosa and submucosal tissues in the midline and bilaterally at the level of the midurethra. A small sagittal incision, approximately 1.5 cm, was made in the midline of the anterior vaginal wall, 1.5 cm proximal to the external urethral meatus. Metzenbaum scissors were used to free the vaginal wall from the urethra and develop the small periurethral space on the patient's right side heading towards the obturator foramen.    With the scissors closed and at a 45 degree angle, the superior medial portion of the obturator membrane was pierced. The scissors were  removed and replaced by the winged guide, which was passed through the same portion of the obturator membrane. The right sided TVT-O helical passer with attached mesh was then passed through the previously created defect in the obturator membrane, following the course of the winged guide.    Once the passer was through the obturator membrane, the winged guide was removed. The helical needle was rotated and brought into position, such that the handle of the passer was perpendicular to the floor. In this manner, the tip of the passer was brought through the obturator foramen and adductor muscles of the thigh and the tip of the passer through the skin. The helical needle was then withdrawn from the plastic tubing, and the plastic tubing was cut off after the mesh and sheath were brought through the skin incision on the thigh. This technique was repeated in a similar fashion on the patient's left side, with care taken to assure that the mesh lay flat underneath the urethra. The Romo catheter was removed and the cystoscopy was performed to exclude unintentional bladder perforation. After the bladder integrity was confirmed, the Romo catheter was replaced and a 4 mm portion of the tape with the plastic sheaths pulled back, was grasped at the midline using a Florence clamp. Gentle traction on the lateral ends of the tape relieved any excess tape material and brought the tape and Benoit clamp into contact with the periurethral tissue. Once the tension was adjusted in the usual fashion, the plastic sheaths were removed. Then the mesh previously grasped by the Florence clamp was released, causing it to lie flat underneath the urethra without tension. The lateral ends of the tape were cut just below the surface of the skin. Finally, the thigh incisions were closed with steri strips and the vaginal incision was closed using a running delayed absorbable suture. Good hemostasis was maintained throughout the procedure.    Patient  tolerated the procedure well. She was taken to the recovery room in stable condition. Laps, instrument, needle count were correct.      Estimated Blood Loss (EBL): minimal    IVF: 750 mL crystalloid    UOP: 600 mL           Implants:   Implant Name Type Inv. Item Serial No.  Lot No. LRB No. Used Action   SYS TVT OBTURATOR - [id number]  SYS TVT OBTURATOR  [employer] [id number] N/A 1 Implanted       Specimens:   Specimen (24h ago, onward)      None                    Condition: Good    Disposition: PACU - hemodynamically stable.    Attestation: I was present and scrubbed for the entire procedure.    Nedra Younger MD

## 2023-08-18 ENCOUNTER — TELEPHONE (OUTPATIENT)
Dept: UROGYNECOLOGY | Facility: CLINIC | Age: 73
End: 2023-08-18
Payer: OTHER GOVERNMENT

## 2023-08-18 VITALS
TEMPERATURE: 98 F | DIASTOLIC BLOOD PRESSURE: 58 MMHG | OXYGEN SATURATION: 93 % | HEART RATE: 76 BPM | SYSTOLIC BLOOD PRESSURE: 121 MMHG | RESPIRATION RATE: 18 BRPM

## 2023-08-18 NOTE — TELEPHONE ENCOUNTER
Attempted to contact patient to see how she was feeling postoperatively. No answer. VM left.   Nedra Younger MD

## 2023-08-20 DIAGNOSIS — E66.9 OBESITY (BMI 30-39.9): ICD-10-CM

## 2023-08-20 DIAGNOSIS — R73.03 PREDIABETES: ICD-10-CM

## 2023-08-21 ENCOUNTER — OFFICE VISIT (OUTPATIENT)
Dept: UROGYNECOLOGY | Facility: CLINIC | Age: 73
End: 2023-08-21
Payer: OTHER GOVERNMENT

## 2023-08-21 VITALS — DIASTOLIC BLOOD PRESSURE: 70 MMHG | SYSTOLIC BLOOD PRESSURE: 132 MMHG

## 2023-08-21 DIAGNOSIS — R33.9 INCOMPLETE BLADDER EMPTYING: ICD-10-CM

## 2023-08-21 DIAGNOSIS — Z98.890 POST-OPERATIVE STATE: Primary | ICD-10-CM

## 2023-08-21 PROCEDURE — 99999 PR PBB SHADOW E&M-EST. PATIENT-LVL IV: ICD-10-PCS | Mod: PBBFAC,,, | Performed by: NURSE PRACTITIONER

## 2023-08-21 PROCEDURE — 87086 URINE CULTURE/COLONY COUNT: CPT | Performed by: NURSE PRACTITIONER

## 2023-08-21 PROCEDURE — 99024 PR POST-OP FOLLOW-UP VISIT: ICD-10-PCS | Mod: S$GLB,,, | Performed by: NURSE PRACTITIONER

## 2023-08-21 PROCEDURE — 99999 PR PBB SHADOW E&M-EST. PATIENT-LVL IV: CPT | Mod: PBBFAC,,, | Performed by: NURSE PRACTITIONER

## 2023-08-21 PROCEDURE — 99024 POSTOP FOLLOW-UP VISIT: CPT | Mod: S$GLB,,, | Performed by: NURSE PRACTITIONER

## 2023-08-21 RX ORDER — SEMAGLUTIDE 0.68 MG/ML
INJECTION, SOLUTION SUBCUTANEOUS
Qty: 9 ML | Refills: 0 | Status: SHIPPED | OUTPATIENT
Start: 2023-08-21 | End: 2023-11-02

## 2023-08-21 RX ORDER — AMOXICILLIN 500 MG/1
CAPSULE ORAL
COMMUNITY
Start: 2023-08-10 | End: 2023-11-28

## 2023-08-21 NOTE — TELEPHONE ENCOUNTER
No care due was identified.  [facility] Embedded Care Due Messages. Reference number: [id number].   8/20/2023 9:04:23 PM CDT

## 2023-08-21 NOTE — TELEPHONE ENCOUNTER
Refill Routing Note   Medication(s) are not appropriate for processing by [facility] for the following reason(s):      Required labs outdated    [facility] action(s):  Defer Care Due:  None identified            Appointments  past 12m or future 3m with PCP    Date Provider   Last Visit   5/23/2023 Monroe Cutler MD   Next Visit   11/28/2023 Monroe Cutler MD   ED visits in past 90 days: 0        Note composed:2:27 PM 08/21/2023

## 2023-08-22 LAB — BACTERIA UR CULT: NO GROWTH

## 2023-08-31 ENCOUNTER — OFFICE VISIT (OUTPATIENT)
Dept: UROGYNECOLOGY | Facility: CLINIC | Age: 73
End: 2023-08-31
Payer: OTHER GOVERNMENT

## 2023-08-31 VITALS
HEIGHT: 65 IN | DIASTOLIC BLOOD PRESSURE: 70 MMHG | SYSTOLIC BLOOD PRESSURE: 124 MMHG | BODY MASS INDEX: 29.94 KG/M2 | WEIGHT: 179.69 LBS

## 2023-08-31 DIAGNOSIS — Z98.890 POST-OPERATIVE STATE: Primary | ICD-10-CM

## 2023-08-31 PROCEDURE — 99999 PR PBB SHADOW E&M-EST. PATIENT-LVL IV: CPT | Mod: PBBFAC,,, | Performed by: NURSE PRACTITIONER

## 2023-08-31 PROCEDURE — 99999 PR PBB SHADOW E&M-EST. PATIENT-LVL IV: ICD-10-PCS | Mod: PBBFAC,,, | Performed by: NURSE PRACTITIONER

## 2023-08-31 PROCEDURE — 99024 POSTOP FOLLOW-UP VISIT: CPT | Mod: S$GLB,,, | Performed by: NURSE PRACTITIONER

## 2023-08-31 PROCEDURE — 99024 PR POST-OP FOLLOW-UP VISIT: ICD-10-PCS | Mod: S$GLB,,, | Performed by: NURSE PRACTITIONER

## 2023-08-31 RX ORDER — CARVEDILOL 6.25 MG/1
6.25 TABLET ORAL 2 TIMES DAILY
COMMUNITY
Start: 2023-08-21 | End: 2023-09-18

## 2023-08-31 NOTE — PATIENT INSTRUCTIONS
Post op healing well. Continue to follow post op instructions.  Patient will follow up with us in 4 weeks

## 2023-08-31 NOTE — PROGRESS NOTES
[facility], [place], operated by [facility] - UROGYNECOLOGY  [address] [place] [id number]  August 31, 2023     Maria Elena Jackson  [id number]  1950    UROGYN POST-OP  8/31/2023     Maria Elena Jackson is a 72 y.o. here for a post operative visit.    OPERATIVE NOTE  Date of Procedure: 8/17/2023      Procedure: Procedure(s) (LRB):  SLING, MIDURETHRAL (N/A)  CYSTOSCOPY (N/A)       HISTORY SINCE LAST VISIT:  Denies pain or bleeding.  Scant white discharge.  Bladder issues: Denies UI. Voiding every 2-3 hours during the day.  Bowel issues: Denies constipation.  Taking stool softener daily    Past Medical History:   Diagnosis Date    Arthritis     Hyperlipidemia     Hypertension     S/P total hysterectomy and bilateral salpingo-oophorectomy 6/29/2022       Past Surgical History:   Procedure Laterality Date    CHOLECYSTECTOMY      CYSTOSCOPY N/A 8/17/2023    Procedure: CYSTOSCOPY;  Surgeon: Nedra Younger MD;  Location: [facility];  Service: OB/GYN;  Laterality: N/A;    D&C Hysteroscopy      DILATION AND CURETTAGE OF UTERUS      HYSTEROSCOPY WITH DILATION AND CURETTAGE OF UTERUS N/A 05/10/2022    Procedure: HYSTEROSCOPY, WITH DILATION AND CURETTAGE OF UTERUS;  Surgeon: Marce Ellsworth MD;  Location: [facility];  Service: OB/GYN;  Laterality: N/A;    INSERTION OF MIDURETHRAL SLING N/A 8/17/2023    Procedure: SLING, MIDURETHRAL;  Surgeon: Nedra Younger MD;  Location: [facility];  Service: OB/GYN;  Laterality: N/A;  tvt-O    laparoscopy      LYMPH NODE BIOPSY N/A 06/29/2022    Procedure: BIOPSY, LYMPH NODE;  Surgeon: Kevin Mcgraw MD;  Location: [facility];  Service: OB/GYN;  Laterality: N/A;    TONSILLECTOMY      TOTAL KNEE ARTHROPLASTY Right 10/07/2019    Procedure: ARTHROPLASTY, KNEE, TOTAL;  Surgeon: John L. [facility] Jr., MD;  Location: [facility] OR;  Service: Orthopedics;  Laterality: Right;    XI ROBOTIC HYSTERECTOMY, WITH SALPINGO-OOPHORECTOMY Bilateral 06/29/2022    Procedure: XI ROBOTIC HYSTERECTOMY,WITH SALPINGO-OOPHORECTOMY;   Surgeon: Kevin Mcgraw MD;  Location: [facility];  Service: OB/GYN;  Laterality: Bilateral;  Robotic assisted total hysterectomy, Bilateral salpingo-oophrectomy, [place] Node Mapping         Family History   Problem Relation Age of Onset    Colon cancer Mother 55    Ovarian cancer Mother 35    Prostate cancer Father 50    Breast cancer Neg Hx        Social History     Socioeconomic History    Marital status:    Tobacco Use    Smoking status: Former     Current packs/day: 0.00     Average packs/day: 1.5 packs/day for 22.0 years (33.0 ttl pk-yrs)     Types: Cigarettes     Start date:      Quit date:      Years since quittin.6    Smokeless tobacco: Never   Substance and Sexual Activity    Alcohol use: Yes     Comment: 2 glasses of wine in a month    Drug use: No    Sexual activity: Not Currently     Birth control/protection: Post-menopausal   Social History Narrative    Lives with spouse. Feels safe in her home.        Current Outpatient Medications   Medication Sig Dispense Refill    amoxicillin (AMOXIL) 500 MG capsule SMARTSI Tablet(s) By Mouth      atorvastatin (LIPITOR) 40 MG tablet Take 1 tablet (40 mg total) by mouth every evening. 3 times a week 90 tablet 3    calcium-vitamin D 250 mg-2.5 mcg (100 unit) per tablet Take 1 tablet by mouth 2 (two) times daily.      carvediloL (COREG) 12.5 MG tablet Take 1 tablet (12.5 mg total) by mouth 2 (two) times daily with meals. 120 tablet 2    carvediloL (COREG) 6.25 MG tablet Take 6.25 mg by mouth 2 (two) times daily.      cholecalciferol, vitamin D3, (VITAMIN D3) 25 mcg (1,000 unit) capsule Take 1,000 Units by mouth once daily.      docusate sodium (COLACE) 100 MG capsule Take 1 capsule (100 mg total) by mouth 2 (two) times daily. 60 capsule 1    doxazosin (CARDURA) 4 MG tablet Take 4 mg by mouth.      HYDROcodone-acetaminophen (NORCO) 5-325 mg per tablet Take 1 tablet by mouth every 6 (six) hours as needed for Pain. 15 tablet 0    ibuprofen  "(ADVIL,MOTRIN) 600 MG tablet Take 1 tablet (600 mg total) by mouth every 6 (six) hours as needed for Pain. 30 tablet 1    irbesartan-hydrochlorothiazide (AVALIDE) 300-12.5 mg per tablet Take 1 tablet by mouth nightly. 90 tablet 3    NIFEdipine (PROCARDIA-XL) 60 MG (OSM) 24 hr tablet Take 1 tablet (60 mg total) by mouth once daily. 90 tablet 3    OZEMPIC 0.25 mg or 0.5 mg (2 mg/3 mL) pen injector INJECT 0.25MG              SUBCUTANEOUSLY EVERY 7 DAYS 9 mL 0     No current facility-administered medications for this visit.       Review of patient's allergies indicates:  No Known Allergies     ROS  Per HPI    Well Woman   Pap:post hyst  Mammo:05/2023      Physical Exam  /70 (BP Location: Right arm, Patient Position: Sitting, BP Method: Large (Manual))   Ht 5' 5" (1.651 m)   Wt 81.5 kg (179 lb 10.8 oz)   LMP  (LMP Unknown)   BMI 29.90 kg/m²   General: alert and oriented, no acute distress  Respiratory: normal respiratory effort  Abd: soft, non-tender, non-distended  Pelvic:  Ext. Genitalia: normal external genitalia. Normal bartholin's and skeens glands  Vagina: + atrophy. Normal vaginal mucosa without lesions. No discharge noted.  Anterior incision healing well. No mesh visible/ palpable. Non-tender bladder base without palpable mass.  Cervix: absent  Uterus:  absent   Urethra: no masses or tenderness  Urethral meatus: no lesions, caruncle or prolapse.        Impression  1. Post-operative state          We reviewed the above issues and discussed options for short-term versus long-term management of her problems.     Plan:   Post op healing well. Continue to follow post op instructions.  Patient will follow up with us in 4 weeks    20 minutes were spent in face to face time with this patient  90 % of this time was spent in counseling and/or coordination of care    DEMARIO Lobato-BC  [facility]  Division of Female Pelvic Medicine and Reconstructive Surgery  Department of Obstetrics & " Gynecology

## 2023-09-18 ENCOUNTER — PATIENT MESSAGE (OUTPATIENT)
Dept: UROGYNECOLOGY | Facility: CLINIC | Age: 73
End: 2023-09-18
Payer: OTHER GOVERNMENT

## 2023-09-18 ENCOUNTER — PATIENT MESSAGE (OUTPATIENT)
Dept: INTERNAL MEDICINE | Facility: CLINIC | Age: 73
End: 2023-09-18
Payer: OTHER GOVERNMENT

## 2023-09-26 ENCOUNTER — OFFICE VISIT (OUTPATIENT)
Dept: UROGYNECOLOGY | Facility: CLINIC | Age: 73
End: 2023-09-26
Payer: OTHER GOVERNMENT

## 2023-09-26 VITALS
DIASTOLIC BLOOD PRESSURE: 67 MMHG | BODY MASS INDEX: 29.95 KG/M2 | WEIGHT: 180 LBS | HEART RATE: 70 BPM | SYSTOLIC BLOOD PRESSURE: 150 MMHG

## 2023-09-26 DIAGNOSIS — N39.3 STRESS INCONTINENCE: ICD-10-CM

## 2023-09-26 DIAGNOSIS — Z09 POSTOP CHECK: Primary | ICD-10-CM

## 2023-09-26 PROCEDURE — 99999 PR PBB SHADOW E&M-EST. PATIENT-LVL III: CPT | Mod: PBBFAC,,, | Performed by: OBSTETRICS & GYNECOLOGY

## 2023-09-26 PROCEDURE — 99024 POSTOP FOLLOW-UP VISIT: CPT | Mod: S$GLB,,, | Performed by: OBSTETRICS & GYNECOLOGY

## 2023-09-26 PROCEDURE — 99999 PR PBB SHADOW E&M-EST. PATIENT-LVL III: ICD-10-PCS | Mod: PBBFAC,,, | Performed by: OBSTETRICS & GYNECOLOGY

## 2023-09-26 PROCEDURE — 99024 PR POST-OP FOLLOW-UP VISIT: ICD-10-PCS | Mod: S$GLB,,, | Performed by: OBSTETRICS & GYNECOLOGY

## 2023-11-01 DIAGNOSIS — E66.9 OBESITY (BMI 30-39.9): ICD-10-CM

## 2023-11-01 DIAGNOSIS — R73.03 PREDIABETES: ICD-10-CM

## 2023-11-02 RX ORDER — SEMAGLUTIDE 0.68 MG/ML
INJECTION, SOLUTION SUBCUTANEOUS
Qty: 3 EACH | Refills: 0 | Status: SHIPPED | OUTPATIENT
Start: 2023-11-02 | End: 2024-01-16 | Stop reason: SDUPTHER

## 2023-11-02 NOTE — TELEPHONE ENCOUNTER
Care Due:                  Date            Visit Type   Department     Provider  --------------------------------------------------------------------------------                                EP -                              PRIMARY      [facility] INTERNAL  Last Visit: 05-      CARE ([facility])   BAYLEE Cutler                              EP -                              PRIMARY      [facility] INTERNAL  Next Visit: 11-      CARE ([facility])   BAYLEE Cutler                                                            Last  Test          Frequency    Reason                     Performed    Due Date  --------------------------------------------------------------------------------    HBA1C.......  6 months...  OZEMPIC..................  11- 05-    Health [facility] Embedded Care Due Messages. Reference number: [id number].   11/01/2023 7:14:00 PM CDT

## 2023-11-02 NOTE — TELEPHONE ENCOUNTER
Refill Routing Note   Medication(s) are not appropriate for processing by [facility] for the following reason(s):      Required labs outdated  No active prescription written by provider    [facility] action(s):  Defer Care Due:  Labs due            Appointments  past 12m or future 3m with PCP    Date Provider   Last Visit   5/23/2023 Monroe Cutler MD   Next Visit   11/28/2023 Monroe Cutler MD   ED visits in past 90 days: 0        Note composed:8:04 AM 11/02/2023

## 2023-11-28 ENCOUNTER — OFFICE VISIT (OUTPATIENT)
Dept: INTERNAL MEDICINE | Facility: CLINIC | Age: 73
End: 2023-11-28
Payer: OTHER GOVERNMENT

## 2023-11-28 VITALS
HEIGHT: 65 IN | HEART RATE: 63 BPM | SYSTOLIC BLOOD PRESSURE: 136 MMHG | DIASTOLIC BLOOD PRESSURE: 66 MMHG | OXYGEN SATURATION: 96 % | BODY MASS INDEX: 29.97 KG/M2 | WEIGHT: 179.88 LBS

## 2023-11-28 DIAGNOSIS — Z96.651 STATUS POST TOTAL RIGHT KNEE REPLACEMENT: ICD-10-CM

## 2023-11-28 DIAGNOSIS — Z98.890 STATUS POST HYSTEROSCOPY: ICD-10-CM

## 2023-11-28 DIAGNOSIS — Z90.79 S/P TOTAL HYSTERECTOMY AND BILATERAL SALPINGO-OOPHORECTOMY: ICD-10-CM

## 2023-11-28 DIAGNOSIS — Z71.89 COUNSELING FOR HORMONE REPLACEMENT THERAPY: ICD-10-CM

## 2023-11-28 DIAGNOSIS — M81.0 AGE-RELATED OSTEOPOROSIS WITHOUT CURRENT PATHOLOGICAL FRACTURE: ICD-10-CM

## 2023-11-28 DIAGNOSIS — E78.5 HYPERLIPIDEMIA, UNSPECIFIED HYPERLIPIDEMIA TYPE: ICD-10-CM

## 2023-11-28 DIAGNOSIS — Z90.722 S/P TOTAL HYSTERECTOMY AND BILATERAL SALPINGO-OOPHORECTOMY: ICD-10-CM

## 2023-11-28 DIAGNOSIS — Z90.710 S/P TOTAL HYSTERECTOMY AND BILATERAL SALPINGO-OOPHORECTOMY: ICD-10-CM

## 2023-11-28 DIAGNOSIS — I10 ESSENTIAL HYPERTENSION: Primary | ICD-10-CM

## 2023-11-28 PROCEDURE — 99999 PR PBB SHADOW E&M-EST. PATIENT-LVL IV: ICD-10-PCS | Mod: PBBFAC,,, | Performed by: STUDENT IN AN ORGANIZED HEALTH CARE EDUCATION/TRAINING PROGRAM

## 2023-11-28 PROCEDURE — 99214 OFFICE O/P EST MOD 30 MIN: CPT | Mod: S$GLB,,, | Performed by: STUDENT IN AN ORGANIZED HEALTH CARE EDUCATION/TRAINING PROGRAM

## 2023-11-28 PROCEDURE — 99999 PR PBB SHADOW E&M-EST. PATIENT-LVL IV: CPT | Mod: PBBFAC,,, | Performed by: STUDENT IN AN ORGANIZED HEALTH CARE EDUCATION/TRAINING PROGRAM

## 2023-11-28 PROCEDURE — 99214 PR OFFICE/OUTPT VISIT, EST, LEVL IV, 30-39 MIN: ICD-10-PCS | Mod: S$GLB,,, | Performed by: STUDENT IN AN ORGANIZED HEALTH CARE EDUCATION/TRAINING PROGRAM

## 2023-11-28 NOTE — PROGRESS NOTES
[facility]    Subjective:       Patient ID: Maria Elena Jackson is a 73 y.o. female.    Chief Complaint: Annual Exam        History was obtained from the patient and supplemented through chart review.  This patient is known to me.     HPI:    Patient is a 73 y.o. female w/ HTN, HLD, family hx of colon cancer, fam hx of ovarian cancer presents for BP f/u    HTN  Controlled on repeat  coreg 12.5 BID, irbesartan-hctz 300-12.5 mg, nifedipine 60  No CP/HA/SOB/Vision changes     Overweight federal insurance  Tried ozempic 0.25  Will try to keep same insurance to continue, helped with keeping appetite under control    HLD  lipitor 40 three times a week nightly per pt preference  stable    Prediabetes  Diet counseled; discussed     Mom had colon cancer, ovarian cancer,  early  S/p oopherectomy, hyst; Follows with gyn after hyst    Osteopenia -> osteoporosis  Discussed dosing of vit D/ca, constipation with algae  Hx of multiple dental issues  3 times a week cross fit   Wants to hold off on any meds  Interested in hormone replacement    Wt Readings from Last 15 Encounters:   23 81.6 kg (179 lb 14.3 oz)   23 81.6 kg (180 lb)   23 81.5 kg (179 lb 10.8 oz)   23 82.1 kg (181 lb)   23 82.2 kg (181 lb 3.5 oz)   23 83.5 kg (184 lb 1.4 oz)   23 85.1 kg (187 lb 9.8 oz)   23 85.1 kg (187 lb 9.8 oz)   23 82.6 kg (182 lb)   23 83 kg (182 lb 15.7 oz)   23 83 kg (183 lb)   23 83.3 kg (183 lb 10.3 oz)   02/15/23 84.6 kg (186 lb 8.2 oz)   23 83 kg (182 lb 15.7 oz)   23 83.8 kg (184 lb 11.9 oz)      [employer] may change    Medical History  Past Medical History:   Diagnosis Date    Arthritis     Hyperlipidemia     Hypertension     S/P total hysterectomy and bilateral salpingo-oophorectomy 2022       Review of Systems   Constitutional:  Negative for fever.   HENT:  Negative for trouble swallowing.    Respiratory:  Negative for  "shortness of breath.    Cardiovascular:  Negative for chest pain.   Gastrointestinal:  Negative for constipation, diarrhea, nausea and vomiting.   Musculoskeletal:  Negative for gait problem.   Neurological:  Negative for dizziness and seizures.   Psychiatric/Behavioral:  Negative for hallucinations.          Surgical hx, family hx, social hx   Have been reviewed      Current Outpatient Medications:     atorvastatin (LIPITOR) 40 MG tablet, Take 1 tablet (40 mg total) by mouth every evening. 3 times a week, Disp: 90 tablet, Rfl: 3    calcium-vitamin D 250 mg-2.5 mcg (100 unit) per tablet, Take 1 tablet by mouth 2 (two) times daily., Disp: , Rfl:     carvediloL (COREG) 12.5 MG tablet, Take 1 tablet (12.5 mg total) by mouth 2 (two) times daily with meals., Disp: 120 tablet, Rfl: 2    cholecalciferol, vitamin D3, (VITAMIN D3) 25 mcg (1,000 unit) capsule, Take 1,000 Units by mouth once daily., Disp: , Rfl:     docusate sodium (COLACE) 100 MG capsule, Take 1 capsule (100 mg total) by mouth 2 (two) times daily., Disp: 60 capsule, Rfl: 1    doxazosin (CARDURA) 4 MG tablet, Take 4 mg by mouth., Disp: , Rfl:     ibuprofen (ADVIL,MOTRIN) 600 MG tablet, Take 1 tablet (600 mg total) by mouth every 6 (six) hours as needed for Pain., Disp: 30 tablet, Rfl: 1    irbesartan-hydrochlorothiazide (AVALIDE) 300-12.5 mg per tablet, Take 1 tablet by mouth nightly., Disp: 90 tablet, Rfl: 3    NIFEdipine (PROCARDIA-XL) 60 MG (OSM) 24 hr tablet, Take 1 tablet (60 mg total) by mouth once daily., Disp: 90 tablet, Rfl: 3    OZEMPIC 0.25 mg or 0.5 mg (2 mg/3 mL) pen injector, INJECT 0.25MG              SUBCUTANEOUSLY EVERY 7 DAYS, Disp: 3 each, Rfl: 0    Objective:        Body mass index is 29.94 kg/m².  Vitals:    11/28/23 1353 11/28/23 1439   BP: (!) 150/66 136/66   Pulse: 63    SpO2: 96%    Weight: 81.6 kg (179 lb 14.3 oz)    Height: 5' 5" (1.651 m)    PainSc: 0-No pain            Physical Exam  Vitals and nursing note reviewed. "   Constitutional:       General: She is not in acute distress.     Appearance: Normal appearance. She is not ill-appearing.   HENT:      Head: Normocephalic and atraumatic.   Eyes:      General: No scleral icterus.  Pulmonary:      Effort: Pulmonary effort is normal.   Abdominal:      General: There is no distension.   Musculoskeletal:         General: No deformity.      Cervical back: Normal range of motion.   Skin:     General: Skin is warm and dry.   Neurological:      Mental Status: She is alert and oriented to person, place, and time.   Psychiatric:         Behavior: Behavior normal.           Lab Results   Component Value Date    WBC 9.48 08/02/2023    HGB 13.8 08/02/2023    HCT 41.5 08/02/2023     08/02/2023    CHOL 181 01/11/2022    TRIG 104 01/11/2022    HDL 47 01/11/2022    ALT 19 12/13/2022    AST 18 12/13/2022     08/02/2023    K 3.6 08/02/2023     08/02/2023    CREATININE 0.9 08/02/2023    BUN 20 08/02/2023    CO2 27 08/02/2023    INR 0.9 10/01/2019    HGBA1C 5.6 11/08/2022       The 10-year ASCVD risk score (Quyen DK, et al., 2019) is: 23.2%    Values used to calculate the score:      Age: 73 years      Sex: Female      Is Non- : No      Diabetic: No      Tobacco smoker: No      Systolic Blood Pressure: 136 mmHg      Is BP treated: Yes      HDL Cholesterol: 47 mg/dL      Total Cholesterol: 181 mg/dL    On lipitor 3 times a week    (Imaging have been independently reviewed)    Assessment:         1. Essential hypertension    2. S/p RA-TLH/BSO/LND    3. Status post hysteroscopy with polypectomy    4. Hyperlipidemia, unspecified hyperlipidemia type    5. Age-related osteoporosis without current pathological fracture    6. Status post total right knee replacement 10/7/2019    7. Counseling for hormone replacement therapy                  Plan:     Maria Elena was seen today for annual exam.    Diagnoses and all orders for this visit:    Essential hypertension    S/p  RA-TLH/BSO/LND    Status post hysteroscopy with polypectomy    Hyperlipidemia, unspecified hyperlipidemia type    Age-related osteoporosis without current pathological fracture    Status post total right knee replacement 10/7/2019    Counseling for hormone replacement therapy  -     Ambulatory referral/consult to Obstetrics / Gynecology; Future                Health Maintenance  - Lipids:   - A1C:   - Colon Ca Screen: diverticulosis, 9/7/2022 repeat 5 years  - Immunizations:     Women's health  - Pap: s/p 6/2022 concern for cancer, but not cancer  - Mammo: 5/30/2023  - Dexa: osteoporosis 11/15/2022    Follow up in about 3 months (around 2/28/2024) for htn follow-up.        Pt wishes for less frequent appts, but also wants ozempic, f/u for blood pressure scheduled since elevated    All medications were reviewed including potential side effects and risks/benefits.  Pt was counseled to call back if anything worsens or if questions arise.    Monroe Cutler MD  Family Medicine  [facility]  [facility]  [address]  [place] [address]  Phone [phone]  Fax [phone]

## 2023-12-01 ENCOUNTER — PATIENT MESSAGE (OUTPATIENT)
Dept: INTERNAL MEDICINE | Facility: CLINIC | Age: 73
End: 2023-12-01
Payer: OTHER GOVERNMENT

## 2023-12-01 DIAGNOSIS — R73.03 PREDIABETES: ICD-10-CM

## 2023-12-01 DIAGNOSIS — E66.9 OBESITY (BMI 30-39.9): ICD-10-CM

## 2023-12-01 RX ORDER — SEMAGLUTIDE 0.68 MG/ML
INJECTION, SOLUTION SUBCUTANEOUS
Qty: 3 EACH | Refills: 0 | Status: CANCELLED | OUTPATIENT
Start: 2023-12-01

## 2023-12-01 NOTE — TELEPHONE ENCOUNTER
No care due was identified.  Health [facility] Embedded Care Due Messages. Reference number: [id number].   12/01/2023 1:06:51 PM CST

## 2023-12-12 ENCOUNTER — CLINICAL SUPPORT (OUTPATIENT)
Dept: INTERNAL MEDICINE | Facility: CLINIC | Age: 73
End: 2023-12-12
Payer: OTHER GOVERNMENT

## 2023-12-12 ENCOUNTER — TELEPHONE (OUTPATIENT)
Dept: INTERNAL MEDICINE | Facility: CLINIC | Age: 73
End: 2023-12-12

## 2023-12-12 VITALS — SYSTOLIC BLOOD PRESSURE: 137 MMHG | DIASTOLIC BLOOD PRESSURE: 58 MMHG

## 2023-12-12 DIAGNOSIS — Z01.30 BP CHECK: Primary | ICD-10-CM

## 2023-12-12 PROCEDURE — 99999 PR PBB SHADOW E&M-EST. PATIENT-LVL II: CPT | Mod: PBBFAC,,,

## 2023-12-12 PROCEDURE — 99999 PR PBB SHADOW E&M-EST. PATIENT-LVL II: ICD-10-PCS | Mod: PBBFAC,,,

## 2023-12-12 NOTE — TELEPHONE ENCOUNTER
Maria Elena Jackson 73 y.o. female is here for Blood Pressure check. remote     Manual Blood pressure reading was  137/58, Pulse 71. (Checked at the end of the visit)     If high, was it repeated after 15 minutes? no     Pt's Home blood pressure machine read in office NO, Pulse N/A.      Diagnosed with Hypertension yes.     Patient took blood pressure medication today yes.  Last dose of blood pressure medication was taken at 0800. Patient took 1. Carvedilol 12.5 mg BID 2. Irbesartan-hydrochlorothiazide 300-12.5 mg nightly 3. Nifedipine 60 mg daily 4. Cardura 4 mg on med list ( states doesn't think she is taking ).      All Medications and OTC medication updated no     Does patient have record of home blood pressure readings / Blood Pressure Log yes. 12/10 139/75 Pulse 63, 12/11 125/63 Pulse 70     Does the pt have any complaints today in regards to their blood pressure medication? no. Complains of None . Patient is asymptomatic.      Were you sitting still for 5-10 minutes prior to taking your Blood pressure? yes      Has your blood pressure monitor ever been checked? yes When was last time we checked your blood pressure monitor? 2023     Updated vitals yes     Follow up date is 04/03/24.      Dr. Cutler  notified.            Creatinine   Date Value Ref Range Status   08/02/2023 0.9 0.5 - 1.4 mg/dL Final            Sodium   Date Value Ref Range Status   08/02/2023 138 136 - 145 mmol/L Final            Potassium   Date Value Ref Range Status   08/02/2023 3.6 3.5 - 5.1 mmol/L Final

## 2023-12-21 RX ORDER — AMOXICILLIN 500 MG/1
CAPSULE ORAL
Qty: 12 CAPSULE | Refills: 2 | Status: SHIPPED | OUTPATIENT
Start: 2023-12-21

## 2024-01-15 ENCOUNTER — PATIENT MESSAGE (OUTPATIENT)
Dept: INTERNAL MEDICINE | Facility: CLINIC | Age: 74
End: 2024-01-15
Payer: OTHER GOVERNMENT

## 2024-01-15 DIAGNOSIS — E66.9 OBESITY (BMI 30-39.9): ICD-10-CM

## 2024-01-15 DIAGNOSIS — R73.03 PREDIABETES: ICD-10-CM

## 2024-01-16 RX ORDER — SEMAGLUTIDE 0.68 MG/ML
INJECTION, SOLUTION SUBCUTANEOUS
Qty: 3 EACH | Refills: 0 | Status: SHIPPED | OUTPATIENT
Start: 2024-01-16 | End: 2024-04-03

## 2024-01-16 NOTE — TELEPHONE ENCOUNTER
Care Due:                  Date            Visit Type   Department     Provider  --------------------------------------------------------------------------------                                EP -                              PRIMARY      B[facility] INTERNAL  Last Visit: 11-      CARE ([facility])   BAYLEE Cutler                              EP -                              PRIMARY      [facility] INTERNAL  Next Visit: 04-      CARE ([facility])   BAYLEE Cutler                                                            Last  Test          Frequency    Reason                     Performed    Due Date  --------------------------------------------------------------------------------    HBA1C.......  6 months...  OZEMPIC..................  11- 05-    [employer] Embedded Care Due Messages. Reference number: [id number].   1/16/2024 8:11:54 AM CST

## 2024-01-17 ENCOUNTER — TELEPHONE (OUTPATIENT)
Dept: INTERNAL MEDICINE | Facility: CLINIC | Age: 74
End: 2024-01-17
Payer: OTHER GOVERNMENT

## 2024-01-17 NOTE — TELEPHONE ENCOUNTER
Patient was informed that her ozempic was denied because she doesn't have a type 2 diabetes diagnosis. Patient verbally understood

## 2024-01-17 NOTE — TELEPHONE ENCOUNTER
----- Message from Jody Villar sent at 1/17/2024  4:39 PM CST -----  Type:  Patient Returning Call    Who Called: pt     Who Left Message for Patient: delease    Does the patient know what this is regarding?:no    Would the patient rather a call back or a response via [facility]? call    Best Call Back Number:[phone] (home)       Additional Information:

## 2024-01-29 ENCOUNTER — PATIENT MESSAGE (OUTPATIENT)
Dept: INTERNAL MEDICINE | Facility: CLINIC | Age: 74
End: 2024-01-29
Payer: OTHER GOVERNMENT

## 2024-01-29 DIAGNOSIS — I10 ESSENTIAL HYPERTENSION: ICD-10-CM

## 2024-01-29 RX ORDER — CARVEDILOL 6.25 MG/1
6.25 TABLET ORAL 2 TIMES DAILY WITH MEALS
Qty: 180 TABLET | Refills: 2 | OUTPATIENT
Start: 2024-01-29

## 2024-01-29 RX ORDER — CARVEDILOL 12.5 MG/1
12.5 TABLET ORAL 2 TIMES DAILY WITH MEALS
Qty: 120 TABLET | Refills: 2 | Status: SHIPPED | OUTPATIENT
Start: 2024-01-29 | End: 2024-04-03

## 2024-01-29 NOTE — TELEPHONE ENCOUNTER
No care due was identified.  Health [facility] Embedded Care Due Messages. Reference number: [id number].   1/29/2024 12:28:12 PM CST

## 2024-01-29 NOTE — TELEPHONE ENCOUNTER
No care due was identified.  Health [facility] Embedded Care Due Messages. Reference number: [id number].   1/29/2024 10:35:40 AM CST

## 2024-01-29 NOTE — TELEPHONE ENCOUNTER
Refill Decision Note   Maria Elena Jackson  is requesting a refill authorization.  Brief Assessment and Rationale for Refill:  Quick Discontinue     Medication Therapy Plan:       Medication Reconciliation Completed: No   Comments:     No Care Gaps recommended.     Duplicate Pended Encounter(s)/ Last Prescribed Details:    Pharmacy    [id number] - [place] - [address]  [address] [address]  Phone: [phone]  Fax: [phone]  [place] #: [id number]   ABIGAIL Reason: --     Outpatient Medication Detail     Disp Refills Start End ABIGAIL   carvediloL (COREG) 12.5 MG tablet 120 tablet 2 1/29/2024 1/28/2025 No   Sig - Route: Take 1 tablet (12.5 mg total) by mouth 2 (two) times daily with meals. - Oral   Sent to pharmacy as: carvediloL (COREG) 12.5 MG tablet   Class: Normal   Notes to Pharmacy: 12.5 mg BID should be the correct dose. Please discontinue other dosing and instructions   Order: [id number]   Date/Time Signed: 1/29/2024 12:38       E-Prescribing Status: Receipt confirmed by pharmacy (1/29/2024 12:38 PM CST)              Note composed:4:48 PM 01/29/2024

## 2024-02-05 ENCOUNTER — OFFICE VISIT (OUTPATIENT)
Dept: OBSTETRICS AND GYNECOLOGY | Facility: CLINIC | Age: 74
End: 2024-02-05
Payer: OTHER GOVERNMENT

## 2024-02-05 VITALS
SYSTOLIC BLOOD PRESSURE: 155 MMHG | DIASTOLIC BLOOD PRESSURE: 71 MMHG | WEIGHT: 178.38 LBS | BODY MASS INDEX: 29.68 KG/M2

## 2024-02-05 DIAGNOSIS — Z78.0 MENOPAUSE PRESENT: ICD-10-CM

## 2024-02-05 DIAGNOSIS — N95.2 POSTMENOPAUSAL ATROPHIC VAGINITIS: ICD-10-CM

## 2024-02-05 DIAGNOSIS — Z71.89 COUNSELING FOR HORMONE REPLACEMENT THERAPY: Primary | ICD-10-CM

## 2024-02-05 PROCEDURE — 99214 OFFICE O/P EST MOD 30 MIN: CPT | Mod: S$GLB,,, | Performed by: OBSTETRICS & GYNECOLOGY

## 2024-02-05 PROCEDURE — 99999 PR PBB SHADOW E&M-EST. PATIENT-LVL IV: CPT | Mod: PBBFAC,,, | Performed by: OBSTETRICS & GYNECOLOGY

## 2024-02-05 RX ORDER — ESTRADIOL 0.1 MG/G
1 CREAM VAGINAL
Qty: 42 G | Refills: 3 | Status: SHIPPED | OUTPATIENT
Start: 2024-02-05

## 2024-02-05 NOTE — PROGRESS NOTES
Maria Elena Jackson is a 73 y.o. female who presents to discuss hormone replacement therapy.  Menarche occurred at age 8 and the patient went into menopause at 53 years of age, which was 20 years ago. Of note, she had a hysterectomy in 2022 for PMB, was diagnosed with Complex hyperplasia with Atypia. FUrther she has had a midurethral sling placement with Dr. Younger last year, and has not been using her vaginal estradiol cream. (We discussed prophylactic treatment for prevention of UTI's/bladder dysfunction).   Patient is requesting hormone replacement therapy due to minimization of osteoporosis and personal history of osteoporosis,  She reports was advised on the Bisphosphonates and is reluctant to take these due to concerns about jaw necrosis/oral issues.. .The patient is not taking hormone replacement therapy. Patient denies post-menopausal vaginal bleeding. Had a hysterectomy   The patient is not sexually active.  She denies the following contraindications to HRT:  Vaginal bleeding, history of VTE/PE, thrombophilia,  breast cancer, or active liver disease.       PCP: Dr. Monroe Cutler       Routine labs: 2022  Pap smear: 5/6/2022  Mammogram: May 30,2023: BI-RADS Category 1: Negative   DEXA: 2022: Osteoporosis both hips.   Colonoscopy: 2021: Normal    No visits with results within 3 Month(s) from this visit.   Latest known visit with results is:   Office Visit on 08/21/2023   Component Date Value Ref Range Status    Urine Culture, Routine 08/21/2023 No growth   Final       Past Medical History:   Diagnosis Date    Arthritis     Hyperlipidemia     Hypertension     S/P total hysterectomy and bilateral salpingo-oophorectomy 06/29/2022     Past Surgical History:   Procedure Laterality Date    CHOLECYSTECTOMY      CYSTOSCOPY N/A 8/17/2023    Procedure: CYSTOSCOPY;  Surgeon: Nedra Younger MD;  Location: [facility];  Service: OB/GYN;  Laterality: N/A;    D&C Hysteroscopy      DILATION AND CURETTAGE OF UTERUS       HYSTEROSCOPY WITH DILATION AND CURETTAGE OF UTERUS N/A 05/10/2022    Procedure: HYSTEROSCOPY, WITH DILATION AND CURETTAGE OF UTERUS;  Surgeon: Marce Ellsworth MD;  Location: [facility];  Service: OB/GYN;  Laterality: N/A;    INSERTION OF MIDURETHRAL SLING N/A 2023    Procedure: SLING, MIDURETHRAL;  Surgeon: Nedra Younger MD;  Location: [facility] OR;  Service: OB/GYN;  Laterality: N/A;  tvt-O    laparoscopy      LYMPH NODE BIOPSY N/A 2022    Procedure: BIOPSY, LYMPH NODE;  Surgeon: Kevin Mcgraw MD;  Location: [facility] OR;  Service: OB/GYN;  Laterality: N/A;    TONSILLECTOMY      TOTAL KNEE ARTHROPLASTY Right 10/07/2019    Procedure: ARTHROPLASTY, KNEE, TOTAL;  Surgeon: John L. [facility] Jr., MD;  Location: [facility];  Service: Orthopedics;  Laterality: Right;    XI ROBOTIC HYSTERECTOMY, WITH SALPINGO-OOPHORECTOMY Bilateral 2022    Procedure: XI ROBOTIC HYSTERECTOMY,WITH SALPINGO-OOPHORECTOMY;  Surgeon: Kevin Mcgraw MD;  Location: [facility];  Service: OB/GYN;  Laterality: Bilateral;  Robotic assisted total hysterectomy, Bilateral salpingo-oophrectomy, [place] Node Mapping       Social History     Tobacco Use    Smoking status: Former     Current packs/day: 0.00     Average packs/day: 1.5 packs/day for 22.0 years (33.0 ttl pk-yrs)     Types: Cigarettes     Start date:      Quit date:      Years since quittin.1    Smokeless tobacco: Never   Substance Use Topics    Alcohol use: Yes     Comment: 2 glasses of wine in a month    Drug use: No     Family History   Problem Relation Age of Onset    Diabetes Maternal Grandmother     Diabetes Maternal Grandfather     Hypertension Father     Prostate cancer Father 50    Colon cancer Mother 55    Ovarian cancer Mother 35    Squamous cell carcinoma Brother     Heart attack Brother 55    Breast cancer Neg Hx     Stroke Neg Hx      OB History    Para Term  AB Living   2 2 2     2   SAB IAB Ectopic Multiple Live Births           2      # Outcome  Date GA Lbr Edison/2nd Weight Sex Delivery Anes PTL Lv   2 Term      Vag-Spont   SABINA   1 Term      Vag-Spont   SABINA       Current Outpatient Medications:     amoxicillin (AMOXIL) 500 MG capsule, TAKE FOUR TABLETS ONE HOUR PRIOR TO DENTAL PROCEDURE, Disp: 12 capsule, Rfl: 2    atorvastatin (LIPITOR) 40 MG tablet, Take 1 tablet (40 mg total) by mouth every evening. 3 times a week, Disp: 90 tablet, Rfl: 3    carvediloL (COREG) 12.5 MG tablet, Take 1 tablet (12.5 mg total) by mouth 2 (two) times daily with meals., Disp: 120 tablet, Rfl: 2    cholecalciferol, vitamin D3, (VITAMIN D3) 25 mcg (1,000 unit) capsule, Take 1,000 Units by mouth once daily., Disp: , Rfl:     docusate sodium (COLACE) 100 MG capsule, Take 1 capsule (100 mg total) by mouth 2 (two) times daily., Disp: 60 capsule, Rfl: 1    ibuprofen (ADVIL,MOTRIN) 600 MG tablet, Take 1 tablet (600 mg total) by mouth every 6 (six) hours as needed for Pain., Disp: 30 tablet, Rfl: 1    irbesartan-hydrochlorothiazide (AVALIDE) 300-12.5 mg per tablet, Take 1 tablet by mouth nightly., Disp: 90 tablet, Rfl: 3    NIFEdipine (PROCARDIA-XL) 60 MG (OSM) 24 hr tablet, Take 1 tablet (60 mg total) by mouth once daily., Disp: 90 tablet, Rfl: 3    semaglutide (OZEMPIC) 0.25 mg or 0.5 mg (2 mg/3 mL) pen injector, INJECT 0.25MG SUBCUTANEOUSLY EVERY 7 DAYS, Disp: 3 each, Rfl: 0    calcium-vitamin D 250 mg-2.5 mcg (100 unit) per tablet, Take 1 tablet by mouth 2 (two) times daily., Disp: , Rfl:     doxazosin (CARDURA) 4 MG tablet, Take 4 mg by mouth., Disp: , Rfl:     estradioL (ESTRACE) 0.01 % (0.1 mg/gram) vaginal cream, Place 1 g vaginally twice a week. Use nightly x 2 weeks then twice weekly, Disp: 42 g, Rfl: 3    Review of Systems:  General: No fever, chills, or weight loss.  Chest: No chest pain, shortness of breath, or palpitations.  Breast: No pain, masses, or nipple discharge.  Vulva: No pain, lesions, or itching.  Vagina: No relaxation, itching, discharge, or lesions.  Abdomen: No  pain, nausea, vomiting, diarrhea, or constipation.  Urinary: No incontinence, nocturia, frequency, or dysuria.  Extremities:  No leg cramps, edema, or calf pain.  Neurologic: No headaches, dizziness, or visual changes.    Vitals:    02/05/24 0945   BP: (!) 155/71   Weight: 80.9 kg (178 lb 5.6 oz)   PainSc: 0-No pain     Body mass index is 29.68 kg/m².    Assessment:    Postmenopausal atrophic vaginitis  -     estradioL (ESTRACE) 0.01 % (0.1 mg/gram) vaginal cream; Place 1 g vaginally twice a week. Use nightly x 2 weeks then twice weekly  Dispense: 42 g; Refill: 3    Counseling for hormone replacement therapy  -     Ambulatory referral/consult to Obstetrics / Gynecology  -     Ambulatory referral/consult to Cardiology; Future; Expected date: 02/12/2024        Plan:   Risks and benefits of hormone replacement therapy were discussed.  Hormone replacement therapy options, including bioidentical versus non-bioidentical hormones, as well as alternatives discussed.  We spent a significant amt of time discussing estrogen replacement theropy.  We discussed the risks and benefits including breast cancer and embolic risk, osteoporosis and heart and colon health benefits.  Pt understands that there are many different ways to take estrogen and many different doses and that she does not have to take long term unless she chooses.  She understands that if she still has her uterus, she will need to take progesterone with this to decrease risk of endometrial cancer.We discussed side effects that might include but not limited to headaches, edema, nausea.   We discussed the risks/benefits of results from WHI study as well as current recommendations/cautions.n    We also discussed that the menopause society's recommendations do not recommend ERT for treatment of osteoporosis, however estrogen therapy has been shown to delay /slow further bone loss .   We did discuss that transdermal option of estrogen is safer than oral estradiol as  transdermal methods decrease risk for blood clots and stroke as they bypass liver metabolism.     Will refer to Cardiology for further evaluation prior to considering ERT.    The 10-year ASCVD risk score (Quyen JACOB, et al., 2019) is: 29.1%    Values used to calculate the score:      Age: 73 years      Sex: Female      Is Non- : No      Diabetic: No      Tobacco smoker: No      Systolic Blood Pressure: 155 mmHg      Is BP treated: Yes      HDL Cholesterol: 47 mg/dL      Total Cholesterol: 181 mg/dL      Follow up in a few weeks  Will recheck labs once on typical optimal dose or if having side effects.  Instructed patient to call if she experiences any side effects or has any questions.  I spent 35 minutes with this patient today, >50% counseling.

## 2024-02-24 DIAGNOSIS — I10 ESSENTIAL HYPERTENSION: ICD-10-CM

## 2024-02-24 NOTE — TELEPHONE ENCOUNTER
No care due was identified.  [facility] Embedded Care Due Messages. Reference number: [id number].   2/24/2024 7:02:39 AM CST

## 2024-02-25 NOTE — TELEPHONE ENCOUNTER
Refill Routing Note   Medication(s) are not appropriate for processing by [facility] for the following reason(s):        Required vitals abnormal    [facility] action(s):  Defer               Appointments  past 12m or future 3m with PCP    Date Provider   Last Visit   11/28/2023 Monroe Cutler MD   Next Visit   4/3/2024 Monroe Cutler MD   ED visits in past 90 days: 0        Note composed:8:36 PM 02/24/2024

## 2024-02-26 RX ORDER — IRBESARTAN AND HYDROCHLOROTHIAZIDE 300; 12.5 MG/1; MG/1
1 TABLET, FILM COATED ORAL NIGHTLY
Qty: 90 TABLET | Refills: 1 | Status: SHIPPED | OUTPATIENT
Start: 2024-02-26 | End: 2025-02-25

## 2024-02-26 RX ORDER — NIFEDIPINE 60 MG/1
60 TABLET, EXTENDED RELEASE ORAL
Qty: 90 TABLET | Refills: 0 | Status: SHIPPED | OUTPATIENT
Start: 2024-02-26 | End: 2024-05-29

## 2024-03-19 ENCOUNTER — OFFICE VISIT (OUTPATIENT)
Dept: UROGYNECOLOGY | Facility: CLINIC | Age: 74
End: 2024-03-19
Payer: OTHER GOVERNMENT

## 2024-03-19 VITALS
WEIGHT: 182.75 LBS | BODY MASS INDEX: 30.45 KG/M2 | HEART RATE: 58 BPM | DIASTOLIC BLOOD PRESSURE: 55 MMHG | HEIGHT: 65 IN | SYSTOLIC BLOOD PRESSURE: 131 MMHG

## 2024-03-19 DIAGNOSIS — Z98.890 HISTORY OF SUBURETHRAL SLING PROCEDURE: Primary | ICD-10-CM

## 2024-03-19 PROCEDURE — 99212 OFFICE O/P EST SF 10 MIN: CPT | Mod: S$GLB,,, | Performed by: OBSTETRICS & GYNECOLOGY

## 2024-03-19 PROCEDURE — 99999 PR PBB SHADOW E&M-EST. PATIENT-LVL III: CPT | Mod: PBBFAC,,, | Performed by: OBSTETRICS & GYNECOLOGY

## 2024-03-19 NOTE — PROGRESS NOTES
"CC: Post-operative visit  S/p TVT-O and cystoscopy 23    HPI:  Patient is a 73 y.o. female  presents today for a postoperative visit. She denies vaginal bleeding. She denies urinary urgency and frequency. She denies urgency urinary incontinence and leakage with coughing, sneezing and laughing.       REVIEW OF SYSTEMS:  Per HPI.       PHYSICAL EXAMINATION  BP (!) 131/55 (BP Location: Right arm, Patient Position: Sitting, BP Method: Large (Automatic))   Pulse (!) 58   Ht 5' 5" (1.651 m)   Wt 82.9 kg (182 lb 12.2 oz)   LMP 2004 (Approximate)   BMI 30.41 kg/m²     General: Healthy in appearance, Well nourished, Affect Normal, NAD.  Ext: No clubbing, cyanosis, edema or varicosities.    Genitourinary-  Vulva: normal without lesions, masses, atrophy or pain  Urethra: meatus central and normal in appearance, no prolapse/caruncle, no masses or discharge. Empty supine stress test was negative.   Bladder: non-tender, no masses  Vagina: No discharge or lesions, severe atrophy, no masses appreciated, Mesh erosion: none.   Cervix: absent    No visits with results within 1 Day(s) from this visit.   Latest known visit with results is:   Office Visit on 2023   Component Date Value Ref Range Status    Urine Culture, Routine 2023 No growth   Final        ASSESSMENT & PLAN:    History of suburethral sling procedure         74 yo s/p TVT-O and cystoscopy on 23 presents today for a postoperative visit. She is doing well and is without complaints. Will return again in 6 months for re-evaluation.   All questions were answered today. The patient was encouraged to contact the office as needed with any additional questions or concerns.       Nedra Younger MD    "

## 2024-04-02 ENCOUNTER — LAB VISIT (OUTPATIENT)
Dept: LAB | Facility: HOSPITAL | Age: 74
End: 2024-04-02
Payer: OTHER GOVERNMENT

## 2024-04-02 DIAGNOSIS — I10 ESSENTIAL HYPERTENSION: ICD-10-CM

## 2024-04-02 DIAGNOSIS — Z91.89 AT RISK FOR BLEEDING: ICD-10-CM

## 2024-04-02 DIAGNOSIS — Z00.00 ANNUAL PHYSICAL EXAM: Primary | ICD-10-CM

## 2024-04-02 DIAGNOSIS — R73.03 PREDIABETES: ICD-10-CM

## 2024-04-02 DIAGNOSIS — E78.5 HYPERLIPIDEMIA, UNSPECIFIED HYPERLIPIDEMIA TYPE: ICD-10-CM

## 2024-04-02 LAB
ALBUMIN SERPL BCP-MCNC: 3.6 G/DL (ref 3.5–5.2)
ALP SERPL-CCNC: 72 U/L (ref 55–135)
ALT SERPL W/O P-5'-P-CCNC: 19 U/L (ref 10–44)
ANION GAP SERPL CALC-SCNC: 8 MMOL/L (ref 8–16)
AST SERPL-CCNC: 17 U/L (ref 10–40)
BASOPHILS # BLD AUTO: 0.07 K/UL (ref 0–0.2)
BASOPHILS NFR BLD: 1.1 % (ref 0–1.9)
BILIRUB SERPL-MCNC: 0.5 MG/DL (ref 0.1–1)
BUN SERPL-MCNC: 21 MG/DL (ref 8–23)
CALCIUM SERPL-MCNC: 10.5 MG/DL (ref 8.7–10.5)
CHLORIDE SERPL-SCNC: 105 MMOL/L (ref 95–110)
CHOLEST SERPL-MCNC: 140 MG/DL (ref 120–199)
CHOLEST/HDLC SERPL: 3.1 {RATIO} (ref 2–5)
CO2 SERPL-SCNC: 29 MMOL/L (ref 23–29)
CREAT SERPL-MCNC: 0.8 MG/DL (ref 0.5–1.4)
DIFFERENTIAL METHOD BLD: ABNORMAL
EOSINOPHIL # BLD AUTO: 0.3 K/UL (ref 0–0.5)
EOSINOPHIL NFR BLD: 4.5 % (ref 0–8)
ERYTHROCYTE [DISTWIDTH] IN BLOOD BY AUTOMATED COUNT: 12 % (ref 11.5–14.5)
EST. GFR  (NO RACE VARIABLE): >60 ML/MIN/1.73 M^2
ESTIMATED AVG GLUCOSE: 114 MG/DL (ref 68–131)
GLUCOSE SERPL-MCNC: 103 MG/DL (ref 70–110)
HBA1C MFR BLD: 5.6 % (ref 4–5.6)
HCT VFR BLD AUTO: 41.1 % (ref 37–48.5)
HDLC SERPL-MCNC: 45 MG/DL (ref 40–75)
HDLC SERPL: 32.1 % (ref 20–50)
HGB BLD-MCNC: 13.4 G/DL (ref 12–16)
IMM GRANULOCYTES # BLD AUTO: 0.01 K/UL (ref 0–0.04)
IMM GRANULOCYTES NFR BLD AUTO: 0.2 % (ref 0–0.5)
LDLC SERPL CALC-MCNC: 76 MG/DL (ref 63–159)
LYMPHOCYTES # BLD AUTO: 2.5 K/UL (ref 1–4.8)
LYMPHOCYTES NFR BLD: 40.4 % (ref 18–48)
MCH RBC QN AUTO: 31.2 PG (ref 27–31)
MCHC RBC AUTO-ENTMCNC: 32.6 G/DL (ref 32–36)
MCV RBC AUTO: 96 FL (ref 82–98)
MONOCYTES # BLD AUTO: 0.5 K/UL (ref 0.3–1)
MONOCYTES NFR BLD: 8.5 % (ref 4–15)
NEUTROPHILS # BLD AUTO: 2.8 K/UL (ref 1.8–7.7)
NEUTROPHILS NFR BLD: 45.3 % (ref 38–73)
NONHDLC SERPL-MCNC: 95 MG/DL
NRBC BLD-RTO: 0 /100 WBC
PLATELET # BLD AUTO: 236 K/UL (ref 150–450)
PMV BLD AUTO: 12.3 FL (ref 9.2–12.9)
POTASSIUM SERPL-SCNC: 3.8 MMOL/L (ref 3.5–5.1)
PROT SERPL-MCNC: 6.8 G/DL (ref 6–8.4)
RBC # BLD AUTO: 4.29 M/UL (ref 4–5.4)
SODIUM SERPL-SCNC: 142 MMOL/L (ref 136–145)
TRIGL SERPL-MCNC: 95 MG/DL (ref 30–150)
TSH SERPL DL<=0.005 MIU/L-ACNC: 2.39 UIU/ML (ref 0.4–4)
WBC # BLD AUTO: 6.26 K/UL (ref 3.9–12.7)

## 2024-04-02 PROCEDURE — 80053 COMPREHEN METABOLIC PANEL: CPT | Performed by: STUDENT IN AN ORGANIZED HEALTH CARE EDUCATION/TRAINING PROGRAM

## 2024-04-02 PROCEDURE — 80061 LIPID PANEL: CPT | Performed by: STUDENT IN AN ORGANIZED HEALTH CARE EDUCATION/TRAINING PROGRAM

## 2024-04-02 PROCEDURE — 36415 COLL VENOUS BLD VENIPUNCTURE: CPT | Performed by: STUDENT IN AN ORGANIZED HEALTH CARE EDUCATION/TRAINING PROGRAM

## 2024-04-02 PROCEDURE — 84443 ASSAY THYROID STIM HORMONE: CPT | Performed by: STUDENT IN AN ORGANIZED HEALTH CARE EDUCATION/TRAINING PROGRAM

## 2024-04-02 PROCEDURE — 83036 HEMOGLOBIN GLYCOSYLATED A1C: CPT | Performed by: STUDENT IN AN ORGANIZED HEALTH CARE EDUCATION/TRAINING PROGRAM

## 2024-04-02 PROCEDURE — 85025 COMPLETE CBC W/AUTO DIFF WBC: CPT | Performed by: STUDENT IN AN ORGANIZED HEALTH CARE EDUCATION/TRAINING PROGRAM

## 2024-04-03 ENCOUNTER — TELEPHONE (OUTPATIENT)
Dept: INTERNAL MEDICINE | Facility: CLINIC | Age: 74
End: 2024-04-03
Payer: OTHER GOVERNMENT

## 2024-04-03 ENCOUNTER — OFFICE VISIT (OUTPATIENT)
Dept: INTERNAL MEDICINE | Facility: CLINIC | Age: 74
End: 2024-04-03
Payer: OTHER GOVERNMENT

## 2024-04-03 VITALS — HEART RATE: 60 BPM | DIASTOLIC BLOOD PRESSURE: 60 MMHG | SYSTOLIC BLOOD PRESSURE: 130 MMHG

## 2024-04-03 DIAGNOSIS — I10 ESSENTIAL HYPERTENSION: ICD-10-CM

## 2024-04-03 DIAGNOSIS — E78.5 HYPERLIPIDEMIA, UNSPECIFIED HYPERLIPIDEMIA TYPE: ICD-10-CM

## 2024-04-03 DIAGNOSIS — E66.9 OBESITY (BMI 30-39.9): Primary | ICD-10-CM

## 2024-04-03 PROCEDURE — G2211 COMPLEX E/M VISIT ADD ON: HCPCS | Mod: 95,,, | Performed by: STUDENT IN AN ORGANIZED HEALTH CARE EDUCATION/TRAINING PROGRAM

## 2024-04-03 PROCEDURE — 99214 OFFICE O/P EST MOD 30 MIN: CPT | Mod: 95,,, | Performed by: STUDENT IN AN ORGANIZED HEALTH CARE EDUCATION/TRAINING PROGRAM

## 2024-04-03 RX ORDER — SEMAGLUTIDE 0.25 MG/.5ML
1 INJECTION, SOLUTION SUBCUTANEOUS WEEKLY
Qty: 2 ML | Refills: 2 | Status: SHIPPED | OUTPATIENT
Start: 2024-04-03

## 2024-04-03 RX ORDER — CARVEDILOL 12.5 MG/1
12.5 TABLET ORAL 2 TIMES DAILY WITH MEALS
Qty: 120 TABLET | Refills: 2 | Status: SHIPPED | OUTPATIENT
Start: 2024-04-03 | End: 2025-04-03

## 2024-04-03 NOTE — PROGRESS NOTES
The patient location is: home  The chief complaint leading to consultation is: htn    Visit type: audiovisual    Face to Face time with patient: 29  34 minutes of total time spent on the encounter, which includes face to face time and non-face to face time preparing to see the patient (eg, review of tests), Obtaining and/or reviewing separately obtained history, Documenting clinical information in the electronic or other health record, Independently interpreting results (not separately reported) and communicating results to the patient/family/caregiver, or Care coordination (not separately reported).         Each patient to whom he or she provides medical services by telemedicine is:  (1) informed of the relationship between the physician and patient and the respective role of any other health care provider with respect to management of the patient; and (2) notified that he or she may decline to receive medical services by telemedicine and may withdraw from such care at any time.    Notes:           [facility]    Subjective:       Patient ID: Maria Elena Jackson is a 73 y.o. female.    Chief Complaint: Hypertension      History was obtained from the patient and supplemented through chart review.  This patient is known to me.     HPI:    Patient is a 73 y.o. female w/ HTN, HLD, family hx of colon cancer, fam hx of ovarian cancer presents for BP f/u    HTN  Controlled  coreg 12.5 BID, irbesartan-hctz 300-12.5 mg, nifedipine 60  No CP/HA/SOB/Vision changes   Doing well    Women's cardiologist  Appt with Dr. Alonzo    Overweight federal insurance  Try wegovy again 0.25, ozempic stopped being covered  No contraindications  Aware of R/B    HLD  lipitor 40 three times a week nightly per pt preference  stable    Prediabetes  Diet counseled; discussed   Repeat stable    Mom had colon cancer, ovarian cancer,  early  S/p oopherectomy, hyst; Follows with gyn after hyst    Dental prophylaxis every  cleaning due to knee implant    Osteopenia -> osteoporosis  Discussed dosing of vit D/ca, constipation with algae  Has declined other med treatments in the past    Hx of multiple dental issues  3 times a week cross fit   Wants to hold off on any meds  Interested in hormone replacement    Wt Readings from Last 15 Encounters:   03/19/24 82.9 kg (182 lb 12.2 oz)   02/05/24 80.9 kg (178 lb 5.6 oz)   11/28/23 81.6 kg (179 lb 14.3 oz)   09/26/23 81.6 kg (180 lb)   08/31/23 81.5 kg (179 lb 10.8 oz)   08/02/23 82.1 kg (181 lb)   08/02/23 82.2 kg (181 lb 3.5 oz)   06/21/23 83.5 kg (184 lb 1.4 oz)   05/24/23 85.1 kg (187 lb 9.8 oz)   05/23/23 85.1 kg (187 lb 9.8 oz)   04/24/23 82.6 kg (182 lb)   03/23/23 83 kg (182 lb 15.7 oz)   03/16/23 83 kg (183 lb)   02/23/23 83.3 kg (183 lb 10.3 oz)   02/15/23 84.6 kg (186 lb 8.2 oz)      [employer] may change    Medical History  Past Medical History:   Diagnosis Date    Arthritis     Hyperlipidemia     Hypertension     S/P total hysterectomy and bilateral salpingo-oophorectomy 06/29/2022       Review of Systems   Constitutional:  Negative for activity change, fever and unexpected weight change.   HENT:  Negative for hearing loss and trouble swallowing.    Eyes:  Negative for discharge.   Respiratory:  Negative for chest tightness, shortness of breath and wheezing.    Cardiovascular:  Negative for chest pain and palpitations.   Gastrointestinal:  Negative for constipation, diarrhea, nausea and vomiting.   Genitourinary:  Negative for difficulty urinating and hematuria.   Musculoskeletal:  Negative for gait problem and neck pain.   Neurological:  Negative for dizziness, seizures and headaches.   Psychiatric/Behavioral:  Negative for dysphoric mood and hallucinations.          Surgical hx, family hx, social hx   Have been reviewed      Current Outpatient Medications:     amoxicillin (AMOXIL) 500 MG capsule, TAKE FOUR TABLETS ONE HOUR PRIOR TO DENTAL PROCEDURE (Patient not taking: Reported  on 3/19/2024), Disp: 12 capsule, Rfl: 2    atorvastatin (LIPITOR) 40 MG tablet, Take 1 tablet (40 mg total) by mouth every evening. 3 times a week, Disp: 90 tablet, Rfl: 3    calcium-vitamin D 250 mg-2.5 mcg (100 unit) per tablet, Take 1 tablet by mouth 2 (two) times daily., Disp: , Rfl:     carvediloL (COREG) 12.5 MG tablet, Take 1 tablet (12.5 mg total) by mouth 2 (two) times daily with meals., Disp: 120 tablet, Rfl: 2    cholecalciferol, vitamin D3, (VITAMIN D3) 25 mcg (1,000 unit) capsule, Take 1,000 Units by mouth once daily., Disp: , Rfl:     docusate sodium (COLACE) 100 MG capsule, Take 1 capsule (100 mg total) by mouth 2 (two) times daily. (Patient not taking: Reported on 3/19/2024), Disp: 60 capsule, Rfl: 1    doxazosin (CARDURA) 4 MG tablet, Take 4 mg by mouth., Disp: , Rfl:     estradioL (ESTRACE) 0.01 % (0.1 mg/gram) vaginal cream, Place 1 g vaginally twice a week. Use nightly x 2 weeks then twice weekly (Patient not taking: Reported on 3/19/2024), Disp: 42 g, Rfl: 3    ibuprofen (ADVIL,MOTRIN) 600 MG tablet, Take 1 tablet (600 mg total) by mouth every 6 (six) hours as needed for Pain. (Patient not taking: Reported on 3/19/2024), Disp: 30 tablet, Rfl: 1    irbesartan-hydrochlorothiazide (AVALIDE) 300-12.5 mg per tablet, TAKE 1 TABLET BY MOUTH NIGHTLY, Disp: 90 tablet, Rfl: 1    NIFEdipine (PROCARDIA-XL) 60 MG (OSM) 24 hr tablet, TAKE 1 TABLET BY MOUTH ONCE DAILY, Disp: 90 tablet, Rfl: 0    semaglutide, weight loss, (WEGOVY) 0.25 mg/0.5 mL PnIj, Inject 1 each into the skin once a week., Disp: 2 mL, Rfl: 2    Objective:        There is no height or weight on file to calculate BMI.  Vitals:    04/03/24 1103   BP: 130/60   Pulse: 60           Physical Exam  Vitals and nursing note reviewed.   Constitutional:       General: She is not in acute distress.     Appearance: Normal appearance. She is not ill-appearing.   HENT:      Head: Normocephalic and atraumatic.   Eyes:      General: No scleral  icterus.  Pulmonary:      Effort: Pulmonary effort is normal.   Abdominal:      General: There is no distension.   Musculoskeletal:         General: No deformity.      Cervical back: Normal range of motion.   Skin:     General: Skin is warm and dry.   Neurological:      Mental Status: She is alert and oriented to person, place, and time.   Psychiatric:         Behavior: Behavior normal.           Lab Results   Component Value Date    WBC 6.26 04/02/2024    HGB 13.4 04/02/2024    HCT 41.1 04/02/2024     04/02/2024    CHOL 140 04/02/2024    TRIG 95 04/02/2024    HDL 45 04/02/2024    ALT 19 04/02/2024    AST 17 04/02/2024     04/02/2024    K 3.8 04/02/2024     04/02/2024    CREATININE 0.8 04/02/2024    BUN 21 04/02/2024    CO2 29 04/02/2024    TSH 2.392 04/02/2024    INR 0.9 10/01/2019    HGBA1C 5.6 04/02/2024       The 10-year ASCVD risk score (Quyen JACOB, et al., 2019) is: 20.9%    Values used to calculate the score:      Age: 73 years      Sex: Female      Is Non- : No      Diabetic: No      Tobacco smoker: No      Systolic Blood Pressure: 130 mmHg      Is BP treated: Yes      HDL Cholesterol: 45 mg/dL      Total Cholesterol: 140 mg/dL    On lipitor 3 times a week    (Imaging have been independently reviewed)    reviewed    Assessment:         1. Obesity (BMI 30-39.9)    2. Essential hypertension    3. Hyperlipidemia, unspecified hyperlipidemia type            Plan:     Maria Elena was seen today for hypertension.    Diagnoses and all orders for this visit:    Obesity (BMI 30-39.9)  -     semaglutide, weight loss, (WEGOVY) 0.25 mg/0.5 mL PnIj; Inject 1 each into the skin once a week.    Essential hypertension  -     carvediloL (COREG) 12.5 MG tablet; Take 1 tablet (12.5 mg total) by mouth 2 (two) times daily with meals.    Hyperlipidemia, unspecified hyperlipidemia type          Health Maintenance  - Lipids:   - A1C:   - Colon Ca Screen: diverticulosis, 9/7/2022 repeat 5  years  - Immunizations:     Women's health  - Pap: s/p 6/2022 concern for cancer, but not cancer  - Mammo: 5/30/2023  - Dexa: osteoporosis 11/15/2022    Follow up in about 3 months (around 7/3/2024) for weight f/u.        Visit today is associated with current or anticipated ongoing medical care related to this patient's single serious condition/complex condition of obesity, htn, wishes for minimal meds. The patient will return to see me as these issues will be followed longitudinally.      All medications were reviewed including potential side effects and risks/benefits.  Pt was counseled to call back if anything worsens or if questions arise.    Monroe Cutler MD  Family Medicine  [facility]  [facility] [address] [address]  Phone [phone]  Fax [phone]

## 2024-04-03 NOTE — TELEPHONE ENCOUNTER
----- Message from Елена Stover sent at 4/3/2024  9:02 AM CDT -----  Regarding: Return Call    Who Called:  Patient      Who Left Message for Patient:  Delease,      Does the patient know what this is regarding?  Returning Call        Best Call Back Number:  [phone]         Additional Information: Patient is returning a call.  Please assist.

## 2024-04-09 ENCOUNTER — PATIENT MESSAGE (OUTPATIENT)
Dept: OBSTETRICS AND GYNECOLOGY | Facility: CLINIC | Age: 74
End: 2024-04-09
Payer: OTHER GOVERNMENT

## 2024-04-17 ENCOUNTER — OFFICE VISIT (OUTPATIENT)
Dept: CARDIOLOGY | Facility: CLINIC | Age: 74
End: 2024-04-17
Payer: OTHER GOVERNMENT

## 2024-04-17 VITALS
SYSTOLIC BLOOD PRESSURE: 130 MMHG | WEIGHT: 181.44 LBS | HEART RATE: 60 BPM | DIASTOLIC BLOOD PRESSURE: 59 MMHG | HEIGHT: 65 IN | BODY MASS INDEX: 30.23 KG/M2 | OXYGEN SATURATION: 96 %

## 2024-04-17 DIAGNOSIS — Z13.6 ENCOUNTER FOR SCREENING FOR CARDIOVASCULAR DISORDERS: ICD-10-CM

## 2024-04-17 DIAGNOSIS — Z90.722 S/P TOTAL HYSTERECTOMY AND BILATERAL SALPINGO-OOPHORECTOMY: ICD-10-CM

## 2024-04-17 DIAGNOSIS — Z71.89 COUNSELING FOR HORMONE REPLACEMENT THERAPY: ICD-10-CM

## 2024-04-17 DIAGNOSIS — Z90.710 S/P TOTAL HYSTERECTOMY AND BILATERAL SALPINGO-OOPHORECTOMY: ICD-10-CM

## 2024-04-17 DIAGNOSIS — Z91.89 AT RISK FOR CORONARY ARTERY DISEASE: ICD-10-CM

## 2024-04-17 DIAGNOSIS — Z90.79 S/P TOTAL HYSTERECTOMY AND BILATERAL SALPINGO-OOPHORECTOMY: ICD-10-CM

## 2024-04-17 DIAGNOSIS — Z87.891 EX-SMOKER: ICD-10-CM

## 2024-04-17 DIAGNOSIS — E78.2 MIXED HYPERLIPIDEMIA: ICD-10-CM

## 2024-04-17 DIAGNOSIS — I10 ESSENTIAL HYPERTENSION: Primary | ICD-10-CM

## 2024-04-17 PROCEDURE — 99204 OFFICE O/P NEW MOD 45 MIN: CPT | Mod: S$GLB,,, | Performed by: INTERNAL MEDICINE

## 2024-04-17 PROCEDURE — 99999 PR PBB SHADOW E&M-EST. PATIENT-LVL IV: CPT | Mod: PBBFAC,,, | Performed by: INTERNAL MEDICINE

## 2024-04-17 NOTE — PROGRESS NOTES
Subjective:   Patient ID:  Maria Elena Jakcson is a 73 y.o. female is a new patient who presents for evaluation of No chief complaint on file.  HTN since age 48 and high cholesterol,  HRT to help osteoporosis and colon cancer in the family    HPI:   Ex smoker since college to age 38 (25 py h/o smoking)  No chest pain, Orthopnea, PND of heart failure symptoms.   Denies palpitations or fluttering in the chest  Recent KATLIN/BSO recent due to cervical dysplasia  Goes to exercise class 3 days a week (like cross fit)  No CVA in the family  Menopause at 48  Colon cancer in the family  Normal Vaginal delivery         Subjective  Maria Elena Jackson is a 73 y.o. female who presents to discuss hormone replacement therapy.  Menarche occurred at age 8 and the patient went into menopause at 53 years of age, which was 20 years ago. Of note, she had a hysterectomy in 2022 for PMB, was diagnosed with Complex hyperplasia with Atypia. FUrther she has had a midurethral sling placement with Dr. Younger last year, and has not been using her vaginal estradiol cream. (We discussed prophylactic treatment for prevention of UTI's/bladder dysfunction).   Patient is requesting hormone replacement therapy due to minimization of osteoporosis and personal history of osteoporosis,  She reports was advised on the Bisphosphonates and is reluctant to take these due to concerns about jaw necrosis/oral issues.. .The patient is not taking hormone replacement therapy. Patient denies post-menopausal vaginal bleeding. Had a hysterectomy   The patient is not sexually active.  She denies the following contraindications to HRT:  Vaginal bleeding, history of VTE/PE, thrombophilia,  breast cancer, or active liver disease.                Echo 3/2023  The left ventricle is normal in size with normal systolic function.  The estimated ejection fraction is 65%.  Grade I left ventricular diastolic dysfunction.  Mild left atrial enlargement.  The estimated PA systolic  "pressure is 35 mmHg.  Normal right ventricular size with normal right ventricular systolic function.  Normal central venous pressure (3 mmHg).  There is no pulmonary hypertension.       Patient Active Problem List   Diagnosis    Essential hypertension    HLD (hyperlipidemia)    Edema    Status post total right knee replacement 10/7/2019    S/P total knee replacement using cement, right    Muscle weakness of lower extremity    Status post hysteroscopy with polypectomy    S/p RA-TLH/BSO/LND    Age-related osteoporosis without current pathological fracture    Urinary incontinence    Closed fracture of left proximal humerus    Acute pain of left shoulder     BP (!) 130/59   Pulse 60   Ht 5' 5" (1.651 m)   Wt 82.3 kg (181 lb 7 oz)   LMP 01/01/2004 (Approximate)   SpO2 96%   BMI 30.19 kg/m²   Body mass index is 30.19 kg/m².  CrCl cannot be calculated (Patient's most recent lab result is older than the maximum 7 days allowed.).    Lab Results   Component Value Date     04/02/2024    K 3.8 04/02/2024     04/02/2024    CO2 29 04/02/2024    BUN 21 04/02/2024    CREATININE 0.8 04/02/2024     04/02/2024    HGBA1C 5.6 04/02/2024    AST 17 04/02/2024    ALT 19 04/02/2024    ALBUMIN 3.6 04/02/2024    PROT 6.8 04/02/2024    BILITOT 0.5 04/02/2024    WBC 6.26 04/02/2024    HGB 13.4 04/02/2024    HCT 41.1 04/02/2024    MCV 96 04/02/2024     04/02/2024    INR 0.9 10/01/2019    TSH 2.392 04/02/2024    CHOL 140 04/02/2024    HDL 45 04/02/2024    LDLCALC 76.0 04/02/2024    TRIG 95 04/02/2024       Current Outpatient Medications   Medication Sig Dispense Refill    amoxicillin (AMOXIL) 500 MG capsule TAKE FOUR TABLETS ONE HOUR PRIOR TO DENTAL PROCEDURE 12 capsule 2    atorvastatin (LIPITOR) 40 MG tablet Take 1 tablet (40 mg total) by mouth every evening. 3 times a week 90 tablet 3    calcium-vitamin D 250 mg-2.5 mcg (100 unit) per tablet Take 1 tablet by mouth 2 (two) times daily.      carvediloL (COREG) " 12.5 MG tablet Take 1 tablet (12.5 mg total) by mouth 2 (two) times daily with meals. 120 tablet 2    cholecalciferol, vitamin D3, (VITAMIN D3) 25 mcg (1,000 unit) capsule Take 1,000 Units by mouth once daily.      docusate sodium (COLACE) 100 MG capsule Take 1 capsule (100 mg total) by mouth 2 (two) times daily. 60 capsule 1    doxazosin (CARDURA) 4 MG tablet Take 4 mg by mouth.      estradioL (ESTRACE) 0.01 % (0.1 mg/gram) vaginal cream Place 1 g vaginally twice a week. Use nightly x 2 weeks then twice weekly 42 g 3    ibuprofen (ADVIL,MOTRIN) 600 MG tablet Take 1 tablet (600 mg total) by mouth every 6 (six) hours as needed for Pain. 30 tablet 1    irbesartan-hydrochlorothiazide (AVALIDE) 300-12.5 mg per tablet TAKE 1 TABLET BY MOUTH NIGHTLY 90 tablet 1    NIFEdipine (PROCARDIA-XL) 60 MG (OSM) 24 hr tablet TAKE 1 TABLET BY MOUTH ONCE DAILY 90 tablet 0    semaglutide, weight loss, (WEGOVY) 0.25 mg/0.5 mL PnIj Inject 0.25mg into the skin once a week. 2 mL 2     No current facility-administered medications for this visit.       Review of Systems   Constitutional: Negative for chills, decreased appetite, malaise/fatigue, night sweats, weight gain and weight loss.   Eyes:  Negative for blurred vision, double vision, visual disturbance and visual halos.   Cardiovascular:  Negative for chest pain, claudication, cyanosis, dyspnea on exertion, irregular heartbeat, leg swelling, near-syncope, orthopnea, palpitations, paroxysmal nocturnal dyspnea and syncope.   Respiratory:  Negative for cough, hemoptysis, snoring, sputum production and wheezing.    Endocrine: Negative for cold intolerance, heat intolerance, polydipsia and polyphagia.   Hematologic/Lymphatic: Negative for adenopathy and bleeding problem. Does not bruise/bleed easily.   Skin:  Negative for flushing, itching, poor wound healing and rash.   Musculoskeletal:  Negative for arthritis, back pain, falls, gout, joint pain, joint swelling, muscle cramps, muscle  weakness, myalgias, neck pain and stiffness.   Gastrointestinal:  Negative for bloating, abdominal pain, anorexia, diarrhea, dysphagia, excessive appetite, flatus, hematemesis, jaundice, melena and nausea.   Genitourinary:  Negative for hesitancy and incomplete emptying.   Neurological:  Negative for aphonia, brief paralysis, difficulty with concentration, disturbances in coordination, excessive daytime sleepiness, dizziness, focal weakness, light-headedness, loss of balance and weakness.   Psychiatric/Behavioral:  Negative for altered mental status, depression, hallucinations, hypervigilance, memory loss, substance abuse and suicidal ideas. The patient does not have insomnia and is not nervous/anxious.        Objective:   Physical Exam  Constitutional:       General: She is not in acute distress.     Appearance: She is well-developed. She is not diaphoretic.   HENT:      Head: Normocephalic and atraumatic.      Nose: Nose normal.      Mouth/Throat:      Pharynx: No oropharyngeal exudate.   Eyes:      General: No scleral icterus.        Right eye: No discharge.         Left eye: No discharge.      Conjunctiva/sclera: Conjunctivae normal.      Pupils: Pupils are equal, round, and reactive to light.   Neck:      Thyroid: No thyromegaly.      Vascular: No JVD.      Trachea: No tracheal deviation.   Cardiovascular:      Rate and Rhythm: Normal rate and regular rhythm.      Pulses: Intact distal pulses.      Heart sounds: Normal heart sounds. No murmur heard.     No friction rub. No gallop.   Pulmonary:      Effort: Pulmonary effort is normal. No respiratory distress.      Breath sounds: Normal breath sounds. No stridor. No wheezing or rales.   Chest:      Chest wall: No tenderness.   Abdominal:      General: Bowel sounds are normal. There is no distension.      Palpations: Abdomen is soft. There is no mass.      Tenderness: There is no abdominal tenderness. There is no guarding or rebound.   Musculoskeletal:          General: No tenderness. Normal range of motion.      Cervical back: Normal range of motion and neck supple.   Lymphadenopathy:      Cervical: No cervical adenopathy.   Skin:     General: Skin is warm.      Coloration: Skin is not pale.      Findings: No erythema or rash.   Neurological:      Mental Status: She is alert and oriented to person, place, and time.      Cranial Nerves: No cranial nerve deficit.      Motor: No abnormal muscle tone.      Coordination: Coordination normal.      Deep Tendon Reflexes: Reflexes are normal and symmetric. Reflexes normal.   Psychiatric:         Behavior: Behavior normal.         Thought Content: Thought content normal.         Judgment: Judgment normal.       Assessment:     1. Essential hypertension    2. Counseling for hormone replacement therapy    3. S/p RA-TLH/BSO/LND    4. Mixed hyperlipidemia      Plan:     Will obtain calcium score moderate to high risk for CAD  Counseled on importance of heart healthy diet low in saturated and trans fat and salt as well gradually starting a regular aerobic exercise regimen with goal of 30min 5x/week. Recommend BP diary. Call if systolic BP > 130 mmHg on checking repeatedly

## 2024-04-25 ENCOUNTER — HOSPITAL ENCOUNTER (OUTPATIENT)
Dept: RADIOLOGY | Facility: HOSPITAL | Age: 74
Discharge: HOME | End: 2024-04-25
Attending: INTERNAL MEDICINE
Payer: OTHER GOVERNMENT

## 2024-04-25 DIAGNOSIS — Z13.6 ENCOUNTER FOR SCREENING FOR CARDIOVASCULAR DISORDERS: ICD-10-CM

## 2024-04-25 PROCEDURE — 75571 CT HRT W/O DYE W/CA TEST: CPT | Mod: TC

## 2024-04-25 PROCEDURE — 75571 CT HRT W/O DYE W/CA TEST: CPT | Mod: 26,,, | Performed by: RADIOLOGY

## 2024-04-26 ENCOUNTER — PATIENT MESSAGE (OUTPATIENT)
Dept: CARDIOLOGY | Facility: CLINIC | Age: 74
End: 2024-04-26
Payer: OTHER GOVERNMENT

## 2024-05-29 DIAGNOSIS — I10 ESSENTIAL HYPERTENSION: ICD-10-CM

## 2024-05-29 RX ORDER — NIFEDIPINE 60 MG/1
60 TABLET, EXTENDED RELEASE ORAL
Qty: 90 TABLET | Refills: 3 | Status: SHIPPED | OUTPATIENT
Start: 2024-05-29

## 2024-05-29 NOTE — TELEPHONE ENCOUNTER
No care due was identified.  [facility] Embedded Care Due Messages. Reference number: [id number].   5/29/2024 12:20:53 AM CDT

## 2024-05-29 NOTE — TELEPHONE ENCOUNTER
Refill Decision Note   Maria Elena Jackson  is requesting a refill authorization.  Brief Assessment and Rationale for Refill:  Approve     Medication Therapy Plan:         Comments:     Note composed:8:10 AM 05/29/2024             Appointments     Last Visit   4/3/2024 Monroe Cutler MD   Next Visit   7/25/2024 Monroe Cutler MD

## 2024-06-06 NOTE — TELEPHONE ENCOUNTER
RE: Plan of Care    Benjamin Goldberg, MD    Thank you for referring Miles Hoffmann. The following information reflects my assessment and plan of care.    Please review and sign the attached form to indicate your approval of the plan of care. Insurance compliance requires your approval be on this plan of care. After your review, please fax back all pages received. Should you have any questions, feel free to contact me.    Demetrio Gustafson, PT  [facility] PHYSICAL MEDICINE AND REHAB  [address] [place] [id number]  Phone: [phone]  Fax: [phone]           Plan of Care 24   Effective from: 2024  Effective to: 2024    Plan ID: [id number]                Participants as of Finalize on 2024      Name Type Comments Contact Info    Benjamin Goldberg, MD Referring Provider  [phone]    Demetrio Gustafson, PT Physical Therapist                 Miles Hoffmann MRN:[id number] (:1950 73 year old M)                 Evaluation       Author: Demetrio Gustafson, PT Status: Signed Last edited: 2024 10:30 AM         Physical Therapy Progress Note    Visit Type: Daily Treatment Note- Progress Note  Visit: 9  Referring Provider: Benjamin Goldberg, MD  Medical Diagnosis (from order): M97.32XA - Periprosthetic fracture around internal prosthetic left shoulder joint, initial encounter   Patient alert and oriented X3.    SUBJECTIVE                                                                                                               Patient reports having a good weekend and is getting better slow but steady. Having more pain in right shoulder lately and hopes left shoulder recovers quickly back to the best it can before his right \"goes out\".  Functional Change: Improved ability to  back pack with left, don jacket  Current Functional Limitations: Bathing, reaching higher shelf, carrying groceries, dressing    Pain / Symptoms  - Pain rating (out of 10): Current: 2  Refill Encounter    PCP Visits: Recent Visits  Date Type Provider Dept   04/03/24 Office Visit Monroe Cutler MD [facility]   11/28/23 Office Visit Monroe Cutler MD [facility]   Showing recent visits within past 360 days and meeting all other requirements  Future Appointments  Date Type Provider Dept   07/25/24 Appointment oMnroe Cutler MD [facility]   Showing future appointments within next 720 days and meeting all other requirements     Last 3 Blood Pressure:   BP Readings from Last 3 Encounters:   04/17/24 (!) 130/59   04/03/24 130/60   03/19/24 (!) 131/55     Preferred Pharmacy:     [facility]/pharmacy #[id number] - [place] - [address]  [address]  [place] [address]  Phone: [phone] Fax: [phone]      Requested Prescriptions     Pending Prescriptions Disp Refills    NIFEdipine (PROCARDIA-XL) 60 MG (OSM) 24 hr tablet [Pharmacy Med Name: NIFEDIPINE ER 60 MG TABLET] 90 tablet 0     Sig: TAKE 1 TABLET BY MOUTH EVERY DAY      RX Route: Normal       - Location:        OBJECTIVE                                                                                                                     Range of Motion (ROM)   (degrees unless noted; active unless noted; norms in ( ); negative=lacking to 0, positive=beyond 0)  Shoulder:    - Flexion (180):         Left:61     Passive: 108 (pain)         Right: 120    - Extension (50):         Left: 55 pain         Right: 71    - Abduction (180):         Left: 66   Passive: 81         Right: 108    - Internal Rotation:        - Behind Back:            Left: hip    - External Rotation:       - Behind the Head:            Left: unable   Elbow/Forearm:    - Flexion (140-150):       Left:  136     - Extension (0):       Left: -5    Strength  (out of 5 unless noted, standard test position unless noted)   Shoulder:     - Flexion:          Left: 3     - Extension:          Left: 4, pain    - Abduction:         Left: 3    - Internal Rotation:           Left (at 0°): 4    - External Rotation:          Left (at 0°): 3  Elbow/Forearm:    - Flexion:         Left: 4    - Extension:         Left: 5                    Outcome/Assessments  Outcome Measures:   Quick Disabilities of the Arm, Shoulder and Hand: QuickDash Total Score (Score will not calculate if more then 2 questions are left blank): 70.45   (scored 0-100; a higher score indicates greater disability) see flowsheet for additional documentation     Treatment     Therapeutic Exercise  Pulleys for ROM, flexion and scaption, x5 minutes  Scapular squeeze, x20  Supine hands clasped chest press, 2x10 // easy  Supine chest press AROM, 2x10 left // improving AROM  Seated table slides, flexion incline, 3x10 left   Seated table wash, adduction/abduction, x15 left   Seated triceps extensions, x20 red Tband left // band looped around neck and held in right hand  Seated isometric shoulder flexion, x10 left   Seated isometric shoulder internal rotation/external rotation, 2x10 3\" holds left    Seated biceps curls with resistance band, 2x10 Red Tband left   Supine biceps curls, x10 5# ankle weight around wrist left // fatigued at end of therapy      Skilled input: verbal instruction/cues    Writer verbally educated and received verbal consent for hand placement, positioning of patient, and techniques to be performed today from patient   Home Exercise Program  Access Code: [email or web address]  URL: [email or web address]  Date: 03/27/2024  Prepared by: Demetrio Gustafson    Exercises  - Seated Scapular Retraction  - 1 x daily - 7 x weekly - 3 sets - 10 reps  - Circular Shoulder Pendulum with Table Support  - 1 x daily - 7 x weekly - 3 sets - 10 reps  - Horizontal Shoulder Pendulum with Table Support  - 1 x daily - 7 x weekly - 3 sets - 10 reps  - Seated Shoulder Flexion Towel Slide at Table Top  - 1 x daily - 7 x weekly - 3 sets - 10 reps  - Seated Shoulder Abduction Towel Slide at Table Top  - 1 x daily - 7 x weekly - 3 sets - 10 reps  - Standing Isometric Shoulder Abduction with Doorway - Arm Bent  - 1 x daily - 7 x weekly - 3 sets - 10 reps  - Supine Shoulder Press AAROM in Abduction with Dowel  - 1 x daily - 7 x weekly - 3 sets - 10 reps      ASSESSMENT                                                                                                          To date the patient has made gains as expected.  Patient will continue to benefit from skilled care as outlined.  Patient continues to have impairments and functional deficits as noted.    Patient is 74 y/o male with left shoulder pain and loss of motion status post total shoulder replacement and then fall causing proximal humerus fracture that required ORIF. He has completed 9 sessions of skilled physical therapy with progress made in left shoulder flexion/abduction AROM. He continues to have significant impairments in left shoulder flexion/abduction and external rotation strength as well as AROM that cause functional deficits in ability to  carry groceries, dress, reach overhead, and bathe. He will benefit from further skilled physical therapy appointments to progress his functional capacity to his rehab potential.  Pain/symptoms after session (out of 10): 3  Education:   - Results of above outlined education: Verbalizes understanding    PLAN                                                                                                                          Extend frequency and duration per below. and Modify frequency and duration per below.  Frequency / Duration  2 times per week tapering as patient progresses for 12 weeks for an estimated total of 16 visits    Suggestions for next session as indicated: Progress per plan of care as tolerated    Goals  Improve shoulder flexion/abduction AROM to at least 80 degrees, progressing   Improve shoulder strength to at least 4-/5, progressing  Decrease pain to 0/10, progressing  The above improvements in impairments to assist in obtaining goals listed below  Long Term Goals: to be met by end of plan of care  1. Patient will complete independent upper body dressing with reported manageable/tolerable pain   Status: met   2. Patient will reach overhead with reported manageable/tolerable pain for tasks for independent living including putting groceries away, doing hair and retrieving items from cabinets  Status: progressing/ongoing  3. Patient will be able to reach other side of table to grab items while eating with left upper extremity  Status: progressing/ongoing  4. Patient will be independent with progressed and modified home exercise program.  Status: progressing/ongoing  5. Quick DASH: Patient will score 50% or lower on The Quick DASH to indicate a decreased level of impairment with lifting, carrying, household and self-care activity. (minimal clinically important difference: 14 of calculated score)    QuickDash Total Score (Score will not calculate if more then 2 questions are left blank): 65.91    Az  QuickDash Total Score (Score will not calculate if more then 2 questions are left blank): 70.45 5/13/2024  Status: progressing/ongoing      Therapy procedure time and total treatment time can be found documented on the Time Entry flowsheet           Current Participants as of 6/6/2024      Name Type Comments Contact Info    Benjamin Goldberg, MD Referring Provider  [phone]    Signature pending    Demetrio Gustafson PT Physical Therapist      Electronically signed by Demetrio Gustafson PT at 5/13/2024 1132 CDT            RE: Plan of Care for Miles Hoffmann, YOB: 1950     I certify the need for these services, furnished under this plan of treatment and while under my care.  I agree with the plan of care as stated and request that therapy proceed.        __________________________________________________________________________________  Provider Signature         Date

## 2024-06-13 ENCOUNTER — PATIENT MESSAGE (OUTPATIENT)
Dept: OBSTETRICS AND GYNECOLOGY | Facility: CLINIC | Age: 74
End: 2024-06-13
Payer: OTHER GOVERNMENT

## 2024-06-13 ENCOUNTER — PATIENT MESSAGE (OUTPATIENT)
Dept: CARDIOLOGY | Facility: CLINIC | Age: 74
End: 2024-06-13
Payer: OTHER GOVERNMENT

## 2024-06-13 ENCOUNTER — PATIENT MESSAGE (OUTPATIENT)
Dept: INTERNAL MEDICINE | Facility: CLINIC | Age: 74
End: 2024-06-13
Payer: OTHER GOVERNMENT

## 2024-06-13 DIAGNOSIS — Z12.31 SCREENING MAMMOGRAM, ENCOUNTER FOR: Primary | ICD-10-CM

## 2024-06-20 ENCOUNTER — HOSPITAL ENCOUNTER (OUTPATIENT)
Dept: RADIOLOGY | Facility: HOSPITAL | Age: 74
Discharge: HOME | End: 2024-06-20
Attending: OBSTETRICS & GYNECOLOGY
Payer: OTHER GOVERNMENT

## 2024-06-20 ENCOUNTER — TELEPHONE (OUTPATIENT)
Dept: OBSTETRICS AND GYNECOLOGY | Facility: CLINIC | Age: 74
End: 2024-06-20
Payer: OTHER GOVERNMENT

## 2024-06-20 VITALS — HEIGHT: 65 IN | WEIGHT: 181 LBS | BODY MASS INDEX: 30.16 KG/M2

## 2024-06-20 DIAGNOSIS — Z12.31 SCREENING MAMMOGRAM, ENCOUNTER FOR: ICD-10-CM

## 2024-06-20 DIAGNOSIS — Z30.46 ENCOUNTER FOR REMOVAL AND REINSERTION OF NEXPLANON: Primary | ICD-10-CM

## 2024-06-20 PROCEDURE — 77067 SCR MAMMO BI INCL CAD: CPT | Mod: TC

## 2024-07-02 ENCOUNTER — TELEPHONE (OUTPATIENT)
Dept: INTERNAL MEDICINE | Facility: CLINIC | Age: 74
End: 2024-07-02
Payer: OTHER GOVERNMENT

## 2024-07-02 NOTE — TELEPHONE ENCOUNTER
----- Message from Faith Villafuerte sent at 7/2/2024  3:27 PM CDT -----  Name of Who is calling :  TAYLOR BLANKENSHIP [[id number]]      What is the request in detail:  Pt would like to know if she can be seen July 16,17,18 if possible. Please assist       Can the clinic reply by [facility]:no            What number to call back if not in [facility]: [phone]

## 2024-07-03 DIAGNOSIS — E66.9 OBESITY (BMI 30-39.9): ICD-10-CM

## 2024-07-03 RX ORDER — SEMAGLUTIDE 0.25 MG/.5ML
0.25 INJECTION, SOLUTION SUBCUTANEOUS WEEKLY
Qty: 2 ML | Refills: 2 | Status: SHIPPED | OUTPATIENT
Start: 2024-07-03

## 2024-07-03 NOTE — TELEPHONE ENCOUNTER
Care Due:                  Date            Visit Type   Department     Provider  --------------------------------------------------------------------------------                                ESTABLISHED                              PATIENT -    [facility] INTERNAL  Last Visit: 04-      [place]      MEDICINE       Monroe Cutler  Next Visit: None Scheduled  None         None Found                                                            Last  Test          Frequency    Reason                     Performed    Due Date  --------------------------------------------------------------------------------    HBA1C.......  6 months...  semaglutide,.............  04- 09-    [facility] Embedded Care Due Messages. Reference number: [id number].   7/03/2024 2:20:21 PM CDT

## 2024-08-28 DIAGNOSIS — I10 ESSENTIAL HYPERTENSION: ICD-10-CM

## 2024-08-28 RX ORDER — IRBESARTAN AND HYDROCHLOROTHIAZIDE 300; 12.5 MG/1; MG/1
1 TABLET, FILM COATED ORAL NIGHTLY
Qty: 90 TABLET | Refills: 2 | Status: SHIPPED | OUTPATIENT
Start: 2024-08-28

## 2024-08-28 NOTE — TELEPHONE ENCOUNTER
Refill Decision Note   Maria Elena Jackson  is requesting a refill authorization.  Brief Assessment and Rationale for Refill:  Approve     Medication Therapy Plan:         Comments:     Note composed:2:18 AM 08/28/2024

## 2024-08-28 NOTE — TELEPHONE ENCOUNTER
No care due was identified.  [facility] Embedded Care Due Messages. Reference number: [id number].   8/28/2024 12:05:41 AM CDT

## 2024-09-17 ENCOUNTER — OFFICE VISIT (OUTPATIENT)
Dept: INTERNAL MEDICINE | Facility: CLINIC | Age: 74
End: 2024-09-17
Payer: OTHER GOVERNMENT

## 2024-09-17 VITALS
HEIGHT: 65 IN | WEIGHT: 178.56 LBS | DIASTOLIC BLOOD PRESSURE: 66 MMHG | BODY MASS INDEX: 29.75 KG/M2 | HEART RATE: 63 BPM | SYSTOLIC BLOOD PRESSURE: 128 MMHG | OXYGEN SATURATION: 95 %

## 2024-09-17 DIAGNOSIS — Z90.79 S/P TOTAL HYSTERECTOMY AND BILATERAL SALPINGO-OOPHORECTOMY: ICD-10-CM

## 2024-09-17 DIAGNOSIS — I10 ESSENTIAL HYPERTENSION: Primary | ICD-10-CM

## 2024-09-17 DIAGNOSIS — E66.9 OBESITY (BMI 30-39.9): ICD-10-CM

## 2024-09-17 DIAGNOSIS — E78.5 HYPERLIPIDEMIA, UNSPECIFIED HYPERLIPIDEMIA TYPE: ICD-10-CM

## 2024-09-17 DIAGNOSIS — Z90.710 S/P TOTAL HYSTERECTOMY AND BILATERAL SALPINGO-OOPHORECTOMY: ICD-10-CM

## 2024-09-17 DIAGNOSIS — Z98.890 STATUS POST HYSTEROSCOPY: ICD-10-CM

## 2024-09-17 DIAGNOSIS — Z90.722 S/P TOTAL HYSTERECTOMY AND BILATERAL SALPINGO-OOPHORECTOMY: ICD-10-CM

## 2024-09-17 DIAGNOSIS — Z91.89 AT HIGH RISK FOR CARDIOVASCULAR DISEASE: ICD-10-CM

## 2024-09-17 DIAGNOSIS — M81.0 AGE-RELATED OSTEOPOROSIS WITHOUT CURRENT PATHOLOGICAL FRACTURE: ICD-10-CM

## 2024-09-17 PROCEDURE — G2211 COMPLEX E/M VISIT ADD ON: HCPCS | Mod: S$GLB,,, | Performed by: STUDENT IN AN ORGANIZED HEALTH CARE EDUCATION/TRAINING PROGRAM

## 2024-09-17 PROCEDURE — 99214 OFFICE O/P EST MOD 30 MIN: CPT | Mod: S$GLB,,, | Performed by: STUDENT IN AN ORGANIZED HEALTH CARE EDUCATION/TRAINING PROGRAM

## 2024-09-17 PROCEDURE — 99999 PR PBB SHADOW E&M-EST. PATIENT-LVL III: CPT | Mod: PBBFAC,,, | Performed by: STUDENT IN AN ORGANIZED HEALTH CARE EDUCATION/TRAINING PROGRAM

## 2024-09-17 RX ORDER — SEMAGLUTIDE 0.25 MG/.5ML
0.25 INJECTION, SOLUTION SUBCUTANEOUS WEEKLY
Qty: 2 ML | Refills: 9 | Status: SHIPPED | OUTPATIENT
Start: 2024-09-17 | End: 2024-09-17

## 2024-09-17 RX ORDER — SEMAGLUTIDE 0.25 MG/.5ML
0.25 INJECTION, SOLUTION SUBCUTANEOUS WEEKLY
Qty: 2 ML | Refills: 9 | Status: SHIPPED | OUTPATIENT
Start: 2024-09-17

## 2024-09-17 NOTE — PROGRESS NOTES
[facility]    Subjective:       Patient ID: Maria Elena Jackson is a 74 y.o. female.    Chief Complaint: weight (F/u)      History was obtained from the patient and supplemented through chart review.  This patient is known to me.     HPI:    Patient is a 74 y.o. 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 Alonzo 2024 to discuss HRT  Taking atorvastatin 40    Overweight federal insurance  Taking wegovy 0.25, may increase over time  No contraindications  Aware of R/B    HLD  lipitor 40 now nightly  stable    Prediabetes  Diet counseled; discussed   Repeat stable    Mom had colon cancer, ovarian cancer,  early  S/p oopherectomy, hyst; Follows with gyn after hyst    Dental prophylaxis every cleaning due to knee implant    Osteopenia -> osteoporosis  Discussed dosing of vit D/ca, constipation with algae  Has declined other med treatments in the past  Declines fosamax now    Hx of multiple dental issues  3 times a week cross fit   Wants to hold off on any meds  Interested in hormone replacement    Wt Readings from Last 15 Encounters:   24 81 kg (178 lb 9.2 oz)   24 82.1 kg (181 lb)   24 82.3 kg (181 lb 7 oz)   24 82.9 kg (182 lb 12.2 oz)   24 80.9 kg (178 lb 5.6 oz)   23 81.6 kg (179 lb 14.3 oz)   23 81.6 kg (180 lb)   23 81.5 kg (179 lb 10.8 oz)   23 82.1 kg (181 lb)   23 82.2 kg (181 lb 3.5 oz)   23 83.5 kg (184 lb 1.4 oz)   23 85.1 kg (187 lb 9.8  oz)   05/23/23 85.1 kg (187 lb 9.8 oz)   04/24/23 82.6 kg (182 lb)   03/23/23 83 kg (182 lb 15.7 oz)      [employer] may change    Medical History  Past Medical History:   Diagnosis Date    Arthritis     Hyperlipidemia     Hypertension     S/P total hysterectomy and bilateral salpingo-oophorectomy 06/29/2022       Review of Systems   Constitutional:  Negative for activity change, fever and unexpected weight change.   HENT:  Negative for hearing loss and trouble swallowing.    Eyes:  Negative for discharge.   Respiratory:  Negative for chest tightness, shortness of breath and wheezing.    Cardiovascular:  Negative for chest pain and palpitations.   Gastrointestinal:  Negative for constipation, diarrhea, nausea and vomiting.   Genitourinary:  Negative for difficulty urinating and hematuria.   Musculoskeletal:  Negative for gait problem and neck pain.   Neurological:  Negative for dizziness, seizures and headaches.   Psychiatric/Behavioral:  Negative for dysphoric mood and hallucinations.          Surgical hx, family hx, social hx   Have been reviewed      Current Outpatient Medications:     calcium-vitamin D 250 mg-2.5 mcg (100 unit) per tablet, Take 1 tablet by mouth 2 (two) times daily., Disp: , Rfl:     carvediloL (COREG) 12.5 MG tablet, Take 1 tablet (12.5 mg total) by mouth 2 (two) times daily with meals., Disp: 120 tablet, Rfl: 2    cholecalciferol, vitamin D3, (VITAMIN D3) 25 mcg (1,000 unit) capsule, Take 1,000 Units by mouth once daily., Disp: , Rfl:     doxazosin (CARDURA) 4 MG tablet, Take 4 mg by mouth., Disp: , Rfl:     estradioL (ESTRACE) 0.01 % (0.1 mg/gram) vaginal cream, Place 1 g vaginally twice a week. Use nightly x 2 weeks then twice weekly, Disp: 42 g, Rfl: 3    ibuprofen (ADVIL,MOTRIN) 600 MG tablet, Take 1 tablet (600 mg total) by mouth every 6 (six) hours as needed for Pain., Disp: 30 tablet, Rfl: 1    irbesartan-hydrochlorothiazide (AVALIDE) 300-12.5 mg per tablet, TAKE 1 TABLET BY MOUTH EVERY  "DAY AT NIGHT, Disp: 90 tablet, Rfl: 2    NIFEdipine (PROCARDIA-XL) 60 MG (OSM) 24 hr tablet, TAKE 1 TABLET BY MOUTH EVERY DAY, Disp: 90 tablet, Rfl: 3    amoxicillin (AMOXIL) 500 MG capsule, TAKE FOUR TABLETS ONE HOUR PRIOR TO DENTAL PROCEDURE, Disp: 12 capsule, Rfl: 2    atorvastatin (LIPITOR) 40 MG tablet, Take 1 tablet (40 mg total) by mouth every evening. 3 times a week, Disp: 90 tablet, Rfl: 3    docusate sodium (COLACE) 100 MG capsule, Take 1 capsule (100 mg total) by mouth 2 (two) times daily. (Patient not taking: Reported on 9/17/2024), Disp: 60 capsule, Rfl: 1    semaglutide, weight loss, (WEGOVY) 0.25 mg/0.5 mL PnIj, Inject 0.25mg into the skin once a week., Disp: 2 mL, Rfl: 9    Objective:        Body mass index is 29.72 kg/m².  Vitals:    09/17/24 0832   BP: 128/66   Pulse: 63   SpO2: 95%   Weight: 81 kg (178 lb 9.2 oz)   Height: 5' 5" (1.651 m)   PainSc: 0-No pain           Physical Exam  Vitals and nursing note reviewed.   Constitutional:       General: She is not in acute distress.     Appearance: Normal appearance. She is not ill-appearing.   HENT:      Head: Normocephalic and atraumatic.   Eyes:      General: No scleral icterus.  Cardiovascular:      Rate and Rhythm: Regular rhythm.      Heart sounds: Normal heart sounds.   Pulmonary:      Effort: Pulmonary effort is normal.   Abdominal:      General: There is no distension.   Musculoskeletal:         General: No deformity.      Cervical back: Normal range of motion.   Skin:     General: Skin is warm and dry.   Neurological:      Mental Status: She is alert and oriented to person, place, and time.   Psychiatric:         Behavior: Behavior normal.           Lab Results   Component Value Date    WBC 6.26 04/02/2024    HGB 13.4 04/02/2024    HCT 41.1 04/02/2024     04/02/2024    CHOL 140 04/02/2024    TRIG 95 04/02/2024    HDL 45 04/02/2024    ALT 19 04/02/2024    AST 17 04/02/2024     04/02/2024    K 3.8 04/02/2024     04/02/2024    " "CREATININE 0.8 04/02/2024    BUN 21 04/02/2024    CO2 29 04/02/2024    TSH 2.392 04/02/2024    INR 0.9 10/01/2019    HGBA1C 5.6 04/02/2024       The 10-year ASCVD risk score (Quyen JACOB, et al., 2019) is: 22.6%    Values used to calculate the score:      Age: 74 years      Sex: Female      Is Non- : No      Diabetic: No      Tobacco smoker: No      Systolic Blood Pressure: 128 mmHg      Is BP treated: Yes      HDL Cholesterol: 45 mg/dL      Total Cholesterol: 140 mg/dL    On lipitor 3 times a week    (Imaging have been independently reviewed)    reviewed    Assessment:         1. Essential hypertension    2. Hyperlipidemia, unspecified hyperlipidemia type    3. S/p RA-TLH/BSO/LND    4. Status post hysteroscopy with polypectomy    5. Age-related osteoporosis without current pathological fracture    6. At high risk for cardiovascular disease    7. Obesity (BMI 30-39.9)              Plan:     Maria Elena Castillo" was seen today for weight.    Diagnoses and all orders for this visit:    Essential hypertension    Hyperlipidemia, unspecified hyperlipidemia type  -     Cancel: Lipid Panel; Future  -     Cancel: Lipid Panel  -     Lipid Panel; Future  -     Lipid Panel    S/p RA-TLH/BSO/LND    Status post hysteroscopy with polypectomy    Age-related osteoporosis without current pathological fracture    At high risk for cardiovascular disease  -     Cancel: LIPOPROTEIN A (LPA); Future  -     Cancel: CRP, High Sensitivity; Future  -     Cancel: LIPOPROTEIN A (LPA)  -     Cancel: CRP, High Sensitivity  -     CRP, High Sensitivity; Future  -     LIPOPROTEIN A (LPA); Future  -     CRP, High Sensitivity  -     LIPOPROTEIN A (LPA)    Obesity (BMI 30-39.9)  -     Discontinue: semaglutide, weight loss, (WEGOVY) 0.25 mg/0.5 mL PnIj; Inject 0.25mg into the skin once a week.  -     semaglutide, weight loss, (WEGOVY) 0.25 mg/0.5 mL PnIj; Inject 0.25mg into the skin once a week.            Health Maintenance  - Lipids: "   - A1C:   - Colon Ca Screen: diverticulosis, 9/7/2022 repeat 5 years  - Immunizations:     Women's health  - Pap: s/p 6/2022 concern for cancer, but not cancer  - Mammo: 5/30/2023  - Dexa: osteoporosis 11/15/2022    Follow up in about 6 months (around 3/17/2025) for weight f/u, will need labs.        Visit today is associated with current or anticipated ongoing medical care related to this patient's single serious condition/complex condition of obesity, htn, wishes for minimal meds. The patient will return to see me as these issues will be followed longitudinally.    30 min were used in chart review, evaluation and counseling of patient, documentation and review of results on same day of service     All medications were reviewed including potential side effects and risks/benefits.  Pt was counseled to call back if anything worsens or if questions arise.    Monroe Cutler MD  Family Medicine  [facility]  [address]  [address]  [place] [address]  Phone [phone]  Fax [phone]

## 2024-09-19 ENCOUNTER — PATIENT MESSAGE (OUTPATIENT)
Dept: INTERNAL MEDICINE | Facility: CLINIC | Age: 74
End: 2024-09-19
Payer: OTHER GOVERNMENT

## 2024-09-26 ENCOUNTER — PATIENT MESSAGE (OUTPATIENT)
Dept: CARDIOLOGY | Facility: CLINIC | Age: 74
End: 2024-09-26
Payer: OTHER GOVERNMENT

## 2024-09-30 ENCOUNTER — PATIENT MESSAGE (OUTPATIENT)
Dept: INTERNAL MEDICINE | Facility: CLINIC | Age: 74
End: 2024-09-30
Payer: OTHER GOVERNMENT

## 2024-10-01 ENCOUNTER — PATIENT MESSAGE (OUTPATIENT)
Dept: INTERNAL MEDICINE | Facility: CLINIC | Age: 74
End: 2024-10-01

## 2024-10-01 ENCOUNTER — OFFICE VISIT (OUTPATIENT)
Dept: INTERNAL MEDICINE | Facility: CLINIC | Age: 74
End: 2024-10-01
Payer: OTHER GOVERNMENT

## 2024-10-01 ENCOUNTER — OFFICE VISIT (OUTPATIENT)
Dept: UROGYNECOLOGY | Facility: CLINIC | Age: 74
End: 2024-10-01
Payer: OTHER GOVERNMENT

## 2024-10-01 VITALS
DIASTOLIC BLOOD PRESSURE: 59 MMHG | SYSTOLIC BLOOD PRESSURE: 118 MMHG | WEIGHT: 177.69 LBS | OXYGEN SATURATION: 96 % | HEART RATE: 61 BPM | HEIGHT: 65 IN | BODY MASS INDEX: 29.61 KG/M2

## 2024-10-01 VITALS
HEART RATE: 59 BPM | DIASTOLIC BLOOD PRESSURE: 54 MMHG | WEIGHT: 178.13 LBS | SYSTOLIC BLOOD PRESSURE: 109 MMHG | BODY MASS INDEX: 29.68 KG/M2 | HEIGHT: 65 IN

## 2024-10-01 DIAGNOSIS — I10 ESSENTIAL HYPERTENSION: ICD-10-CM

## 2024-10-01 DIAGNOSIS — R60.0 LEG EDEMA, RIGHT: Primary | ICD-10-CM

## 2024-10-01 DIAGNOSIS — N39.3 STRESS INCONTINENCE: Primary | ICD-10-CM

## 2024-10-01 PROCEDURE — 99213 OFFICE O/P EST LOW 20 MIN: CPT | Mod: S$GLB,,,

## 2024-10-01 PROCEDURE — 99999 PR PBB SHADOW E&M-EST. PATIENT-LVL III: CPT | Mod: PBBFAC,,,

## 2024-10-01 PROCEDURE — 99999 PR PBB SHADOW E&M-EST. PATIENT-LVL IV: CPT | Mod: PBBFAC,,, | Performed by: OBSTETRICS & GYNECOLOGY

## 2024-10-01 PROCEDURE — 99212 OFFICE O/P EST SF 10 MIN: CPT | Mod: S$GLB,,, | Performed by: OBSTETRICS & GYNECOLOGY

## 2024-10-01 RX ORDER — FUROSEMIDE 20 MG/1
20 TABLET ORAL DAILY PRN
Qty: 30 TABLET | Refills: 0 | Status: SHIPPED | OUTPATIENT
Start: 2024-10-01 | End: 2024-10-31

## 2024-10-01 NOTE — PROGRESS NOTES
"CC: Follow up visit  S/p TVT-O and cystoscopy 23    HPI:  Patient is a 74 y.o. female  presents today for a postoperative visit. She denies vaginal bleeding and pain. She denies urinary urgency and frequency. She denies urgency urinary incontinence and leakage with coughing, sneezing and laughing.       REVIEW OF SYSTEMS:  Per HPI.     PHYSICAL EXAMINATION  BP (!) 109/54 (BP Location: Right arm, Patient Position: Sitting)   Pulse (!) 59   Ht 5' 5" (1.651 m)   Wt 80.8 kg (178 lb 2.1 oz)   LMP 2004 (Approximate)   BMI 29.64 kg/m²     General: Healthy in appearance, Well nourished, Affect Normal, NAD.  Ext: No clubbing, cyanosis, edema or varicosities.    Genitourinary-  Vulva: normal without lesions, masses, atrophy or pain  Urethra: meatus central and normal in appearance, no prolapse/caruncle, no masses or discharge. Empty supine stress test was negative.   Bladder: non-tender, no masses  Vagina: No discharge or lesions, severe atrophy, no masses appreciated, Mesh erosion: none.   Cervix: absent    No visits with results within 1 Day(s) from this visit.   Latest known visit with results is:   Office Visit on 2024   Component Date Value Ref Range Status    CRP, High Sensitivity 2024 1.0  mg/L  Final    Lipoprotein (a) 2024 22  <75 nmol/L Final    Cholesterol 2024 120  <200 mg/dL Final    HDL 2024 45 (L)  > OR = 50 mg/dL Final    Triglycerides 2024 93  <150 mg/dL Final    LDL Cholesterol 2024 57  mg/dL (calc) Final    HDL/Cholesterol Ratio 2024 2.7  <5.0 (calc) Final    Non HDL Chol. (LDL+VLDL) 2024 75  <130 mg/dL (calc) Final        ASSESSMENT & PLAN:    Stress incontinence      75 yo s/p TVT-O and cystoscopy on 23 presents today for a one year follow up visit. She is doing well and is without complaints. Stress urinary incontinence has resolved. The patient was given the option to return as needed versus annually and at this time opts " to return as needed. The patient was encouraged to contact the office as needed with any additional questions or concerns.     Total time spent on visit was 10 minutes.  This includes face to face time and non-face to face time preparing to see the patient (eg, review of tests), Obtaining and/or reviewing separately obtained history, Documenting clinical information in the electronic or other health record, Independently interpreting resultsand communicating results to the patient/family/caregiver, or Care coordination.        Nedra Younger MD

## 2024-10-01 NOTE — PROGRESS NOTES
INTERNAL MEDICINE  [facility]    Reason for visit:   Chief Complaint   Patient presents with    Leg Swelling       HPI: Maria Elena Jackson is a 74 y.o. female presenting today for right leg swelling.    Patient is an established patient of PCP, Dr. Monroe Cutler MD. This patient is new to me.    History of Present Illness    CHIEF COMPLAINT:  Maria Elena presents today for leg swelling.    LEG SWELLING:  She reports unilateral leg swelling, more pronounced in the right leg. The affected area is pink and warm with 2+ pitting. The swelling extends to the foot when not wearing tennis shoes. She denies tenderness but notes some tightness or mild pain in the calf on foot flexion. She recalls calf pain a few months ago while exercising with a sled dog and again recently when she tripped. She has a history of intermittent swelling, particularly since starting blood pressure medication. She is not on CCB. During a trip to [place], she experienced severe edema, prompting her to purchase compression stockings, which were uncomfortably tight. She expresses interest in finding milder compression options.    MEDICAL HISTORY:  She has a history of hypertension, managed with nifedipine 60 mg daily and irbesartan 300 mg daily. Hydrochlorothiazide 12.5 mg was added to her evening regimen to counteract episodes of edema. She vaguely recalls a previous Doctor mentioning vascular issues related to her leg swelling.    WEIGHT MANAGEMENT:  She reports experiencing weight loss while taking Ozempic, expressing satisfaction with the medication's effects on her overall health, including improvements in her A1C levels. She is now taking Wegovy.    RECENT TRAVEL:  During a recent out-of-town trip, she increased her salt intake due to frequent eating out, recognizing it as a trigger for her condition.    ROS:  General: -fever, -chills, -fatigue, -weight gain, -weight loss  Eyes: -vision changes, -redness,  -discharge  Cardiovascular: -chest pain, -palpitations, +lower extremity edema  Respiratory: -cough, -shortness of breath  Gastrointestinal: -abdominal pain, -nausea, -vomiting, -diarrhea, -constipation, -blood in stool  Musculoskeletal: -joint pain, -muscle pain  Skin: -rash, -lesion, -itching  Neurological: -headache, -dizziness, -numbness, -tingling        Social History     Social History Narrative    Lives with spouse. Feels safe in her home.        ALLERGIES:   Review of patient's allergies indicates:  No Known Allergies    MEDS:   Current Outpatient Medications on File Prior to Visit   Medication Sig Dispense Refill Last Dose/Taking    amoxicillin (AMOXIL) 500 MG capsule TAKE FOUR TABLETS ONE HOUR PRIOR TO DENTAL PROCEDURE 12 capsule 2 Taking    atorvastatin (LIPITOR) 40 MG tablet Take 1 tablet (40 mg total) by mouth every evening. 3 times a week 90 tablet 3 Taking    carvediloL (COREG) 12.5 MG tablet Take 1 tablet (12.5 mg total) by mouth 2 (two) times daily with meals. 120 tablet 2 Taking    doxazosin (CARDURA) 4 MG tablet Take 4 mg by mouth.   Taking    estradioL (ESTRACE) 0.01 % (0.1 mg/gram) vaginal cream Place 1 g vaginally twice a week. Use nightly x 2 weeks then twice weekly 42 g 3 Taking    irbesartan-hydrochlorothiazide (AVALIDE) 300-12.5 mg per tablet TAKE 1 TABLET BY MOUTH EVERY DAY AT NIGHT 90 tablet 2 Taking    NIFEdipine (PROCARDIA-XL) 60 MG (OSM) 24 hr tablet TAKE 1 TABLET BY MOUTH EVERY DAY 90 tablet 3 Taking    semaglutide, weight loss, (WEGOVY) 0.25 mg/0.5 mL PnIj Inject 0.25mg into the skin once a week. 2 mL 9 Taking    calcium-vitamin D 250 mg-2.5 mcg (100 unit) per tablet Take 1 tablet by mouth 2 (two) times daily.   Not Taking    cholecalciferol, vitamin D3, (VITAMIN D3) 25 mcg (1,000 unit) capsule Take 1,000 Units by mouth once daily.   Not Taking    docusate sodium (COLACE) 100 MG capsule Take 1 capsule (100 mg total) by mouth 2 (two) times daily. 60 capsule 1     ibuprofen  "(ADVIL,MOTRIN) 600 MG tablet Take 1 tablet (600 mg total) by mouth every 6 (six) hours as needed for Pain. 30 tablet 1 Not Taking       Vital signs:   Vitals:    10/01/24 0957   BP: (!) 118/59   BP Location: Left arm   Patient Position: Sitting   Pulse: 61   SpO2: 96%   Weight: 80.6 kg (177 lb 11.1 oz)   Height: 5' 5" (1.651 m)     Body mass index is 29.57 kg/m².    PHYSICAL EXAM:     Physical Exam  Vitals and nursing note reviewed.   Constitutional:       Appearance: Normal appearance. She is not ill-appearing or diaphoretic.   HENT:      Head: Normocephalic and atraumatic.   Eyes:      Extraocular Movements: Extraocular movements intact.      Conjunctiva/sclera: Conjunctivae normal.   Cardiovascular:      Rate and Rhythm: Bradycardia present.      Heart sounds: Normal heart sounds. No murmur heard.     No friction rub. No gallop.   Pulmonary:      Effort: Pulmonary effort is normal. No respiratory distress.      Breath sounds: Normal breath sounds. No wheezing or rhonchi.   Musculoskeletal:         General: No signs of injury.      Cervical back: Normal range of motion.      Right lower le+ Pitting Edema present.      Left lower leg: Edema (trace) present.   Skin:     General: Skin is warm and dry.      Coloration: Skin is not pale.      Findings: Erythema (mild, peripheral RLE) present. No bruising, lesion or rash.   Neurological:      Mental Status: She is alert and oriented to person, place, and time.      Motor: No weakness.      Gait: Gait normal.   Psychiatric:         Mood and Affect: Mood normal.         Behavior: Behavior normal.         Thought Content: Thought content normal.         Judgment: Judgment normal.         PERTINENT RESULTS:   No visits with results within 1 Week(s) from this visit.   Latest known visit with results is:   Office Visit on 2024   Component Date Value Ref Range Status    CRP, High Sensitivity 2024 1.0  mg/L  Final    Comment: Reference Range  Optimal " <1.0  Annabel CARDOZO et al. Endocr Pract.2017;23(Suppl 2):1-87.  For ages >17 Years:  hs-CRP mg/L  Risk According to AHA/CDC Guidelines  <1.0         Lower relative cardiovascular risk.  1.0-3.0      Average relative cardiovascular risk.  3.1-10.0     Higher relative cardiovascular risk.               Consider retesting in 1 to 2 weeks to               exclude a benign transient elevation               in the baseline CRP value secondary               to infection or inflammation.  >10.0        Persistent elevation, upon retesting,               may be associated with infection and               inflammation.     Dixon TA, Dionisio GA, Jefe RW, et al. Markers  of inflammation and cardiovascular disease:    application to clinical and public health practice:  A statement for healthcare professionals from the   Centers for Disease Control and Prevention and the   American Heart Association. Circulation 2003; 107(3):  499-511.      Lipoprotein (a) 09/24/2024 22  <75 nmol/L Final    Comment:    Risk: Optimal < 75 nmol/L; Moderate  nmol/L; High >  125 nmol/L     Cardiovascular event risk category cut points (optimal,  moderate, high) are based on Carolina STEVENSON.[facility]  2017;69:692-711.      Cholesterol 09/24/2024 120  <200 mg/dL Final    HDL 09/24/2024 45 (L)  > OR = 50 mg/dL Final    Triglycerides 09/24/2024 93  <150 mg/dL Final    LDL Cholesterol 09/24/2024 57  mg/dL (calc) Final    Comment: Reference range: <100     Desirable range <100 mg/dL for primary prevention;    <70 mg/dL for patients with CHD or diabetic patients   with > or = 2 CHD risk factors.     LDL-C is now calculated using the Samson-Luis Angel   calculation, which is a validated novel method providing   better accuracy than the Friedewald equation in the   estimation of LDL-C.   Samson SS et al. MATHEW. 2013;310(19): 1576-2780   (http://education.[employer]ana/faq/BIK265)      HDL/Cholesterol Ratio 09/24/2024 2.7  <5.0 (calc) Final    Non HDL  Chol. (LDL+VLDL) 09/24/2024 75  <130 mg/dL (calc) Final    Comment: For patients with diabetes plus 1 major ASCVD risk   factor, treating to a non-HDL-C goal of <100 mg/dL   (LDL-C of <70 mg/dL) is considered a therapeutic   option.       Assessment & Plan    Considered DVT due to unilateral leg swelling, warmth, and redness, but clinical presentation suggests edema rather than localized blood clot  Ordered ultrasound of right leg to rule out DVT and assess for peripheral venous disease  Noted well-controlled blood pressure; no changes to antihypertensive regimen indicated  Assessed kidney function from recent labs; found to be normal, allowing for safe use of diuretic    LEG SWELLING:  - Discussed peripheral venous disease as potential cause of leg swelling and skin changes.  - Educated on importance of leg elevation above heart level for venous return.  - Maria Elena to wear compression stockings, even mild compression is helpful.  - Maria Elena to elevate legs above heart level when lying down.  - Recommend reducing salt intake.  - Started Lasix (furosemide) at low dose, to be taken every other day for 1 week, then as needed for increased swelling.  - Ultrasound of right leg ordered to rule out deep vein thrombosis and assess for peripheral venous disease.    HYPERTENSION:  - Continued current blood pressure medications, including nifedipine 60 mg and irbesartan/hydrochlorothiazide.    PATIENT EDUCATION:  - Explained difference between pancreatitis (inflammation of pancreas) and pancreatic cancer; clarified they are unrelated.    FOLLOW UP:  - Follow up in 1 week via [employer] portal to assess improvement and discuss any necessary changes to the treatment plan.  - Contact the office when ultrasound results are available for review.       ASSESSMENT/PLAN:    1. Leg edema, right  -     furosemide (LASIX) 20 MG tablet; Take 1 tablet (20 mg total) by mouth daily as needed (leg swelling).  Dispense: 30 tablet; Refill: 0  -      CV Ultrasound doppler venous DVT leg right; Future    2. Essential hypertension  Assessment & Plan:  Well controlled on current medication      Health maintenance addressed: UTD  Vaccines recommended: Influenza, Covid-19    Follow up if symptoms worsen or fail to improve.     RAFAEL Del Angel   Internal Medicine

## 2024-10-03 ENCOUNTER — HOSPITAL ENCOUNTER (OUTPATIENT)
Dept: CARDIOLOGY | Facility: HOSPITAL | Age: 74
Discharge: HOME | End: 2024-10-03
Payer: OTHER GOVERNMENT

## 2024-10-03 DIAGNOSIS — R60.0 LEG EDEMA, RIGHT: ICD-10-CM

## 2024-10-03 PROCEDURE — 93971 EXTREMITY STUDY: CPT | Mod: 26,RT,, | Performed by: INTERNAL MEDICINE

## 2024-10-03 PROCEDURE — 93971 EXTREMITY STUDY: CPT | Mod: RT

## 2024-10-23 ENCOUNTER — PATIENT MESSAGE (OUTPATIENT)
Dept: UROGYNECOLOGY | Facility: CLINIC | Age: 74
End: 2024-10-23
Payer: OTHER GOVERNMENT

## 2024-10-23 ENCOUNTER — PATIENT MESSAGE (OUTPATIENT)
Dept: INTERNAL MEDICINE | Facility: CLINIC | Age: 74
End: 2024-10-23
Payer: OTHER GOVERNMENT

## 2024-10-23 ENCOUNTER — LAB VISIT (OUTPATIENT)
Dept: LAB | Facility: HOSPITAL | Age: 74
End: 2024-10-23
Attending: STUDENT IN AN ORGANIZED HEALTH CARE EDUCATION/TRAINING PROGRAM
Payer: OTHER GOVERNMENT

## 2024-10-23 DIAGNOSIS — R30.0 DYSURIA: Primary | ICD-10-CM

## 2024-10-23 DIAGNOSIS — N39.0 ACUTE UTI: Primary | ICD-10-CM

## 2024-10-23 LAB
BILIRUB UR QL STRIP: NEGATIVE
CLARITY UR REFRACT.AUTO: CLEAR
COLOR UR AUTO: YELLOW
GLUCOSE UR QL STRIP: NEGATIVE
HGB UR QL STRIP: ABNORMAL
HYALINE CASTS UR QL AUTO: 6 /LPF
KETONES UR QL STRIP: NEGATIVE
LEUKOCYTE ESTERASE UR QL STRIP: ABNORMAL
MICROSCOPIC COMMENT: ABNORMAL
NITRITE UR QL STRIP: NEGATIVE
PH UR STRIP: 6 [PH] (ref 5–8)
PROT UR QL STRIP: ABNORMAL
RBC #/AREA URNS AUTO: >100 /HPF (ref 0–4)
SP GR UR STRIP: 1.01 (ref 1–1.03)
SQUAMOUS #/AREA URNS AUTO: 0 /HPF
URN SPEC COLLECT METH UR: ABNORMAL
WBC #/AREA URNS AUTO: >100 /HPF (ref 0–5)
WBC CLUMPS UR QL AUTO: ABNORMAL

## 2024-10-23 PROCEDURE — 87086 URINE CULTURE/COLONY COUNT: CPT | Performed by: STUDENT IN AN ORGANIZED HEALTH CARE EDUCATION/TRAINING PROGRAM

## 2024-10-23 PROCEDURE — 81001 URINALYSIS AUTO W/SCOPE: CPT | Performed by: STUDENT IN AN ORGANIZED HEALTH CARE EDUCATION/TRAINING PROGRAM

## 2024-10-23 PROCEDURE — 87088 URINE BACTERIA CULTURE: CPT | Performed by: STUDENT IN AN ORGANIZED HEALTH CARE EDUCATION/TRAINING PROGRAM

## 2024-10-23 PROCEDURE — 87186 SC STD MICRODIL/AGAR DIL: CPT | Performed by: STUDENT IN AN ORGANIZED HEALTH CARE EDUCATION/TRAINING PROGRAM

## 2024-10-23 RX ORDER — SULFAMETHOXAZOLE AND TRIMETHOPRIM 800; 160 MG/1; MG/1
1 TABLET ORAL 2 TIMES DAILY
Qty: 6 TABLET | Refills: 0 | Status: SHIPPED | OUTPATIENT
Start: 2024-10-23

## 2024-10-25 LAB — BACTERIA UR CULT: ABNORMAL

## 2024-11-12 ENCOUNTER — PATIENT MESSAGE (OUTPATIENT)
Dept: INTERNAL MEDICINE | Facility: CLINIC | Age: 74
End: 2024-11-12
Payer: OTHER GOVERNMENT

## 2024-11-12 DIAGNOSIS — E78.5 HYPERLIPIDEMIA, UNSPECIFIED HYPERLIPIDEMIA TYPE: ICD-10-CM

## 2024-11-12 RX ORDER — ATORVASTATIN CALCIUM 40 MG/1
40 TABLET, FILM COATED ORAL NIGHTLY
Qty: 90 TABLET | Refills: 3 | Status: SHIPPED | OUTPATIENT
Start: 2024-11-12 | End: 2025-11-12

## 2024-11-12 NOTE — TELEPHONE ENCOUNTER
No care due was identified.  [facility] Embedded Care Due Messages. Reference number: [id number].   11/12/2024 9:53:51 AM CST

## 2024-11-13 ENCOUNTER — OFFICE VISIT (OUTPATIENT)
Dept: OBSTETRICS AND GYNECOLOGY | Facility: CLINIC | Age: 74
End: 2024-11-13
Attending: OBSTETRICS & GYNECOLOGY
Payer: OTHER GOVERNMENT

## 2024-11-13 VITALS
WEIGHT: 178.38 LBS | SYSTOLIC BLOOD PRESSURE: 128 MMHG | DIASTOLIC BLOOD PRESSURE: 68 MMHG | HEIGHT: 65 IN | BODY MASS INDEX: 29.72 KG/M2

## 2024-11-13 DIAGNOSIS — Z01.419 ENCOUNTER FOR GYNECOLOGICAL EXAMINATION WITHOUT ABNORMAL FINDING: Primary | ICD-10-CM

## 2024-11-13 DIAGNOSIS — N95.2 POSTMENOPAUSAL ATROPHIC VAGINITIS: ICD-10-CM

## 2024-11-13 PROCEDURE — 99397 PER PM REEVAL EST PAT 65+ YR: CPT | Mod: S$GLB,,, | Performed by: OBSTETRICS & GYNECOLOGY

## 2024-11-13 PROCEDURE — 99999 PR PBB SHADOW E&M-EST. PATIENT-LVL III: CPT | Mod: PBBFAC,,, | Performed by: OBSTETRICS & GYNECOLOGY

## 2024-11-13 RX ORDER — ESTRADIOL 0.1 MG/G
1 CREAM VAGINAL
Qty: 42 G | Refills: 3 | Status: SHIPPED | OUTPATIENT
Start: 2024-11-13

## 2024-11-13 NOTE — PROGRESS NOTES
Subjective:       Patient ID: Maria Elena Jackson is a 74 y.o. female.    Chief Complaint:  Annual Exam and Gynecologic Exam      History of Present Illness  Gynecologic Exam  Pertinent negatives include no abdominal pain, back pain or headaches.     Maria Elena Jackson is a 74 y.o. female  here for her annual GYN exam.   She is menopausal since age 48.  Had a hysterectomy in  for complex endometrial hyperplasia with atypia.  denies vaginal itching or irritation.  Denies vaginal discharge.  She is not currently sexually active. She has had1 partner for 54 years .   History of abnormal pap: No  Last Pap: approximate date 2022 and was normal(neg HR HPV)  Last MMG: normal-2024: BI-RADS Category 1: Negative -routine follow-up in 12 months  Last Colonoscopy:  colonoscopy a few years ago without abnormalities.  denies domestic violence. She does feel safe at home.     Past Medical History:   Diagnosis Date    Arthritis     Hyperlipidemia     Hypertension     S/P total hysterectomy and bilateral salpingo-oophorectomy 2022     Past Surgical History:   Procedure Laterality Date    ADENOIDECTOMY      CHOLECYSTECTOMY      CYSTOSCOPY N/A 2023    Procedure: CYSTOSCOPY;  Surgeon: Nedra Younger MD;  Location: [facility];  Service: OB/GYN;  Laterality: N/A;    D&C Hysteroscopy      DILATION AND CURETTAGE OF UTERUS      HYSTERECTOMY      HYSTEROSCOPY WITH DILATION AND CURETTAGE OF UTERUS N/A 05/10/2022    Procedure: HYSTEROSCOPY, WITH DILATION AND CURETTAGE OF UTERUS;  Surgeon: Marce Ellsworth MD;  Location: [facility] OR;  Service: OB/GYN;  Laterality: N/A;    INSERTION OF MIDURETHRAL SLING N/A 2023    Procedure: SLING, MIDURETHRAL;  Surgeon: Nedra Younger MD;  Location: [facility] OR;  Service: OB/GYN;  Laterality: N/A;  tvt-O    JOINT REPLACEMENT  10/19    laparoscopy      LYMPH NODE BIOPSY N/A 2022    Procedure: BIOPSY, LYMPH NODE;  Surgeon: Kevin Mcgraw MD;  Location: [facility] OR;   Service: OB/GYN;  Laterality: N/A;    OOPHORECTOMY      TONSILLECTOMY      TOTAL KNEE ARTHROPLASTY Right 10/07/2019    Procedure: ARTHROPLASTY, KNEE, TOTAL;  Surgeon: John L. Ochsner Jr., MD;  Location: [facility] OR;  Service: Orthopedics;  Laterality: Right;    XI ROBOTIC HYSTERECTOMY, WITH SALPINGO-OOPHORECTOMY Bilateral 2022    Procedure: XI ROBOTIC HYSTERECTOMY,WITH SALPINGO-OOPHORECTOMY;  Surgeon: Kevin Mcgraw MD;  Location: [facility] OR;  Service: OB/GYN;  Laterality: Bilateral;  Robotic assisted total hysterectomy, Bilateral salpingo-oophrectomy, [place] Node Mapping       Social History     Socioeconomic History    Marital status:    Tobacco Use    Smoking status: Former     Current packs/day: 0.00     Average packs/day: 1.5 packs/day for 22.0 years (33.0 ttl pk-yrs)     Types: Cigarettes     Start date:      Quit date:      Years since quittin.8    Smokeless tobacco: Never   Substance and Sexual Activity    Alcohol use: Yes     Comment: One to two glasses a month    Drug use: No    Sexual activity: Not Currently     Partners: Male     Birth control/protection: Post-menopausal     Comment:  x 54 years   Social History Narrative    Lives with spouse. Feels safe in her home.      Social Drivers of Health     Financial Resource Strain: Patient Declined (4/3/2024)    Overall Financial Resource Strain (CARDIA)     Difficulty of Paying Living Expenses: Patient declined   Food Insecurity: Patient Declined (4/3/2024)    Hunger Vital Sign     Worried About Running Out of Food in the Last Year: Patient declined     Ran Out of Food in the Last Year: Patient declined   Transportation Needs: Patient Declined (4/3/2024)    PRAPARE - Transportation     Lack of Transportation (Medical): Patient declined     Lack of Transportation (Non-Medical): Patient declined   Physical Activity: Sufficiently Active (4/3/2024)    Exercise Vital Sign     Days of Exercise per Week: 3 days     Minutes of Exercise  "per Session: 60 min   Stress: No Stress Concern Present (4/3/2024)    [employer] [employer] - Occupational Stress Questionnaire     Feeling of Stress : Not at all   Housing Stability: Patient Declined (4/3/2024)    Housing Stability Vital Sign     Unable to Pay for Housing in the Last Year: Patient declined     Unstable Housing in the Last Year: Patient declined     Family History   Problem Relation Name Age of Onset    Diabetes Maternal Grandmother hrandmother     Diabetes Maternal Grandfather Grandfather     Hypertension Father Father     Prostate cancer Father Father 50    Colon cancer Mother Mother 55    Ovarian cancer Mother Mother 35    Cancer Mother Mother     Squamous cell carcinoma Brother      Heart attack Brother  55    Breast cancer Neg Hx      Stroke Neg Hx       OB History          2    Para   2    Term   2            AB        Living   2         SAB        IAB        Ectopic        Multiple        Live Births   2                 /68 (Patient Position: Sitting)   Ht 5' 5" (1.651 m)   Wt 80.9 kg (178 lb 6.4 oz)   LMP 2004 (Approximate)   BMI 29.69 kg/m²         GYN & OB History    Date of Last Pap: 2022    OB History    Para Term  AB Living   2 2 2     2   SAB IAB Ectopic Multiple Live Births           2      # Outcome Date GA Lbr [place]/2nd Weight Sex Type Anes PTL Lv   2 Term      Vag-Spont   SABINA   1 Term      Vag-Spont   SABINA       Review of Systems  Review of Systems   Constitutional:  Negative for activity change, appetite change, fatigue and unexpected weight change.   HENT: Negative.     Eyes:  Negative for visual disturbance.   Respiratory:  Negative for shortness of breath and wheezing.    Cardiovascular:  Negative for chest pain, palpitations and leg swelling.   Gastrointestinal:  Negative for abdominal pain, bloating and blood in stool.   Endocrine: Negative for diabetes and hair loss.   Genitourinary:  Positive for vaginal " dryness. Negative for hot flashes.   Musculoskeletal:  Negative for back pain and joint swelling.   Integumentary:  Negative for acne, hair changes and nipple discharge.   Neurological:  Negative for headaches.   Hematological:  Does not bruise/bleed easily.   Psychiatric/Behavioral:  Negative for depression and sleep disturbance. The patient is not nervous/anxious.    Breast: Negative for mastodynia and nipple discharge          Objective:      Physical Exam:   Constitutional: She is oriented to person, place, and time. She appears well-developed and well-nourished.    HENT:   Head: Normocephalic and atraumatic.    Eyes: Pupils are equal, round, and reactive to light. EOM are normal.     Cardiovascular:  Normal rate and regular rhythm.             Pulmonary/Chest: Effort normal and breath sounds normal.   BREASTS:  no mass, no tenderness, no deformity and no retraction. Right breast exhibits no inverted nipple, no mass, no nipple discharge, no skin change, no tenderness, no bleeding and no swelling. Left breast exhibits no inverted nipple, no mass, no nipple discharge, no skin change, no tenderness, no bleeding and no swelling. Breasts are symmetrical.              Abdominal: Soft. Bowel sounds are normal.     Genitourinary:    Pelvic exam was performed with patient supine.      Genitourinary Comments: PELVIC: Normal external female genitalia without lesions. Normal hair distribution. Adequate perineal body, normal urethral meatus. Vagina atrophic without lesions or discharge. No significant cystocele or rectocele. Bimanual exam shows uterus and cervix to be surgically absent. Adnexa without masses or tenderness.  RECTAL: Deferred                 Musculoskeletal: Normal range of motion and moves all extremeties.       Neurological: She is alert and oriented to person, place, and time.    Skin: Skin is warm and dry.    Psychiatric: She has a normal mood and affect.              Assessment:        1. Encounter for  gynecological examination without abnormal finding    2. Postmenopausal atrophic vaginitis                Plan:      Problem List Items Addressed This Visit    None  Visit Diagnoses       Encounter for gynecological examination without abnormal finding    -  Primary  COUNSELING:  The patient was counseled today on regular weight bearing exercise. Patient was counseled today on the new ACS guidelines for cervical cytology screening as well as the current recommendations for breast cancer screening. Counseling session lasted approximately 10 minutes, and all her questions were answered. She was advised to see her primary care physician for all other health maintenance.   FOLLOW-UP with me for next routine visit.       Postmenopausal atrophic vaginitis        Relevant Medications    estradioL (ESTRACE) 0.01 % (0.1 mg/gram) vaginal cream             Follow up in about 1 year (around 11/13/2025).

## 2025-01-17 ENCOUNTER — PATIENT MESSAGE (OUTPATIENT)
Dept: INTERNAL MEDICINE | Facility: CLINIC | Age: 75
End: 2025-01-17
Payer: OTHER GOVERNMENT

## 2025-01-17 DIAGNOSIS — I10 ESSENTIAL HYPERTENSION: ICD-10-CM

## 2025-01-17 DIAGNOSIS — E78.5 HYPERLIPIDEMIA, UNSPECIFIED HYPERLIPIDEMIA TYPE: ICD-10-CM

## 2025-01-17 DIAGNOSIS — E66.9 OBESITY (BMI 30-39.9): Primary | ICD-10-CM

## 2025-01-27 ENCOUNTER — PATIENT MESSAGE (OUTPATIENT)
Dept: INTERNAL MEDICINE | Facility: CLINIC | Age: 75
End: 2025-01-27
Payer: OTHER GOVERNMENT

## 2025-01-27 DIAGNOSIS — E66.9 OBESITY (BMI 30-39.9): Primary | ICD-10-CM

## 2025-01-27 DIAGNOSIS — Z71.3 ENCOUNTER FOR WEIGHT LOSS COUNSELING: ICD-10-CM

## 2025-01-27 RX ORDER — SEMAGLUTIDE 0.5 MG/.5ML
5 INJECTION, SOLUTION SUBCUTANEOUS
Qty: 2 ML | Refills: 2 | Status: SHIPPED | OUTPATIENT
Start: 2025-01-27

## 2025-03-18 ENCOUNTER — OFFICE VISIT (OUTPATIENT)
Dept: INTERNAL MEDICINE | Facility: CLINIC | Age: 75
End: 2025-03-18
Payer: OTHER GOVERNMENT

## 2025-03-18 ENCOUNTER — TELEPHONE (OUTPATIENT)
Dept: INTERNAL MEDICINE | Facility: CLINIC | Age: 75
End: 2025-03-18
Payer: OTHER GOVERNMENT

## 2025-03-18 VITALS
HEART RATE: 52 BPM | HEIGHT: 65 IN | WEIGHT: 177.94 LBS | OXYGEN SATURATION: 96 % | SYSTOLIC BLOOD PRESSURE: 134 MMHG | BODY MASS INDEX: 29.65 KG/M2 | DIASTOLIC BLOOD PRESSURE: 60 MMHG

## 2025-03-18 DIAGNOSIS — E66.9 OBESITY (BMI 30-39.9): Primary | ICD-10-CM

## 2025-03-18 DIAGNOSIS — I10 ESSENTIAL HYPERTENSION: ICD-10-CM

## 2025-03-18 DIAGNOSIS — R82.90 ABNORMAL URINE ODOR: ICD-10-CM

## 2025-03-18 DIAGNOSIS — R73.03 PREDIABETES: ICD-10-CM

## 2025-03-18 DIAGNOSIS — E78.5 HYPERLIPIDEMIA, UNSPECIFIED HYPERLIPIDEMIA TYPE: ICD-10-CM

## 2025-03-18 LAB
BACTERIA #/AREA URNS AUTO: ABNORMAL /HPF
BILIRUB UR QL STRIP: NEGATIVE
CLARITY UR REFRACT.AUTO: ABNORMAL
COLOR UR AUTO: YELLOW
GLUCOSE UR QL STRIP: NEGATIVE
HGB UR QL STRIP: ABNORMAL
KETONES UR QL STRIP: NEGATIVE
LEUKOCYTE ESTERASE UR QL STRIP: ABNORMAL
MICROSCOPIC COMMENT: ABNORMAL
NITRITE UR QL STRIP: POSITIVE
PH UR STRIP: 5 [PH] (ref 5–8)
PROT UR QL STRIP: NEGATIVE
RBC #/AREA URNS AUTO: 5 /HPF (ref 0–4)
SP GR UR STRIP: 1.01 (ref 1–1.03)
SQUAMOUS #/AREA URNS AUTO: 1 /HPF
URN SPEC COLLECT METH UR: ABNORMAL
WBC #/AREA URNS AUTO: >100 /HPF (ref 0–5)
WBC CLUMPS UR QL AUTO: ABNORMAL

## 2025-03-18 PROCEDURE — 87088 URINE BACTERIA CULTURE: CPT | Performed by: COUNSELOR

## 2025-03-18 PROCEDURE — 81001 URINALYSIS AUTO W/SCOPE: CPT | Performed by: COUNSELOR

## 2025-03-18 PROCEDURE — 99999 PR PBB SHADOW E&M-EST. PATIENT-LVL IV: CPT | Mod: PBBFAC,,, | Performed by: COUNSELOR

## 2025-03-18 PROCEDURE — 99214 OFFICE O/P EST MOD 30 MIN: CPT | Mod: S$GLB,,, | Performed by: COUNSELOR

## 2025-03-18 PROCEDURE — 87186 SC STD MICRODIL/AGAR DIL: CPT | Performed by: COUNSELOR

## 2025-03-18 PROCEDURE — 87086 URINE CULTURE/COLONY COUNT: CPT | Performed by: COUNSELOR

## 2025-03-18 RX ORDER — MAGNESIUM 250 MG
TABLET ORAL ONCE
COMMUNITY

## 2025-03-18 RX ORDER — TIRZEPATIDE 2.5 MG/.5ML
2.5 INJECTION, SOLUTION SUBCUTANEOUS
Qty: 2 ML | Refills: 0 | Status: SHIPPED | OUTPATIENT
Start: 2025-03-18

## 2025-03-18 NOTE — PROGRESS NOTES
Subjective:       Patient ID: Maria Elena Jackson is a 74 y.o. female with history of HTN, HLD, s/pTLH with BSO, osteoporosis, UI    Chief Complaint: Obesity (BMI 30-39.9) [E66.9]    Patient is new to me, PCP is Dr. Cutler. Here today for the following:    History of Present Illness    CHIEF COMPLAINT:  Patient presents today for weight management and urinary symptoms    URINARY SYMPTOMS:  She reports occasional foamy urine with strong odor and intermittent pressure. She denies pain, burning, or increased urinary frequency. She has increased water intake. She had a urinary tract infection in October with confirmed E. coli on culture. Treated with bactrim.    WEIGHT MANAGEMENT:  She has lost a total of 13 lbs over the past two years through medication interventions. She was previously on Wegovy until January with a 9-pound weight loss. Prior to that, she was on Ozempic approximately two years ago. Her last dose was in 01/25 due to insurance cost increase from $25 to $400 per month. Today she is similar to her weight 6 months ago.    DIET AND EXERCISE:  She attends modified [employer] classes 3 times weekly for one hour each session and walks for 30 minutes twice weekly during lunch breaks. She consumes two meals daily with light food in the morning and reports minimal snacking except for occasional cookies. She identifies bread as a particularly challenging food to limit in her diet.    MEDICAL HISTORY:  She has hypertension, currently well-controlled, with notably low blood pressure readings since starting Wegovy and after discontinuation. She has a history of right  knee replacement with persistent difficulty navigating stairs, particularly when descending.    ROS:  General: -fever, -chills, -fatigue, -weight gain, -weight loss  Cardiovascular: -chest pain, -palpitations, -lower extremity edema  Respiratory: -cough, -shortness of breath  Gastrointestinal: -abdominal pain, -nausea, -vomiting, -diarrhea,  "-constipation, -blood in stool  Genitourinary: -dysuria, -hematuria, -frequency, +abnormal urine odor, +pelvic pressure  Musculoskeletal: -joint pain, -muscle pain, +pain with movement, +limb pain  Skin: -rash, -lesion  Neurological: -headache, -dizziness, -numbness, -tingling  Psychiatric: -anxiety, -depression, -sleep difficulty          Current Outpatient Medications   Medication Instructions    amoxicillin (AMOXIL) 500 MG capsule TAKE FOUR TABLETS ONE HOUR PRIOR TO DENTAL PROCEDURE    atorvastatin (LIPITOR) 40 mg, Oral, Nightly    calcium-vitamin D 250 mg-2.5 mcg (100 unit) per tablet 1 tablet, 2 times daily    carvediloL (COREG) 12.5 mg, Oral, 2 times daily with meals    cholecalciferol (vitamin D3) (VITAMIN D3) 1,000 Units, Daily    docusate sodium (COLACE) 100 mg, Oral, 2 times daily    doxazosin (CARDURA) 4 mg    estradioL (ESTRACE) 1 g, Vaginal, Three times weekly    furosemide (LASIX) 20 mg, Oral, Daily PRN    ibuprofen (ADVIL,MOTRIN) 600 mg, Oral, Every 6 hours PRN    irbesartan-hydrochlorothiazide (AVALIDE) 300-12.5 mg per tablet 1 tablet, Oral, Nightly    magnesium 250 mg Tab Once    NIFEdipine (PROCARDIA-XL) 60 mg, Oral    sulfamethoxazole-trimethoprim 800-160mg (BACTRIM DS) 800-160 mg Tab 1 tablet, Oral, 2 times daily    ZEPBOUND 2.5 mg, Subcutaneous, Every 7 days     Objective:      Vitals:    03/18/25 0928   BP: 134/60   Pulse: (!) 52   SpO2: 96%   Weight: 80.7 kg (177 lb 14.6 oz)   Height: 5' 5" (1.651 m)   PainSc: 0-No pain     Body mass index is 29.61 kg/m².    Physical Exam  Constitutional:       General: She is not in acute distress.     Appearance: Normal appearance. She is not ill-appearing, toxic-appearing or diaphoretic.   HENT:      Head: Normocephalic and atraumatic.   Eyes:      Extraocular Movements: Extraocular movements intact.      Conjunctiva/sclera: Conjunctivae normal.   Cardiovascular:      Rate and Rhythm: Normal rate and regular rhythm.      Pulses: Normal pulses.      Heart " sounds: Normal heart sounds. No murmur heard.     No friction rub. No gallop.   Pulmonary:      Effort: Pulmonary effort is normal. No respiratory distress.      Breath sounds: Normal breath sounds. No stridor. No wheezing, rhonchi or rales.   Musculoskeletal:      Right lower leg: No edema.      Left lower leg: No edema.   Skin:     General: Skin is warm and dry.   Neurological:      General: No focal deficit present.      Mental Status: She is alert and oriented to person, place, and time. Mental status is at baseline.   Psychiatric:         Mood and Affect: Mood normal.         Behavior: Behavior normal.         Thought Content: Thought content normal.         Judgment: Judgment normal.         Assessment:       1. Obesity (BMI 30-39.9)    2. Essential hypertension    3. Hyperlipidemia, unspecified hyperlipidemia type    4. Prediabetes    5. Abnormal urine odor        IMPRESSION:  - Considered history of UTI and current urinary symptoms, though atypical presentation suggests possible dehydration rather than infection.  - Evaluated weight management options, considering previous use of GLP-1 agonists (Ozempic, Wegovy) and insurance coverage issues.  - Discussed various weight loss medication options, including GLP-1 agonists, Contrave, and Qsymia, weighing efficacy and potential side effects.  - Blood pressure control appears adequate on current management.    Plan:   Obesity (BMI 30-39.9)  - Assessed diet and exercise habits, acknowledging patient's efforts in strength training and plans for increased cardio.  - Explained the importance of muscle mass preservation during weight loss to prevent unfavorable body composition changes.  - Discussed the role of diet vs. exercise in weight loss. Patient will try to limit carbs or substitute with lower caloric options.  - Provided information on basal metabolic rate and its impact on daily calorie needs.  - Prescribe Zepbound (tirzepatide) at the lowest dose as a  potentially more effective GLP-1/GIP agonist for weight management, 1-month supply to be filled at [facility] [place].  - Instruct the patient to contact the office in the third week of treatment for prescription renewal and to report any significant side effects.  - Recommend continuing strength training and increasing cardio exercise to 150 minutes per week.  - Schedule follow up in 6 months for weight management and medication review, or sooner if needed.  -     tirzepatide, weight loss, (ZEPBOUND) 2.5 mg/0.5 mL PnIj; Inject 2.5 mg into the skin every 7 days.  Dispense: 2 mL; Refill: 0    Essential hypertension  - Evaluate the current blood pressure reading of 134/60, which is slightly elevated but not concerning given recent history of low readings. Patient aware they could check BP more regularly. Home readings in past were around 120/60 consistently.   - Continue current management as it appears effective. Will try to add Zepbound.  - Follow up on blood pressure control at next visit.  -     tirzepatide, weight loss, (ZEPBOUND) 2.5 mg/0.5 mL PnIj; Inject 2.5 mg into the skin every 7 days.  Dispense: 2 mL; Refill: 0    Hyperlipidemia, unspecified hyperlipidemia type  Patient taking atorvastatin. Well controlled while on wegovy.   Continue current management, will try to add Zepbound.  -     tirzepatide, weight loss, (ZEPBOUND) 2.5 mg/0.5 mL PnIj; Inject 2.5 mg into the skin every 7 days.  Dispense: 2 mL; Refill: 0    Prediabetes  A1c well controlled, continue current regimen.  -     tirzepatide, weight loss, (ZEPBOUND) 2.5 mg/0.5 mL PnIj; Inject 2.5 mg into the skin every 7 days.  Dispense: 2 mL; Refill: 0    Abnormal urine odor  - Monitor the patient's reported intermittent symptoms of foamy or smelly urine without pain or burning.  - Evaluate that symptoms are not typical of UTI and could be related to dehydration or dietary changes.  - Ordered urinalysis to rule out urinary tract infection or any  underlying issues.  -     Cancel: Urinalysis, Reflex to Urine Culture Urine, Clean Catch; Future; Expected date: 03/18/2025  -     Urinalysis, Reflex to Urine Culture Urine, Clean Catch        This note was generated with the assistance of ambient listening technology. Verbal consent was obtained by the patient and accompanying visitor(s) for the recording of patient appointment to facilitate this note. I attest to having reviewed and edited the generated note for accuracy, though some syntax or spelling errors may persist. Please contact the author of this note for any clarification.    Robbie Hansen PA-C    3/18/2025

## 2025-03-18 NOTE — TELEPHONE ENCOUNTER
Spoke to patient to reschedule appointment, patient declined seeing Cristy saying she only wants to see Dr. Cutler.

## 2025-03-19 ENCOUNTER — RESULTS FOLLOW-UP (OUTPATIENT)
Dept: INTERNAL MEDICINE | Facility: CLINIC | Age: 75
End: 2025-03-19
Payer: OTHER GOVERNMENT

## 2025-03-19 DIAGNOSIS — N30.00 ACUTE CYSTITIS WITHOUT HEMATURIA: Primary | ICD-10-CM

## 2025-03-19 RX ORDER — SULFAMETHOXAZOLE AND TRIMETHOPRIM 800; 160 MG/1; MG/1
1 TABLET ORAL 2 TIMES DAILY
Qty: 6 TABLET | Refills: 0 | Status: SHIPPED | OUTPATIENT
Start: 2025-03-19 | End: 2025-03-22

## 2025-03-19 NOTE — TELEPHONE ENCOUNTER
Spoke to patient on telephone, confirmed no fever or back pains consistent with CVA tenderness. Likely acute simply cystitis. Last bactrim use about 5-6 months ago, no history of kidney disease. Prescribed bactrim again empirically for 3 days.

## 2025-03-21 LAB — BACTERIA UR CULT: ABNORMAL

## 2025-03-23 ENCOUNTER — PATIENT MESSAGE (OUTPATIENT)
Dept: INTERNAL MEDICINE | Facility: CLINIC | Age: 75
End: 2025-03-23
Payer: OTHER GOVERNMENT

## 2025-03-24 ENCOUNTER — PATIENT MESSAGE (OUTPATIENT)
Dept: INTERNAL MEDICINE | Facility: CLINIC | Age: 75
End: 2025-03-24

## 2025-03-24 ENCOUNTER — OFFICE VISIT (OUTPATIENT)
Dept: INTERNAL MEDICINE | Facility: CLINIC | Age: 75
End: 2025-03-24
Payer: OTHER GOVERNMENT

## 2025-03-24 ENCOUNTER — LAB VISIT (OUTPATIENT)
Dept: LAB | Facility: HOSPITAL | Age: 75
End: 2025-03-24
Attending: STUDENT IN AN ORGANIZED HEALTH CARE EDUCATION/TRAINING PROGRAM
Payer: OTHER GOVERNMENT

## 2025-03-24 VITALS
OXYGEN SATURATION: 97 % | HEIGHT: 65 IN | DIASTOLIC BLOOD PRESSURE: 60 MMHG | SYSTOLIC BLOOD PRESSURE: 118 MMHG | WEIGHT: 177 LBS | HEART RATE: 52 BPM | BODY MASS INDEX: 29.49 KG/M2

## 2025-03-24 DIAGNOSIS — R10.12 LEFT UPPER QUADRANT ABDOMINAL PAIN: ICD-10-CM

## 2025-03-24 DIAGNOSIS — R10.12 LEFT UPPER QUADRANT ABDOMINAL PAIN: Primary | ICD-10-CM

## 2025-03-24 LAB
ABSOLUTE EOSINOPHIL (OHS): 0.31 K/UL
ABSOLUTE MONOCYTE (OHS): 0.52 K/UL (ref 0.3–1)
ABSOLUTE NEUTROPHIL COUNT (OHS): 2.64 K/UL (ref 1.8–7.7)
ALBUMIN SERPL BCP-MCNC: 3.6 G/DL (ref 3.5–5.2)
ALP SERPL-CCNC: 90 UNIT/L (ref 40–150)
ALT SERPL W/O P-5'-P-CCNC: 14 UNIT/L (ref 10–44)
ANION GAP (OHS): 8 MMOL/L (ref 8–16)
AST SERPL-CCNC: 14 UNIT/L (ref 11–45)
BASOPHILS # BLD AUTO: 0.08 K/UL
BASOPHILS NFR BLD AUTO: 1.4 %
BILIRUB SERPL-MCNC: 0.2 MG/DL (ref 0.1–1)
BUN SERPL-MCNC: 22 MG/DL (ref 8–23)
CALCIUM SERPL-MCNC: 9.6 MG/DL (ref 8.7–10.5)
CHLORIDE SERPL-SCNC: 107 MMOL/L (ref 95–110)
CO2 SERPL-SCNC: 23 MMOL/L (ref 23–29)
CREAT SERPL-MCNC: 0.9 MG/DL (ref 0.5–1.4)
CRP SERPL-MCNC: 0.4 MG/L
ERYTHROCYTE [DISTWIDTH] IN BLOOD BY AUTOMATED COUNT: 12.3 % (ref 11.5–14.5)
ERYTHROCYTE [SEDIMENTATION RATE] IN BLOOD BY PHOTOMETRIC METHOD: 14 MM/HR
GFR SERPLBLD CREATININE-BSD FMLA CKD-EPI: >60 ML/MIN/1.73/M2
GLUCOSE SERPL-MCNC: 151 MG/DL (ref 70–110)
HCT VFR BLD AUTO: 40.4 % (ref 37–48.5)
HGB BLD-MCNC: 12.9 GM/DL (ref 12–16)
IMM GRANULOCYTES # BLD AUTO: 0.01 K/UL (ref 0–0.04)
IMM GRANULOCYTES NFR BLD AUTO: 0.2 % (ref 0–0.5)
LYMPHOCYTES # BLD AUTO: 2.35 K/UL (ref 1–4.8)
MCH RBC QN AUTO: 31.2 PG (ref 27–50)
MCHC RBC AUTO-ENTMCNC: 31.9 G/DL (ref 32–36)
MCV RBC AUTO: 98 FL (ref 82–98)
NUCLEATED RBC (/100WBC) (OHS): 0 /100 WBC
PLATELET # BLD AUTO: 245 K/UL (ref 150–450)
PMV BLD AUTO: 12.7 FL (ref 9.2–12.9)
POTASSIUM SERPL-SCNC: 3.8 MMOL/L (ref 3.5–5.1)
PROT SERPL-MCNC: 7.1 GM/DL (ref 6–8.4)
RBC # BLD AUTO: 4.13 M/UL (ref 4–5.4)
RELATIVE EOSINOPHIL (OHS): 5.2 %
RELATIVE LYMPHOCYTE (OHS): 39.8 % (ref 18–48)
RELATIVE MONOCYTE (OHS): 8.8 % (ref 4–15)
RELATIVE NEUTROPHIL (OHS): 44.6 % (ref 38–73)
SODIUM SERPL-SCNC: 138 MMOL/L (ref 136–145)
TSH SERPL-ACNC: 2.25 UIU/ML (ref 0.4–4)
WBC # BLD AUTO: 5.91 K/UL (ref 3.9–12.7)

## 2025-03-24 PROCEDURE — 99999 PR PBB SHADOW E&M-EST. PATIENT-LVL IV: CPT | Mod: PBBFAC,,, | Performed by: COUNSELOR

## 2025-03-24 PROCEDURE — 99214 OFFICE O/P EST MOD 30 MIN: CPT | Mod: S$GLB,,, | Performed by: COUNSELOR

## 2025-03-24 PROCEDURE — 85025 COMPLETE CBC W/AUTO DIFF WBC: CPT

## 2025-03-24 PROCEDURE — 84443 ASSAY THYROID STIM HORMONE: CPT

## 2025-03-24 PROCEDURE — 36415 COLL VENOUS BLD VENIPUNCTURE: CPT

## 2025-03-24 PROCEDURE — 85652 RBC SED RATE AUTOMATED: CPT

## 2025-03-24 PROCEDURE — 86140 C-REACTIVE PROTEIN: CPT

## 2025-03-24 PROCEDURE — 80053 COMPREHEN METABOLIC PANEL: CPT

## 2025-03-24 NOTE — TELEPHONE ENCOUNTER
Spoke with pt and got her scheduled     Appointment Information   Name: TAYLOR BLANKENSHIP     Date: 3/24/2025     Appt Time: 1:30 PM     Visit Type: EP - PRIMARY CARE ([facility]) [486]     Provider: Jefe Hansen PA-C Dept: [facility], Internal Medicine       Dept Address: [address] [place] [id number]       Dept Phone Number: [phone]

## 2025-03-24 NOTE — PROGRESS NOTES
Subjective:       Patient ID: Maria Elena Jackson is a 74 y.o. female with history of HTN, HLD, s/pTLH with BSO, osteoporosis, UI     Chief Complaint: Left upper quadrant abdominal pain [R10.12]    Patient is known to me, PCP is Dr. Monroe Cutler. Here today for the following:    History of Present Illness    CHIEF COMPLAINT:  Patient presents today with left-sided pain that started Saturday night.    HISTORY OF PRESENT ILLNESS:  She reports left-sided pain that began Saturday night, initially attributed to sitting position on the couch. The pain is described as a pulled muscle sensation with a dull ache rather than burning. Pain is exacerbated when lying down but has not worsened over time. She denies recent strenuous exercise and reports minimal discomfort with palpation. She took Tylenol Saturday night followed by Aleve yesterday morning for relief, noting ibuprofen is effective for pain management. She denies any changes in bowel movements, including blood in stool or worsening constipation/diarrhea. She has measured her temperature at home reporting no fevers or chills. She denies any problems with swallowing. Colonoscopy 09/2022 showed non bleeding diverticular in sigmoid colon.     RECENT MEDICAL HISTORY:  She saw me for weight management 3/18/25. She reported abnormal urine odor. Urine culture confirmed E. Coli UTI and prescribed Bactrim. She completed this on Friday with complete resolution of symptoms. This also previously occurred 10/25.   She has an upcoming urogynecologist appointment scheduled for next week.    MEDICAL HISTORY:  She has history of osteoporosis, shingles affecting the back region, vaginal atrophy, and recurrent hematuria noted on urinalysis.    SURGICAL HISTORY:  She has history of hysterectomy and cholecystectomy.    IMMUNIZATIONS:  She completed shingles vaccination series with two doses in 2022.    SOCIAL HISTORY:  She reports occasional alcohol use, consuming half a glass of wine  "monthly when visiting her son and occasional beer.      ROS:  General: -fever, -chills, -fatigue, -weight gain, -weight loss  Eyes: -vision changes, -redness, -discharge  ENT: -ear pain, -nasal congestion, -sore throat  Cardiovascular: -chest pain, -palpitations, -lower extremity edema  Respiratory: -cough, -shortness of breath  Gastrointestinal: -abdominal pain, -nausea, -vomiting, -diarrhea, +constipation, -blood in stool  Genitourinary: -dysuria, -hematuria, -frequency  Musculoskeletal: -joint pain, +muscle pain, +pain with movement  Skin: -rash, -lesion  Neurological: -headache, -dizziness, -numbness, -tingling  Psychiatric: -anxiety, -depression, -sleep difficulty          Current Outpatient Medications   Medication Instructions    amoxicillin (AMOXIL) 500 MG capsule TAKE FOUR TABLETS ONE HOUR PRIOR TO DENTAL PROCEDURE    atorvastatin (LIPITOR) 40 mg, Oral, Nightly    calcium-vitamin D 250 mg-2.5 mcg (100 unit) per tablet 1 tablet, 2 times daily    carvediloL (COREG) 12.5 mg, Oral, 2 times daily with meals    cholecalciferol (vitamin D3) (VITAMIN D3) 1,000 Units, Daily    docusate sodium (COLACE) 100 mg, Oral, 2 times daily    doxazosin (CARDURA) 4 mg    estradioL (ESTRACE) 1 g, Vaginal, Three times weekly    furosemide (LASIX) 20 mg, Oral, Daily PRN    ibuprofen (ADVIL,MOTRIN) 600 mg, Oral, Every 6 hours PRN    irbesartan-hydrochlorothiazide (AVALIDE) 300-12.5 mg per tablet 1 tablet, Oral, Nightly    magnesium 250 mg Tab Once    NIFEdipine (PROCARDIA-XL) 60 mg, Oral    ZEPBOUND 2.5 mg, Subcutaneous, Every 7 days     Objective:      Vitals:    03/24/25 1339   BP: 118/60   Pulse: (!) 52   SpO2: 97%   Weight: 80.3 kg (177 lb 0.5 oz)   Height: 5' 5" (1.651 m)   PainSc:   2   PainLoc: Back     Body mass index is 29.46 kg/m².    Physical Exam  Vitals reviewed.   Constitutional:       General: She is not in acute distress.     Appearance: Normal appearance. She is not ill-appearing, toxic-appearing or diaphoretic. "   HENT:      Head: Normocephalic and atraumatic.   Cardiovascular:      Rate and Rhythm: Normal rate and regular rhythm.      Pulses: Normal pulses.      Heart sounds: Normal heart sounds. No murmur heard.     No friction rub. No gallop.   Pulmonary:      Effort: Pulmonary effort is normal. No respiratory distress.      Breath sounds: Normal breath sounds. No stridor. No wheezing, rhonchi or rales.   Abdominal:      General: Abdomen is flat. Bowel sounds are normal. There is no distension.      Palpations: Abdomen is soft. There is no mass.      Tenderness: There is abdominal tenderness. There is no right CVA tenderness, left CVA tenderness, guarding or rebound.      Hernia: No hernia is present.      Comments: LUQ abdominal pain with palpation worse when laying flat. Some tenderness on lateral aspect of abdomen when sitting upright.  No mass or distension appreciated.   Some red bumps noted on abdomen, not in a dermatomal distribution.    Musculoskeletal:      Right lower leg: No edema.      Left lower leg: No edema.   Skin:     General: Skin is warm and dry.   Neurological:      General: No focal deficit present.      Mental Status: She is alert and oriented to person, place, and time. Mental status is at baseline.      Gait: Gait normal.   Psychiatric:         Mood and Affect: Mood normal.         Behavior: Behavior normal.         Thought Content: Thought content normal.         Judgment: Judgment normal.         Assessment:       1. Left upper quadrant abdominal pain        PLAN SUMMARY:  - Ordered lab work including renal function tests and CRP  - Referred patient to urogynecologist for evaluation of recurrent UTIs  - Continue ibuprofen as needed for pain relief  - Await further instructions based on lab results, may include additional imaging if necessary  - Follow up with office if symptoms change or worsen    Plan:   Left upper quadrant abdominal pain  - Considered possibility of diverticulitis,  musculoskeletal, shingles, splenic pathology, gastritis, pancreatitis, or pyelonephritis as cause of symptoms. Unlikely pyelo since no CVA tenderness and resolution of urinary symptoms without fever or chills.   - She has no history of atrial fibrillation with echo showing grade I diastolic dysfunction in 02/23.  - Last lab work shows no signs of hemolysis and no trauma to indicate splenic damage. Repeating labs to assess any changes.   - Instructed the patient to monitor for changes in bowel movements, particularly constipation or hematochezia.  - Patient reports normal bowel movements and denies hematochezia.  - Patient denies history of diverticulosis or diverticulitis.  - Advised the patient to possibly transition to liquid diet if symptoms worsen.  - Based on lab work, may order additional imaging.  - Patient can continue symptomatic treatment with NSAIDs for pain.   -     CBC Auto Differential; Future; Expected date: 04/24/2025  -     TSH; Future; Expected date: 04/24/2025  -     Comprehensive Metabolic Panel; Future; Expected date: 04/24/2025  -     Sedimentation rate; Future; Expected date: 03/24/2025  -     C-Reactive Protein; Future; Expected date: 03/24/2025    OSTEOPOROSIS:  - Confirmed the patient's history of osteoporosis.    HEMATURIA:  - Patient reports history of hematuria.  - Attributed hematuria to possible vaginal atrophy.    SKIN ERUPTION:  - Observed some erythematous papules on the patient's skin.  - Considered early herpes zoster due to erythematous papules, but noted atypical presentation given vaccination status.    HISTORY OF INFECTIOUS DISEASES:  - Noted the patient's history of echovirus infection.    HISTORY OF URINARY TRACT INFECTIONS:  - Evaluated left-sided pain, initially considering possible relation to recent UTI.  - Ruled out urinary system involvement based on physical exam and symptom resolution.  - Patient recently completed antibiotic treatment for UTI.  - UTI symptoms have  resolved.  - Noted the patient has had 2 UTIs within 6 months.  - Referred the patient to urogynecologist for evaluation of recurrent UTIs.    ACQUIRED ABSENCE OF UTERUS:  - Noted the patient's history of hysterectomy.    ACQUIRED ABSENCE OF OTHER ORGANS:  - Noted the patient's history of cholecystectomy.    FOLLOW-UP AND FURTHER EVALUATION:  - Provided brief overview of CT procedure in case further imaging is needed.  - Ordered lab work to assess renal function and screen for diverticulosis before considering advanced imaging, including renal function tests and CRP.  - Advised the patient to follow up at [facility] to complete lab work.  - Instructed the patient to contact the office if symptoms change or worsen.  - Await further instructions based on lab results, which may include additional imaging if necessary.  - Consulted with nurse practitioner for second opinion.         This note was generated with the assistance of ambient listening technology. Verbal consent was obtained by the patient and accompanying visitor(s) for the recording of patient appointment to facilitate this note. I attest to having reviewed and edited the generated note for accuracy, though some syntax or spelling errors may persist. Please contact the author of this note for any clarification.    Robbie Hansen PA-C    3/24/2025

## 2025-03-24 NOTE — TELEPHONE ENCOUNTER
"Pt update:   " My UTI seems better but over the weekend I am experiencing what feels like a muscle ache ( like I used a muscle at exercise) on my front left side flank. Should I give it a few days in case it was something from exercise or does it sound like a pain from an infection that has moved to my kidneys?  (Of course I looked it up on the internet ) l feel fine otherwise with no temperature. Thanks for your advise."            "

## 2025-03-25 ENCOUNTER — TELEPHONE (OUTPATIENT)
Dept: INTERNAL MEDICINE | Facility: CLINIC | Age: 75
End: 2025-03-25
Payer: OTHER GOVERNMENT

## 2025-03-25 ENCOUNTER — RESULTS FOLLOW-UP (OUTPATIENT)
Dept: INTERNAL MEDICINE | Facility: CLINIC | Age: 75
End: 2025-03-25

## 2025-03-25 NOTE — TELEPHONE ENCOUNTER
Spoke to patient on telephone. Discussed that lab results were reassuring showing no signs of increased inflammation with ESR and CRP. CMP and CBC were normal, with slight drop in HgB, but still within normal range and no signs of microcytosis or other hemolytic process. Went through possible diagnoses with patient including gastritis, pancreatitis, diverticulitis, musculoskeletal strain, colitis. Patient's symptoms were unchanged from yesterday. They deny any GI symptoms including nausea/vomiting, changes in pain related to food, difficulty swallowing, hematemesis, recent illnesses/sore throat, changes in bowel movements, hematochezia, painful BM's. They did report a fall two weeks ago onto their left side after mechanical tripping incident. They scraped their left elbow and the next day experienced some pain on that side. We discussed that their results and findings have been reassuring thus far and we can continue to monitor with strict precautions to return to clinic with any changes or worsening of symptoms. Will discuss results with PCP to ensure no recommendation for other diagnostic imaging. Patient expressed understanding and is agreeable with this plan.

## 2025-03-25 NOTE — TELEPHONE ENCOUNTER
"Pt update:   "   I was just looking at my results and I think I should retake the glucose part. It may be high because when I left [place] I was very hungry and I ate at least 5 or more peppermint candies on my way to [place] for the blood test. It took a while to get there and I was just not thinking. Let me know."        "

## 2025-04-01 ENCOUNTER — OFFICE VISIT (OUTPATIENT)
Dept: UROGYNECOLOGY | Facility: CLINIC | Age: 75
End: 2025-04-01
Payer: OTHER GOVERNMENT

## 2025-04-01 VITALS
HEART RATE: 54 BPM | HEIGHT: 65 IN | SYSTOLIC BLOOD PRESSURE: 128 MMHG | BODY MASS INDEX: 29.57 KG/M2 | DIASTOLIC BLOOD PRESSURE: 55 MMHG | WEIGHT: 177.5 LBS

## 2025-04-01 DIAGNOSIS — Z98.890 HISTORY OF SUBURETHRAL SLING PROCEDURE: ICD-10-CM

## 2025-04-01 DIAGNOSIS — K59.09 CHRONIC CONSTIPATION: ICD-10-CM

## 2025-04-01 DIAGNOSIS — N39.0 FREQUENT UTI: Primary | ICD-10-CM

## 2025-04-01 DIAGNOSIS — N89.8 VAGINAL DRYNESS: ICD-10-CM

## 2025-04-01 PROCEDURE — 99999 PR PBB SHADOW E&M-EST. PATIENT-LVL IV: CPT | Mod: PBBFAC,,,

## 2025-04-01 PROCEDURE — 51701 INSERT BLADDER CATHETER: CPT | Mod: S$GLB,,,

## 2025-04-01 PROCEDURE — 99214 OFFICE O/P EST MOD 30 MIN: CPT | Mod: 25,S$GLB,,

## 2025-04-01 PROCEDURE — 87086 URINE CULTURE/COLONY COUNT: CPT

## 2025-04-01 NOTE — PATIENT INSTRUCTIONS
"1)  Frequent UTIs (bladder infections):  --urine C&S sent today  --If you feel like you have a UTI:     --During the work week: call PCP or go to nearest [facility]   --After hours or on weekends: go to nearest [facility]    --These facilities can check your urine for infection, send a urine culture to verify presence/absence of infection, and start treatment if needed.    --After you are evaluated, please send a message through [facility] portal or call your urogynecology provider's office to let them know so that they can follow trends.  --follow UTI prevention tips (see attached)  --work on emptying bladder well:  --empty bladder every 2-3 hours.  Empty well: wait a minute, lean forward on toilet.    --prolapse present: none   --PVR today: 5 mL  --control bowel movements/fecal cross-contamination  --treat vaginal atrophy (dryness)  --empty bladder before and after intercourse  --consider taking daily combination pill: cranberry + D-mannose (0608-9636 mg) + probiotic    --look on [employer] or in drug/grocery store for any brand that has these components   --examples of brands: Biophix, Now, AZO  --consider need for further evaluation ( tract imaging, cystoscopy) if issue persists    2. Constipation  -- Controlling constipation can help bladder urgency/leakage, and pelvic pain and fiber may better control cholesterol and blood glucose.   -- Increase daily fiber.  Start taking 2 tsp of fiber powder (Benefiber) or fiber gummies or fiber pills.  Mix in 8 oz of water.  Take x 3-5 days.  Then, increase fiber by 1 tsp every 3-5 days until stool is easy to pass.  Stop and continue at that dose.  **Do not exceed 6 tsps/day.   -- Also use over the counter stool softener (ex Colace/docusate) 1-2 x/day.  **AVOID laxatives.  -- Also try "Natural Vitality Calm" powder or magnesium oxide 400-500 mg per night (helps with sleep, anxiety, and constipation)  -- Drink plenty of water!!  -- Get a SQUATTY POTTY!    3. " Vaginal dryness (atrophy):    -- continue using 1 gram of estrogen cream in vagina nightly x 2 weeks, then twice a week thereafter.  Remove the applicator and use your finger (up to the second knuckle) to place a dime-sized amount on the inside of your vagina and then around the outer lips and opening.     4. RTC 6 months    --------------------------------------------------------------------------------------------------------    UTI PREVENTION: (from National Association for Continence)  Urinary Tract Infection (UTI)  More than 4 million doctor office visits per year in the [place] are for urinary tract infections (UTI). About 12% of men and 50% of women will have a UTI during his or her lifetime. Most UTIs arise from bacteria that normally live in the colon and rectum and are present in bowel movements. These bacteria cling to the opening of the urethra, begin to multiply, & travel up to the bladder. Urine flow from the bladder usually washes bacteria out of the body. However, because women have a shorter urethra than men, bacteria can reach the bladder more easily and settle into the bladder wall. This is why women are more likely to develop UTIs than men. This risk factor is exacerbated by the greater likelihood that women introduce bacteria from fecal matter, following a bowel movement, into the urinary tract. Less often, bacteria can spread to the kidney from the bloodstream. A recurrent UTI is classified as three or more a year.  Risk Factors for UTI  Several factors can contribute to the risk of UTI. Sexual intercourse, use of contraceptive spermicide, low levels of estrogen, catheterization, diabetes, pregnancy, and immune suppression increase susceptibility to UTI.  Naturally occurring estrogen helps prevent recurrent UTI in women. After menopause, estrogen levels drop along with the number of vaginal lactobacilli, the good bacteria which prevent growth of intestinal bacteria in the vagina. This  makes postmenopausal women especially susceptible to UTI.  Catheters also present a risk of recurring UTI. Catheters are associated with colonization of bacteria and increased risks of clinical infection. Using the techniques described previously can help keep catheters clean and prevent recurrent UTI. While single-use of sterile catheters reduces the risks, it does not prevent UTI from occurring. It is therefore important to maintain proper care and use of catheters at all times while remaining alert to symptoms of UTI.  Common Symptoms of UTI   Painful urination   Frequency   Urgency   Lower abdominal or pelvic pain or pressure   Blood in the urine   Milky, cloudy, or pink/red urine   Fever   Strong smelling urine  In the elderly populations, symptoms of a urinary tract infection, can be easily overlooked, causing a delay in diagnosis. Elderly people with a UTI are more likely than younger people not to be diagnosed until the complication of sepsis occurs. The elderly often do not experience or report obvious symptoms that younger people have, such as difficult urination, or frequent urination. Instead they may exhibit far more vague symptoms that are similiar to many disease or may be assumed to be due to the aging process. These symptoms include: confusion, feelings of general discomfort, disorientation, fatigue, weakness, behavior changes, falling, and/or a new, acute incontinence. In addition, because incontinence is common in the elderly, they may not be aware of the symptom of frequency. If you experience any of these symptoms, see your healthcare professional.  Types of UTIs   Cystitis - an inflammation of the bladder and the most common UTI. Almost always, it occurs in women. The infection typically affects only the surface of the bladder and is brief & acute.   Pyelonephritis - more commonly known as a kidney infection and usually occurs when a lower UTI spreads to the kidneys. Occasionally, bacteria will  spread to the kidney from the bloodstresam. Kidney infections are more serious and less common than bladder infections. Symptoms include a fever, pain in the back or side below the ribs, nausea, or vomiting.   Urethritis - is an inflammation of the urethra. Men commonly contract urethritis through sexual intercourse with partners infected with sexually transmitted infections. Urethritis may also result from trauma to the area or from the catheterization process.  Prevention of UTI  There are several ways to prevent bladder infections.  Bathroom Behavior:Periodically emptying the bladder by trying to urinate every two to three hours.  Diet:The use of cranberry products seems to decrease the ability of bacteria to adhere to the lining of the urethra and bladder. As cranberry juice can have a high amount of sugar, cranberry extract can be taken in capsule or pill form instead. Increasing water intake by one or two glasses per day may help limit the length of time that you have symptoms and reduce the infections.  Hygiene: Proper hygiene in caring for the urethral area is another way to limit the amount or type of bacteria that can be drawn into the urethra. This is especially true in people who have decreased sensation in the perineal region such as those with MS or who experience any amount of fecal incontinence. Using soap & water or commercially available cleansing wipes several times per day & frequent changing of incontinence pads as they become wet can minimize the amount of bacteria in the urethral area. Women should always wipe from the front of the vagina to the back of the anus after urination or a bowel movement and wear cotton underwear. It is also reported that wearing thong undergarments may increase one's risk of developing an infection.  Sexual Activity: Can be the source of UTIs in women. Insuring proper lubrication to the vagina and voiding before and after intercourse are tactics to help prevent  infection. Using diaphragms and spermicidal jelly and/or foam may increase the risk of infection, so it is important to evaluate what type of birth control you use. Some physicians encourage women who have a history of recurrent infections to take an prophylactic antibiotic after intercourse, as it reduces risk of recurrent UTI by about 85%. At a minimum, restrict your number of sexual partners and urinate immediately after sexual intimacy to lower the risk of recurrent UTIs.  Vaginal Estrogen: reduces risk of recurrent UTI by repopulating the normal vaginal lactobacilli that keep bacteria from the rectum from multiplying and causing a bladder infection. Forms of vaginal estrogen are available at very low dosages that have minimal systemic absorption.  Catheter Use: proper cleansing and storage of catheters is important in one who intermittently catheterizes, as the catheter can be a vehicle to introduce infection if the technique is incorrect or if the catheters are not properly cleaned between each use. A closed system catheter provides a reliable means of sterile IC because the introducer tip is surrounded by a urine collection bag and never exposed to bacteria typically found at the urethral opening. This greatly reduces the risk of infection.  NOTE: Vaginal estrogens and antibiotics are medications that need to be prescribed by your healthcare provider.  Treatment of UTI  Depending on the severity of infection, UTI can be treated with oral antibiotics. A three-day course of antibiotics can treat a simple UTI. However, some infections may need to be treated for several days or weeks. Length of antibiotic treatment also depends on the type of antibiotic prescribed.  Even if a few doses of medication relieve some symptoms, you should still complete the full course of medication prescribed by your doctor. Taking aspirin, Tylenol, or non-steroidal, anti-inflammatory medications may reduce symptoms during this  episode, as they may be a result of increased body temperature.  For more information regarding UTIs please visit: http://www.ncbi.nlm.nih.gov/pubmedhealth/BMZ5694311/

## 2025-04-01 NOTE — PROGRESS NOTES
"Urogyn follow up  2025    .  [facility] - UROGYNECOLOGY  [address] [address]    Maria Elena Jackson  [id number]  1950    Maria Elena Jackson is a 74 y.o. here for a urogyn follow up.    CC: Follow up visit  S/p TVT-O and cystoscopy 23     HPI:  Patient is a 74 y.o. female  presents today for a postoperative visit. She denies vaginal bleeding and pain. She denies urinary urgency and frequency. She denies urgency urinary incontinence and leakage with coughing, sneezing and laughing.     Changes from last visit (10/01/2024):  -- two UTIs in the past six months and was told by PCP to f/u with urogyn  -- doesn't usually know when she has a UTI - happened to find out most recent one at routine PCP visit   -- reports mild increase in odor and "foam" in urine  -- no dysuria, frequency, pelvic pressure. No fever, back pain, N/V/D.  -- using vaginal estrogen cream twice a week  -- reports chronic constipation. Drinks a lot of water, but no supplements  -- finished bactrim BID x 3 days 3/20/25 per PCP  -- recent urine cultures:     Urine Culture   10/23/2024 ESCHERICHIA COLI !    3/18/2025 ESCHERICHIA COLI !         Past Medical History:   Diagnosis Date    Arthritis     Hyperlipidemia     Hypertension     S/P total hysterectomy and bilateral salpingo-oophorectomy 2022       Past Surgical History:   Procedure Laterality Date    ADENOIDECTOMY      CHOLECYSTECTOMY      CYSTOSCOPY N/A 2023    Procedure: CYSTOSCOPY;  Surgeon: Nedra Younger MD;  Location: [facility];  Service: OB/GYN;  Laterality: N/A;    D&C Hysteroscopy      DILATION AND CURETTAGE OF UTERUS      HYSTERECTOMY      HYSTEROSCOPY WITH DILATION AND CURETTAGE OF UTERUS N/A 05/10/2022    Procedure: HYSTEROSCOPY, WITH DILATION AND CURETTAGE OF UTERUS;  Surgeon: Marce Ellsworth MD;  Location: [facility];  Service: OB/GYN;  Laterality: N/A;    INSERTION OF MIDURETHRAL SLING N/A 2023    Procedure: SLING, " MIDURETHRAL;  Surgeon: Nedra Younger MD;  Location: [facility];  Service: OB/GYN;  Laterality: N/A;  tvt-O    JOINT REPLACEMENT  10/19    laparoscopy      LYMPH NODE BIOPSY N/A 2022    Procedure: BIOPSY, LYMPH NODE;  Surgeon: Kevin Mcgraw MD;  Location: [facility];  Service: OB/GYN;  Laterality: N/A;    OOPHORECTOMY      TONSILLECTOMY      TOTAL KNEE ARTHROPLASTY Right 10/07/2019    Procedure: ARTHROPLASTY, KNEE, TOTAL;  Surgeon: John L. [facility] Jr., MD;  Location: [facility];  Service: Orthopedics;  Laterality: Right;    XI ROBOTIC HYSTERECTOMY, WITH SALPINGO-OOPHORECTOMY Bilateral 2022    Procedure: XI ROBOTIC HYSTERECTOMY,WITH SALPINGO-OOPHORECTOMY;  Surgeon: Kevin Mcgraw MD;  Location: [facility];  Service: OB/GYN;  Laterality: Bilateral;  Robotic assisted total hysterectomy, Bilateral salpingo-oophrectomy, Rosser Node Mapping         Family History   Problem Relation Name Age of Onset    Diabetes Maternal Grandmother hrandmother     Diabetes Maternal Grandfather Grandfather     Hypertension Father Father     Prostate cancer Father Father 50    Colon cancer Mother Mother 55    Ovarian cancer Mother Mother 35    Cancer Mother Mother     Squamous cell carcinoma Brother      Heart attack Brother  55    Breast cancer Neg Hx      Stroke Neg Hx         Social History     Socioeconomic History    Marital status:    Tobacco Use    Smoking status: Former     Current packs/day: 0.00     Average packs/day: 1.5 packs/day for 22.0 years (33.0 ttl pk-yrs)     Types: Cigarettes     Start date:      Quit date:      Years since quittin.2    Smokeless tobacco: Never   Substance and Sexual Activity    Alcohol use: Yes     Comment: One to two glasses a month    Drug use: No    Sexual activity: Not Currently     Partners: Male     Birth control/protection: Post-menopausal     Comment:  x 54 years   Social History Narrative    Lives with spouse. Feels safe in her home.      Social Drivers of  "Health     Financial Resource Strain: Patient Declined (4/3/2024)    Overall Financial Resource Strain (CARDIA)     Difficulty of Paying Living Expenses: Patient declined   Food Insecurity: Patient Declined (4/3/2024)    Hunger Vital Sign     Worried About Running Out of Food in the Last Year: Patient declined     Ran Out of Food in the Last Year: Patient declined   Transportation Needs: Patient Declined (4/3/2024)    PRAPARE - Transportation     Lack of Transportation (Medical): Patient declined     Lack of Transportation (Non-Medical): Patient declined   Physical Activity: Sufficiently Active (4/3/2024)    Exercise Vital Sign     Days of Exercise per Week: 3 days     Minutes of Exercise per Session: 60 min   Stress: No Stress Concern Present (4/3/2024)    [employer] - Occupational Stress Questionnaire     Feeling of Stress : Not at all   Housing Stability: Patient Declined (4/3/2024)    Housing Stability Vital Sign     Unable to Pay for Housing in the Last Year: Patient declined     Unstable Housing in the Last Year: Patient declined       Current Medications[1]    Review of patient's allergies indicates:  No Known Allergies    ROS:  As per HPI.      Exam  BP (!) 128/55   Pulse (!) 54   Ht 5' 5" (1.651 m)   Wt 80.5 kg (177 lb 7.5 oz)   LMP 01/01/2004 (Approximate)   BMI 29.53 kg/m²   General: Healthy in appearance, Well nourished, Affect Normal, NAD.  Ext: No clubbing, cyanosis, edema or varicosities.     Genitourinary-  Vulva: normal without lesions, masses, atrophy or pain  Urethra: meatus central and normal in appearance, no prolapse/caruncle, no masses or discharge. Empty supine stress test was negative.   Bladder: non-tender, no masses  Vagina: No discharge or lesions, severe atrophy, no masses appreciated, Mesh erosion: none.   Cervix: absent    POP deferred. No obvious prolapse on exam.    Impression  1. Frequent UTI        2. History of suburethral sling procedure      "   3. Vaginal dryness        4. Chronic constipation          We reviewed the above issues and discussed options for short-term versus long-term management of her problems.   Plan:   1)  Frequent UTIs (bladder infections):  --urine C&S sent today  --If you feel like you have a UTI:     --During the work week: call PCP or go to nearest [facility]   --After hours or on weekends: go to nearest [facility]    --These facilities can check your urine for infection, send a urine culture to verify presence/absence of infection, and start treatment if needed.    --After you are evaluated, please send a message through [facility] portal or call your urogynecology provider's office to let them know so that they can follow trends.  --follow UTI prevention tips (see attached)  --work on emptying bladder well:  --empty bladder every 2-3 hours.  Empty well: wait a minute, lean forward on toilet.    --prolapse present: none   --PVR today: 5 mL  --control bowel movements/fecal cross-contamination  --treat vaginal atrophy (dryness)  --empty bladder before and after intercourse  --consider taking daily combination pill: cranberry + D-mannose (4869-5241 mg) + probiotic    --look on [employer] or in drug/grocery store for any brand that has these components   --examples of brands: Biophix, Now, AZO  --consider need for further evaluation ( tract imaging, cystoscopy) if issue persists    2. Constipation  -- Controlling constipation can help bladder urgency/leakage, and pelvic pain and fiber may better control cholesterol and blood glucose.   -- Increase daily fiber.  Start taking 2 tsp of fiber powder (Benefiber) or fiber gummies or fiber pills.  Mix in 8 oz of water.  Take x 3-5 days.  Then, increase fiber by 1 tsp every 3-5 days until stool is easy to pass.  Stop and continue at that dose.   **Do not exceed 6 tsps/day.   -- Also use over the counter stool softener (ex Colace/docusate) 1-2 x/day.  **AVOID laxatives.  -- Also  "try "Natural Vitality Calm" powder or magnesium oxide 400-500 mg per night (helps with sleep, anxiety, and constipation)  -- Drink plenty of water!!  -- Get a SQUATTY POTTY!    3. Vaginal dryness (atrophy):    -- continue using 1 gram of estrogen cream in vagina nightly x 2 weeks, then twice a week thereafter.  Remove the applicator and use your finger (up to the second knuckle) to place a dime-sized amount on the inside of your vagina and then around the outer lips and opening.       I spent a total of 30 minutes on the day of the visit.  This includes face to face time and non-face to face time preparing to see the patient (eg, review of tests), obtaining and/or reviewing separately obtained history, documenting clinical information in the electronic or other health record, independently interpreting results and communicating results to the patient/family/caregiver, or care coordinator.      Agustina Chaudhary PA-C  [facility]  Division of Female Pelvic Medicine and Reconstructive Surgery  Department of Obstetrics & Gynecology           [1]   Current Outpatient Medications   Medication Sig Dispense Refill    atorvastatin (LIPITOR) 40 MG tablet Take 1 tablet (40 mg total) by mouth every evening. 90 tablet 3    calcium-vitamin D 250 mg-2.5 mcg (100 unit) per tablet Take 1 tablet by mouth 2 (two) times daily.      carvediloL (COREG) 12.5 MG tablet Take 1 tablet (12.5 mg total) by mouth 2 (two) times daily with meals. 180 tablet 3    cholecalciferol, vitamin D3, (VITAMIN D3) 25 mcg (1,000 unit) capsule Take 1,000 Units by mouth once daily.      estradioL (ESTRACE) 0.01 % (0.1 mg/gram) vaginal cream Place 1 g vaginally 3 (three) times a week. 42 g 3    irbesartan-hydrochlorothiazide (AVALIDE) 300-12.5 mg per tablet TAKE 1 TABLET BY MOUTH EVERY DAY AT NIGHT 90 tablet 2    magnesium 250 mg Tab Take by mouth once.      NIFEdipine (PROCARDIA-XL) 60 MG (OSM) 24 hr tablet TAKE 1 TABLET BY MOUTH EVERY DAY 90 tablet " 3    amoxicillin (AMOXIL) 500 MG capsule TAKE FOUR TABLETS ONE HOUR PRIOR TO DENTAL PROCEDURE (Patient not taking: Reported on 4/1/2025) 12 capsule 2    docusate sodium (COLACE) 100 MG capsule Take 1 capsule (100 mg total) by mouth 2 (two) times daily. (Patient not taking: Reported on 4/1/2025) 60 capsule 1    doxazosin (CARDURA) 4 MG tablet Take 4 mg by mouth. (Patient not taking: Reported on 4/1/2025)      furosemide (LASIX) 20 MG tablet Take 1 tablet (20 mg total) by mouth daily as needed (leg swelling). (Patient not taking: Reported on 4/1/2025) 30 tablet 0    ibuprofen (ADVIL,MOTRIN) 600 MG tablet Take 1 tablet (600 mg total) by mouth every 6 (six) hours as needed for Pain. (Patient not taking: Reported on 4/1/2025) 30 tablet 1    tirzepatide, weight loss, (ZEPBOUND) 2.5 mg/0.5 mL PnIj Inject 2.5 mg into the skin every 7 days. (Patient not taking: Reported on 4/1/2025) 2 mL 0     No current facility-administered medications for this visit.

## 2025-04-03 LAB — BACTERIA UR CULT: NO GROWTH

## 2025-04-04 ENCOUNTER — RESULTS FOLLOW-UP (OUTPATIENT)
Dept: UROGYNECOLOGY | Facility: CLINIC | Age: 75
End: 2025-04-04

## 2025-05-05 DIAGNOSIS — Z96.651 HISTORY OF RIGHT KNEE JOINT REPLACEMENT: Primary | ICD-10-CM

## 2025-05-05 RX ORDER — AMOXICILLIN 500 MG/1
CAPSULE ORAL
Qty: 12 CAPSULE | Refills: 2 | Status: SHIPPED | OUTPATIENT
Start: 2025-05-05

## 2025-06-05 DIAGNOSIS — I10 ESSENTIAL HYPERTENSION: ICD-10-CM

## 2025-06-05 RX ORDER — IRBESARTAN AND HYDROCHLOROTHIAZIDE 300; 12.5 MG/1; MG/1
1 TABLET, FILM COATED ORAL NIGHTLY
Qty: 90 TABLET | Refills: 1 | Status: SHIPPED | OUTPATIENT
Start: 2025-06-05

## 2025-06-05 RX ORDER — NIFEDIPINE 60 MG/1
60 TABLET, EXTENDED RELEASE ORAL
Qty: 90 TABLET | Refills: 1 | Status: SHIPPED | OUTPATIENT
Start: 2025-06-05

## 2025-07-02 DIAGNOSIS — Z78.0 MENOPAUSE: ICD-10-CM

## (undated) DEVICE — SET CYSTO IRRIGATION UNIV SPIK

## (undated) DEVICE — SYR 30CC LUER LOCK

## (undated) DEVICE — SET TRI-LUMEN FILTERED TUBE

## (undated) DEVICE — GLOVE SENSICARE PI SURG 6.5

## (undated) DEVICE — JELLY SURGILUBE 5GR

## (undated) DEVICE — PUMP COLD THERAPY

## (undated) DEVICE — GLOVE BIOGEL ORTHOPEDIC 7.5

## (undated) DEVICE — UNDERGLOVES BIOGEL PI SZ 7 LF

## (undated) DEVICE — SUT V-LOC ABSRB WOUND CLSR

## (undated) DEVICE — PAD CAST SPECIALIST STRL 6

## (undated) DEVICE — GLOVE BIOGEL SKINSENSE PI 6.5

## (undated) DEVICE — UNDERGLOVES BIOGEL PI SIZE 8

## (undated) DEVICE — KIT TOTAL KNEE TKOFG

## (undated) DEVICE — IRRIGATOR ENDOSCOPY DISP.

## (undated) DEVICE — BLADE SAG 13.0 X1.27X90

## (undated) DEVICE — BIT DRILL PIN TROCAR 3.2MM
Type: IMPLANTABLE DEVICE | Site: KNEE | Status: NON-FUNCTIONAL
Removed: 2019-10-07

## (undated) DEVICE — Device

## (undated) DEVICE — SOL BETADINE 5%

## (undated) DEVICE — SOL PVP-I SCRUB 7.5% 4OZ

## (undated) DEVICE — BLADE SAG 18.0X1.27X100

## (undated) DEVICE — HOOD T-5 TEAR AWAY STERILE

## (undated) DEVICE — DEVICE ANC SW STAT FOLEY 6-24

## (undated) DEVICE — ELECTRODE REM PLYHSV RETURN 9

## (undated) DEVICE — OCCLUDER COLPO-PNEUMO STERILE

## (undated) DEVICE — NDL HYPO REG 25G X 1 1/2

## (undated) DEVICE — SYR ONLY LUER LOCK 20CC

## (undated) DEVICE — OBTURATOR BLADELESS 8MM XI CLR

## (undated) DEVICE — INSERT CUSHIONPRONE VIEW LARGE

## (undated) DEVICE — SEE L#120831

## (undated) DEVICE — KIT IRR SUCTION HND PIECE

## (undated) DEVICE — SUT VICRYL 2-0 CT-2 VCP269H

## (undated) DEVICE — NDL 18GA X1 1/2 REG BEVEL

## (undated) DEVICE — SUT CTD VICRYL 0 UND BR CPX

## (undated) DEVICE — GLOVE SENSICARE PI GRN 6.5

## (undated) DEVICE — SUT VICRYL 3-0 27 SH

## (undated) DEVICE — SEE MEDLINE ITEM 146298

## (undated) DEVICE — SYR B-D DISP CONTROL 10CC100/C

## (undated) DEVICE — DRESSING AQUACEL AG ADV 3.5X12

## (undated) DEVICE — PAD PREP CUFFED NS 24X48IN

## (undated) DEVICE — ADHESIVE DERMABOND ADVANCED

## (undated) DEVICE — SET CEMENT (SCULP)

## (undated) DEVICE — DRAPE STERI INSTRUMENT 1018

## (undated) DEVICE — STRIP MEDI WND CLSR 1/2X4IN

## (undated) DEVICE — SEE MEDLINE ITEM 157117

## (undated) DEVICE — SCREW HEX HEAD
Type: IMPLANTABLE DEVICE | Site: KNEE | Status: NON-FUNCTIONAL
Removed: 2019-10-07

## (undated) DEVICE — SOL POVIDONE PREP IODINE 4 OZ

## (undated) DEVICE — SET CYSTO IRR DRP CHMBR 84IN

## (undated) DEVICE — SEE MEDLINE ITEM 152487

## (undated) DEVICE — GOWN SMARTGOWN LVL4 X-LONG XL

## (undated) DEVICE — SCREW HEX HEAD 3.5X38MM
Type: IMPLANTABLE DEVICE | Site: KNEE | Status: NON-FUNCTIONAL
Removed: 2019-10-07

## (undated) DEVICE — SOL IRR SOD CHL .9% POUR

## (undated) DEVICE — SET BASIN 48X48IN 6000ML RING

## (undated) DEVICE — SUT MCRYL PLUS 4-0 PS2 27IN

## (undated) DEVICE — TAPE SURG DURAPORE 2 X10YD

## (undated) DEVICE — SOL WATER STRL IRR 1000ML

## (undated) DEVICE — PACK LAPSCP/PELVSCPY III TIBRN

## (undated) DEVICE — SEE MEDLINE ITEM 146345

## (undated) DEVICE — SYR 10CC LUER LOCK

## (undated) DEVICE — BOWL CEMENT

## (undated) DEVICE — TRAY CATH FOL SIL URIMTR 16FR

## (undated) DEVICE — TOURNIQUET SB QC DP 34X4IN

## (undated) DEVICE — SEAL UNIVERSAL 5MM-8MM XI

## (undated) DEVICE — TROCAR ENDOPATH XCEL 5X100MM

## (undated) DEVICE — SEE MEDLINE ITEM 152515

## (undated) DEVICE — SOL NS 1000CC

## (undated) DEVICE — SEE MEDLINE ITEM 156923

## (undated) DEVICE — SOL IRR NACL .9% 3000ML

## (undated) DEVICE — SEAL LENS SCOPE MYOSURE

## (undated) DEVICE — SEE MEDLINE ITEM 157150

## (undated) DEVICE — SUT VICRYL BR 1 GEN 27 CT-1

## (undated) DEVICE — DRAPE UND BUTT W/POUCH 40X44IN

## (undated) DEVICE — GLOVE BIOGEL SKINSENSE PI 7.0

## (undated) DEVICE — COVER TIP CURVED SCISSORS XI

## (undated) DEVICE — PACK FLUENT DISPOSABLE

## (undated) DEVICE — SYS SEE SHARP SCOPE ANTIFOG

## (undated) DEVICE — SYR IRRIGATION BULB STER 60ML

## (undated) DEVICE — CLIPPER BLADE MOD 4406 (CAREF)

## (undated) DEVICE — BLADE SURG STAINLESS STEEL #15

## (undated) DEVICE — SUT MONOCRYL 3-0 PS-2 UND

## (undated) DEVICE — GLOVE BIOGEL SKINSENSE PI 7.5

## (undated) DEVICE — SEE MEDLINE ITEM 146231

## (undated) DEVICE — SOL POVIDONE SCRUB IODINE 4 OZ

## (undated) DEVICE — GAUZE SPONGE 4X4 12PLY

## (undated) DEVICE — KIT SURGIFLO HEMOSTATIC MATRIX

## (undated) DEVICE — NDL WILLIAMS CYSTOSCOPIC

## (undated) DEVICE — KIT WING PAD POSITIONING

## (undated) DEVICE — SEE MEDLINE ITEM 154981

## (undated) DEVICE — SUT VICRYL PLUS 0 CT1 36IN

## (undated) DEVICE — TOWEL OR DISP STRL BLUE 4/PK

## (undated) DEVICE — SUT VICRYL PLUS 4-0 P3 18IN

## (undated) DEVICE — APPLICATOR ENDOSCOPIC

## (undated) DEVICE — BANDAGE ESMARK 6X12

## (undated) DEVICE — PAD COLD THERAPY KNEE WRAP ON

## (undated) DEVICE — CLOSURE SKIN STERI STRIP 1/4X4

## (undated) DEVICE — BLADE RECIP RIBBED

## (undated) DEVICE — TRAY DRY SKIN SCRUB PREP

## (undated) DEVICE — SYR 50ML CATH TIP

## (undated) DEVICE — DRAPE ARM DAVINCI XI

## (undated) DEVICE — SOL ELECTROLUBE ANTI-STIC

## (undated) DEVICE — DRAPE COLUMN DAVINCI XI